# Patient Record
Sex: FEMALE | Race: WHITE | NOT HISPANIC OR LATINO | Employment: OTHER | URBAN - METROPOLITAN AREA
[De-identification: names, ages, dates, MRNs, and addresses within clinical notes are randomized per-mention and may not be internally consistent; named-entity substitution may affect disease eponyms.]

---

## 2017-01-03 ENCOUNTER — PATIENT MESSAGE (OUTPATIENT)
Dept: RHEUMATOLOGY | Facility: CLINIC | Age: 57
End: 2017-01-03

## 2017-01-17 ENCOUNTER — TELEPHONE (OUTPATIENT)
Dept: RHEUMATOLOGY | Facility: CLINIC | Age: 57
End: 2017-01-17

## 2017-01-17 NOTE — TELEPHONE ENCOUNTER
----- Message from Shagufta Clark sent at 1/17/2017  1:19 PM CST -----  Contact: Zeinab Al office called regarding a clearance needed for the patient. Stating having surgery. Please contact [phone] and email address is [email or web address]

## 2017-01-18 ENCOUNTER — TELEPHONE (OUTPATIENT)
Dept: FAMILY MEDICINE | Facility: CLINIC | Age: 57
End: 2017-01-18

## 2017-01-18 NOTE — TELEPHONE ENCOUNTER
Called Zeinab with Dr. Chinchilla office, she was cleared by Dr. Fry to have surgery, and had a few questions like who prescribed her the sildenafil and imuran. Advised Dr. Fry was the last one to prescribe her that and she stated she will pre op her today and that's all she needed, and she will just contact Dr. Fry office for any other clearance questions.

## 2017-01-18 NOTE — TELEPHONE ENCOUNTER
----- Message from Shagufta Clark sent at 1/17/2017  1:17 PM CST -----  Contact: Zeinab Al office called regarding a clearance needed for the patient. Stating having surgery. Please contact [phone] and email address is [email or web address]

## 2017-01-19 ENCOUNTER — TELEPHONE (OUTPATIENT)
Dept: RHEUMATOLOGY | Facility: CLINIC | Age: 57
End: 2017-01-19

## 2017-01-19 NOTE — TELEPHONE ENCOUNTER
Returned Dr. Beavers's office's call. Informed patient to hold Imuran one week prior and Revation day of surgery.

## 2017-01-19 NOTE — TELEPHONE ENCOUNTER
----- Message from Hawaleo Armstrong sent at 1/18/2017  8:47 AM CST -----  Contact: [phone]/Zeinab/Renée  Please call Dr. Chinchilla's office/Zeinab at [phone] in reference to patients medications.

## 2017-03-17 ENCOUNTER — PATIENT MESSAGE (OUTPATIENT)
Dept: RHEUMATOLOGY | Facility: CLINIC | Age: 57
End: 2017-03-17

## 2017-03-20 ENCOUNTER — TELEPHONE (OUTPATIENT)
Dept: RHEUMATOLOGY | Facility: CLINIC | Age: 57
End: 2017-03-20

## 2017-03-20 ENCOUNTER — LAB VISIT (OUTPATIENT)
Dept: LAB | Facility: HOSPITAL | Age: 57
End: 2017-03-20
Attending: INTERNAL MEDICINE
Payer: MEDICARE

## 2017-03-20 DIAGNOSIS — K22.4 ESOPHAGEAL DYSMOTILITY: ICD-10-CM

## 2017-03-20 DIAGNOSIS — I27.20 PULMONARY HYPERTENSION: ICD-10-CM

## 2017-03-20 DIAGNOSIS — E03.9 HYPOTHYROIDISM, UNSPECIFIED TYPE: ICD-10-CM

## 2017-03-20 DIAGNOSIS — M34.1 CREST (CALCINOSIS, RAYNAUD'S PHENOMENON, ESOPHAGEAL DYSFUNCTION, SCLERODACTYLY, TELANGIECTASIA): ICD-10-CM

## 2017-03-20 LAB
ALBUMIN SERPL BCP-MCNC: 3.5 G/DL
ALP SERPL-CCNC: 71 U/L
ALT SERPL W/O P-5'-P-CCNC: 25 U/L
ANION GAP SERPL CALC-SCNC: 8 MMOL/L
AST SERPL-CCNC: 22 U/L
BASOPHILS # BLD AUTO: 0.01 K/UL
BASOPHILS NFR BLD: 0.2 %
BILIRUB SERPL-MCNC: 0.2 MG/DL
BUN SERPL-MCNC: 15 MG/DL
CALCIUM SERPL-MCNC: 9.1 MG/DL
CHLORIDE SERPL-SCNC: 103 MMOL/L
CO2 SERPL-SCNC: 28 MMOL/L
CREAT SERPL-MCNC: 0.8 MG/DL
CRP SERPL-MCNC: 3.6 MG/L
DIFFERENTIAL METHOD: ABNORMAL
EOSINOPHIL # BLD AUTO: 0.1 K/UL
EOSINOPHIL NFR BLD: 1.7 %
ERYTHROCYTE [DISTWIDTH] IN BLOOD BY AUTOMATED COUNT: 13.4 %
ERYTHROCYTE [SEDIMENTATION RATE] IN BLOOD BY WESTERGREN METHOD: 20 MM/HR
EST. GFR  (AFRICAN AMERICAN): >60 ML/MIN/1.73 M^2
EST. GFR  (NON AFRICAN AMERICAN): >60 ML/MIN/1.73 M^2
GLUCOSE SERPL-MCNC: 87 MG/DL
HCT VFR BLD AUTO: 36.8 %
HGB BLD-MCNC: 12.2 G/DL
LYMPHOCYTES # BLD AUTO: 3.1 K/UL
LYMPHOCYTES NFR BLD: 46.4 %
MCH RBC QN AUTO: 28.8 PG
MCHC RBC AUTO-ENTMCNC: 33.2 %
MCV RBC AUTO: 87 FL
MONOCYTES # BLD AUTO: 0.5 K/UL
MONOCYTES NFR BLD: 7.4 %
NEUTROPHILS # BLD AUTO: 2.9 K/UL
NEUTROPHILS NFR BLD: 44.1 %
PLATELET # BLD AUTO: 257 K/UL
PMV BLD AUTO: 10.8 FL
POTASSIUM SERPL-SCNC: 3.7 MMOL/L
PROT SERPL-MCNC: 7.1 G/DL
RBC # BLD AUTO: 4.23 M/UL
SODIUM SERPL-SCNC: 139 MMOL/L
T3FREE SERPL-MCNC: 2.4 PG/ML
T4 FREE SERPL-MCNC: 0.92 NG/DL
TSH SERPL DL<=0.005 MIU/L-ACNC: 2.53 UIU/ML
WBC # BLD AUTO: 6.58 K/UL

## 2017-03-20 PROCEDURE — 80053 COMPREHEN METABOLIC PANEL: CPT

## 2017-03-20 PROCEDURE — 85651 RBC SED RATE NONAUTOMATED: CPT

## 2017-03-20 PROCEDURE — 84481 FREE ASSAY (FT-3): CPT

## 2017-03-20 PROCEDURE — 86140 C-REACTIVE PROTEIN: CPT

## 2017-03-20 PROCEDURE — 84443 ASSAY THYROID STIM HORMONE: CPT

## 2017-03-20 PROCEDURE — 36415 COLL VENOUS BLD VENIPUNCTURE: CPT | Mod: PO

## 2017-03-20 PROCEDURE — 85025 COMPLETE CBC W/AUTO DIFF WBC: CPT

## 2017-03-20 PROCEDURE — 84439 ASSAY OF FREE THYROXINE: CPT

## 2017-03-23 ENCOUNTER — OFFICE VISIT (OUTPATIENT)
Dept: RHEUMATOLOGY | Facility: CLINIC | Age: 57
End: 2017-03-23
Payer: MEDICARE

## 2017-03-23 VITALS
WEIGHT: 189.38 LBS | SYSTOLIC BLOOD PRESSURE: 128 MMHG | HEIGHT: 63 IN | BODY MASS INDEX: 33.55 KG/M2 | DIASTOLIC BLOOD PRESSURE: 82 MMHG | HEART RATE: 89 BPM

## 2017-03-23 DIAGNOSIS — M35.00 SJOGREN'S SYNDROME: ICD-10-CM

## 2017-03-23 DIAGNOSIS — I27.20 PULMONARY HYPERTENSION: Primary | ICD-10-CM

## 2017-03-23 DIAGNOSIS — E55.9 VITAMIN D DEFICIENCY DISEASE: ICD-10-CM

## 2017-03-23 DIAGNOSIS — M34.1 CREST (CALCINOSIS, RAYNAUD'S PHENOMENON, ESOPHAGEAL DYSFUNCTION, SCLERODACTYLY, TELANGIECTASIA): ICD-10-CM

## 2017-03-23 DIAGNOSIS — K22.4 ESOPHAGEAL DYSMOTILITY: ICD-10-CM

## 2017-03-23 PROCEDURE — 3079F DIAST BP 80-89 MM HG: CPT | Mod: S$GLB,,, | Performed by: INTERNAL MEDICINE

## 2017-03-23 PROCEDURE — 1160F RVW MEDS BY RX/DR IN RCRD: CPT | Mod: S$GLB,,, | Performed by: INTERNAL MEDICINE

## 2017-03-23 PROCEDURE — 99499 UNLISTED E&M SERVICE: CPT | Mod: S$GLB,,, | Performed by: INTERNAL MEDICINE

## 2017-03-23 PROCEDURE — 99214 OFFICE O/P EST MOD 30 MIN: CPT | Mod: S$GLB,,, | Performed by: INTERNAL MEDICINE

## 2017-03-23 PROCEDURE — 99999 PR PBB SHADOW E&M-EST. PATIENT-LVL III: CPT | Mod: PBBFAC,,, | Performed by: INTERNAL MEDICINE

## 2017-03-23 PROCEDURE — 3074F SYST BP LT 130 MM HG: CPT | Mod: S$GLB,,, | Performed by: INTERNAL MEDICINE

## 2017-03-23 RX ORDER — CLONAZEPAM 1 MG/1
1 TABLET ORAL 2 TIMES DAILY PRN
Qty: 45 TABLET | Refills: 2 | Status: SHIPPED | OUTPATIENT
Start: 2017-03-23 | End: 2017-04-25 | Stop reason: SDUPTHER

## 2017-03-23 RX ORDER — HYDROXYZINE PAMOATE 25 MG/1
50 CAPSULE ORAL NIGHTLY PRN
Qty: 60 CAPSULE | Refills: 4 | Status: SHIPPED | OUTPATIENT
Start: 2017-03-23 | End: 2017-04-25 | Stop reason: SDUPTHER

## 2017-03-23 NOTE — PROGRESS NOTES
Subjective:       Patient ID: Tina García is a 56 y.o. female.    Chief Complaint: Follow-up    HPI Comments: Follow up: has crest syndrome, pa pressure 33, pft mild pulmonary restriction,  raynauds disease,  She is on revatio and its helping. Patient complains of arthralgias and myalgias for which has been present for a few years. Pain is located in multiple joints, both shoulder(s), both elbow(s), both wrist(s), both MCP(s): 1st, 2nd, 3rd, 4th and 5th, both PIP(s): 1st, 2nd, 3rd, 4th and 5th, both DIP(s): 1st and 2nd, both hip(s), both knee(s) and both MTP(s): 1st, 2nd, 3rd, 4th and 5th, is described as aching, pulsating, shooting and throbbing, and is constant, moderate .  Associated symptoms include: crepitation, decreased range of motion, edema, effusion, tenderness and warmth.              Associated symptoms include fatigue. She complains of lasting between 30 and 60 minutes after awakening.    Treatments tried: plaquenil.     Review of Systems   Constitutional: Positive for activity change, chills and fatigue. Negative for appetite change, diaphoresis and unexpected weight change.   HENT: Negative for congestion, dental problem, ear discharge, ear pain, facial swelling, mouth sores, nosebleeds, postnasal drip, rhinorrhea, sinus pressure, sneezing, sore throat, tinnitus and voice change.    Eyes: Negative for photophobia, pain, discharge, redness and itching.   Respiratory: Positive for choking. Negative for apnea, cough, chest tightness, shortness of breath and wheezing.    Cardiovascular: Positive for leg swelling. Negative for chest pain and palpitations.   Gastrointestinal: Positive for abdominal pain. Negative for abdominal distention, constipation, diarrhea, nausea and vomiting.   Endocrine: Negative for cold intolerance, heat intolerance, polydipsia and polyuria.   Genitourinary: Negative for decreased urine volume, difficulty urinating, flank pain, frequency, hematuria and urgency.  "  Musculoskeletal: Positive for arthralgias, back pain, gait problem, neck pain and neck stiffness.   Skin: Negative for pallor, rash and wound.   Allergic/Immunologic: Negative for immunocompromised state.   Neurological: Positive for weakness. Negative for dizziness, tremors and numbness.   Hematological: Negative for adenopathy. Does not bruise/bleed easily.   Psychiatric/Behavioral: Negative for sleep disturbance. The patient is not nervous/anxious.          Objective:     /82  Pulse 89  Ht 5' 3" (1.6 m)  Wt 85.9 kg (189 lb 6 oz)  BMI 33.55 kg/m2     Physical Exam   Vitals reviewed.  Constitutional: She is oriented to person, place, and time. She appears distressed.   HENT:   Head: Normocephalic and atraumatic.   Eyes: EOM are normal. Pupils are equal, round, and reactive to light. Right eye exhibits no discharge. Left eye exhibits no discharge.   Neck: Neck supple. No thyromegaly present.   Cardiovascular: Normal rate, regular rhythm and normal heart sounds.  Exam reveals no gallop and no friction rub.    No murmur heard.  Pulmonary/Chest: She has wheezes. She has no rales. She exhibits no tenderness.   Abdominal: There is no tenderness. There is no rebound and no guarding.       Right Side Rheumatological Exam     Examination finds the elbow normal.    The patient is tender to palpation of the shoulder, wrist, knee, 1st PIP, 1st MCP, 2nd PIP, 2nd MCP, 3rd PIP, 3rd MCP, 4th PIP, 4th MCP, 5th PIP and 5th MCP    She has swelling of the 1st PIP, 1st MCP, 2nd PIP, 2nd MCP, 3rd PIP, 3rd MCP, 4th PIP, 4th MCP, 5th PIP and 5th MCP    Shoulder Exam   Tenderness Location: no tenderness    Range of Motion   Active Abduction: abnormal   Adduction: abnormal  Sensation: normal    Knee Exam   Patellofemoral Crepitus: positive  Effusion: positive  Sensation: normal    Hip Exam   Tenderness Location: posterior  Sensation: normal    Elbow/Wrist Exam   Tenderness Location: no tenderness  Sensation: normal    Left Side " Rheumatological Exam     The patient is tender to palpation of the shoulder, elbow, wrist, knee, 1st PIP, 1st MCP, 2nd PIP, 2nd MCP, 3rd PIP, 3rd MCP, 4th PIP, 4th MCP, 5th PIP and 5th MCP.    She has swelling of the 1st PIP, 1st MCP, 2nd PIP, 2nd MCP, 3rd PIP, 3rd MCP, 4th PIP, 4th MCP, 5th PIP and 5th MCP    Shoulder Exam   Tenderness Location: no tenderness    Range of Motion   Active Abduction: abnormal   Sensation: normal    Knee Exam     Patellofemoral Crepitus: positive  Effusion: positive  Sensation: normal    Hip Exam   Tenderness Location: posterior  Sensation: normal    Elbow/Wrist Exam   Sensation: normal      Back/Neck Exam   General Inspection   Gait: normal         Lymphadenopathy:     She has no cervical adenopathy.   Neurological: She is alert and oriented to person, place, and time. Gait normal.   Skin: Skin is dry. No rash noted. No erythema. There is pallor.     Psychiatric: Mood and affect normal.   Musculoskeletal: She exhibits tenderness and deformity. She exhibits no edema.   dactylitis         TSH 0.400 - 4.000 uIU/mL   6.735 (H)  1.878  3.749  Resulting Agency  [facility]   Positive 1:1280 [employer] [facility]  Resulting Agency [facility]    Results for orders placed or performed in visit on 03/20/17   CBC auto differential   Result Value Ref Range    WBC 6.58 3.90 - 12.70 K/uL    RBC 4.23 4.00 - 5.40 M/uL    Hemoglobin 12.2 12.0 - 16.0 g/dL    Hematocrit 36.8 (L) 37.0 - 48.5 %    MCV 87 82 - 98 fL    MCH 28.8 27.0 - 31.0 pg    MCHC 33.2 32.0 - 36.0 %    RDW 13.4 11.5 - 14.5 %    Platelets 257 150 - 350 K/uL    MPV 10.8 9.2 - 12.9 fL    Gran # 2.9 1.8 - 7.7 K/uL    Lymph # 3.1 1.0 - 4.8 K/uL    Mono # 0.5 0.3 - 1.0 K/uL    Eos # 0.1 0.0 - 0.5 K/uL    Baso # 0.01 0.00 - 0.20 K/uL    Gran% 44.1 38.0 - 73.0 %    Lymph% 46.4 18.0 - 48.0 %    Mono% 7.4 4.0 - 15.0 %    Eosinophil% 1.7 0.0 - 8.0 %    Basophil% 0.2 0.0 - 1.9 %    Differential Method Automated    Comprehensive metabolic panel    Result Value Ref Range    Sodium 139 136 - 145 mmol/L    Potassium 3.7 3.5 - 5.1 mmol/L    Chloride 103 95 - 110 mmol/L    CO2 28 23 - 29 mmol/L    Glucose 87 70 - 110 mg/dL    BUN, Bld 15 6 - 20 mg/dL    Creatinine 0.8 0.5 - 1.4 mg/dL    Calcium 9.1 8.7 - 10.5 mg/dL    Total Protein 7.1 6.0 - 8.4 g/dL    Albumin 3.5 3.5 - 5.2 g/dL    Total Bilirubin 0.2 0.1 - 1.0 mg/dL    Alkaline Phosphatase 71 55 - 135 U/L    AST 22 10 - 40 U/L    ALT 25 10 - 44 U/L    Anion Gap 8 8 - 16 mmol/L    eGFR if African American >60.0 >60 mL/min/1.73 m^2    eGFR if non African American >60.0 >60 mL/min/1.73 m^2   C-reactive protein   Result Value Ref Range    CRP 3.6 0.0 - 8.2 mg/L   TSH   Result Value Ref Range    TSH 2.531 0.400 - 4.000 uIU/mL   T4, free   Result Value Ref Range    Free T4 0.92 0.71 - 1.51 ng/dL   T3, free   Result Value Ref Range    T3, Free 2.4 2.3 - 4.2 pg/mL   Sedimentation rate, manual   Result Value Ref Range    Sed Rate 20 0 - 20 mm/Hr     Assessment:       1. Pulmonary hypertension    2. CREST (calcinosis, Raynaud's phenomenon, esophageal dysfunction, sclerodactyly, telangiectasia)    3. Esophageal dysmotility    4. Sjogren's syndrome            Plan:       Pulmonary hypertension  -     Comprehensive metabolic panel; Future; Expected date: 3/23/17  -     CBC auto differential; Future; Expected date: 3/23/17  -     C-reactive protein; Future; Expected date: 3/23/17  -     Sedimentation rate, manual; Future; Expected date: 3/23/17  -     TSH; Future; Expected date: 3/23/17  -     T4, free; Future; Expected date: 3/23/17  -     T3, free; Future; Expected date: 3/23/17  -     JAMILA; Future; Expected date: 3/23/17  -     Anti-scleroderma antibody; Future; Expected date: 3/23/17  -     PROGESTERONE; Future; Expected date: 3/23/17  -     ESTROGENS, TOTAL; Future; Expected date: 3/23/17  -     DHEA; Future; Expected date: 3/23/17  -     Cortisol, free, serum; Future; Expected date: 3/23/17  -     VITAMIN D; Future;  Expected date: 3/23/17    CREST (calcinosis, Raynaud's phenomenon, esophageal dysfunction, sclerodactyly, telangiectasia)  -     Comprehensive metabolic panel; Future; Expected date: 3/23/17  -     CBC auto differential; Future; Expected date: 3/23/17  -     C-reactive protein; Future; Expected date: 3/23/17  -     Sedimentation rate, manual; Future; Expected date: 3/23/17  -     TSH; Future; Expected date: 3/23/17  -     T4, free; Future; Expected date: 3/23/17  -     T3, free; Future; Expected date: 3/23/17  -     JAMILA; Future; Expected date: 3/23/17  -     Anti-scleroderma antibody; Future; Expected date: 3/23/17  -     PROGESTERONE; Future; Expected date: 3/23/17  -     ESTROGENS, TOTAL; Future; Expected date: 3/23/17  -     DHEA; Future; Expected date: 3/23/17  -     Cortisol, free, serum; Future; Expected date: 3/23/17  -     VITAMIN D; Future; Expected date: 3/23/17    Esophageal dysmotility  -     Comprehensive metabolic panel; Future; Expected date: 3/23/17  -     CBC auto differential; Future; Expected date: 3/23/17  -     C-reactive protein; Future; Expected date: 3/23/17  -     Sedimentation rate, manual; Future; Expected date: 3/23/17  -     TSH; Future; Expected date: 3/23/17  -     T4, free; Future; Expected date: 3/23/17  -     T3, free; Future; Expected date: 3/23/17  -     JAMILA; Future; Expected date: 3/23/17  -     Anti-scleroderma antibody; Future; Expected date: 3/23/17  -     PROGESTERONE; Future; Expected date: 3/23/17  -     ESTROGENS, TOTAL; Future; Expected date: 3/23/17  -     DHEA; Future; Expected date: 3/23/17  -     Cortisol, free, serum; Future; Expected date: 3/23/17  -     VITAMIN D; Future; Expected date: 3/23/17    Sjogren's syndrome  -     Comprehensive metabolic panel; Future; Expected date: 3/23/17  -     CBC auto differential; Future; Expected date: 3/23/17  -     C-reactive protein; Future; Expected date: 3/23/17  -     Sedimentation rate, manual; Future; Expected date:  3/23/17  -     TSH; Future; Expected date: 3/23/17  -     T4, free; Future; Expected date: 3/23/17  -     T3, free; Future; Expected date: 3/23/17  -     JAMILA; Future; Expected date: 3/23/17  -     Anti-scleroderma antibody; Future; Expected date: 3/23/17  -     PROGESTERONE; Future; Expected date: 3/23/17  -     ESTROGENS, TOTAL; Future; Expected date: 3/23/17  -     DHEA; Future; Expected date: 3/23/17  -     Cortisol, free, serum; Future; Expected date: 3/23/17  -     VITAMIN D; Future; Expected date: 3/23/17    Vitamin D deficiency disease  -     PROGESTERONE; Future; Expected date: 3/23/17  -     ESTROGENS, TOTAL; Future; Expected date: 3/23/17  -     DHEA; Future; Expected date: 3/23/17  -     Cortisol, free, serum; Future; Expected date: 3/23/17  -     VITAMIN D; Future; Expected date: 3/23/17    Other orders  -     clonazePAM (KLONOPIN) 1 MG tablet; Take 1 tablet (1 mg total) by mouth 2 (two) times daily as needed for Anxiety.  Dispense: 45 tablet; Refill: 2  -     hydrOXYzine pamoate (VISTARIL) 25 MG Cap; Take 2 capsules (50 mg total) by mouth nightly as needed.  Dispense: 60 capsule; Refill: 4

## 2017-03-23 NOTE — MR AVS SNAPSHOT
[facility]  [address]  [facility] [address]  Phone: [phone]  Fax: [phone]                  Tina García   3/23/2017 11:30 AM   Office Visit    Description:  Female : 1960   Provider:  Joao Fry MD   Department:  [place] - Rheumatology           Reason for Visit     Follow-up           Diagnoses this Visit        Comments    Pulmonary hypertension    -  Primary     CREST (calcinosis, Raynaud's phenomenon, esophageal dysfunction, sclerodactyly, telangiectasia)         Esophageal dysmotility         Sjogren's syndrome         Vitamin D deficiency disease                To Do List           Future Appointments        Provider Department Dept Phone    7/15/2017 9:00 AM LAB, [facility] - [facility] [phone]    2017 3:00 PM Joao Fry MD [facility] [phone]      Goals (5 Years of Data)     None       These Medications        Disp Refills Start End    clonazePAM (KLONOPIN) 1 MG tablet 45 tablet 2 3/23/2017 2017    Take 1 tablet (1 mg total) by mouth 2 (two) times daily as needed for Anxiety. - Oral    Pharmacy: [email or web address] [address]. Ph #: [phone]       hydrOXYzine pamoate (VISTARIL) 25 MG Cap 60 capsule 4 3/23/2017 2017    Take 2 capsules (50 mg total) by mouth nightly as needed. - Oral    Pharmacy: [email or web address] [address]. Ph #: [phone]         [facility]     [facility] -  Assistance  Registered nurses in the [facility] provide clinical advisement, health education, appointment booking, and other advisory services.  Call for this free service at [phone].             Medications           Message regarding Medications     Verify the changes and/or additions to your medication regime listed below are the same as discussed with your clinician today.  If any of these changes or  additions are incorrect, please notify your healthcare provider.        CHANGE how you are taking these medications     Start Taking Instead of    clonazePAM (KLONOPIN) 1 MG tablet clonazePAM (KLONOPIN) 1 MG tablet    Dosage:  Take 1 tablet (1 mg total) by mouth 2 (two) times daily as needed for Anxiety.     Reason for Change:  Reorder     hydrOXYzine pamoate (VISTARIL) 25 MG Cap hydrOXYzine (VISTARIL) 25 MG capsule    Dosage:  Take 2 capsules (50 mg total) by mouth nightly as needed. Dosage:  Take 25 mg by mouth nightly as needed.      Reason for Change:  Reorder            Verify that the below list of medications is an accurate representation of the medications you are currently taking.  If none reported, the list may be blank. If incorrect, please contact your healthcare provider. Carry this list with you in case of emergency.           Current Medications     albuterol (PROAIR HFA) 90 mcg/actuation inhaler Inhale 2 puffs into the lungs every 6 (six) hours as needed for Wheezing.    atropine-phenobarbital-scopolamine-hyoscyamine (DONNATAL) 16.2-0.1037 -0.0194 mg Tab Take 1 tablet by mouth 3 (three) times daily as needed.    azathioprine (IMURAN) 50 mg Tab Take 1 tablet (50 mg total) by mouth once daily.    calcium carbonate-vit D3-min (CALTRATE 600+D PLUS MINERALS) 600 mg (1,500 mg)-400 unit Chew Take 1 tablet by mouth once daily.      cetirizine (ZYRTEC) 10 MG tablet Take 1 tablet (10 mg total) by mouth once daily.    clonazePAM (KLONOPIN) 1 MG tablet Take 1 tablet (1 mg total) by mouth 2 (two) times daily as needed for Anxiety.    diclofenac sodium (PENNSAID) 1.5 % Drop 40 drops.    EMBEDA 20-0.8 mg CEPO Take 1 capsule by mouth 2 (two) times daily.    ESTROGENS,CONJ.,SYNTHETIC B (SYNTHETIC CONJ ESTROGENS B ORAL) Take by mouth.    fluticasone (FLONASE) 50 mcg/actuation nasal spray 2 drops.    hydrOXYzine pamoate (VISTARIL) 25 MG Cap Take 2 capsules (50 mg total) by mouth nightly as needed.    levothyroxine  "(SYNTHROID) 75 MCG tablet Take 1 tablet (75 mcg total) by mouth once daily.    losartan-hydrochlorothiazide 100-25 mg (HYZAAR) 100-25 mg per tablet TAKE ONE TABLET BY MOUTH EVERY DAY    misoprostol (CYTOTEC) 200 MCG Tab Take 1 tablet (200 mcg total) by mouth 4 (four) times daily with meals and nightly.    naftifine (NAFTIN) 1 % Gel Apply 1 puff topically once daily.    niacin 500 MG tablet Take 1 tablet by mouth.    omeprazole (PRILOSEC) 40 MG capsule Take 40 mg by mouth once daily.    ondansetron (ZOFRAN-ODT) 4 MG TbDL Take 2 tablets (8 mg total) by mouth 3 (three) times daily as needed.    ranitidine (ZANTAC) 300 MG tablet Take 1 tablet (300 mg total) by mouth every evening. , about 30-60 minutes before bedtime.    sildenafil (REVATIO) 20 mg Tab Take 1 tablet (20 mg total) by mouth 3 (three) times daily.    TESTOSTERONE 5 MG CAP Take 5 mg by mouth once daily.    tizanidine (ZANAFLEX) 4 MG tablet Take 0.5 tablets by mouth Twice daily as needed.    tramadol (ULTRAM) 50 mg tablet Take 50 mg by mouth 3 (three) times daily as needed.           Clinical Reference Information           Your Vitals Were     BP Pulse Height Weight BMI    128/82 89 5' 3" (1.6 m) 85.9 kg (189 lb 6 oz) 33.55 kg/m2      Blood Pressure          Most Recent Value    BP  128/82      Allergies as of 3/23/2017     Celebrex [Celecoxib]    Nsaids (Non-steroidal Anti-inflammatory Drug)      Immunizations Administered on Date of Encounter - 3/23/2017     None      Orders Placed During Today's Visit     Future Labs/Procedures Expected by Expires    JAMILA  3/23/2017 5/22/2018    Anti-scleroderma antibody  3/23/2017 5/22/2018    C-reactive protein  3/23/2017 5/22/2018    CBC auto differential  3/23/2017 5/22/2018    Comprehensive metabolic panel  3/23/2017 5/22/2018    Cortisol, free, serum  3/23/2017 5/22/2018    DHEA  3/23/2017 5/22/2018    ESTROGENS, TOTAL  3/23/2017 5/22/2018    PROGESTERONE  3/23/2017 5/22/2018    Sedimentation rate, manual  3/23/2017 " 5/22/2018    T3, free  3/23/2017 5/22/2018    T4, free  3/23/2017 5/22/2018    TSH  3/23/2017 5/22/2018    VITAMIN D  3/23/2017 5/22/2018      Language Assistance Services     ATTENTION: Language assistance services are available, free of charge. Please call [phone].      ATENCIÓN: Si habla español, tiene a jorgensen disposición servicios gratuitos de asistencia lingüística. Llame al [phone].     CHÚ Ý: N?u b?n nói Ti?ng Vi?t, có các d?ch v? h? tr? ngôn ng? mi?n phí dành cho b?n. G?i s? [phone].         [facility] complies with applicable Federal civil rights laws and does not discriminate on the basis of race, color, national origin, age, disability, or sex.

## 2017-03-24 ENCOUNTER — PATIENT MESSAGE (OUTPATIENT)
Dept: RHEUMATOLOGY | Facility: CLINIC | Age: 57
End: 2017-03-24

## 2017-03-27 RX ORDER — CLONAZEPAM 1 MG/1
1 TABLET ORAL 2 TIMES DAILY PRN
Qty: 45 TABLET | Refills: 3 | Status: CANCELLED | OUTPATIENT
Start: 2017-03-27 | End: 2017-04-26

## 2017-03-27 RX ORDER — HYDROXYZINE PAMOATE 25 MG/1
50 CAPSULE ORAL NIGHTLY PRN
Qty: 60 CAPSULE | Refills: 3 | Status: CANCELLED | OUTPATIENT
Start: 2017-03-27 | End: 2017-04-26

## 2017-03-27 NOTE — TELEPHONE ENCOUNTER
Needs refills until next office visit in August. She states she has already discussed with you on last visit, until she see another psych, you stated you would be wiling to fill these meds. Please advise**     Currently pt has:   clonazepam 1mg-  (2) refills left. Will need 3 more refills to last until next office visit.   hydroxyzine 25mg- (3) refills left. Will need 2 more refills to last her until next office visit.

## 2017-03-29 ENCOUNTER — PATIENT MESSAGE (OUTPATIENT)
Dept: RHEUMATOLOGY | Facility: CLINIC | Age: 57
End: 2017-03-29

## 2017-04-12 ENCOUNTER — OFFICE VISIT (OUTPATIENT)
Dept: FAMILY MEDICINE | Facility: CLINIC | Age: 57
End: 2017-04-12
Payer: MEDICARE

## 2017-04-12 VITALS
TEMPERATURE: 98 F | OXYGEN SATURATION: 97 % | BODY MASS INDEX: 33.2 KG/M2 | HEIGHT: 63 IN | DIASTOLIC BLOOD PRESSURE: 80 MMHG | HEART RATE: 106 BPM | WEIGHT: 187.38 LBS | SYSTOLIC BLOOD PRESSURE: 140 MMHG

## 2017-04-12 DIAGNOSIS — J45.20 REACTIVE AIRWAY DISEASE, MILD INTERMITTENT, UNCOMPLICATED: ICD-10-CM

## 2017-04-12 DIAGNOSIS — M34.1 CREST SYNDROME: ICD-10-CM

## 2017-04-12 DIAGNOSIS — J01.10 ACUTE NON-RECURRENT FRONTAL SINUSITIS: ICD-10-CM

## 2017-04-12 DIAGNOSIS — R05.9 COUGH: Primary | ICD-10-CM

## 2017-04-12 DIAGNOSIS — I10 ESSENTIAL HYPERTENSION: ICD-10-CM

## 2017-04-12 DIAGNOSIS — I27.20 PHT (PULMONARY HYPERTENSION): ICD-10-CM

## 2017-04-12 PROCEDURE — 3077F SYST BP >= 140 MM HG: CPT | Mod: S$GLB,,, | Performed by: NURSE PRACTITIONER

## 2017-04-12 PROCEDURE — 99214 OFFICE O/P EST MOD 30 MIN: CPT | Mod: 25,S$GLB,, | Performed by: NURSE PRACTITIONER

## 2017-04-12 PROCEDURE — 96372 THER/PROPH/DIAG INJ SC/IM: CPT | Mod: S$GLB,,, | Performed by: NURSE PRACTITIONER

## 2017-04-12 PROCEDURE — 1160F RVW MEDS BY RX/DR IN RCRD: CPT | Mod: S$GLB,,, | Performed by: NURSE PRACTITIONER

## 2017-04-12 PROCEDURE — 99999 PR PBB SHADOW E&M-EST. PATIENT-LVL V: CPT | Mod: PBBFAC,,, | Performed by: NURSE PRACTITIONER

## 2017-04-12 PROCEDURE — 99499 UNLISTED E&M SERVICE: CPT | Mod: S$GLB,,, | Performed by: NURSE PRACTITIONER

## 2017-04-12 PROCEDURE — 3079F DIAST BP 80-89 MM HG: CPT | Mod: S$GLB,,, | Performed by: NURSE PRACTITIONER

## 2017-04-12 RX ORDER — ALBUTEROL SULFATE 0.83 MG/ML
2.5 SOLUTION RESPIRATORY (INHALATION) EVERY 6 HOURS PRN
Qty: 3 BOX | Refills: 0 | Status: SHIPPED | OUTPATIENT
Start: 2017-04-12 | End: 2018-04-12

## 2017-04-12 RX ORDER — FLUTICASONE PROPIONATE 50 MCG
1 SPRAY, SUSPENSION (ML) NASAL DAILY
Qty: 16 G | Refills: 5 | Status: SHIPPED | OUTPATIENT
Start: 2017-04-12 | End: 2018-01-05 | Stop reason: SDUPTHER

## 2017-04-12 RX ORDER — CEFUROXIME AXETIL 500 MG/1
500 TABLET ORAL 2 TIMES DAILY
Qty: 14 TABLET | Refills: 0 | Status: SHIPPED | OUTPATIENT
Start: 2017-04-12 | End: 2017-05-23

## 2017-04-12 RX ORDER — BETAMETHASONE SODIUM PHOSPHATE AND BETAMETHASONE ACETATE 3; 3 MG/ML; MG/ML
6 INJECTION, SUSPENSION INTRA-ARTICULAR; INTRALESIONAL; INTRAMUSCULAR; SOFT TISSUE
Status: COMPLETED | OUTPATIENT
Start: 2017-04-12 | End: 2017-04-12

## 2017-04-12 RX ORDER — ALBUTEROL SULFATE 90 UG/1
2 AEROSOL, METERED RESPIRATORY (INHALATION) EVERY 6 HOURS PRN
Qty: 6.7 G | Refills: 3 | Status: SHIPPED | OUTPATIENT
Start: 2017-04-12 | End: 2020-06-03 | Stop reason: SDUPTHER

## 2017-04-12 RX ADMIN — BETAMETHASONE SODIUM PHOSPHATE AND BETAMETHASONE ACETATE 6 MG: 3; 3 INJECTION, SUSPENSION INTRA-ARTICULAR; INTRALESIONAL; INTRAMUSCULAR; SOFT TISSUE at 02:04

## 2017-04-12 NOTE — LETTER
[facility]  [address]  [facility] [address]  Phone: [phone]  Fax: [phone] April 12, 2017    Tina García  [address] [place] [id number]      To Whom It May Concern:    Tina García is unable to participate in jury duty due to chronic illness.  She is unable to participate in jury duty that she was summoned for 5-17-17     If you have any questions or concerns, please feel free to call my office.    Sincerely,        Linda Jane, NP

## 2017-04-12 NOTE — PROGRESS NOTES
Subjective:       Patient ID: Tina García is a 56 y.o. female.    Chief Complaint: Cough (cough/cold since saturday.Sore throat,chills,wheezing,congestion,fever,diarrhea,fatigue ,no appetite)    Cough   This is a new problem. The current episode started in the past 7 days (4 days ). The problem has been rapidly worsening. The cough is productive of sputum. Associated symptoms include a fever, nasal congestion, rhinorrhea, a sore throat, shortness of breath and wheezing. Pertinent negatives include no chest pain, chills, ear congestion, ear pain, headaches, heartburn, hemoptysis, myalgias, postnasal drip, rash, sweats or weight loss. Associated symptoms comments: 100. Nothing aggravates the symptoms. Treatments tried: mucinex, cloricidan hbp. There is no history of asthma, bronchiectasis, bronchitis, COPD, emphysema, environmental allergies or pneumonia.     Vitals:    04/12/17 1411   BP: (!) 140/80   Pulse: 106   Temp: 98.3 °F (36.8 °C)     Review of Systems   Constitutional: Positive for fever. Negative for chills, diaphoresis, fatigue and weight loss.   HENT: Positive for rhinorrhea and sore throat. Negative for ear pain and postnasal drip.    Eyes: Negative.    Respiratory: Positive for cough, shortness of breath and wheezing. Negative for hemoptysis.    Cardiovascular: Negative.  Negative for chest pain.   Gastrointestinal: Negative.  Negative for abdominal pain, diarrhea, heartburn and nausea.   Endocrine: Negative.    Genitourinary: Negative.  Negative for dysuria and hematuria.   Musculoskeletal: Negative.  Negative for myalgias.   Skin: Negative for color change and rash.   Allergic/Immunologic: Negative.  Negative for environmental allergies.   Neurological: Negative.  Negative for speech difficulty, numbness and headaches.   Hematological: Negative.    Psychiatric/Behavioral: Negative.        Past Medical History:   Diagnosis Date    Acid reflux     Anxiety     AR (allergic rhinitis)      Arthritis     rheumatoid?    Chronic constipation     Colon polyp     CREST syndrome     Depression     Dry eyes     Dry mouth     Esophageal dysmotility     Esophageal dysmotility     Fibromyalgia     Food intolerance     GERD (gastroesophageal reflux disease)     HTN (hypertension)     Irritable bowel syndrome     Raynaud disease     Right wrist fracture     nov 2013    SOB (shortness of breath) on exertion     Thyroid disease     hypothyroid, thyroid cyst on U/S    Ulcer     Vitamin D deficiency disease      Objective:      Physical Exam   Constitutional: She is oriented to person, place, and time. She appears well-developed and well-nourished.   HENT:   Head: Normocephalic and atraumatic.   Right Ear: Hearing normal. A middle ear effusion is present.   Left Ear: Hearing, tympanic membrane, external ear and ear canal normal.   Nose: Mucosal edema and rhinorrhea present. Right sinus exhibits maxillary sinus tenderness. Right sinus exhibits no frontal sinus tenderness. Left sinus exhibits maxillary sinus tenderness. Left sinus exhibits no frontal sinus tenderness.   Mouth/Throat: Uvula is midline and mucous membranes are normal. Posterior oropharyngeal erythema present.   Eyes: Conjunctivae and EOM are normal. Pupils are equal, round, and reactive to light.   Neck: Neck supple.   Cardiovascular: Normal rate, regular rhythm, normal heart sounds and intact distal pulses.  Exam reveals no friction rub.    No murmur heard.  Pulmonary/Chest: Effort normal and breath sounds normal. No respiratory distress. She has no wheezes.   Abdominal: Soft. Bowel sounds are normal.   Musculoskeletal: Normal range of motion.   Neurological: She is alert and oriented to person, place, and time.   Skin: Skin is warm and dry.   Psychiatric: She has a normal mood and affect. Her behavior is normal. Judgment and thought content normal.   Nursing note and vitals reviewed.      Assessment:       1. Cough    2. Reactive  airway disease, mild intermittent, uncomplicated    3. Acute non-recurrent frontal sinusitis    4. CREST syndrome    5. Essential hypertension    6. PHT (pulmonary hypertension)        Plan:       Cough  -     betamethasone acetate-betamethasone sodium phosphate injection 6 mg; Inject 1 mL (6 mg total) into the muscle one time.    Reactive airway disease, mild intermittent, uncomplicated  -     albuterol (PROAIR HFA) 90 mcg/actuation inhaler; Inhale 2 puffs into the lungs every 6 (six) hours as needed for Wheezing.  Dispense: 6.7 g; Refill: 3  -     albuterol (PROVENTIL) 2.5 mg /3 mL (0.083 %) nebulizer solution; Take 3 mLs (2.5 mg total) by nebulization every 6 (six) hours as needed for Wheezing. Rescue  Dispense: 3 Box; Refill: 0  -     betamethasone acetate-betamethasone sodium phosphate injection 6 mg; Inject 1 mL (6 mg total) into the muscle one time.    Acute non-recurrent frontal sinusitis   fluticasone (FLONASE) 50 mcg/actuation nasal spray; 1 spray by Each Nare route once daily.  Dispense: 16 g; Refill: 5  -     cefUROXime (CEFTIN) 500 MG tablet; Take 1 tablet (500 mg total) by mouth 2 (two) times daily.  Dispense: 14 tablet; Refill: 0  Discussed with her that based on her immunodeficient function - that will provide abx - she will try to see how she feels and wait to take it 7-10 days     CREST syndrome  Stable follows with rheum -     Essential hypertension  On meds     PHT (pulmonary hypertension)  Referred to pulmonology       FU if not better

## 2017-04-24 DIAGNOSIS — K21.9 GASTROESOPHAGEAL REFLUX DISEASE, ESOPHAGITIS PRESENCE NOT SPECIFIED: Primary | ICD-10-CM

## 2017-04-26 RX ORDER — CLONAZEPAM 1 MG/1
1 TABLET ORAL 2 TIMES DAILY PRN
Qty: 45 TABLET | Refills: 2 | Status: SHIPPED | OUTPATIENT
Start: 2017-04-26 | End: 2017-09-21 | Stop reason: SDUPTHER

## 2017-04-26 RX ORDER — HYDROXYZINE PAMOATE 25 MG/1
50 CAPSULE ORAL NIGHTLY PRN
Qty: 60 CAPSULE | Refills: 4 | Status: SHIPPED | OUTPATIENT
Start: 2017-04-26 | End: 2017-05-26

## 2017-05-01 ENCOUNTER — TELEPHONE (OUTPATIENT)
Dept: TRANSPLANT | Facility: CLINIC | Age: 57
End: 2017-05-01

## 2017-05-01 DIAGNOSIS — R06.82 TACHYPNEA: ICD-10-CM

## 2017-05-01 DIAGNOSIS — I27.9 CHRONIC PULMONARY HEART DISEASE: ICD-10-CM

## 2017-05-01 DIAGNOSIS — Z79.899 POLYPHARMACY: Primary | ICD-10-CM

## 2017-05-01 NOTE — TELEPHONE ENCOUNTER
----- Message from Na Ramos RN sent at 5/1/2017  2:42 PM CDT -----  Contact: patient      ----- Message -----     From: Anahi Johnson     Sent: 5/1/2017   2:10 PM       To: Yud[facility] SALMON Staff    Please call pt at [phone]. Referred by Dr Fry for pulmonary hypertension. Also referred by Anne Favret/Jeremy Zheng nurse. She asked only for Dr Zurita.     Thank you

## 2017-05-04 DIAGNOSIS — R06.02 SHORTNESS OF BREATH: Primary | ICD-10-CM

## 2017-05-04 DIAGNOSIS — R05.9 COUGH: ICD-10-CM

## 2017-05-15 ENCOUNTER — PATIENT MESSAGE (OUTPATIENT)
Dept: RHEUMATOLOGY | Facility: CLINIC | Age: 57
End: 2017-05-15

## 2017-05-15 RX ORDER — TRAMADOL HYDROCHLORIDE 50 MG/1
50 TABLET ORAL 3 TIMES DAILY PRN
Qty: 90 TABLET | Refills: 3 | Status: SHIPPED | OUTPATIENT
Start: 2017-05-15 | End: 2017-08-16 | Stop reason: SDUPTHER

## 2017-05-15 RX ORDER — TRAMADOL HYDROCHLORIDE 50 MG/1
50 TABLET ORAL 3 TIMES DAILY PRN
Qty: 90 TABLET | Refills: 2 | Status: SHIPPED | OUTPATIENT
Start: 2017-05-15 | End: 2017-05-15 | Stop reason: SDUPTHER

## 2017-05-23 ENCOUNTER — HOSPITAL ENCOUNTER (OUTPATIENT)
Dept: PULMONOLOGY | Facility: CLINIC | Age: 57
Discharge: HOME | End: 2017-05-23
Payer: MEDICARE

## 2017-05-23 ENCOUNTER — HOSPITAL ENCOUNTER (OUTPATIENT)
Dept: RADIOLOGY | Facility: HOSPITAL | Age: 57
Discharge: HOME | End: 2017-05-23
Attending: INTERNAL MEDICINE
Payer: MEDICARE

## 2017-05-23 ENCOUNTER — INITIAL CONSULT (OUTPATIENT)
Dept: TRANSPLANT | Facility: CLINIC | Age: 57
End: 2017-05-23
Attending: INTERNAL MEDICINE
Payer: MEDICARE

## 2017-05-23 ENCOUNTER — HOSPITAL ENCOUNTER (OUTPATIENT)
Dept: PULMONOLOGY | Facility: CLINIC | Age: 57
Discharge: HOME | End: 2017-05-23
Attending: INTERNAL MEDICINE
Payer: MEDICARE

## 2017-05-23 VITALS
SYSTOLIC BLOOD PRESSURE: 121 MMHG | BODY MASS INDEX: 31.21 KG/M2 | DIASTOLIC BLOOD PRESSURE: 75 MMHG | WEIGHT: 176.13 LBS | HEIGHT: 63 IN | HEART RATE: 85 BPM

## 2017-05-23 VITALS — HEIGHT: 64 IN | BODY MASS INDEX: 30.04 KG/M2 | WEIGHT: 175.94 LBS

## 2017-05-23 DIAGNOSIS — R06.02 SHORTNESS OF BREATH: ICD-10-CM

## 2017-05-23 DIAGNOSIS — R05.9 COUGH: ICD-10-CM

## 2017-05-23 DIAGNOSIS — I27.20 PHT (PULMONARY HYPERTENSION): ICD-10-CM

## 2017-05-23 DIAGNOSIS — I10 ESSENTIAL HYPERTENSION: ICD-10-CM

## 2017-05-23 DIAGNOSIS — M34.1 CREST SYNDROME: Primary | ICD-10-CM

## 2017-05-23 DIAGNOSIS — K21.9 GASTROESOPHAGEAL REFLUX DISEASE WITHOUT ESOPHAGITIS: ICD-10-CM

## 2017-05-23 DIAGNOSIS — I27.9 CHRONIC PULMONARY HEART DISEASE: ICD-10-CM

## 2017-05-23 DIAGNOSIS — E66.01 SEVERE OBESITY (BMI 35.0-39.9) WITH COMORBIDITY: ICD-10-CM

## 2017-05-23 LAB
POST FEV1 FVC: 0.84
POST FEV1: 2.49
POST FVC: 2.95
PRE FEV1 FVC: 82
PRE FEV1: 2.33
PRE FVC: 2.85
PREDICTED FEV1 FVC: 81
PREDICTED FEV1: 2.54
PREDICTED FVC: 3.12

## 2017-05-23 PROCEDURE — 94620 PR PULMONARY STRESS TESTING,SIMPLE: CPT | Mod: S$GLB,,, | Performed by: INTERNAL MEDICINE

## 2017-05-23 PROCEDURE — 99999 PR PBB SHADOW E&M-EST. PATIENT-LVL III: CPT | Mod: PBBFAC,,, | Performed by: INTERNAL MEDICINE

## 2017-05-23 PROCEDURE — 99499 UNLISTED E&M SERVICE: CPT | Mod: S$GLB,,, | Performed by: INTERNAL MEDICINE

## 2017-05-23 PROCEDURE — 94727 GAS DIL/WSHOT DETER LNG VOL: CPT | Mod: S$GLB,,, | Performed by: INTERNAL MEDICINE

## 2017-05-23 PROCEDURE — 99204 OFFICE O/P NEW MOD 45 MIN: CPT | Mod: S$GLB,,, | Performed by: INTERNAL MEDICINE

## 2017-05-23 PROCEDURE — 94060 EVALUATION OF WHEEZING: CPT | Mod: S$GLB,,, | Performed by: INTERNAL MEDICINE

## 2017-05-23 PROCEDURE — 94729 DIFFUSING CAPACITY: CPT | Mod: S$GLB,,, | Performed by: INTERNAL MEDICINE

## 2017-05-23 PROCEDURE — 71020 XR CHEST PA AND LATERAL: CPT | Mod: 26,,, | Performed by: RADIOLOGY

## 2017-05-23 NOTE — PROGRESS NOTES
Subjective:    Patient ID:  Tina García is a 56 y.o. female who presents for evaluation of Pulmonary Hypertension.    HPI  57 yo WF referred for PH evaluation by Dr. Fry (rheumatology) is related to Anne Favret a NP at [facility]. She has hx of CREST and Sjorgren's and has been on revatio 20 mg TID for PAH since Nov 2016.  I do not see a RHC in system.  The highest doppler PASP 33 mm Hg July 2016.  She is currently on slidenafil for Raynaud's.  She uses pedometer and generally walks 10,000 steps/day.  She has MATHUR if talking an walking or if climbing steps.  She is working on weight loss program with [employer] with personal goa of 130's-140's for target weight.  No presyncope or syncope but does note occ dizziness and reports BP as low as 90's systolic on sildenafil.  Sleeps on wedge and 2 pillows without PND but this is for prior reflux more than breathing.  No hx of left sided heart disease or CAD.  Was only of pulm inhalers briefly for URI in April 2017.    Past Medical History:   Diagnosis Date    Acid reflux     Anxiety     AR (allergic rhinitis)     Arthritis     rheumatoid?    Chronic constipation     Colon polyp     CREST syndrome     Depression     Dry eyes     Dry mouth     Esophageal dysmotility     Esophageal dysmotility     Fibromyalgia     Food intolerance     GERD (gastroesophageal reflux disease)     HTN (hypertension)     Irritable bowel syndrome     Raynaud disease     Right wrist fracture     nov 2013    SOB (shortness of breath) on exertion     Thyroid disease     hypothyroid, thyroid cyst on U/S    Ulcer     Vitamin D deficiency disease        Past Surgical History:   Procedure Laterality Date    COLONOSCOPY  7/2012    one polyp removed, 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    CYST REMOVAL      pilonidal    DILATION AND CURETTAGE  OF UTERUS      GANGLION CYST EXCISION      right thumb    HYSTERECTOMY      TOTAL ABDOMINAL HYSTERECTOMY W/ BILATERAL SALPINGOOPHORECTOMY      UPPER GASTROINTESTINAL ENDOSCOPY  12/23/2011  [facility]    Fluid in the lower third of the esophagus  (stasis).  The esophageal examination was compatible with scleroderma.  (Dilataion done, 51 Fr.)    LA Grade A reflux esophagitis.   Enlarged gastric folds.   Multiple gastric polyps.   Antritis (erythematous streaks).  JOELLEN Test  Negative.       Family History   Problem Relation Age of Onset    Cancer Mother      Leiomyosarcoma    Liver cancer Mother     Diabetes      Hypertension      Stomach cancer Paternal Grandfather     Cancer Paternal Uncle      colon    Cancer Maternal Grandfather      stomach    Stomach cancer Father 73     getting chemo    Cloud's esophagus Father     Colon polyps Sister      Social History    Marital status:      Social History Main Topics    Smoking status: Never Smoker    Smokeless tobacco: Never Used    Alcohol use Yes      Comment: holidays    Drug use: No     Current Outpatient Prescriptions   Medication Sig Dispense Refill    albuterol (PROAIR HFA) 90 mcg/actuation inhaler Inhale 2 puffs into the lungs every 6 (six) hours as needed for Wheezing. 6.7 g 3    albuterol (PROVENTIL) 2.5 mg /3 mL (0.083 %) nebulizer solution Take 3 mLs (2.5 mg total) by nebulization every 6 (six) hours as needed for Wheezing. Rescue 3 Box 0    atropine-phenobarbital-scopolamine-hyoscyamine (DONNATAL) 16.2-0.1037 -0.0194 mg Tab Take 1 tablet by mouth 3 (three) times daily as needed. 90 tablet 5    azathioprine (IMURAN) 50 mg Tab Take 1 tablet (50 mg total) by mouth once daily. 30 tablet 4    calcium carbonate-vit D3-min (CALTRATE 600+D PLUS MINERALS) 600 mg (1,500 mg)-400 unit Chew Take 1 tablet by mouth once daily.        cetirizine (ZYRTEC) 10 MG tablet Take 1 tablet (10 mg total) by mouth once daily. 30 tablet 11    clonazePAM  (KLONOPIN) 1 MG tablet Take 1 tablet (1 mg total) by mouth 2 (two) times daily as needed for Anxiety. 45 tablet 2    diclofenac sodium (PENNSAID) 1.5 % Drop 40 drops.      ESTROGENS,CONJ.,SYNTHETIC B (SYNTHETIC CONJ ESTROGENS B ORAL) Take by mouth.      fluticasone (FLONASE) 50 mcg/actuation nasal spray 1 spray by Each Nare route once daily. 16 g 5    levothyroxine (SYNTHROID) 75 MCG tablet Take 1 tablet (75 mcg total) by mouth once daily. 30 tablet 11    losartan-hydrochlorothiazide 100-25 mg (HYZAAR) 100-25 mg per tablet TAKE ONE TABLET BY MOUTH EVERY DAY 90 tablet 0    misoprostol (CYTOTEC) 200 MCG Tab Take 1 tablet (200 mcg total) by mouth 4 (four) times daily with meals and nightly. 120 tablet 11    naftifine (NAFTIN) 1 % Gel Apply 1 puff topically once daily. 20 g 1    niacin 500 MG tablet Take 1 tablet by mouth.      omeprazole (PRILOSEC) 40 MG capsule Take 40 mg by mouth once daily.  6    ondansetron (ZOFRAN-ODT) 4 MG TbDL Take 2 tablets (8 mg total) by mouth 3 (three) times daily as needed. 30 tablet 2    ranitidine (ZANTAC) 300 MG tablet Take 1 tablet (300 mg total) by mouth every evening. , about 30-60 minutes before bedtime. 30 tablet 11    sildenafil (REVATIO) 20 mg Tab Take 1 tablet (20 mg total) by mouth 3 (three) times daily. 90 tablet 11    tizanidine (ZANAFLEX) 4 MG tablet Take 0.5 tablets by mouth Twice daily as needed.      tramadol (ULTRAM) 50 mg tablet Take 1 tablet (50 mg total) by mouth 3 (three) times daily as needed. 90 tablet 3     Review of patient's allergies indicates:   Allergen Reactions    Celebrex [celecoxib] Other (See Comments)     Reflux & stomach symptoms    Nsaids (non-steroidal anti-inflammatory drug)      Irritates reflux     Review of Systems   Constitution: Positive for weight loss (on diet). Negative for chills, decreased appetite, diaphoresis, fever, weakness, malaise/fatigue, night sweats and weight gain.   HENT: Negative for ear discharge, ear pain,  "headaches, hearing loss and hoarse voice.    Eyes: Negative for photophobia and visual disturbance.   Cardiovascular: Negative for chest pain, dyspnea on exertion, irregular heartbeat, leg swelling, orthopnea, palpitations, paroxysmal nocturnal dyspnea and syncope.   Respiratory: Negative for cough, hemoptysis, shortness of breath, sputum production and wheezing.    Endocrine: Negative for cold intolerance, heat intolerance, polydipsia and polyuria.   Hematologic/Lymphatic: Negative for bleeding problem. Does not bruise/bleed easily.   Musculoskeletal: Positive for back pain (lower thorax with or without exertion relieved with heating pad and tramadol; uses motrin prn). Negative for arthritis and joint pain.   Gastrointestinal: Positive for heartburn (reflux controlled with PPI.  She reports prior esophageal dilation few years ago but no dysphagia or odonophagia at this time). Negative for abdominal pain, anorexia, change in bowel habit, dysphagia, hematochezia and melena.   Genitourinary: Negative for dysuria, frequency and hematuria.   Neurological: Negative for brief paralysis, focal weakness and seizures.     Objective:  /75   Pulse 85   Ht 5' 3" (1.6 m)   Wt 79.9 kg (176 lb 2.4 oz)   BMI 31.20 kg/m²       Physical Exam   Constitutional: She is oriented to person, place, and time. She appears well-developed and well-nourished. No distress.   /75   Pulse 85   Ht 5' 3" (1.6 m)   Wt 79.9 kg (176 lb 2.4 oz)   BMI 31.20 kg/m²   Overweight WF in NAD   HENT:   Head: Normocephalic and atraumatic.   Mouth/Throat: No oropharyngeal exudate.   Eyes: Conjunctivae are normal. Right eye exhibits no discharge. Left eye exhibits no discharge. No scleral icterus.   Neck: No JVD present. No tracheal deviation present. No thyromegaly present.   Cardiovascular: Normal rate, regular rhythm and normal heart sounds.  Exam reveals no gallop.    No murmur heard.  Pulmonary/Chest: Effort normal and breath sounds " normal.   Abdominal: Soft. Bowel sounds are normal. She exhibits no distension. There is no tenderness. There is no rebound and no guarding.   Musculoskeletal: She exhibits no edema or tenderness.   Neurological: She is alert and oriented to person, place, and time.   Skin: Skin is warm and dry. She is not diaphoretic.   Psychiatric: She has a normal mood and affect. Her behavior is normal. Judgment and thought content normal.     Lab Results   Component Value Date    BNP 31 05/23/2017     (L) 05/23/2017    K 4.3 05/23/2017    MG 1.8 05/23/2017    CL 96 05/23/2017    CO2 27 05/23/2017    BUN 19 05/23/2017    CREATININE 0.9 05/23/2017     05/23/2017    AST 20 05/23/2017    ALT 17 05/23/2017    ALBUMIN 4.0 05/23/2017    PROT 7.3 05/23/2017    BILITOT 0.2 05/23/2017    WBC 6.43 05/23/2017    HGB 12.8 05/23/2017    HCT 37.0 05/23/2017     05/23/2017    TSH 2.531 03/20/2017    CHOL 201 (H) 04/08/2015    HDL 45 04/08/2015    LDLCALC 137.8 04/08/2015    TRIG 91 04/08/2015     Today Six Minute Walk Test:   427 meters, Huy 0, sats 97-98%    Echo 7/7/2016    Results for orders placed or performed in visit on 07/07/16   2D Echo w/ Color Flow Doppler   Result Value Ref Range    EF 55 55 - 65    Mitral Valve Regurgitation TRIVIAL     Diastolic Dysfunction No     Est. PA Systolic Pressure 33     Tricuspid Valve Regurgitation TRIVIAL TO MILD    CONCLUSIONS     1 - Normal left ventricular systolic function (EF 55-60%).     2 - Normal left ventricular diastolic function.     3 - Normal right ventricular systolic function .     4 - The estimated PA systolic pressure is 33 mmHg.     5 - Trivial mitral regurgitation.     6 - Trivial to mild tricuspid regurgitation.     EKG none in system    RHC none in system    CXR   5/23/2017 unremarkable by report    CT Chest none in system      PFTs   5/23/2017:  Await official read but no obstruction, normal DLCO and VC 87% predicted  Assessment:       1. CREST syndrome    2.  PHT (pulmonary hypertension) WHO Group: 1  Functional Class 2 listed as dx with echo July 2016 PASP 33 mm Hg   3. Essential HBP   4. GERD without esophagitis; prior hx of esophageal dilatation   5. Obesity with BMI low 30's with co-morbid condition of HBP, PH     Plan:   ECHO doppler to see if RHC indicated  Keep up exercise program and diet efforts  Would consider evaluation for ischemia for completeness though upper back pain does not sound ischemic  Repeat lipids with wt loss goal attainment of by 2018   Sister is NP Anne Favret at [facility] and patient has given me permission to speak with her and will do so after doppler available. Will forward this note to her as well.    Thank you for allowing me to see this nice woman in consultation and do not hesitate to contact me if any questions arise.  Benji Zurita MD  [facility] [address] [phone]

## 2017-05-24 NOTE — PROCEDURES
Tina García is a 56 y.o.  female patient, who presents for a 6 minute walk test ordered by MD. Lb.  The diagnosis is Shortness of Breath.  The patient's BMI is 30.3 kg/m2.  Predicted distance (lower limit of normal) is 374.11 meters.      Test Results:    The test was completed without stopping.   The total time walked was 360 seconds.  During walking, the patient reported:  No complaints. The patient used no assistive devices  during testing.     5/23/2017---------Distance: 426.72 meters (1400 feet)     O2 Sat % Supplemental Oxygen Heart Rate Blood Pressure Huy Scale   Pre-exercise  (Resting) 97 % Room Air 84 bpm 116/61 mmHg 0   During Exercise 98 % Room Air 107 bpm 125/66 mmHg 0   Post-exercise  (Recovery) 98 %    96 bpm   mmHg       Recovery Time: 71 seconds    Performing nurse/tech: TATUM Hager.      PREVIOUS STUDY:   The patient has not had a previous study.      CLINICAL INTERPRETATION:  Six minute walk distance is 426.72 meters (1400 feet) with no dyspnea.  During exercise, there was no significant desaturation while breathing room air.  Both blood pressure and heart rate remained stable with walking.  The patient did not report non-pulmonary symptoms during exercise.  No previous study performed.  Based upon age and body mass index, exercise capacity is normal.

## 2017-05-30 ENCOUNTER — HOSPITAL ENCOUNTER (OUTPATIENT)
Dept: CARDIOLOGY | Facility: CLINIC | Age: 57
Discharge: HOME | End: 2017-05-30
Attending: INTERNAL MEDICINE
Payer: MEDICARE

## 2017-05-30 ENCOUNTER — OFFICE VISIT (OUTPATIENT)
Dept: PULMONOLOGY | Facility: CLINIC | Age: 57
End: 2017-05-30
Payer: MEDICARE

## 2017-05-30 ENCOUNTER — TELEPHONE (OUTPATIENT)
Dept: TRANSPLANT | Facility: CLINIC | Age: 57
End: 2017-05-30

## 2017-05-30 VITALS
BODY MASS INDEX: 30.51 KG/M2 | HEIGHT: 63 IN | DIASTOLIC BLOOD PRESSURE: 66 MMHG | WEIGHT: 172.19 LBS | HEART RATE: 79 BPM | OXYGEN SATURATION: 99 % | SYSTOLIC BLOOD PRESSURE: 118 MMHG

## 2017-05-30 DIAGNOSIS — R06.00 DYSPNEA AND RESPIRATORY ABNORMALITY: Primary | ICD-10-CM

## 2017-05-30 DIAGNOSIS — M34.1 CRST SYNDROME: ICD-10-CM

## 2017-05-30 DIAGNOSIS — M34.1 CREST SYNDROME: ICD-10-CM

## 2017-05-30 DIAGNOSIS — E87.1 HYPONATREMIA: ICD-10-CM

## 2017-05-30 DIAGNOSIS — I27.20 PHT (PULMONARY HYPERTENSION): ICD-10-CM

## 2017-05-30 DIAGNOSIS — R06.89 DYSPNEA AND RESPIRATORY ABNORMALITY: Primary | ICD-10-CM

## 2017-05-30 LAB
DIASTOLIC DYSFUNCTION: NO
ESTIMATED PA SYSTOLIC PRESSURE: 23.79
RETIRED EF AND QEF - SEE NOTES: 65 (ref 55–65)
TRICUSPID VALVE REGURGITATION: NORMAL

## 2017-05-30 PROCEDURE — 99204 OFFICE O/P NEW MOD 45 MIN: CPT | Mod: S$GLB,,, | Performed by: INTERNAL MEDICINE

## 2017-05-30 PROCEDURE — 93306 TTE W/DOPPLER COMPLETE: CPT | Mod: S$GLB,,, | Performed by: INTERNAL MEDICINE

## 2017-05-30 PROCEDURE — 99999 PR PBB SHADOW E&M-EST. PATIENT-LVL III: CPT | Mod: PBBFAC,,, | Performed by: INTERNAL MEDICINE

## 2017-05-30 NOTE — PROGRESS NOTES
Subjective:       Patient ID: Tina García is a 56 y.o. female.    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 Her symptoms began with Raynaud's Phenomenon and slowly progressed. I have access to her recent pulmonary studies and they are all normal. Six minute walk test; Normal Walked 427 meters SaO2: 97% and no change with exercise. Recent PFT's done here are normal including a DLCO of88%.   2-D Echo done today is Normal; EF:65% and PAP: 24.   Review of Systems   Constitutional: Negative.    HENT: Negative.    Eyes: Negative.    Respiratory: Negative.    Cardiovascular: Negative.    Genitourinary: Negative.    Musculoskeletal: Negative.         CRST and Fibromyalgia   Skin: Negative.    Gastrointestinal: Negative.         Long hx of GERD's   Neurological: Negative.    Psychiatric/Behavioral: Negative.         Hx of depression       Objective:      Physical Exam   Constitutional: She is oriented to person, place, and time. She appears well-developed and well-nourished. No distress.   HENT:   Head: Normocephalic and atraumatic.   Right Ear: External ear normal.   Left Ear: External ear normal.   Nose: Nose normal.   Mouth/Throat: Oropharynx is clear and moist.   Eyes: Conjunctivae and EOM are normal. Pupils are equal, round, and reactive to light.   Neck: Normal range of motion. Neck supple. No JVD present. No thyromegaly present.   Cardiovascular: Normal rate, regular rhythm, normal heart sounds and intact distal pulses.  Exam reveals no gallop.    No murmur heard.  Pulmonary/Chest: Breath sounds normal. No stridor. No respiratory distress. She has no wheezes. She has no rales. She exhibits no tenderness.     Clear without wheezes or rales.    Abdominal: Soft. Bowel sounds are normal. She exhibits no distension and no mass. There is no tenderness. There is no rebound and no guarding.    Musculoskeletal: Normal range of motion. She exhibits no edema.   Lymphadenopathy:     She has no cervical adenopathy.   Neurological: She is alert and oriented to person, place, and time. She has normal reflexes. She displays normal reflexes. No cranial nerve deficit.   Skin: Skin is warm and dry. No rash noted.   Psychiatric: She has a normal mood and affect. Her behavior is normal. Judgment and thought content normal.   Nursing note and vitals reviewed.        Assessment:       No diagnosis found.    Outpatient Encounter Prescriptions as of 5/30/2017   Medication Sig Dispense Refill    albuterol (PROAIR HFA) 90 mcg/actuation inhaler Inhale 2 puffs into the lungs every 6 (six) hours as needed for Wheezing. 6.7 g 3    albuterol (PROVENTIL) 2.5 mg /3 mL (0.083 %) nebulizer solution Take 3 mLs (2.5 mg total) by nebulization every 6 (six) hours as needed for Wheezing. Rescue 3 Box 0    atropine-phenobarbital-scopolamine-hyoscyamine (DONNATAL) 16.2-0.1037 -0.0194 mg Tab Take 1 tablet by mouth 3 (three) times daily as needed. 90 tablet 5    azathioprine (IMURAN) 50 mg Tab Take 1 tablet (50 mg total) by mouth once daily. 30 tablet 4    calcium carbonate-vit D3-min (CALTRATE 600+D PLUS MINERALS) 600 mg (1,500 mg)-400 unit Chew Take 1 tablet by mouth once daily.        cetirizine (ZYRTEC) 10 MG tablet Take 1 tablet (10 mg total) by mouth once daily. 30 tablet 11    clonazePAM (KLONOPIN) 1 MG tablet Take 1 tablet (1 mg total) by mouth 2 (two) times daily as needed for Anxiety. 45 tablet 2    diclofenac sodium (PENNSAID) 1.5 % Drop 40 drops.      ESTROGENS,CONJ.,SYNTHETIC B (SYNTHETIC CONJ ESTROGENS B ORAL) Take by mouth.      fluticasone (FLONASE) 50 mcg/actuation nasal spray 1 spray by Each Nare route once daily. 16 g 5    levothyroxine (SYNTHROID) 75 MCG tablet Take 1 tablet (75 mcg total) by mouth once daily. 30 tablet 11    losartan-hydrochlorothiazide 100-25 mg (HYZAAR) 100-25 mg per tablet TAKE ONE  TABLET BY MOUTH EVERY DAY 90 tablet 0    misoprostol (CYTOTEC) 200 MCG Tab Take 1 tablet (200 mcg total) by mouth 4 (four) times daily with meals and nightly. 120 tablet 11    naftifine (NAFTIN) 1 % Gel Apply 1 puff topically once daily. 20 g 1    niacin 500 MG tablet Take 1 tablet by mouth.      omeprazole (PRILOSEC) 40 MG capsule Take 40 mg by mouth once daily.  6    ondansetron (ZOFRAN-ODT) 4 MG TbDL Take 2 tablets (8 mg total) by mouth 3 (three) times daily as needed. 30 tablet 2    ranitidine (ZANTAC) 300 MG tablet Take 1 tablet (300 mg total) by mouth every evening. , about 30-60 minutes before bedtime. 30 tablet 11    sildenafil (REVATIO) 20 mg Tab Take 1 tablet (20 mg total) by mouth 3 (three) times daily. 90 tablet 11    tizanidine (ZANAFLEX) 4 MG tablet Take 0.5 tablets by mouth Twice daily as needed.      tramadol (ULTRAM) 50 mg tablet Take 1 tablet (50 mg total) by mouth 3 (three) times daily as needed. 90 tablet 3     No facility-administered encounter medications on file as of 5/30/2017.      No orders of the defined types were placed in this encounter.    Plan:        Patient comprehensive pulmonary assessment is normal in all respects. She is exercising and losing weight. I told her to shoot for a weight of 145 pounds, but she doesn't need a pulmonologist.

## 2017-05-31 RX ORDER — LOSARTAN POTASSIUM AND HYDROCHLOROTHIAZIDE 25; 100 MG/1; MG/1
TABLET ORAL
Qty: 90 TABLET | Refills: 0 | Status: SHIPPED | OUTPATIENT
Start: 2017-05-31 | End: 2017-08-16 | Stop reason: DRUGHIGH

## 2017-06-02 ENCOUNTER — PATIENT MESSAGE (OUTPATIENT)
Dept: TRANSPLANT | Facility: CLINIC | Age: 57
End: 2017-06-02

## 2017-07-15 ENCOUNTER — LAB VISIT (OUTPATIENT)
Dept: LAB | Facility: HOSPITAL | Age: 57
End: 2017-07-15
Attending: INTERNAL MEDICINE
Payer: MEDICARE

## 2017-07-15 DIAGNOSIS — M34.1 CREST (CALCINOSIS, RAYNAUD'S PHENOMENON, ESOPHAGEAL DYSFUNCTION, SCLERODACTYLY, TELANGIECTASIA): ICD-10-CM

## 2017-07-15 DIAGNOSIS — K22.4 ESOPHAGEAL DYSMOTILITY: ICD-10-CM

## 2017-07-15 DIAGNOSIS — I27.20 PULMONARY HYPERTENSION: ICD-10-CM

## 2017-07-15 DIAGNOSIS — M35.00 SJOGREN'S SYNDROME: ICD-10-CM

## 2017-07-15 DIAGNOSIS — E87.1 HYPONATREMIA: ICD-10-CM

## 2017-07-15 DIAGNOSIS — E55.9 VITAMIN D DEFICIENCY DISEASE: ICD-10-CM

## 2017-07-15 LAB
25(OH)D3+25(OH)D2 SERPL-MCNC: 55 NG/ML
ALBUMIN SERPL BCP-MCNC: 4 G/DL
ALP SERPL-CCNC: 63 U/L
ALT SERPL W/O P-5'-P-CCNC: 20 U/L
ANION GAP SERPL CALC-SCNC: 11 MMOL/L
ANION GAP SERPL CALC-SCNC: 11 MMOL/L
AST SERPL-CCNC: 19 U/L
BASOPHILS # BLD AUTO: 0.03 K/UL
BASOPHILS NFR BLD: 0.4 %
BILIRUB SERPL-MCNC: 0.2 MG/DL
BUN SERPL-MCNC: 16 MG/DL
BUN SERPL-MCNC: 16 MG/DL
CALCIUM SERPL-MCNC: 9.6 MG/DL
CALCIUM SERPL-MCNC: 9.6 MG/DL
CHLORIDE SERPL-SCNC: 99 MMOL/L
CHLORIDE SERPL-SCNC: 99 MMOL/L
CO2 SERPL-SCNC: 24 MMOL/L
CO2 SERPL-SCNC: 24 MMOL/L
CREAT SERPL-MCNC: 0.9 MG/DL
CREAT SERPL-MCNC: 0.9 MG/DL
CRP SERPL-MCNC: 1.8 MG/L
DIFFERENTIAL METHOD: NORMAL
EOSINOPHIL # BLD AUTO: 0.1 K/UL
EOSINOPHIL NFR BLD: 1.8 %
ERYTHROCYTE [DISTWIDTH] IN BLOOD BY AUTOMATED COUNT: 13.6 %
ERYTHROCYTE [SEDIMENTATION RATE] IN BLOOD BY WESTERGREN METHOD: 12 MM/HR
EST. GFR  (AFRICAN AMERICAN): >60 ML/MIN/1.73 M^2
EST. GFR  (AFRICAN AMERICAN): >60 ML/MIN/1.73 M^2
EST. GFR  (NON AFRICAN AMERICAN): >60 ML/MIN/1.73 M^2
EST. GFR  (NON AFRICAN AMERICAN): >60 ML/MIN/1.73 M^2
GLUCOSE SERPL-MCNC: 91 MG/DL
GLUCOSE SERPL-MCNC: 91 MG/DL
HCT VFR BLD AUTO: 37.7 %
HGB BLD-MCNC: 13 G/DL
LYMPHOCYTES # BLD AUTO: 2.8 K/UL
LYMPHOCYTES NFR BLD: 39 %
MCH RBC QN AUTO: 29.1 PG
MCHC RBC AUTO-ENTMCNC: 34.5 %
MCV RBC AUTO: 85 FL
MONOCYTES # BLD AUTO: 0.5 K/UL
MONOCYTES NFR BLD: 7.5 %
NEUTROPHILS # BLD AUTO: 3.6 K/UL
NEUTROPHILS NFR BLD: 51 %
PLATELET # BLD AUTO: 316 K/UL
PMV BLD AUTO: 10.4 FL
POTASSIUM SERPL-SCNC: 3.8 MMOL/L
POTASSIUM SERPL-SCNC: 3.8 MMOL/L
PROGEST SERPL-MCNC: 0.1 NG/ML
PROT SERPL-MCNC: 7.4 G/DL
RBC # BLD AUTO: 4.46 M/UL
SODIUM SERPL-SCNC: 134 MMOL/L
SODIUM SERPL-SCNC: 134 MMOL/L
T3FREE SERPL-MCNC: 2.4 PG/ML
T4 FREE SERPL-MCNC: 1.14 NG/DL
TSH SERPL DL<=0.005 MIU/L-ACNC: 2.73 UIU/ML
WBC # BLD AUTO: 7.05 K/UL

## 2017-07-15 PROCEDURE — 82626 DEHYDROEPIANDROSTERONE: CPT

## 2017-07-15 PROCEDURE — 80053 COMPREHEN METABOLIC PANEL: CPT

## 2017-07-15 PROCEDURE — 86038 ANTINUCLEAR ANTIBODIES: CPT

## 2017-07-15 PROCEDURE — 86039 ANTINUCLEAR ANTIBODIES (ANA): CPT

## 2017-07-15 PROCEDURE — 86235 NUCLEAR ANTIGEN ANTIBODY: CPT

## 2017-07-15 PROCEDURE — 84481 FREE ASSAY (FT-3): CPT

## 2017-07-15 PROCEDURE — 84443 ASSAY THYROID STIM HORMONE: CPT

## 2017-07-15 PROCEDURE — 82530 CORTISOL FREE: CPT

## 2017-07-15 PROCEDURE — 84144 ASSAY OF PROGESTERONE: CPT

## 2017-07-15 PROCEDURE — 85651 RBC SED RATE NONAUTOMATED: CPT

## 2017-07-15 PROCEDURE — 86235 NUCLEAR ANTIGEN ANTIBODY: CPT | Mod: 59

## 2017-07-15 PROCEDURE — 86140 C-REACTIVE PROTEIN: CPT

## 2017-07-15 PROCEDURE — 82306 VITAMIN D 25 HYDROXY: CPT

## 2017-07-15 PROCEDURE — 85025 COMPLETE CBC W/AUTO DIFF WBC: CPT

## 2017-07-15 PROCEDURE — 82672 ASSAY OF ESTROGEN: CPT

## 2017-07-15 PROCEDURE — 84439 ASSAY OF FREE THYROXINE: CPT

## 2017-07-17 LAB
ANA SER QL IF: POSITIVE
ANA TITR SER IF: NORMAL {TITER}

## 2017-07-18 ENCOUNTER — PATIENT MESSAGE (OUTPATIENT)
Dept: TRANSPLANT | Facility: CLINIC | Age: 57
End: 2017-07-18

## 2017-07-18 LAB
ANTI SM ANTIBODY: 1.05 EU
ANTI SM/RNP ANTIBODY: 3.52 EU
ANTI-SM INTERPRETATION: NEGATIVE
ANTI-SM/RNP INTERPRETATION: NEGATIVE
ANTI-SSA ANTIBODY: 1.96 EU
ANTI-SSA INTERPRETATION: NEGATIVE
ANTI-SSB ANTIBODY: 13.47 EU
ANTI-SSB INTERPRETATION: NEGATIVE
DSDNA AB SER-ACNC: NORMAL [IU]/ML

## 2017-07-19 LAB
DHEA SERPL-MCNC: 0.29 NG/ML (ref 0.63–4.7)
ENA SCL70 AB SER-ACNC: 4 UNITS
ESTROGEN SERPL-MCNC: 60 PG/ML

## 2017-07-23 LAB — CORTIS F SERPL-MCNC: 0.23 MCG/DL

## 2017-08-07 RX ORDER — LEVOTHYROXINE SODIUM 75 UG/1
75 TABLET ORAL DAILY
Qty: 30 TABLET | Refills: 11 | Status: SHIPPED | OUTPATIENT
Start: 2017-08-07 | End: 2018-02-08 | Stop reason: SDUPTHER

## 2017-08-07 NOTE — TELEPHONE ENCOUNTER
----- Message from Charles Diop sent at 8/7/2017  1:54 PM CDT -----  Contact: [phone]  Patient requesting a refill on levothyroxine, completely out, see fax.    Patient will be using   [facility]  [address].  [facility].  [place] [address]  Phone: [phone] Fax: [phone]    Please call patient at [phone]. Thanks!

## 2017-08-16 ENCOUNTER — OFFICE VISIT (OUTPATIENT)
Dept: FAMILY MEDICINE | Facility: CLINIC | Age: 57
End: 2017-08-16
Payer: MEDICARE

## 2017-08-16 ENCOUNTER — OFFICE VISIT (OUTPATIENT)
Dept: RHEUMATOLOGY | Facility: CLINIC | Age: 57
End: 2017-08-16
Payer: MEDICARE

## 2017-08-16 VITALS
WEIGHT: 158.31 LBS | DIASTOLIC BLOOD PRESSURE: 84 MMHG | BODY MASS INDEX: 28.05 KG/M2 | HEART RATE: 72 BPM | OXYGEN SATURATION: 98 % | HEIGHT: 63 IN | SYSTOLIC BLOOD PRESSURE: 110 MMHG

## 2017-08-16 VITALS
HEIGHT: 63 IN | SYSTOLIC BLOOD PRESSURE: 120 MMHG | HEART RATE: 77 BPM | BODY MASS INDEX: 27.64 KG/M2 | DIASTOLIC BLOOD PRESSURE: 73 MMHG | WEIGHT: 156 LBS

## 2017-08-16 DIAGNOSIS — M34.1 CREST (CALCINOSIS, RAYNAUD'S PHENOMENON, ESOPHAGEAL DYSFUNCTION, SCLERODACTYLY, TELANGIECTASIA): ICD-10-CM

## 2017-08-16 DIAGNOSIS — I10 ESSENTIAL HYPERTENSION: Primary | ICD-10-CM

## 2017-08-16 DIAGNOSIS — K22.4 ESOPHAGEAL DYSMOTILITY: ICD-10-CM

## 2017-08-16 DIAGNOSIS — I27.20 PULMONARY HYPERTENSION: Primary | ICD-10-CM

## 2017-08-16 PROCEDURE — 3008F BODY MASS INDEX DOCD: CPT | Mod: S$GLB,,, | Performed by: PHYSICIAN ASSISTANT

## 2017-08-16 PROCEDURE — 99999 PR PBB SHADOW E&M-EST. PATIENT-LVL IV: CPT | Mod: PBBFAC,,, | Performed by: INTERNAL MEDICINE

## 2017-08-16 PROCEDURE — 3078F DIAST BP <80 MM HG: CPT | Mod: S$GLB,,, | Performed by: INTERNAL MEDICINE

## 2017-08-16 PROCEDURE — 99999 PR PBB SHADOW E&M-EST. PATIENT-LVL III: CPT | Mod: PBBFAC,,, | Performed by: PHYSICIAN ASSISTANT

## 2017-08-16 PROCEDURE — 99499 UNLISTED E&M SERVICE: CPT | Mod: S$GLB,,, | Performed by: INTERNAL MEDICINE

## 2017-08-16 PROCEDURE — 99499 UNLISTED E&M SERVICE: CPT | Mod: S$GLB,,, | Performed by: PHYSICIAN ASSISTANT

## 2017-08-16 PROCEDURE — 3079F DIAST BP 80-89 MM HG: CPT | Mod: S$GLB,,, | Performed by: PHYSICIAN ASSISTANT

## 2017-08-16 PROCEDURE — 99214 OFFICE O/P EST MOD 30 MIN: CPT | Mod: S$GLB,,, | Performed by: INTERNAL MEDICINE

## 2017-08-16 PROCEDURE — 99214 OFFICE O/P EST MOD 30 MIN: CPT | Mod: S$GLB,,, | Performed by: PHYSICIAN ASSISTANT

## 2017-08-16 PROCEDURE — 3008F BODY MASS INDEX DOCD: CPT | Mod: S$GLB,,, | Performed by: INTERNAL MEDICINE

## 2017-08-16 PROCEDURE — 3074F SYST BP LT 130 MM HG: CPT | Mod: S$GLB,,, | Performed by: PHYSICIAN ASSISTANT

## 2017-08-16 PROCEDURE — 3074F SYST BP LT 130 MM HG: CPT | Mod: S$GLB,,, | Performed by: INTERNAL MEDICINE

## 2017-08-16 RX ORDER — TRAMADOL HYDROCHLORIDE 50 MG/1
50 TABLET ORAL EVERY 6 HOURS PRN
Qty: 120 TABLET | Refills: 3 | Status: SHIPPED | OUTPATIENT
Start: 2017-08-16 | End: 2017-10-24 | Stop reason: SDUPTHER

## 2017-08-16 RX ORDER — HYDROXYZINE PAMOATE 25 MG/1
CAPSULE ORAL
Refills: 4 | COMMUNITY
Start: 2017-08-01 | End: 2019-07-22

## 2017-08-16 RX ORDER — TRIAMCINOLONE ACETONIDE 1 MG/G
CREAM TOPICAL 2 TIMES DAILY
Refills: 1 | COMMUNITY
Start: 2017-06-20 | End: 2020-03-02

## 2017-08-16 RX ORDER — LOSARTAN POTASSIUM AND HYDROCHLOROTHIAZIDE 12.5; 5 MG/1; MG/1
1 TABLET ORAL DAILY
Qty: 90 TABLET | Refills: 3 | Status: SHIPPED | OUTPATIENT
Start: 2017-08-16 | End: 2018-05-01 | Stop reason: ALTCHOICE

## 2017-08-16 NOTE — PROGRESS NOTES
Subjective:       Patient ID: Tina García is a 57 y.o. female    Chief Complaint: chronic pulmonary heart disease    HPI   Her pulmonologist is Dr Asher.  She has recently done ECHO and pulmonary function testing done recently.  She reports that her blood pressure has been low over the past few weeks when she takes her sildenafil.   She feels dizzy when her blood pressure is 90/60.  She has recently lost 25 pounds because she is doing [employer] meal fueling with great results.      Review of Systems   Respiratory: Negative for shortness of breath.    Cardiovascular: Negative for chest pain and palpitations.   Musculoskeletal: Negative for neck pain.   Skin: Positive for rash (rash when exposed to sun, side effect of Imuran).   Neurological: Positive for headaches (she reports increased light sensitivity resulting in occasional headaches).            Objective:   Physical Exam   Constitutional: She is oriented to person, place, and time. She appears well-developed and well-nourished. No distress.   HENT:   Head: Normocephalic and atraumatic.   Right Ear: External ear normal.   Left Ear: External ear normal.   Nose: Nose normal.   Mouth/Throat: Oropharynx is clear and moist. No oropharyngeal exudate.   Eyes: Conjunctivae and EOM are normal. Pupils are equal, round, and reactive to light.   Neck: Normal range of motion. Neck supple. No thyromegaly present.   Cardiovascular: Normal rate, regular rhythm, normal heart sounds and intact distal pulses.  Exam reveals no gallop and no friction rub.    No murmur heard.  Pulmonary/Chest: Effort normal and breath sounds normal. No respiratory distress. She has no wheezes. She has no rales. She exhibits no tenderness.   Abdominal: Soft. Bowel sounds are normal. She exhibits no distension and no mass. There is no tenderness. There is no guarding.   Musculoskeletal: Normal range of motion. She exhibits no edema.   Lymphadenopathy:     She has no cervical adenopathy.    Neurological: She is alert and oriented to person, place, and time. She has normal reflexes.   Skin: Skin is warm and dry. No rash noted. She is not diaphoretic.   Psychiatric: She has a normal mood and affect. Her behavior is normal. Judgment and thought content normal.         Assessment:       1. Essential hypertension           Plan:       Essential hypertension    Other orders  -     losartan-hydrochlorothiazide 50-12.5 mg (HYZAAR) 50-12.5 mg per tablet; Take 1 tablet by mouth once daily.  Dispense: 90 tablet; Refill: 3

## 2017-08-16 NOTE — PROGRESS NOTES
Subjective:       Patient ID: Tina García is a 57 y.o. female.    Chief Complaint: CREST syndrome    Follow up: has crest syndrome, pa pressure 24, [employer] diet lost  30 lbs,  raynauds disease,  She is on revatio and its helping. Patient complains of arthralgias and myalgias for which has been present for a few years. Pain is located in multiple joints, both shoulder(s), both elbow(s), both wrist(s), both MCP(s): 1st, 2nd, 3rd, 4th and 5th, both PIP(s): 1st, 2nd, 3rd, 4th and 5th, both DIP(s): 1st and 2nd, both hip(s), both knee(s) and both MTP(s): 1st, 2nd, 3rd, 4th and 5th, is described as aching, pulsating, shooting and throbbing, and is constant, moderate .  Associated symptoms include: crepitation, decreased range of motion, edema, effusion, tenderness and warmth.                Associated symptoms include fatigue. She complains of lasting between 30 and 60 minutes after awakening.    Treatments tried: plaquenil.     Review of Systems   Constitutional: Positive for activity change, chills and fatigue. Negative for appetite change, diaphoresis and unexpected weight change.   HENT: Negative for congestion, dental problem, ear discharge, ear pain, facial swelling, mouth sores, nosebleeds, postnasal drip, rhinorrhea, sinus pressure, sneezing, sore throat, tinnitus and voice change.    Eyes: Negative for photophobia, pain, discharge, redness and itching.   Respiratory: Positive for choking. Negative for apnea, cough, chest tightness, shortness of breath and wheezing.    Cardiovascular: Positive for leg swelling. Negative for chest pain and palpitations.   Gastrointestinal: Positive for abdominal pain. Negative for abdominal distention, constipation, diarrhea, nausea and vomiting.   Endocrine: Negative for cold intolerance, heat intolerance, polydipsia and polyuria.   Genitourinary: Negative for decreased urine volume, difficulty urinating, flank pain, frequency, hematuria and urgency.  "  Musculoskeletal: Positive for arthralgias, back pain, gait problem, neck pain and neck stiffness.   Skin: Negative for pallor, rash and wound.   Allergic/Immunologic: Negative for immunocompromised state.   Neurological: Positive for weakness. Negative for dizziness, tremors and numbness.   Hematological: Negative for adenopathy. Does not bruise/bleed easily.   Psychiatric/Behavioral: Negative for sleep disturbance. The patient is not nervous/anxious.          Objective:     /73   Pulse 77   Ht 5' 3" (1.6 m)   Wt 70.8 kg (156 lb)   BMI 27.63 kg/m²      Physical Exam   Vitals reviewed.  Constitutional: She is oriented to person, place, and time. No distress.   HENT:   Head: Normocephalic and atraumatic.   Eyes: EOM are normal. Pupils are equal, round, and reactive to light. Right eye exhibits no discharge. Left eye exhibits no discharge.   Neck: Neck supple. No thyromegaly present.   Cardiovascular: Normal rate, regular rhythm and normal heart sounds.  Exam reveals no gallop and no friction rub.    No murmur heard.  Pulmonary/Chest: She has wheezes. She has no rales. She exhibits no tenderness.   Abdominal: There is no tenderness. There is no rebound and no guarding.       Right Side Rheumatological Exam     Examination finds the elbow normal.    The patient is tender to palpation of the shoulder, wrist, knee, 1st PIP, 1st MCP, 2nd PIP, 2nd MCP, 3rd PIP, 3rd MCP, 4th PIP, 4th MCP, 5th PIP and 5th MCP    She has swelling of the 1st PIP, 1st MCP, 2nd PIP, 2nd MCP, 3rd PIP, 3rd MCP, 4th PIP, 4th MCP, 5th PIP and 5th MCP    Shoulder Exam   Tenderness Location: no tenderness    Range of Motion   Active Abduction: abnormal   Adduction: abnormal  Sensation: normal    Knee Exam   Patellofemoral Crepitus: positive  Effusion: positive  Sensation: normal    Hip Exam   Tenderness Location: posterior  Sensation: normal    Elbow/Wrist Exam   Tenderness Location: no tenderness  Sensation: normal    Left Side " Rheumatological Exam     The patient is tender to palpation of the shoulder, elbow, wrist, knee, 1st PIP, 1st MCP, 2nd PIP, 2nd MCP, 3rd PIP, 3rd MCP, 4th PIP, 4th MCP, 5th PIP and 5th MCP.    She has swelling of the 1st PIP, 1st MCP, 2nd PIP, 2nd MCP, 3rd PIP, 3rd MCP, 4th PIP, 4th MCP, 5th PIP and 5th MCP    Shoulder Exam   Tenderness Location: no tenderness    Range of Motion   Active Abduction: abnormal   Sensation: normal    Knee Exam     Patellofemoral Crepitus: positive  Effusion: positive  Sensation: normal    Hip Exam   Tenderness Location: posterior  Sensation: normal    Elbow/Wrist Exam   Sensation: normal      Back/Neck Exam   General Inspection   Gait: normal         Lymphadenopathy:     She has no cervical adenopathy.   Neurological: She is alert and oriented to person, place, and time. Gait normal.   Skin: Skin is dry. No rash noted. No erythema. There is pallor.     Psychiatric: Mood and affect normal.   Musculoskeletal: She exhibits tenderness and deformity. She exhibits no edema.   dactylitis             Results for orders placed or performed in visit on 07/15/17   Comprehensive metabolic panel   Result Value Ref Range    Sodium 134 (L) 136 - 145 mmol/L    Potassium 3.8 3.5 - 5.1 mmol/L    Chloride 99 95 - 110 mmol/L    CO2 24 23 - 29 mmol/L    Glucose 91 70 - 110 mg/dL    BUN, Bld 16 6 - 20 mg/dL    Creatinine 0.9 0.5 - 1.4 mg/dL    Calcium 9.6 8.7 - 10.5 mg/dL    Total Protein 7.4 6.0 - 8.4 g/dL    Albumin 4.0 3.5 - 5.2 g/dL    Total Bilirubin 0.2 0.1 - 1.0 mg/dL    Alkaline Phosphatase 63 55 - 135 U/L    AST 19 10 - 40 U/L    ALT 20 10 - 44 U/L    Anion Gap 11 8 - 16 mmol/L    eGFR if African American >60.0 >60 mL/min/1.73 m^2    eGFR if non African American >60.0 >60 mL/min/1.73 m^2   CBC auto differential   Result Value Ref Range    WBC 7.05 3.90 - 12.70 K/uL    RBC 4.46 4.00 - 5.40 M/uL    Hemoglobin 13.0 12.0 - 16.0 g/dL    Hematocrit 37.7 37.0 - 48.5 %    MCV 85 82 - 98 fL    MCH 29.1 27.0  - 31.0 pg    MCHC 34.5 32.0 - 36.0 %    RDW 13.6 11.5 - 14.5 %    Platelets 316 150 - 350 K/uL    MPV 10.4 9.2 - 12.9 fL    Gran # 3.6 1.8 - 7.7 K/uL    Lymph # 2.8 1.0 - 4.8 K/uL    Mono # 0.5 0.3 - 1.0 K/uL    Eos # 0.1 0.0 - 0.5 K/uL    Baso # 0.03 0.00 - 0.20 K/uL    Gran% 51.0 38.0 - 73.0 %    Lymph% 39.0 18.0 - 48.0 %    Mono% 7.5 4.0 - 15.0 %    Eosinophil% 1.8 0.0 - 8.0 %    Basophil% 0.4 0.0 - 1.9 %    Differential Method Automated    C-reactive protein   Result Value Ref Range    CRP 1.8 0.0 - 8.2 mg/L   Sedimentation rate, manual   Result Value Ref Range    Sed Rate 12 0 - 20 mm/Hr   TSH   Result Value Ref Range    TSH 2.725 0.400 - 4.000 uIU/mL   T4, free   Result Value Ref Range    Free T4 1.14 0.71 - 1.51 ng/dL   T3, free   Result Value Ref Range    T3, Free 2.4 2.3 - 4.2 pg/mL   JAMILA   Result Value Ref Range    JAMILA Screen Positive (A) Negative <1:160   Anti-scleroderma antibody   Result Value Ref Range    Scleroderma SCL- 4 <20 UNITS   PROGESTERONE   Result Value Ref Range    Progesterone 0.1 See Text ng/mL   ESTROGENS, TOTAL   Result Value Ref Range    Estrogen 60 pg/mL   DHEA   Result Value Ref Range    DHEA 0.293 (L) 0.630 - 4.700 ng/mL   Cortisol, free, serum   Result Value Ref Range    Cortisol, Free, Serum 0.23 mcg/dL   VITAMIN D   Result Value Ref Range    Vit D, 25-Hydroxy 55 30 - 96 ng/mL   Basic metabolic panel   Result Value Ref Range    Sodium 134 (L) 136 - 145 mmol/L    Potassium 3.8 3.5 - 5.1 mmol/L    Chloride 99 95 - 110 mmol/L    CO2 24 23 - 29 mmol/L    Glucose 91 70 - 110 mg/dL    BUN, Bld 16 6 - 20 mg/dL    Creatinine 0.9 0.5 - 1.4 mg/dL    Calcium 9.6 8.7 - 10.5 mg/dL    Anion Gap 11 8 - 16 mmol/L    eGFR if African American >60.0 >60 mL/min/1.73 m^2    eGFR if non African American >60.0 >60 mL/min/1.73 m^2   JAMILA Titer   Result Value Ref Range    JAMILA HEP-2 Titer Positive 1:1280 [employer] Homogeneous    JAMILA Profile   Result Value Ref Range    Anti Sm Antibody 1.05 0.00 - 19.99 EU     Anti-Sm Interpretation Negative Negative    Anti-SSA Antibody 1.96 0.00 - 19.99 EU    Anti-SSA Interpretation Negative Negative    Anti-SSB Antibody 13.47 0.00 - 19.99 EU    Anti-SSB Interpretation Negative Negative    ds DNA Ab Negative 1:10 Negative 1:10    Anti Sm/RNP Antibody 3.52 0.00 - 19.99 EU    Anti-Sm/RNP Interpretation Negative Negative     Assessment:       1. Pulmonary hypertension    2. CREST (calcinosis, Raynaud's phenomenon, esophageal dysfunction, sclerodactyly, telangiectasia)    3. Esophageal dysmotility            Plan:       Pulmonary hypertension  -     Comprehensive metabolic panel; Future; Expected date: 08/16/2017  -     CBC auto differential; Future; Expected date: 08/16/2017  -     Sedimentation rate, manual; Future; Expected date: 08/16/2017  -     C-reactive protein; Future; Expected date: 08/16/2017  -     JAMILA; Future; Expected date: 08/16/2017  -     Urinalysis; Future; Expected date: 08/16/2017  -     TSH; Future; Expected date: 08/16/2017  -     T4, free; Future; Expected date: 08/16/2017  -     T3, free; Future; Expected date: 08/16/2017    CREST (calcinosis, Raynaud's phenomenon, esophageal dysfunction, sclerodactyly, telangiectasia)  -     Comprehensive metabolic panel; Future; Expected date: 08/16/2017  -     CBC auto differential; Future; Expected date: 08/16/2017  -     Sedimentation rate, manual; Future; Expected date: 08/16/2017  -     C-reactive protein; Future; Expected date: 08/16/2017  -     JAMILA; Future; Expected date: 08/16/2017  -     Urinalysis; Future; Expected date: 08/16/2017  -     TSH; Future; Expected date: 08/16/2017  -     T4, free; Future; Expected date: 08/16/2017  -     T3, free; Future; Expected date: 08/16/2017    Esophageal dysmotility  -     Comprehensive metabolic panel; Future; Expected date: 08/16/2017  -     CBC auto differential; Future; Expected date: 08/16/2017  -     Sedimentation rate, manual; Future; Expected date: 08/16/2017  -      C-reactive protein; Future; Expected date: 08/16/2017  -     JAMILA; Future; Expected date: 08/16/2017  -     Urinalysis; Future; Expected date: 08/16/2017  -     TSH; Future; Expected date: 08/16/2017  -     T4, free; Future; Expected date: 08/16/2017  -     T3, free; Future; Expected date: 08/16/2017    Other orders  -     tramadol (ULTRAM) 50 mg tablet; Take 1 tablet (50 mg total) by mouth every 6 (six) hours as needed.  Dispense: 120 tablet; Refill: 3

## 2017-08-24 DIAGNOSIS — K58.0 IRRITABLE BOWEL SYNDROME WITH DIARRHEA: ICD-10-CM

## 2017-08-25 RX ORDER — PHENOBARBITAL, HYOSCYAMINE SULFATE, ATROPINE SULFATE AND SCOPOLAMINE HYDROBROMIDE .0194; .1037; 16.2; .0065 MG/1; MG/1; MG/1; MG/1
1 TABLET ORAL 3 TIMES DAILY PRN
Qty: 90 TABLET | Refills: 5 | Status: SHIPPED | OUTPATIENT
Start: 2017-08-25 | End: 2018-02-08 | Stop reason: SDUPTHER

## 2017-09-07 ENCOUNTER — PATIENT MESSAGE (OUTPATIENT)
Dept: RHEUMATOLOGY | Facility: CLINIC | Age: 57
End: 2017-09-07

## 2017-09-21 DIAGNOSIS — K21.9 GASTROESOPHAGEAL REFLUX DISEASE, ESOPHAGITIS PRESENCE NOT SPECIFIED: ICD-10-CM

## 2017-09-21 RX ORDER — CLONAZEPAM 1 MG/1
1 TABLET ORAL 2 TIMES DAILY PRN
Qty: 45 TABLET | Refills: 2 | Status: SHIPPED | OUTPATIENT
Start: 2017-09-21 | End: 2018-01-05 | Stop reason: SDUPTHER

## 2017-10-12 ENCOUNTER — OFFICE VISIT (OUTPATIENT)
Dept: OPTOMETRY | Facility: CLINIC | Age: 57
End: 2017-10-12
Payer: MEDICARE

## 2017-10-12 DIAGNOSIS — H52.03 HYPEROPIA WITH ASTIGMATISM AND PRESBYOPIA, BILATERAL: ICD-10-CM

## 2017-10-12 DIAGNOSIS — Z13.5 GLAUCOMA SCREENING: ICD-10-CM

## 2017-10-12 DIAGNOSIS — H52.203 HYPEROPIA WITH ASTIGMATISM AND PRESBYOPIA, BILATERAL: ICD-10-CM

## 2017-10-12 DIAGNOSIS — H43.393 VITREOUS FLOATERS, BILATERAL: Primary | ICD-10-CM

## 2017-10-12 DIAGNOSIS — H52.4 HYPEROPIA WITH ASTIGMATISM AND PRESBYOPIA, BILATERAL: ICD-10-CM

## 2017-10-12 DIAGNOSIS — H10.10 ALLERGIC CONJUNCTIVITIS, UNSPECIFIED LATERALITY: ICD-10-CM

## 2017-10-12 PROCEDURE — 92015 DETERMINE REFRACTIVE STATE: CPT | Mod: S$GLB,,, | Performed by: OPTOMETRIST

## 2017-10-12 PROCEDURE — 92004 COMPRE OPH EXAM NEW PT 1/>: CPT | Mod: S$GLB,,, | Performed by: OPTOMETRIST

## 2017-10-12 PROCEDURE — 99999 PR PBB SHADOW E&M-EST. PATIENT-LVL III: CPT | Mod: PBBFAC,,, | Performed by: OPTOMETRIST

## 2017-10-12 NOTE — PROGRESS NOTES
HPI     Annual Exam    Additional comments: DLE 6-14 (omar)   ocular health exam // pt off of   plaquenil x 1 yr           Blurred Vision    Additional comments: near >> distance            Itchy Eye    Additional comments: allergies -- +OTC gtts prn           Comments   Agree above  Previous on plaquenil, not for > 1year  Notes reduced at distance / near, using otc and mag glass for reading         Last edited by JERI Jones, OD on 10/12/2017  4:02 PM. (History)        ROS     Positive for: Eyes    Negative for: Constitutional, Gastrointestinal, Neurological, Skin,   Genitourinary, Musculoskeletal, HENT, Endocrine, Cardiovascular,   Respiratory, Psychiatric, Allergic/Imm, Heme/Lymph    Last edited by JERI Jones, OD on 10/12/2017  3:14 PM. (History)        Assessment /Plan     For exam results, see Encounter Report.    Vitreous floaters, bilateral    Allergic conjunctivitis, unspecified laterality    Glaucoma screening    Hyperopia with astigmatism and presbyopia, bilateral      1. RD precautions given  2. Mild seasonal s/s---gave suggestions otc ATs and consider zaditor bid prn  Message if not effective  3. Not suspect  4. Updated specs rx, gave copy, fill prn    Discussed and educated patient on current findings /plan.  RTC 1 year, prn if any changes / issues

## 2017-10-25 RX ORDER — TRAMADOL HYDROCHLORIDE 50 MG/1
50 TABLET ORAL EVERY 6 HOURS PRN
Qty: 120 TABLET | Refills: 3 | Status: SHIPPED | OUTPATIENT
Start: 2017-10-25 | End: 2018-01-05 | Stop reason: SDUPTHER

## 2017-12-18 ENCOUNTER — LAB VISIT (OUTPATIENT)
Dept: LAB | Facility: HOSPITAL | Age: 57
End: 2017-12-18
Attending: INTERNAL MEDICINE
Payer: MEDICARE

## 2017-12-18 DIAGNOSIS — I27.20 PULMONARY HYPERTENSION: ICD-10-CM

## 2017-12-18 DIAGNOSIS — K22.4 ESOPHAGEAL DYSMOTILITY: ICD-10-CM

## 2017-12-18 DIAGNOSIS — M34.1 CREST (CALCINOSIS, RAYNAUD'S PHENOMENON, ESOPHAGEAL DYSFUNCTION, SCLERODACTYLY, TELANGIECTASIA): ICD-10-CM

## 2017-12-18 LAB
ALBUMIN SERPL BCP-MCNC: 3.8 G/DL
ALP SERPL-CCNC: 75 U/L
ALT SERPL W/O P-5'-P-CCNC: 36 U/L
ANION GAP SERPL CALC-SCNC: 8 MMOL/L
AST SERPL-CCNC: 20 U/L
BASOPHILS # BLD AUTO: 0.02 K/UL
BASOPHILS NFR BLD: 0.3 %
BILIRUB SERPL-MCNC: 0.3 MG/DL
BUN SERPL-MCNC: 15 MG/DL
CALCIUM SERPL-MCNC: 9.5 MG/DL
CHLORIDE SERPL-SCNC: 95 MMOL/L
CO2 SERPL-SCNC: 32 MMOL/L
CREAT SERPL-MCNC: 0.8 MG/DL
CRP SERPL-MCNC: 2.6 MG/L
DIFFERENTIAL METHOD: NORMAL
EOSINOPHIL # BLD AUTO: 0.1 K/UL
EOSINOPHIL NFR BLD: 1.4 %
ERYTHROCYTE [DISTWIDTH] IN BLOOD BY AUTOMATED COUNT: 12.3 %
ERYTHROCYTE [SEDIMENTATION RATE] IN BLOOD BY WESTERGREN METHOD: 22 MM/HR
EST. GFR  (AFRICAN AMERICAN): >60 ML/MIN/1.73 M^2
EST. GFR  (NON AFRICAN AMERICAN): >60 ML/MIN/1.73 M^2
GLUCOSE SERPL-MCNC: 87 MG/DL
HCT VFR BLD AUTO: 37 %
HGB BLD-MCNC: 12.4 G/DL
IMM GRANULOCYTES # BLD AUTO: 0.01 K/UL
IMM GRANULOCYTES NFR BLD AUTO: 0.2 %
LYMPHOCYTES # BLD AUTO: 2.4 K/UL
LYMPHOCYTES NFR BLD: 37.1 %
MCH RBC QN AUTO: 28.8 PG
MCHC RBC AUTO-ENTMCNC: 33.5 G/DL
MCV RBC AUTO: 86 FL
MONOCYTES # BLD AUTO: 0.6 K/UL
MONOCYTES NFR BLD: 9.4 %
NEUTROPHILS # BLD AUTO: 3.4 K/UL
NEUTROPHILS NFR BLD: 51.6 %
NRBC BLD-RTO: 0 /100 WBC
PLATELET # BLD AUTO: 297 K/UL
PMV BLD AUTO: 10.2 FL
POTASSIUM SERPL-SCNC: 3.7 MMOL/L
PROT SERPL-MCNC: 7.5 G/DL
RBC # BLD AUTO: 4.31 M/UL
SODIUM SERPL-SCNC: 135 MMOL/L
T3FREE SERPL-MCNC: 2.4 PG/ML
T4 FREE SERPL-MCNC: 1.36 NG/DL
TSH SERPL DL<=0.005 MIU/L-ACNC: 1.1 UIU/ML
WBC # BLD AUTO: 6.52 K/UL

## 2017-12-18 PROCEDURE — 86235 NUCLEAR ANTIGEN ANTIBODY: CPT | Mod: 59

## 2017-12-18 PROCEDURE — 85025 COMPLETE CBC W/AUTO DIFF WBC: CPT

## 2017-12-18 PROCEDURE — 84481 FREE ASSAY (FT-3): CPT

## 2017-12-18 PROCEDURE — 86039 ANTINUCLEAR ANTIBODIES (ANA): CPT

## 2017-12-18 PROCEDURE — 86038 ANTINUCLEAR ANTIBODIES: CPT

## 2017-12-18 PROCEDURE — 86140 C-REACTIVE PROTEIN: CPT

## 2017-12-18 PROCEDURE — 84439 ASSAY OF FREE THYROXINE: CPT

## 2017-12-18 PROCEDURE — 80053 COMPREHEN METABOLIC PANEL: CPT

## 2017-12-18 PROCEDURE — 85651 RBC SED RATE NONAUTOMATED: CPT

## 2017-12-18 PROCEDURE — 36415 COLL VENOUS BLD VENIPUNCTURE: CPT | Mod: PO

## 2017-12-18 PROCEDURE — 84443 ASSAY THYROID STIM HORMONE: CPT

## 2017-12-19 LAB
ANA SER QL IF: POSITIVE
ANA TITR SER IF: NORMAL {TITER}

## 2017-12-21 LAB
ANTI SM ANTIBODY: 0.7 EU
ANTI SM/RNP ANTIBODY: 3.33 EU
ANTI-SM INTERPRETATION: NEGATIVE
ANTI-SM/RNP INTERPRETATION: NEGATIVE
ANTI-SSA ANTIBODY: 1.83 EU
ANTI-SSA INTERPRETATION: NEGATIVE
ANTI-SSB ANTIBODY: 6.4 EU
ANTI-SSB INTERPRETATION: NEGATIVE
DSDNA AB SER-ACNC: NORMAL [IU]/ML

## 2017-12-31 ENCOUNTER — PATIENT MESSAGE (OUTPATIENT)
Dept: FAMILY MEDICINE | Facility: CLINIC | Age: 57
End: 2017-12-31

## 2018-01-02 ENCOUNTER — TELEPHONE (OUTPATIENT)
Dept: FAMILY MEDICINE | Facility: CLINIC | Age: 58
End: 2018-01-02

## 2018-01-02 DIAGNOSIS — R35.0 URINARY FREQUENCY: Primary | ICD-10-CM

## 2018-01-02 RX ORDER — LOSARTAN POTASSIUM AND HYDROCHLOROTHIAZIDE 25; 100 MG/1; MG/1
TABLET ORAL
Qty: 90 TABLET | Refills: 0 | Status: SHIPPED | OUTPATIENT
Start: 2018-01-02 | End: 2018-01-05 | Stop reason: DRUGHIGH

## 2018-01-02 NOTE — TELEPHONE ENCOUNTER
Spoke to patient and stated to her what was noted. Verbalized understanding and states she will go to the lab in [place] and have those done.

## 2018-01-02 NOTE — TELEPHONE ENCOUNTER
----- Message from Ryann Merchant sent at 1/2/2018 10:52 AM CST -----  Returning call for Pavithra / call [phone] (home)

## 2018-01-02 NOTE — TELEPHONE ENCOUNTER
I have put orders in for urinalysis and urine culture.  Please schedule patient.     Thank You,   Medina HIRSCH

## 2018-01-05 ENCOUNTER — OFFICE VISIT (OUTPATIENT)
Dept: FAMILY MEDICINE | Facility: CLINIC | Age: 58
End: 2018-01-05
Payer: MEDICARE

## 2018-01-05 ENCOUNTER — LAB VISIT (OUTPATIENT)
Dept: LAB | Facility: HOSPITAL | Age: 58
End: 2018-01-05
Attending: FAMILY MEDICINE
Payer: MEDICARE

## 2018-01-05 VITALS
OXYGEN SATURATION: 98 % | SYSTOLIC BLOOD PRESSURE: 124 MMHG | DIASTOLIC BLOOD PRESSURE: 76 MMHG | HEART RATE: 76 BPM | TEMPERATURE: 98 F | HEIGHT: 63 IN | BODY MASS INDEX: 27.23 KG/M2 | RESPIRATION RATE: 18 BRPM | WEIGHT: 153.69 LBS

## 2018-01-05 DIAGNOSIS — R35.0 URINARY FREQUENCY: ICD-10-CM

## 2018-01-05 DIAGNOSIS — N30.90 CYSTITIS: Primary | ICD-10-CM

## 2018-01-05 DIAGNOSIS — R11.0 NAUSEA: ICD-10-CM

## 2018-01-05 LAB
BACTERIA #/AREA URNS HPF: ABNORMAL /HPF
BILIRUB UR QL STRIP: NEGATIVE
CLARITY UR: CLEAR
COLOR UR: ABNORMAL
GLUCOSE UR QL STRIP: ABNORMAL
HGB UR QL STRIP: NEGATIVE
KETONES UR QL STRIP: NEGATIVE
LEUKOCYTE ESTERASE UR QL STRIP: ABNORMAL
MICROSCOPIC COMMENT: ABNORMAL
NITRITE UR QL STRIP: ABNORMAL
PH UR STRIP: 7 [PH] (ref 5–8)
PROT UR QL STRIP: ABNORMAL
RBC #/AREA URNS HPF: 1 /HPF (ref 0–4)
SP GR UR STRIP: <=1.005 (ref 1–1.03)
SQUAMOUS #/AREA URNS HPF: 3 /HPF
URN SPEC COLLECT METH UR: ABNORMAL
WBC #/AREA URNS HPF: 10 /HPF (ref 0–5)

## 2018-01-05 PROCEDURE — 87086 URINE CULTURE/COLONY COUNT: CPT

## 2018-01-05 PROCEDURE — 99999 PR PBB SHADOW E&M-EST. PATIENT-LVL III: CPT | Mod: PBBFAC,,, | Performed by: PHYSICIAN ASSISTANT

## 2018-01-05 PROCEDURE — 87077 CULTURE AEROBIC IDENTIFY: CPT

## 2018-01-05 PROCEDURE — 99214 OFFICE O/P EST MOD 30 MIN: CPT | Mod: S$GLB,,, | Performed by: PHYSICIAN ASSISTANT

## 2018-01-05 PROCEDURE — 81000 URINALYSIS NONAUTO W/SCOPE: CPT | Mod: PO

## 2018-01-05 PROCEDURE — 87088 URINE BACTERIA CULTURE: CPT

## 2018-01-05 PROCEDURE — 87186 SC STD MICRODIL/AGAR DIL: CPT

## 2018-01-05 PROCEDURE — 99499 UNLISTED E&M SERVICE: CPT | Mod: S$GLB,,, | Performed by: PHYSICIAN ASSISTANT

## 2018-01-05 RX ORDER — SULFAMETHOXAZOLE AND TRIMETHOPRIM 800; 160 MG/1; MG/1
1 TABLET ORAL 2 TIMES DAILY
Qty: 14 TABLET | Refills: 0 | Status: SHIPPED | OUTPATIENT
Start: 2018-01-05 | End: 2018-01-05 | Stop reason: SDUPTHER

## 2018-01-05 RX ORDER — FLUCONAZOLE 150 MG/1
150 TABLET ORAL DAILY
Qty: 1 TABLET | Refills: 0 | Status: SHIPPED | OUTPATIENT
Start: 2018-01-05 | End: 2018-01-06

## 2018-01-05 RX ORDER — FLUTICASONE PROPIONATE 50 MCG
1 SPRAY, SUSPENSION (ML) NASAL DAILY
Qty: 16 G | Refills: 5 | Status: SHIPPED | OUTPATIENT
Start: 2018-01-05 | End: 2019-09-26 | Stop reason: SDUPTHER

## 2018-01-05 RX ORDER — SULFAMETHOXAZOLE AND TRIMETHOPRIM 800; 160 MG/1; MG/1
1 TABLET ORAL 2 TIMES DAILY
Qty: 14 TABLET | Refills: 0 | Status: SHIPPED | OUTPATIENT
Start: 2018-01-05 | End: 2018-01-12

## 2018-01-05 NOTE — PROGRESS NOTES
Subjective:       Patient ID: Tina García is a 57 y.o. female    Chief Complaint: Hypertension; Dysuria; and Hematuria    HPI  Mrs García presents today CO of dysuria and urinary frequency that began on 12/23/2017.  She has been taking pyridium with temporary relief of her symtpoms.      Review of Systems   Constitutional: Negative for chills and fever.   Gastrointestinal: Negative for abdominal pain, nausea and vomiting.   Genitourinary: Positive for dysuria and frequency. Negative for flank pain.   Musculoskeletal: Negative for back pain.        Objective:   Physical Exam   Constitutional: She is oriented to person, place, and time. She appears well-developed and well-nourished. No distress.   HENT:   Head: Normocephalic and atraumatic.   Eyes: EOM are normal. Pupils are equal, round, and reactive to light.   Neck: Normal range of motion. Neck supple.   Cardiovascular: Normal rate, regular rhythm, normal heart sounds and intact distal pulses.  Exam reveals no gallop and no friction rub.    No murmur heard.  Pulmonary/Chest: Effort normal and breath sounds normal. No respiratory distress. She has no wheezes. She has no rales. She exhibits no tenderness.   Abdominal:   No CVA tenderness bilaterally   Musculoskeletal: Normal range of motion.   Neurological: She is alert and oriented to person, place, and time.   Skin: Skin is warm and dry. No rash noted. She is not diaphoretic.   Psychiatric: She has a normal mood and affect. Her behavior is normal. Judgment and thought content normal.        Assessment:       1. Cystitis  fluconazole (DIFLUCAN) 150 MG Tab    sulfamethoxazole-trimethoprim 800-160mg (BACTRIM DS) 800-160 mg Tab    fluticasone (FLONASE) 50 mcg/actuation nasal spray    DISCONTINUED: sulfamethoxazole-trimethoprim 800-160mg (BACTRIM DS) 800-160 mg Tab        Plan:       Cystitis  -     sulfamethoxazole-trimethoprim 800-160mg (BACTRIM DS) 800-160 mg Tab; Take 1 tablet by mouth 2 (two) times  daily.  Dispense: 14 tablet; Refill: 0  -     fluticasone (FLONASE) 50 mcg/actuation nasal spray; 1 spray (50 mcg total) by Each Nare route once daily.  Dispense: 16 g; Refill: 5

## 2018-01-08 LAB — BACTERIA UR CULT: NORMAL

## 2018-01-08 RX ORDER — PROMETHAZINE HYDROCHLORIDE 25 MG/1
25 TABLET ORAL EVERY 6 HOURS PRN
Qty: 90 TABLET | Refills: 2 | Status: SHIPPED | OUTPATIENT
Start: 2018-01-08 | End: 2018-02-08

## 2018-01-08 RX ORDER — CLONAZEPAM 1 MG/1
1 TABLET ORAL 2 TIMES DAILY PRN
Qty: 45 TABLET | Refills: 2 | Status: SHIPPED | OUTPATIENT
Start: 2018-01-08 | End: 2018-07-09 | Stop reason: SDUPTHER

## 2018-01-08 RX ORDER — TRAMADOL HYDROCHLORIDE 50 MG/1
50 TABLET ORAL EVERY 6 HOURS PRN
Qty: 120 TABLET | Refills: 3 | Status: SHIPPED | OUTPATIENT
Start: 2018-01-08 | End: 2018-07-09 | Stop reason: SDUPTHER

## 2018-01-08 RX ORDER — DICLOFENAC SODIUM 10 MG/G
2 GEL TOPICAL 4 TIMES DAILY PRN
Qty: 100 G | Refills: 3 | Status: SHIPPED | OUTPATIENT
Start: 2018-01-08 | End: 2018-01-18

## 2018-01-08 RX ORDER — ONDANSETRON 4 MG/1
8 TABLET, ORALLY DISINTEGRATING ORAL 3 TIMES DAILY PRN
Qty: 30 TABLET | Refills: 2 | Status: SHIPPED | OUTPATIENT
Start: 2018-01-08 | End: 2019-07-17 | Stop reason: SDUPTHER

## 2018-01-09 ENCOUNTER — PATIENT MESSAGE (OUTPATIENT)
Dept: FAMILY MEDICINE | Facility: CLINIC | Age: 58
End: 2018-01-09

## 2018-01-09 ENCOUNTER — TELEPHONE (OUTPATIENT)
Dept: FAMILY MEDICINE | Facility: CLINIC | Age: 58
End: 2018-01-09

## 2018-01-09 DIAGNOSIS — N30.90 CYSTITIS: Primary | ICD-10-CM

## 2018-01-09 RX ORDER — CIPROFLOXACIN 500 MG/1
500 TABLET ORAL 2 TIMES DAILY
Qty: 14 TABLET | Refills: 0 | Status: SHIPPED | OUTPATIENT
Start: 2018-01-09 | End: 2018-01-16

## 2018-01-09 NOTE — TELEPHONE ENCOUNTER
The urine culture shows some resistance to the antibiotic that was prescribed.  I have sent a different antibiotic to your pharmacy.      AEF

## 2018-02-08 ENCOUNTER — OFFICE VISIT (OUTPATIENT)
Dept: RHEUMATOLOGY | Facility: CLINIC | Age: 58
End: 2018-02-08
Payer: MEDICARE

## 2018-02-08 ENCOUNTER — LAB VISIT (OUTPATIENT)
Dept: LAB | Facility: HOSPITAL | Age: 58
End: 2018-02-08
Attending: INTERNAL MEDICINE
Payer: MEDICARE

## 2018-02-08 VITALS
DIASTOLIC BLOOD PRESSURE: 76 MMHG | BODY MASS INDEX: 26.89 KG/M2 | WEIGHT: 151.81 LBS | HEART RATE: 74 BPM | SYSTOLIC BLOOD PRESSURE: 132 MMHG

## 2018-02-08 DIAGNOSIS — I27.20 PULMONARY HYPERTENSION: ICD-10-CM

## 2018-02-08 DIAGNOSIS — K58.0 IRRITABLE BOWEL SYNDROME WITH DIARRHEA: ICD-10-CM

## 2018-02-08 DIAGNOSIS — K22.4 ESOPHAGEAL DYSMOTILITY: ICD-10-CM

## 2018-02-08 DIAGNOSIS — M34.1 CREST (CALCINOSIS, RAYNAUD'S PHENOMENON, ESOPHAGEAL DYSFUNCTION, SCLERODACTYLY, TELANGIECTASIA): Primary | ICD-10-CM

## 2018-02-08 DIAGNOSIS — N39.0 URINARY TRACT INFECTION WITHOUT HEMATURIA, SITE UNSPECIFIED: ICD-10-CM

## 2018-02-08 DIAGNOSIS — E88.89 FATTY CHANGE: ICD-10-CM

## 2018-02-08 DIAGNOSIS — M34.1 CREST (CALCINOSIS, RAYNAUD'S PHENOMENON, ESOPHAGEAL DYSFUNCTION, SCLERODACTYLY, TELANGIECTASIA): ICD-10-CM

## 2018-02-08 LAB
BILIRUB UR QL STRIP: NEGATIVE
CLARITY UR: CLEAR
COLOR UR: YELLOW
GLUCOSE UR QL STRIP: NEGATIVE
HGB UR QL STRIP: NEGATIVE
KETONES UR QL STRIP: NEGATIVE
LEUKOCYTE ESTERASE UR QL STRIP: NEGATIVE
NITRITE UR QL STRIP: NEGATIVE
PH UR STRIP: 7 [PH] (ref 5–8)
PROT UR QL STRIP: NEGATIVE
SP GR UR STRIP: 1.01 (ref 1–1.03)
URN SPEC COLLECT METH UR: NORMAL

## 2018-02-08 PROCEDURE — 3008F BODY MASS INDEX DOCD: CPT | Mod: S$GLB,,, | Performed by: INTERNAL MEDICINE

## 2018-02-08 PROCEDURE — 99499 UNLISTED E&M SERVICE: CPT | Mod: S$GLB,,, | Performed by: INTERNAL MEDICINE

## 2018-02-08 PROCEDURE — 99999 PR PBB SHADOW E&M-EST. PATIENT-LVL III: CPT | Mod: PBBFAC,,, | Performed by: INTERNAL MEDICINE

## 2018-02-08 PROCEDURE — 81003 URINALYSIS AUTO W/O SCOPE: CPT | Mod: PO

## 2018-02-08 PROCEDURE — 87086 URINE CULTURE/COLONY COUNT: CPT

## 2018-02-08 PROCEDURE — 99214 OFFICE O/P EST MOD 30 MIN: CPT | Mod: S$GLB,,, | Performed by: INTERNAL MEDICINE

## 2018-02-08 RX ORDER — LEVOTHYROXINE SODIUM 75 UG/1
75 TABLET ORAL DAILY
Qty: 30 TABLET | Refills: 11 | Status: SHIPPED | OUTPATIENT
Start: 2018-02-08 | End: 2018-07-09 | Stop reason: SDUPTHER

## 2018-02-08 RX ORDER — FLUCONAZOLE 150 MG/1
150 TABLET ORAL DAILY
Qty: 10 TABLET | Refills: 5 | Status: SHIPPED | OUTPATIENT
Start: 2018-02-08 | End: 2018-02-09

## 2018-02-08 RX ORDER — PHENOBARBITAL, HYOSCYAMINE SULFATE, ATROPINE SULFATE AND SCOPOLAMINE HYDROBROMIDE .0194; .1037; 16.2; .0065 MG/1; MG/1; MG/1; MG/1
1 TABLET ORAL 3 TIMES DAILY PRN
Qty: 90 TABLET | Refills: 5 | Status: SHIPPED | OUTPATIENT
Start: 2018-02-08 | End: 2020-03-02

## 2018-02-08 RX ORDER — SILDENAFIL CITRATE 20 MG/1
20 TABLET ORAL 3 TIMES DAILY
Qty: 90 TABLET | Refills: 11 | Status: SHIPPED | OUTPATIENT
Start: 2018-02-08 | End: 2019-10-28 | Stop reason: SDUPTHER

## 2018-02-08 RX ORDER — NITROFURANTOIN 25; 75 MG/1; MG/1
100 CAPSULE ORAL 2 TIMES DAILY
Qty: 20 CAPSULE | Refills: 5 | Status: SHIPPED | OUTPATIENT
Start: 2018-02-08 | End: 2018-02-18

## 2018-02-08 RX ORDER — AZATHIOPRINE 50 MG/1
50 TABLET ORAL DAILY
Qty: 30 TABLET | Refills: 4 | Status: SHIPPED | OUTPATIENT
Start: 2018-02-08 | End: 2018-07-09 | Stop reason: SDUPTHER

## 2018-02-08 NOTE — PROGRESS NOTES
Subjective:       Patient ID: Tina García is a 57 y.o. female.    Chief Complaint: Follow-up    Follow up:she is on Imuran, has crest syndrome, pa pressure 24, Optiva diet  She is on revatio and its helping. Patient complains of arthralgias and myalgias for which has been present for a few years. Pain is located in multiple joints, both shoulder(s), both elbow(s), both wrist(s), both MCP(s): 1st, 2nd, 3rd, 4th and 5th, both PIP(s): 1st, 2nd, 3rd, 4th and 5th, both DIP(s): 1st and 2nd, both hip(s), both knee(s) and both MTP(s): 1st, 2nd, 3rd, 4th and 5th, is described as aching, pulsating, shooting and throbbing, and is constant, moderate .  Associated symptoms include: crepitation, decreased range of motion, edema, effusion, tenderness and warmth.        Review of Systems   Constitutional: Positive for activity change and chills. Negative for appetite change, diaphoresis and unexpected weight change.   HENT: Negative for congestion, dental problem, ear discharge, ear pain, facial swelling, mouth sores, nosebleeds, postnasal drip, rhinorrhea, sinus pressure, sneezing, sore throat, tinnitus and voice change.    Eyes: Negative for photophobia, pain, discharge, redness and itching.   Respiratory: Positive for choking. Negative for apnea, cough, chest tightness, shortness of breath and wheezing.    Cardiovascular: Positive for leg swelling. Negative for chest pain and palpitations.   Gastrointestinal: Positive for abdominal pain. Negative for abdominal distention, constipation, diarrhea, nausea and vomiting.   Endocrine: Negative for cold intolerance, heat intolerance, polydipsia and polyuria.   Genitourinary: Negative for decreased urine volume, difficulty urinating, flank pain, frequency, hematuria and urgency.   Musculoskeletal: Positive for arthralgias, back pain, gait problem, neck pain and neck stiffness.   Skin: Negative for pallor, rash and wound.   Allergic/Immunologic: Negative for  immunocompromised state.   Neurological: Positive for weakness. Negative for dizziness, tremors and numbness.   Hematological: Negative for adenopathy. Does not bruise/bleed easily.   Psychiatric/Behavioral: Negative for sleep disturbance. The patient is not nervous/anxious.          Objective:     /76 (BP Location: Left arm, Patient Position: Sitting, BP Method: Medium (Automatic))   Pulse 74   Wt 68.9 kg (151 lb 12.6 oz)   BMI 26.89 kg/m²      Physical Exam   Vitals reviewed.  Constitutional: She is oriented to person, place, and time. No distress.   HENT:   Head: Normocephalic and atraumatic.   Eyes: EOM are normal. Pupils are equal, round, and reactive to light. Right eye exhibits no discharge. Left eye exhibits no discharge.   Neck: Neck supple. No thyromegaly present.   Cardiovascular: Normal rate, regular rhythm and normal heart sounds.  Exam reveals no gallop and no friction rub.    No murmur heard.  Pulmonary/Chest: She has wheezes. She has no rales. She exhibits no tenderness.   Abdominal: There is no tenderness. There is no rebound and no guarding.       Right Side Rheumatological Exam     Examination finds the elbow normal.    The patient is tender to palpation of the shoulder, wrist, knee, 1st PIP, 1st MCP, 2nd PIP, 2nd MCP, 3rd PIP, 3rd MCP, 4th PIP, 4th MCP, 5th PIP and 5th MCP    She has swelling of the 1st PIP, 1st MCP, 2nd PIP, 2nd MCP, 3rd PIP, 3rd MCP, 4th PIP, 4th MCP, 5th PIP and 5th MCP    Shoulder Exam   Tenderness Location: no tenderness    Range of Motion   Active Abduction: abnormal   Adduction: abnormal  Sensation: normal    Knee Exam   Patellofemoral Crepitus: positive  Effusion: positive  Sensation: normal    Hip Exam   Tenderness Location: posterior  Sensation: normal    Elbow/Wrist Exam   Tenderness Location: no tenderness  Sensation: normal    Left Side Rheumatological Exam     The patient is tender to palpation of the shoulder, elbow, wrist, knee, 1st PIP, 1st MCP, 2nd PIP,  2nd MCP, 3rd PIP, 3rd MCP, 4th PIP, 4th MCP, 5th PIP and 5th MCP.    She has swelling of the 1st PIP, 1st MCP, 2nd PIP, 2nd MCP, 3rd PIP, 3rd MCP, 4th PIP, 4th MCP, 5th PIP and 5th MCP    Shoulder Exam   Tenderness Location: no tenderness    Range of Motion   Active Abduction: abnormal   Sensation: normal    Knee Exam     Patellofemoral Crepitus: positive  Effusion: positive  Sensation: normal    Hip Exam   Tenderness Location: posterior  Sensation: normal    Elbow/Wrist Exam   Sensation: normal      Back/Neck Exam   General Inspection   Gait: normal         Lymphadenopathy:     She has no cervical adenopathy.   Neurological: She is alert and oriented to person, place, and time. Gait normal.   Skin: Skin is dry. No rash noted. No erythema. There is pallor.     Psychiatric: Mood and affect normal.   Musculoskeletal: She exhibits tenderness and deformity. She exhibits no edema.   dactylitis        Sed Rate 0 - 20 mm/Hr   22    TSH 0.400 - 4.000 uIU/mL   1.104   JAMILA HEP-2 Titer Positive 1:640 [employer] Homogeneous    Resulting Agency [facility]      Specimen Collected: 12/18/17 08:03 Last Resulted:             Results for orders placed or performed in visit on 01/05/18   Urine culture   Result Value Ref Range    Urine Culture, Routine ESCHERICHIA COLI  10,000 - 49,999 cfu/ml          Susceptibility    Escherichia coli - CULTURE, URINE     Amp/Sulbactam <=8/4 Sensitive mcg/mL     Ampicillin <=8 Sensitive mcg/mL     Amox/K Clav'ate <=8/4 Sensitive mcg/mL     Ceftriaxone <=8 Sensitive mcg/mL     Cefazolin <=8 Sensitive mcg/mL     Ciprofloxacin <=1 Sensitive mcg/mL     Cefepime <=8 Sensitive mcg/mL     Ertapenem <=2 Sensitive mcg/mL     Nitrofurantoin <=32 Sensitive mcg/mL     Gentamicin <=4 Sensitive mcg/mL     Meropenem <=4 Sensitive mcg/mL     Piperacillin/Tazo <=16 Sensitive mcg/mL     Trimeth/Sulfa >2/38 Resistant mcg/mL     Tetracycline <=4 Sensitive mcg/mL     Tobramycin <=4 Sensitive mcg/mL   Urinalysis   Result Value  Ref Range    Specimen UA Urine, Clean Catch     Color, UA Orange (A) Yellow, Straw, Natalee    Appearance, UA Clear Clear    pH, UA 7.0 5.0 - 8.0    Specific Gravity, UA <=1.005 (A) 1.005 - 1.030    Protein, UA Trace (A) Negative    Glucose, UA SEE COMMENT Negative    Ketones, UA Negative Negative    Bilirubin (UA) Negative Negative    Occult Blood UA Negative Negative    Nitrite, UA SEE COMMENT Negative    Leukocytes, UA 1+ (A) Negative   Urinalysis Microscopic   Result Value Ref Range    RBC, UA 1 0 - 4 /hpf    WBC, UA 10 (H) 0 - 5 /hpf    Bacteria, UA Few (A) None-Occ /hpf    Squam Epithel, UA 3 /hpf    Microscopic Comment SEE COMMENT      Assessment:       1. Irritable bowel syndrome with diarrhea    2. Pulmonary hypertension    3. CREST (calcinosis, Raynaud's phenomenon, esophageal dysfunction, sclerodactyly, telangiectasia)    4. Esophageal dysmotility            Plan:       CREST (calcinosis, Raynaud's phenomenon, esophageal dysfunction, sclerodactyly, telangiectasia)  -     CULTURE, URINE; Future; Expected date: 02/08/2018  -     Urinalysis; Future; Expected date: 02/08/2018  -     fluconazole (DIFLUCAN) 150 MG Tab; Take 1 tablet (150 mg total) by mouth once daily.  Dispense: 10 tablet; Refill: 5  -     atropine-phenobarbital-scopolamine-hyoscyamine (DONNATAL) 16.2-0.1037 -0.0194 mg Tab; Take 1 tablet by mouth 3 (three) times daily as needed.  Dispense: 90 tablet; Refill: 5  -     azaTHIOprine (IMURAN) 50 mg Tab; Take 1 tablet (50 mg total) by mouth once daily.  Dispense: 30 tablet; Refill: 4  -     levothyroxine (SYNTHROID) 75 MCG tablet; Take 1 tablet (75 mcg total) by mouth once daily.  Dispense: 30 tablet; Refill: 11  -     sildenafil, antihypertensive, (REVATIO) 20 mg Tab; Take 1 tablet (20 mg total) by mouth 3 (three) times daily.  Dispense: 90 tablet; Refill: 11  -     nitrofurantoin, macrocrystal-monohydrate, (MACROBID) 100 MG capsule; Take 1 capsule (100 mg total) by mouth 2 (two) times daily.   Dispense: 20 capsule; Refill: 5  -     CBC auto differential; Future; Expected date: 02/08/2018  -     Comprehensive metabolic panel; Future; Expected date: 02/08/2018  -     TSH; Future; Expected date: 02/08/2018  -     C-reactive protein; Future; Expected date: 02/08/2018  -     Sedimentation rate, manual; Future; Expected date: 02/08/2018    Irritable bowel syndrome with diarrhea  -     CULTURE, URINE; Future; Expected date: 02/08/2018  -     Urinalysis; Future; Expected date: 02/08/2018  -     fluconazole (DIFLUCAN) 150 MG Tab; Take 1 tablet (150 mg total) by mouth once daily.  Dispense: 10 tablet; Refill: 5  -     atropine-phenobarbital-scopolamine-hyoscyamine (DONNATAL) 16.2-0.1037 -0.0194 mg Tab; Take 1 tablet by mouth 3 (three) times daily as needed.  Dispense: 90 tablet; Refill: 5  -     azaTHIOprine (IMURAN) 50 mg Tab; Take 1 tablet (50 mg total) by mouth once daily.  Dispense: 30 tablet; Refill: 4  -     levothyroxine (SYNTHROID) 75 MCG tablet; Take 1 tablet (75 mcg total) by mouth once daily.  Dispense: 30 tablet; Refill: 11  -     sildenafil, antihypertensive, (REVATIO) 20 mg Tab; Take 1 tablet (20 mg total) by mouth 3 (three) times daily.  Dispense: 90 tablet; Refill: 11  -     nitrofurantoin, macrocrystal-monohydrate, (MACROBID) 100 MG capsule; Take 1 capsule (100 mg total) by mouth 2 (two) times daily.  Dispense: 20 capsule; Refill: 5  -     CBC auto differential; Future; Expected date: 02/08/2018  -     Comprehensive metabolic panel; Future; Expected date: 02/08/2018  -     TSH; Future; Expected date: 02/08/2018  -     C-reactive protein; Future; Expected date: 02/08/2018  -     Sedimentation rate, manual; Future; Expected date: 02/08/2018    Pulmonary hypertension  -     CULTURE, URINE; Future; Expected date: 02/08/2018  -     Urinalysis; Future; Expected date: 02/08/2018  -     fluconazole (DIFLUCAN) 150 MG Tab; Take 1 tablet (150 mg total) by mouth once daily.  Dispense: 10 tablet; Refill: 5  -      atropine-phenobarbital-scopolamine-hyoscyamine (DONNATAL) 16.2-0.1037 -0.0194 mg Tab; Take 1 tablet by mouth 3 (three) times daily as needed.  Dispense: 90 tablet; Refill: 5  -     azaTHIOprine (IMURAN) 50 mg Tab; Take 1 tablet (50 mg total) by mouth once daily.  Dispense: 30 tablet; Refill: 4  -     levothyroxine (SYNTHROID) 75 MCG tablet; Take 1 tablet (75 mcg total) by mouth once daily.  Dispense: 30 tablet; Refill: 11  -     sildenafil, antihypertensive, (REVATIO) 20 mg Tab; Take 1 tablet (20 mg total) by mouth 3 (three) times daily.  Dispense: 90 tablet; Refill: 11  -     nitrofurantoin, macrocrystal-monohydrate, (MACROBID) 100 MG capsule; Take 1 capsule (100 mg total) by mouth 2 (two) times daily.  Dispense: 20 capsule; Refill: 5  -     CBC auto differential; Future; Expected date: 02/08/2018  -     Comprehensive metabolic panel; Future; Expected date: 02/08/2018  -     TSH; Future; Expected date: 02/08/2018  -     C-reactive protein; Future; Expected date: 02/08/2018  -     Sedimentation rate, manual; Future; Expected date: 02/08/2018    Esophageal dysmotility  -     CULTURE, URINE; Future; Expected date: 02/08/2018  -     Urinalysis; Future; Expected date: 02/08/2018  -     fluconazole (DIFLUCAN) 150 MG Tab; Take 1 tablet (150 mg total) by mouth once daily.  Dispense: 10 tablet; Refill: 5  -     atropine-phenobarbital-scopolamine-hyoscyamine (DONNATAL) 16.2-0.1037 -0.0194 mg Tab; Take 1 tablet by mouth 3 (three) times daily as needed.  Dispense: 90 tablet; Refill: 5  -     azaTHIOprine (IMURAN) 50 mg Tab; Take 1 tablet (50 mg total) by mouth once daily.  Dispense: 30 tablet; Refill: 4  -     levothyroxine (SYNTHROID) 75 MCG tablet; Take 1 tablet (75 mcg total) by mouth once daily.  Dispense: 30 tablet; Refill: 11  -     sildenafil, antihypertensive, (REVATIO) 20 mg Tab; Take 1 tablet (20 mg total) by mouth 3 (three) times daily.  Dispense: 90 tablet; Refill: 11  -     nitrofurantoin,  macrocrystal-monohydrate, (MACROBID) 100 MG capsule; Take 1 capsule (100 mg total) by mouth 2 (two) times daily.  Dispense: 20 capsule; Refill: 5  -     CBC auto differential; Future; Expected date: 02/08/2018  -     Comprehensive metabolic panel; Future; Expected date: 02/08/2018  -     TSH; Future; Expected date: 02/08/2018  -     C-reactive protein; Future; Expected date: 02/08/2018  -     Sedimentation rate, manual; Future; Expected date: 02/08/2018    Urinary tract infection without hematuria, site unspecified  -     CULTURE, URINE; Future; Expected date: 02/08/2018  -     Urinalysis; Future; Expected date: 02/08/2018  -     fluconazole (DIFLUCAN) 150 MG Tab; Take 1 tablet (150 mg total) by mouth once daily.  Dispense: 10 tablet; Refill: 5  -     atropine-phenobarbital-scopolamine-hyoscyamine (DONNATAL) 16.2-0.1037 -0.0194 mg Tab; Take 1 tablet by mouth 3 (three) times daily as needed.  Dispense: 90 tablet; Refill: 5  -     azaTHIOprine (IMURAN) 50 mg Tab; Take 1 tablet (50 mg total) by mouth once daily.  Dispense: 30 tablet; Refill: 4  -     levothyroxine (SYNTHROID) 75 MCG tablet; Take 1 tablet (75 mcg total) by mouth once daily.  Dispense: 30 tablet; Refill: 11  -     sildenafil, antihypertensive, (REVATIO) 20 mg Tab; Take 1 tablet (20 mg total) by mouth 3 (three) times daily.  Dispense: 90 tablet; Refill: 11  -     nitrofurantoin, macrocrystal-monohydrate, (MACROBID) 100 MG capsule; Take 1 capsule (100 mg total) by mouth 2 (two) times daily.  Dispense: 20 capsule; Refill: 5  -     CBC auto differential; Future; Expected date: 02/08/2018  -     Comprehensive metabolic panel; Future; Expected date: 02/08/2018  -     TSH; Future; Expected date: 02/08/2018  -     C-reactive protein; Future; Expected date: 02/08/2018  -     Sedimentation rate, manual; Future; Expected date: 02/08/2018    Fatty change  -     US Abdomen Limited; Future; Expected date: 02/08/2018

## 2018-02-09 LAB — BACTERIA UR CULT: NO GROWTH

## 2018-02-12 ENCOUNTER — HOSPITAL ENCOUNTER (OUTPATIENT)
Dept: RADIOLOGY | Facility: HOSPITAL | Age: 58
Discharge: HOME | End: 2018-02-12
Attending: INTERNAL MEDICINE
Payer: MEDICARE

## 2018-02-12 DIAGNOSIS — E88.89 FATTY CHANGE: ICD-10-CM

## 2018-02-12 PROCEDURE — 76705 ECHO EXAM OF ABDOMEN: CPT | Mod: 26,,, | Performed by: INTERNAL MEDICINE

## 2018-02-12 PROCEDURE — 76705 ECHO EXAM OF ABDOMEN: CPT | Mod: TC

## 2018-04-26 ENCOUNTER — PES CALL (OUTPATIENT)
Dept: ADMINISTRATIVE | Facility: CLINIC | Age: 58
End: 2018-04-26

## 2018-05-01 RX ORDER — LOSARTAN POTASSIUM AND HYDROCHLOROTHIAZIDE 25; 100 MG/1; MG/1
TABLET ORAL
Qty: 90 TABLET | Refills: 0 | Status: SHIPPED | OUTPATIENT
Start: 2018-05-01 | End: 2018-08-10 | Stop reason: SDUPTHER

## 2018-07-09 ENCOUNTER — OFFICE VISIT (OUTPATIENT)
Dept: RHEUMATOLOGY | Facility: CLINIC | Age: 58
End: 2018-07-09
Payer: MEDICARE

## 2018-07-09 VITALS
BODY MASS INDEX: 30.02 KG/M2 | WEIGHT: 169.44 LBS | SYSTOLIC BLOOD PRESSURE: 129 MMHG | HEART RATE: 75 BPM | DIASTOLIC BLOOD PRESSURE: 77 MMHG | HEIGHT: 63 IN

## 2018-07-09 DIAGNOSIS — K22.4 ESOPHAGEAL DYSMOTILITY: ICD-10-CM

## 2018-07-09 DIAGNOSIS — E03.9 HYPOTHYROIDISM, UNSPECIFIED TYPE: ICD-10-CM

## 2018-07-09 DIAGNOSIS — K58.0 IRRITABLE BOWEL SYNDROME WITH DIARRHEA: ICD-10-CM

## 2018-07-09 DIAGNOSIS — I27.20 PULMONARY HYPERTENSION: ICD-10-CM

## 2018-07-09 DIAGNOSIS — E55.9 VITAMIN D DEFICIENCY DISEASE: ICD-10-CM

## 2018-07-09 DIAGNOSIS — N39.0 URINARY TRACT INFECTION WITHOUT HEMATURIA, SITE UNSPECIFIED: ICD-10-CM

## 2018-07-09 DIAGNOSIS — M34.1 CREST (CALCINOSIS, RAYNAUD'S PHENOMENON, ESOPHAGEAL DYSFUNCTION, SCLERODACTYLY, TELANGIECTASIA): Primary | ICD-10-CM

## 2018-07-09 PROCEDURE — 3078F DIAST BP <80 MM HG: CPT | Mod: CPTII,S$GLB,, | Performed by: INTERNAL MEDICINE

## 2018-07-09 PROCEDURE — 3074F SYST BP LT 130 MM HG: CPT | Mod: CPTII,S$GLB,, | Performed by: INTERNAL MEDICINE

## 2018-07-09 PROCEDURE — 99999 PR PBB SHADOW E&M-EST. PATIENT-LVL IV: CPT | Mod: PBBFAC,,, | Performed by: INTERNAL MEDICINE

## 2018-07-09 PROCEDURE — 99213 OFFICE O/P EST LOW 20 MIN: CPT | Mod: S$GLB,,, | Performed by: INTERNAL MEDICINE

## 2018-07-09 PROCEDURE — 3008F BODY MASS INDEX DOCD: CPT | Mod: CPTII,S$GLB,, | Performed by: INTERNAL MEDICINE

## 2018-07-09 RX ORDER — AZATHIOPRINE 50 MG/1
50 TABLET ORAL DAILY
Qty: 30 TABLET | Refills: 5 | Status: SHIPPED | OUTPATIENT
Start: 2018-07-09 | End: 2019-05-14 | Stop reason: SDUPTHER

## 2018-07-09 RX ORDER — CLONAZEPAM 1 MG/1
1 TABLET ORAL 2 TIMES DAILY PRN
Qty: 45 TABLET | Refills: 4 | Status: SHIPPED | OUTPATIENT
Start: 2018-07-09 | End: 2019-05-14

## 2018-07-09 RX ORDER — LEVOTHYROXINE SODIUM 75 UG/1
75 TABLET ORAL DAILY
Qty: 30 TABLET | Refills: 11 | Status: SHIPPED | OUTPATIENT
Start: 2018-07-09 | End: 2019-07-22 | Stop reason: SDUPTHER

## 2018-07-09 RX ORDER — TRAMADOL HYDROCHLORIDE 50 MG/1
50 TABLET ORAL EVERY 6 HOURS PRN
Qty: 120 TABLET | Refills: 3 | Status: SHIPPED | OUTPATIENT
Start: 2018-07-09 | End: 2018-12-11 | Stop reason: SDUPTHER

## 2018-07-09 NOTE — PROGRESS NOTES
Subjective:       Patient ID: Tina García is a 58 y.o. female.    Chief Complaint: Follow-up    Follow up:she is on Imuran, has crest syndrome, pa pressure 24, Optiva diet  She is on revatio and its helping. Patient complains of arthralgias and myalgias for which has been present for a few years. Pain is located in multiple joints, both shoulder(s), both elbow(s), both wrist(s), both MCP(s): 1st, 2nd, 3rd, 4th and 5th, both PIP(s): 1st, 2nd, 3rd, 4th and 5th, both DIP(s): 1st and 2nd, both hip(s), both knee(s) and both MTP(s): 1st, 2nd, 3rd, 4th and 5th, is described as aching, pulsating, shooting and throbbing, and is constant, moderate .  Associated symptoms include: crepitation, decreased range of motion, edema, effusion, tenderness and warmth.        Review of Systems   Constitutional: Positive for activity change and chills. Negative for appetite change, diaphoresis and unexpected weight change.   HENT: Negative for congestion, dental problem, ear discharge, ear pain, facial swelling, mouth sores, nosebleeds, postnasal drip, rhinorrhea, sinus pressure, sneezing, sore throat, tinnitus and voice change.    Eyes: Negative for photophobia, pain, discharge, redness and itching.   Respiratory: Positive for choking. Negative for apnea, cough, chest tightness, shortness of breath and wheezing.    Cardiovascular: Positive for leg swelling. Negative for chest pain and palpitations.   Gastrointestinal: Positive for abdominal pain. Negative for abdominal distention, constipation, diarrhea, nausea and vomiting.   Endocrine: Negative for cold intolerance, heat intolerance, polydipsia and polyuria.   Genitourinary: Negative for decreased urine volume, difficulty urinating, flank pain, frequency, hematuria and urgency.   Musculoskeletal: Positive for arthralgias, back pain, gait problem, joint swelling, neck pain and neck stiffness.   Skin: Negative for pallor, rash and wound.   Allergic/Immunologic: Negative  "for immunocompromised state.   Neurological: Positive for weakness. Negative for dizziness, tremors and numbness.   Hematological: Negative for adenopathy. Does not bruise/bleed easily.   Psychiatric/Behavioral: Negative for sleep disturbance. The patient is not nervous/anxious.          Objective:     /77   Pulse 75   Ht 5' 3" (1.6 m)   Wt 76.9 kg (169 lb 6.8 oz)   BMI 30.01 kg/m²      Physical Exam   Vitals reviewed.  Constitutional: She is oriented to person, place, and time. No distress.   HENT:   Head: Normocephalic and atraumatic.   Eyes: EOM are normal. Pupils are equal, round, and reactive to light. Right eye exhibits no discharge. Left eye exhibits no discharge.   Neck: Neck supple. No thyromegaly present.   Cardiovascular: Normal rate, regular rhythm and normal heart sounds.  Exam reveals no gallop and no friction rub.    No murmur heard.  Pulmonary/Chest: She has no wheezes. She has no rales. She exhibits no tenderness.   Abdominal: There is no tenderness. There is no rebound and no guarding.       Right Side Rheumatological Exam     Examination finds the elbow normal.    The patient is tender to palpation of the shoulder, wrist, knee, 1st PIP, 1st MCP, 2nd PIP, 2nd MCP, 3rd PIP, 3rd MCP, 4th PIP, 4th MCP, 5th PIP and 5th MCP    She has swelling of the 1st PIP, 1st MCP, 2nd PIP, 2nd MCP, 3rd PIP, 3rd MCP, 4th PIP, 4th MCP, 5th PIP and 5th MCP    Shoulder Exam   Tenderness Location: no tenderness    Range of Motion   Active Abduction: abnormal   Adduction: abnormal  Sensation: normal    Knee Exam   Patellofemoral Crepitus: positive  Effusion: positive  Sensation: normal    Hip Exam   Tenderness Location: posterior  Sensation: normal    Elbow/Wrist Exam   Tenderness Location: no tenderness  Sensation: normal    Left Side Rheumatological Exam     The patient is tender to palpation of the shoulder, elbow, wrist, knee, 1st PIP, 1st MCP, 2nd PIP, 2nd MCP, 3rd PIP, 3rd MCP, 4th PIP, 4th MCP, 5th PIP " and 5th MCP.    She has swelling of the 1st PIP, 1st MCP, 2nd PIP, 2nd MCP, 3rd PIP, 3rd MCP, 4th PIP, 4th MCP, 5th PIP and 5th MCP    Shoulder Exam   Tenderness Location: no tenderness    Range of Motion   Active Abduction: abnormal   Sensation: normal    Knee Exam     Patellofemoral Crepitus: positive  Effusion: positive  Sensation: normal    Hip Exam   Tenderness Location: posterior  Sensation: normal    Elbow/Wrist Exam   Sensation: normal      Back/Neck Exam   General Inspection   Gait: normal         Lymphadenopathy:     She has no cervical adenopathy.   Neurological: She is alert and oriented to person, place, and time. Gait normal.   Skin: Skin is dry. No rash noted. No erythema. There is pallor.     Psychiatric: Mood and affect normal.   Musculoskeletal: She exhibits tenderness and deformity. She exhibits no edema.   dactylitis                Results for orders placed or performed in visit on 02/08/18   CULTURE, URINE   Result Value Ref Range    Urine Culture, Routine No growth    Urinalysis   Result Value Ref Range    Specimen UA Urine, Clean Catch     Color, UA Yellow Yellow, Straw, Natalee    Appearance, UA Clear Clear    pH, UA 7.0 5.0 - 8.0    Specific Gravity, UA 1.010 1.005 - 1.030    Protein, UA Negative Negative    Glucose, UA Negative Negative    Ketones, UA Negative Negative    Bilirubin (UA) Negative Negative    Occult Blood UA Negative Negative    Nitrite, UA Negative Negative    Leukocytes, UA Negative Negative     Assessment:       1. CREST (calcinosis, Raynaud's phenomenon, esophageal dysfunction, sclerodactyly, telangiectasia)    2. Pulmonary hypertension    3. Vitamin D deficiency disease    4. Esophageal dysmotility    5. Hypothyroidism, unspecified type    6. Irritable bowel syndrome with diarrhea    7. Urinary tract infection without hematuria, site unspecified            Plan:       CREST (calcinosis, Raynaud's phenomenon, esophageal dysfunction, sclerodactyly, telangiectasia)  -      JAMILA; Future; Expected date: 07/09/2018  -     Anti Sm/RNP Antibody; Future; Expected date: 07/09/2018  -     Anti-scleroderma antibody; Future; Expected date: 07/09/2018  -     Sjogrens syndrome-A extractable nuclear antibody; Future; Expected date: 07/09/2018  -     Sjogrens syndrome-B extractable nuclear antibody; Future; Expected date: 07/09/2018  -     TSH; Future; Expected date: 07/09/2018  -     T4, free; Future; Expected date: 07/09/2018  -     T3, free; Future; Expected date: 07/09/2018  -     CBC auto differential; Future; Expected date: 07/09/2018  -     Comprehensive metabolic panel; Future; Expected date: 07/09/2018  -     C-reactive protein; Future; Expected date: 07/09/2018  -     Sedimentation rate; Future; Expected date: 07/09/2018  -     Anti-Smith antibody; Future; Expected date: 07/09/2018  -     Anti-DNA antibody, double-stranded; Future; Expected date: 07/09/2018  -     Anti-Histone Antibody; Future; Expected date: 07/09/2018  -     azaTHIOprine (IMURAN) 50 mg Tab; Take 1 tablet (50 mg total) by mouth once daily.  Dispense: 30 tablet; Refill: 5  -     X-Ray Chest PA And Lateral; Future; Expected date: 07/09/2018  -     Complete PFT with bronchodilator; Future  -     PULSE OXIMETRY WITH REST - PULM; Future  -     2D Echo w/ Color Flow Doppler; Future  -     levothyroxine (SYNTHROID) 75 MCG tablet; Take 1 tablet (75 mcg total) by mouth once daily.  Dispense: 30 tablet; Refill: 11    Pulmonary hypertension  -     JAMILA; Future; Expected date: 07/09/2018  -     Anti Sm/RNP Antibody; Future; Expected date: 07/09/2018  -     Anti-scleroderma antibody; Future; Expected date: 07/09/2018  -     Sjogrens syndrome-A extractable nuclear antibody; Future; Expected date: 07/09/2018  -     Sjogrens syndrome-B extractable nuclear antibody; Future; Expected date: 07/09/2018  -     TSH; Future; Expected date: 07/09/2018  -     T4, free; Future; Expected date: 07/09/2018  -     T3, free; Future; Expected date:  07/09/2018  -     CBC auto differential; Future; Expected date: 07/09/2018  -     Comprehensive metabolic panel; Future; Expected date: 07/09/2018  -     C-reactive protein; Future; Expected date: 07/09/2018  -     Sedimentation rate; Future; Expected date: 07/09/2018  -     Anti-Smith antibody; Future; Expected date: 07/09/2018  -     Anti-DNA antibody, double-stranded; Future; Expected date: 07/09/2018  -     Anti-Histone Antibody; Future; Expected date: 07/09/2018  -     azaTHIOprine (IMURAN) 50 mg Tab; Take 1 tablet (50 mg total) by mouth once daily.  Dispense: 30 tablet; Refill: 5  -     X-Ray Chest PA And Lateral; Future; Expected date: 07/09/2018  -     Complete PFT with bronchodilator; Future  -     PULSE OXIMETRY WITH REST - PULM; Future  -     2D Echo w/ Color Flow Doppler; Future  -     levothyroxine (SYNTHROID) 75 MCG tablet; Take 1 tablet (75 mcg total) by mouth once daily.  Dispense: 30 tablet; Refill: 11    Vitamin D deficiency disease  -     JAMILA; Future; Expected date: 07/09/2018  -     Anti Sm/RNP Antibody; Future; Expected date: 07/09/2018  -     Anti-scleroderma antibody; Future; Expected date: 07/09/2018  -     Sjogrens syndrome-A extractable nuclear antibody; Future; Expected date: 07/09/2018  -     Sjogrens syndrome-B extractable nuclear antibody; Future; Expected date: 07/09/2018  -     TSH; Future; Expected date: 07/09/2018  -     T4, free; Future; Expected date: 07/09/2018  -     T3, free; Future; Expected date: 07/09/2018  -     CBC auto differential; Future; Expected date: 07/09/2018  -     Comprehensive metabolic panel; Future; Expected date: 07/09/2018  -     C-reactive protein; Future; Expected date: 07/09/2018  -     Sedimentation rate; Future; Expected date: 07/09/2018  -     Anti-Smith antibody; Future; Expected date: 07/09/2018  -     Anti-DNA antibody, double-stranded; Future; Expected date: 07/09/2018  -     Anti-Histone Antibody; Future; Expected date: 07/09/2018    Esophageal  dysmotility  -     JAMILA; Future; Expected date: 07/09/2018  -     Anti Sm/RNP Antibody; Future; Expected date: 07/09/2018  -     Anti-scleroderma antibody; Future; Expected date: 07/09/2018  -     Sjogrens syndrome-A extractable nuclear antibody; Future; Expected date: 07/09/2018  -     Sjogrens syndrome-B extractable nuclear antibody; Future; Expected date: 07/09/2018  -     TSH; Future; Expected date: 07/09/2018  -     T4, free; Future; Expected date: 07/09/2018  -     T3, free; Future; Expected date: 07/09/2018  -     CBC auto differential; Future; Expected date: 07/09/2018  -     Comprehensive metabolic panel; Future; Expected date: 07/09/2018  -     C-reactive protein; Future; Expected date: 07/09/2018  -     Sedimentation rate; Future; Expected date: 07/09/2018  -     Anti-Smith antibody; Future; Expected date: 07/09/2018  -     Anti-DNA antibody, double-stranded; Future; Expected date: 07/09/2018  -     Anti-Histone Antibody; Future; Expected date: 07/09/2018  -     azaTHIOprine (IMURAN) 50 mg Tab; Take 1 tablet (50 mg total) by mouth once daily.  Dispense: 30 tablet; Refill: 5  -     X-Ray Chest PA And Lateral; Future; Expected date: 07/09/2018  -     Complete PFT with bronchodilator; Future  -     PULSE OXIMETRY WITH REST - PULM; Future  -     2D Echo w/ Color Flow Doppler; Future  -     levothyroxine (SYNTHROID) 75 MCG tablet; Take 1 tablet (75 mcg total) by mouth once daily.  Dispense: 30 tablet; Refill: 11    Hypothyroidism, unspecified type  -     JAMILA; Future; Expected date: 07/09/2018  -     Anti Sm/RNP Antibody; Future; Expected date: 07/09/2018  -     Anti-scleroderma antibody; Future; Expected date: 07/09/2018  -     Sjogrens syndrome-A extractable nuclear antibody; Future; Expected date: 07/09/2018  -     Sjogrens syndrome-B extractable nuclear antibody; Future; Expected date: 07/09/2018  -     TSH; Future; Expected date: 07/09/2018  -     T4, free; Future; Expected date: 07/09/2018  -     T3, free;  Future; Expected date: 07/09/2018  -     CBC auto differential; Future; Expected date: 07/09/2018  -     Comprehensive metabolic panel; Future; Expected date: 07/09/2018  -     C-reactive protein; Future; Expected date: 07/09/2018  -     Sedimentation rate; Future; Expected date: 07/09/2018  -     Anti-Smith antibody; Future; Expected date: 07/09/2018  -     Anti-DNA antibody, double-stranded; Future; Expected date: 07/09/2018  -     Anti-Histone Antibody; Future; Expected date: 07/09/2018    Irritable bowel syndrome with diarrhea  -     azaTHIOprine (IMURAN) 50 mg Tab; Take 1 tablet (50 mg total) by mouth once daily.  Dispense: 30 tablet; Refill: 5  -     levothyroxine (SYNTHROID) 75 MCG tablet; Take 1 tablet (75 mcg total) by mouth once daily.  Dispense: 30 tablet; Refill: 11    Urinary tract infection without hematuria, site unspecified  -     levothyroxine (SYNTHROID) 75 MCG tablet; Take 1 tablet (75 mcg total) by mouth once daily.  Dispense: 30 tablet; Refill: 11    Other orders  -     traMADol (ULTRAM) 50 mg tablet; Take 1 tablet (50 mg total) by mouth every 6 (six) hours as needed.  Dispense: 120 tablet; Refill: 3  -     clonazePAM (KLONOPIN) 1 MG tablet; Take 1 tablet (1 mg total) by mouth 2 (two) times daily as needed for Anxiety.  Dispense: 45 tablet; Refill: 4       doing fair on Imuran.

## 2018-07-10 ENCOUNTER — TELEPHONE (OUTPATIENT)
Dept: FAMILY MEDICINE | Facility: CLINIC | Age: 58
End: 2018-07-10

## 2018-07-13 ENCOUNTER — TELEPHONE (OUTPATIENT)
Dept: RHEUMATOLOGY | Facility: CLINIC | Age: 58
End: 2018-07-13

## 2018-07-13 NOTE — TELEPHONE ENCOUNTER
----- Message from Jorge Eduardo sent at 7/13/2018  1:55 PM CDT -----  Contact: Pt  Name of Who is Calling: Tina      What is the request in detail: Pt is calling requesting to be scheduled in [place] for her lab for Tuesday morning. System won't let me.      Can the clinic reply by [facility]: no      What Number to Call Back if not in [facility]: [phone]

## 2018-08-02 ENCOUNTER — HOSPITAL ENCOUNTER (OUTPATIENT)
Dept: RADIOLOGY | Facility: CLINIC | Age: 58
Discharge: HOME | End: 2018-08-02
Attending: INTERNAL MEDICINE
Payer: MEDICARE

## 2018-08-02 DIAGNOSIS — I27.20 PULMONARY HYPERTENSION: ICD-10-CM

## 2018-08-02 DIAGNOSIS — K22.4 ESOPHAGEAL DYSMOTILITY: ICD-10-CM

## 2018-08-02 DIAGNOSIS — M34.1 CREST (CALCINOSIS, RAYNAUD'S PHENOMENON, ESOPHAGEAL DYSFUNCTION, SCLERODACTYLY, TELANGIECTASIA): ICD-10-CM

## 2018-08-02 PROCEDURE — 71046 X-RAY EXAM CHEST 2 VIEWS: CPT | Mod: TC,FY,PO

## 2018-08-02 PROCEDURE — 71046 X-RAY EXAM CHEST 2 VIEWS: CPT | Mod: 26,,, | Performed by: RADIOLOGY

## 2018-08-12 RX ORDER — LOSARTAN POTASSIUM AND HYDROCHLOROTHIAZIDE 25; 100 MG/1; MG/1
TABLET ORAL
Qty: 90 TABLET | Refills: 0 | Status: SHIPPED | OUTPATIENT
Start: 2018-08-12 | End: 2018-08-14 | Stop reason: SDUPTHER

## 2018-08-13 ENCOUNTER — PATIENT MESSAGE (OUTPATIENT)
Dept: FAMILY MEDICINE | Facility: CLINIC | Age: 58
End: 2018-08-13

## 2018-08-13 ENCOUNTER — TELEPHONE (OUTPATIENT)
Dept: RHEUMATOLOGY | Facility: CLINIC | Age: 58
End: 2018-08-13

## 2018-08-13 NOTE — TELEPHONE ENCOUNTER
----- Message from Valeriano Rivera sent at 8/13/2018  9:34 AM CDT -----  Type: Needs Medical Advice    Who Called:  Patient    Best Call Back Number: [phone]  Additional Information: Patient calling.  States that she was told that she would be scheduled an ECHO but has not seen an appointment.    Please call to advise

## 2018-08-13 NOTE — TELEPHONE ENCOUNTER
Spoke to pt, she advises she has never heard back about scheduling PFTs and TTE at [facility]. Advised nurse would look into how to schedule at that campus. Pt verbalized understanding. Sent message to [facility] and [facility].

## 2018-08-14 RX ORDER — LOSARTAN POTASSIUM AND HYDROCHLOROTHIAZIDE 25; 100 MG/1; MG/1
1 TABLET ORAL DAILY
Qty: 90 TABLET | Refills: 0 | Status: SHIPPED | OUTPATIENT
Start: 2018-08-14 | End: 2018-12-10 | Stop reason: SDUPTHER

## 2018-08-14 NOTE — TELEPHONE ENCOUNTER
Pharmacy states they did not received e-script.    I've already notified patient she needs to come for an annual. Waiting for patient to call and schedule

## 2018-08-17 ENCOUNTER — PATIENT MESSAGE (OUTPATIENT)
Dept: RHEUMATOLOGY | Facility: CLINIC | Age: 58
End: 2018-08-17

## 2018-08-17 ENCOUNTER — TELEPHONE (OUTPATIENT)
Dept: RHEUMATOLOGY | Facility: CLINIC | Age: 58
End: 2018-08-17

## 2018-08-17 NOTE — TELEPHONE ENCOUNTER
----- Message from Brianna Grayson sent at 8/15/2018  9:46 AM CDT -----  Contact: self  Patient is calling back regarding appointment for the echo and PFT. Please call patient at [phone]. Thanks!

## 2018-08-17 NOTE — TELEPHONE ENCOUNTER
----- Message from Valeriano Rivera sent at 8/17/2018  2:49 PM CDT -----  Type: Needs Medical Advice    Who Called:  patient    Best Call Back Number: [phone]  Additional Information: Patient calling.  States that she needs information on scheduling her tests, (ECHO and Pulmonary Function Test)   Please call to advise

## 2018-08-22 ENCOUNTER — PATIENT MESSAGE (OUTPATIENT)
Dept: FAMILY MEDICINE | Facility: CLINIC | Age: 58
End: 2018-08-22

## 2018-08-22 DIAGNOSIS — Z00.00 ROUTINE HEALTH MAINTENANCE: Primary | ICD-10-CM

## 2018-08-22 DIAGNOSIS — E03.9 HYPOTHYROIDISM, UNSPECIFIED TYPE: ICD-10-CM

## 2018-08-22 DIAGNOSIS — Z13.6 ENCOUNTER FOR SCREENING FOR CARDIOVASCULAR DISORDERS: ICD-10-CM

## 2018-12-11 ENCOUNTER — TELEPHONE (OUTPATIENT)
Dept: FAMILY MEDICINE | Facility: CLINIC | Age: 58
End: 2018-12-11

## 2018-12-11 ENCOUNTER — OFFICE VISIT (OUTPATIENT)
Dept: RHEUMATOLOGY | Facility: CLINIC | Age: 58
End: 2018-12-11
Payer: MEDICARE

## 2018-12-11 ENCOUNTER — OFFICE VISIT (OUTPATIENT)
Dept: FAMILY MEDICINE | Facility: CLINIC | Age: 58
End: 2018-12-11
Payer: MEDICARE

## 2018-12-11 VITALS
BODY MASS INDEX: 30.48 KG/M2 | SYSTOLIC BLOOD PRESSURE: 116 MMHG | HEIGHT: 63 IN | RESPIRATION RATE: 13 BRPM | WEIGHT: 172 LBS | HEART RATE: 91 BPM | DIASTOLIC BLOOD PRESSURE: 69 MMHG

## 2018-12-11 VITALS
SYSTOLIC BLOOD PRESSURE: 116 MMHG | TEMPERATURE: 98 F | OXYGEN SATURATION: 98 % | BODY MASS INDEX: 30.62 KG/M2 | DIASTOLIC BLOOD PRESSURE: 69 MMHG | HEART RATE: 91 BPM | WEIGHT: 172.81 LBS | HEIGHT: 63 IN

## 2018-12-11 DIAGNOSIS — I27.20 PHT (PULMONARY HYPERTENSION): ICD-10-CM

## 2018-12-11 DIAGNOSIS — Z00.00 ENCOUNTER FOR PREVENTIVE HEALTH EXAMINATION: Primary | ICD-10-CM

## 2018-12-11 DIAGNOSIS — R05.9 COUGH: ICD-10-CM

## 2018-12-11 DIAGNOSIS — I27.9 CHRONIC PULMONARY HEART DISEASE: ICD-10-CM

## 2018-12-11 DIAGNOSIS — M79.7 FIBROMYALGIA: ICD-10-CM

## 2018-12-11 DIAGNOSIS — I27.20 PULMONARY HYPERTENSION: ICD-10-CM

## 2018-12-11 DIAGNOSIS — G89.29 CHRONIC BILATERAL LOW BACK PAIN WITH BILATERAL SCIATICA: ICD-10-CM

## 2018-12-11 DIAGNOSIS — I10 ESSENTIAL HYPERTENSION: ICD-10-CM

## 2018-12-11 DIAGNOSIS — F32.2 MAJOR DEPRESSIVE DISORDER, SINGLE EPISODE, SEVERE WITHOUT PSYCHOTIC FEATURES: ICD-10-CM

## 2018-12-11 DIAGNOSIS — M54.41 CHRONIC BILATERAL LOW BACK PAIN WITH BILATERAL SCIATICA: ICD-10-CM

## 2018-12-11 DIAGNOSIS — M54.42 CHRONIC BILATERAL LOW BACK PAIN WITH BILATERAL SCIATICA: ICD-10-CM

## 2018-12-11 DIAGNOSIS — K21.9 GASTROESOPHAGEAL REFLUX DISEASE WITHOUT ESOPHAGITIS: ICD-10-CM

## 2018-12-11 DIAGNOSIS — E55.9 VITAMIN D DEFICIENCY DISEASE: ICD-10-CM

## 2018-12-11 DIAGNOSIS — M15.9 PRIMARY OSTEOARTHRITIS INVOLVING MULTIPLE JOINTS: ICD-10-CM

## 2018-12-11 DIAGNOSIS — M34.1 CREST (CALCINOSIS, RAYNAUD'S PHENOMENON, ESOPHAGEAL DYSFUNCTION, SCLERODACTYLY, TELANGIECTASIA): Primary | ICD-10-CM

## 2018-12-11 DIAGNOSIS — M34.1 CREST (CALCINOSIS, RAYNAUD'S PHENOMENON, ESOPHAGEAL DYSFUNCTION, SCLERODACTYLY, TELANGIECTASIA): ICD-10-CM

## 2018-12-11 DIAGNOSIS — M35.00 SJOGREN'S SYNDROME, WITH UNSPECIFIED ORGAN INVOLVEMENT: ICD-10-CM

## 2018-12-11 PROCEDURE — 3078F DIAST BP <80 MM HG: CPT | Mod: CPTII,HCWC,S$GLB, | Performed by: NURSE PRACTITIONER

## 2018-12-11 PROCEDURE — G0439 PPPS, SUBSEQ VISIT: HCPCS | Mod: HCWC,S$GLB,, | Performed by: NURSE PRACTITIONER

## 2018-12-11 PROCEDURE — 3074F SYST BP LT 130 MM HG: CPT | Mod: CPTII,HCWC,S$GLB, | Performed by: NURSE PRACTITIONER

## 2018-12-11 PROCEDURE — 96372 THER/PROPH/DIAG INJ SC/IM: CPT | Mod: 59,S$GLB,, | Performed by: INTERNAL MEDICINE

## 2018-12-11 PROCEDURE — 3074F SYST BP LT 130 MM HG: CPT | Mod: CPTII,S$GLB,, | Performed by: INTERNAL MEDICINE

## 2018-12-11 PROCEDURE — 99499 UNLISTED E&M SERVICE: CPT | Mod: HCNC,S$GLB,, | Performed by: NURSE PRACTITIONER

## 2018-12-11 PROCEDURE — 3078F DIAST BP <80 MM HG: CPT | Mod: CPTII,S$GLB,, | Performed by: INTERNAL MEDICINE

## 2018-12-11 PROCEDURE — 99999 PR PBB SHADOW E&M-EST. PATIENT-LVL V: CPT | Mod: PBBFAC,,, | Performed by: INTERNAL MEDICINE

## 2018-12-11 PROCEDURE — 99999 PR PBB SHADOW E&M-EST. PATIENT-LVL V: CPT | Mod: PBBFAC,HCWC,, | Performed by: NURSE PRACTITIONER

## 2018-12-11 PROCEDURE — 3008F BODY MASS INDEX DOCD: CPT | Mod: CPTII,S$GLB,, | Performed by: INTERNAL MEDICINE

## 2018-12-11 PROCEDURE — 99214 OFFICE O/P EST MOD 30 MIN: CPT | Mod: 25,S$GLB,, | Performed by: INTERNAL MEDICINE

## 2018-12-11 RX ORDER — METHYLPREDNISOLONE ACETATE 80 MG/ML
80 INJECTION, SUSPENSION INTRA-ARTICULAR; INTRALESIONAL; INTRAMUSCULAR; SOFT TISSUE
Status: COMPLETED | OUTPATIENT
Start: 2018-12-11 | End: 2018-12-11

## 2018-12-11 RX ORDER — LOSARTAN POTASSIUM AND HYDROCHLOROTHIAZIDE 25; 100 MG/1; MG/1
TABLET ORAL
Qty: 90 TABLET | Refills: 0 | Status: SHIPPED | OUTPATIENT
Start: 2018-12-11 | End: 2019-03-29 | Stop reason: SDUPTHER

## 2018-12-11 RX ORDER — CYANOCOBALAMIN 1000 UG/ML
1000 INJECTION, SOLUTION INTRAMUSCULAR; SUBCUTANEOUS
Status: COMPLETED | OUTPATIENT
Start: 2018-12-11 | End: 2018-12-11

## 2018-12-11 RX ORDER — BENZONATATE 100 MG/1
100 CAPSULE ORAL 3 TIMES DAILY PRN
Qty: 30 CAPSULE | Refills: 0 | Status: SHIPPED | OUTPATIENT
Start: 2018-12-11 | End: 2018-12-21

## 2018-12-11 RX ORDER — TRAMADOL HYDROCHLORIDE 50 MG/1
50 TABLET ORAL EVERY 6 HOURS PRN
Qty: 120 TABLET | Refills: 4 | Status: SHIPPED | OUTPATIENT
Start: 2018-12-11 | End: 2019-05-14 | Stop reason: SDUPTHER

## 2018-12-11 RX ORDER — AZITHROMYCIN 250 MG/1
TABLET, FILM COATED ORAL
Qty: 6 TABLET | Refills: 0 | Status: SHIPPED | OUTPATIENT
Start: 2018-12-11 | End: 2019-04-16

## 2018-12-11 RX ADMIN — METHYLPREDNISOLONE ACETATE 80 MG: 80 INJECTION, SUSPENSION INTRA-ARTICULAR; INTRALESIONAL; INTRAMUSCULAR; SOFT TISSUE at 11:12

## 2018-12-11 RX ADMIN — CYANOCOBALAMIN 1000 MCG: 1000 INJECTION, SOLUTION INTRAMUSCULAR; SUBCUTANEOUS at 11:12

## 2018-12-11 ASSESSMENT — ROUTINE ASSESSMENT OF PATIENT INDEX DATA (RAPID3)
PAIN SCORE: 5
TOTAL RAPID3 SCORE: 4
PSYCHOLOGICAL DISTRESS SCORE: 5.5
PATIENT GLOBAL ASSESSMENT SCORE: 5
MDHAQ FUNCTION SCORE: .6

## 2018-12-11 NOTE — PATIENT INSTRUCTIONS
Counseling and Referral of Other Preventative  (Italic type indicates deductible and co-insurance are waived)    Patient Name: Tina García  Today's Date: 12/11/2018    Health Maintenance       Date Due Completion Date    Pneumococcal Vaccine (Medium Risk) (1 of 1 - PPSV23) 06/26/1981 ---    Influenza Vaccine 08/01/2018 2/22/2016    Mammogram 09/21/2019 9/21/2018    Override on 12/9/2013: Done (Needs recheck in 6 months due to abnormal MMG)    Lipid Panel 04/08/2020 4/8/2015    Colonoscopy 07/24/2022 7/24/2012    TETANUS VACCINE 04/09/2024 4/9/2014        No orders of the defined types were placed in this encounter.    The following information is provided to all patients.  This information is to help you find resources for any of the problems found today that may be affecting your health:                Living healthy guide: www.[facility].[place].[facility]      Understanding Diabetes: www.diabetes.org      Eating healthy: www.cdc.gov/healthyweight      [facility] home safety checklist: www.cdc.gov/steadi/patient.html      Agency on Aging: www.goea.[place].[facility]      Alcoholics anonymous (AA): www.aa.org      Physical Activity: www.[place].nih.gov/av6qcqw      Tobacco use: www.quitwithusla.org

## 2018-12-11 NOTE — TELEPHONE ENCOUNTER
Requesting refill on BP meds (losartan) and also to change it since it has been recalled. Please advise and send to [facility]. Thank you

## 2018-12-11 NOTE — PROGRESS NOTES
Subjective:       Patient ID: Tina García is a 58 y.o. female.    Chief Complaint: Disease Management (CREST )    Follow up:she is on Imuran, has crest syndrome, pa pressure 24,doing poorly, pt is ill and has a cough and sob.She is on revatio and its helping. Patient complains of arthralgias and myalgias for which has been present for a few years. Pain is located in multiple joints, both shoulder(s), both elbow(s), both wrist(s), both MCP(s): 1st, 2nd, 3rd, 4th and 5th, both PIP(s): 1st, 2nd, 3rd, 4th and 5th, both DIP(s): 1st and 2nd, both hip(s), both knee(s) and both MTP(s): 1st, 2nd, 3rd, 4th and 5th, is described as aching, pulsating, shooting and throbbing, and is constant, moderate .  Associated symptoms include: crepitation, decreased range of motion, edema, effusion, tenderness and warmth.   Pt attempted to work part time, she only has to sit however it is making her fatigued and has a cough.      Review of Systems   Constitutional: Positive for activity change, chills and fatigue. Negative for appetite change, diaphoresis and unexpected weight change.   HENT: Negative for congestion, dental problem, ear discharge, ear pain, facial swelling, mouth sores, nosebleeds, postnasal drip, rhinorrhea, sinus pressure, sneezing, sore throat, tinnitus and voice change.    Eyes: Negative for photophobia, pain, discharge, redness and itching.   Respiratory: Positive for choking. Negative for apnea, cough, chest tightness, shortness of breath and wheezing.    Cardiovascular: Positive for leg swelling. Negative for chest pain and palpitations.   Gastrointestinal: Positive for abdominal pain. Negative for abdominal distention, constipation, diarrhea, nausea and vomiting.   Endocrine: Negative for cold intolerance, heat intolerance, polydipsia and polyuria.   Genitourinary: Negative for decreased urine volume, difficulty urinating, flank pain, frequency, hematuria and urgency.   Musculoskeletal: Positive for  "arthralgias, back pain, gait problem, joint swelling, myalgias, neck pain and neck stiffness.   Skin: Negative for pallor, rash and wound.   Allergic/Immunologic: Negative for immunocompromised state.   Neurological: Positive for weakness. Negative for dizziness, tremors and numbness.   Hematological: Negative for adenopathy. Does not bruise/bleed easily.   Psychiatric/Behavioral: Negative for sleep disturbance. The patient is not nervous/anxious.          Objective:     /69   Pulse 91   Resp 13   Ht 5' 3" (1.6 m)   Wt 78 kg (172 lb)   BMI 30.47 kg/m²      Physical Exam   Vitals reviewed.  Constitutional: She is oriented to person, place, and time. She appears distressed.   HENT:   Head: Normocephalic and atraumatic.   Eyes: EOM are normal. Pupils are equal, round, and reactive to light. Right eye exhibits no discharge. Left eye exhibits no discharge.   Neck: Neck supple. No thyromegaly present.   Cardiovascular: Normal rate, regular rhythm and normal heart sounds.  Exam reveals no gallop and no friction rub.    No murmur heard.  Pulmonary/Chest: She has wheezes. She has no rales. She exhibits no tenderness.   Abdominal: There is no tenderness. There is no rebound and no guarding.       Right Side Rheumatological Exam     Examination finds the elbow normal.    The patient is tender to palpation of the shoulder, wrist, knee, 1st PIP, 1st MCP, 2nd PIP, 2nd MCP, 3rd PIP, 3rd MCP, 4th PIP, 4th MCP, 5th PIP and 5th MCP    She has swelling of the 1st PIP, 1st MCP, 2nd PIP, 2nd MCP, 3rd PIP, 3rd MCP, 4th PIP, 4th MCP, 5th PIP and 5th MCP    Shoulder Exam   Tenderness Location: no tenderness    Range of Motion   Active abduction: abnormal   Adduction: abnormal  Sensation: normal    Knee Exam   Patellofemoral Crepitus: positive  Effusion: positive  Sensation: normal    Hip Exam   Tenderness Location: posterior  Sensation: normal    Elbow/Wrist Exam   Tenderness Location: no tenderness  Sensation: normal    Left " Side Rheumatological Exam     The patient is tender to palpation of the shoulder, elbow, wrist, knee, 1st PIP, 1st MCP, 2nd PIP, 2nd MCP, 3rd PIP, 3rd MCP, 4th PIP, 4th MCP, 5th PIP and 5th MCP.    She has swelling of the 1st PIP, 1st MCP, 2nd PIP, 2nd MCP, 3rd PIP, 3rd MCP, 4th PIP, 4th MCP, 5th PIP and 5th MCP    Shoulder Exam   Tenderness Location: no tenderness    Range of Motion   Active abduction: abnormal   Sensation: normal    Knee Exam     Patellofemoral Crepitus: positive  Effusion: positive  Sensation: normal    Hip Exam   Tenderness Location: posterior  Sensation: normal    Elbow/Wrist Exam   Sensation: normal      Back/Neck Exam   General Inspection   Gait: normal         Lymphadenopathy:     She has no cervical adenopathy.   Neurological: She is alert and oriented to person, place, and time. Gait normal.   Skin: Skin is dry. No rash noted. No erythema. There is pallor.     Psychiatric: Mood and affect normal.   Musculoskeletal: She exhibits edema, tenderness and deformity.   Dactylitis, raynauds                Results for orders placed or performed during the hospital encounter of 08/31/18   Transthoracic echo (TTE) complete   Result Value Ref Range    LVIDS 3.10 2.1 - 4.0 cm    STJ 2.90 cm    AV mean gradient 4 mmHg    Ao peak joselito 1.32 m/s    Ao VTI 30.4 cm    IVS 1.04 0.6 - 1.1 cm    LA size 3.3 cm    LVIDD 4.33 3.5 - 6.0 cm    LVOT diameter 2.0 cm    LVOT peak VTI 21.6 cm    PW 0.915 0.6 - 1.1 cm    MV Peak A Joselito 0.766 m/s    E wave decelartion time 145 msec    RA Major Axis 4.91 cm    RA Width 3.49 cm    RVDD 2.76 cm    LA WIDTH 3.69 cm    PV PEAK VELOCITY 78.7 cm/s    PV peak gradient 2 mmHg    BSA 1.85 m2    FS 28 28 - 44 %    LV mass 139.61 g    Left Ventricle Relative Wall Thickness 0.45 cm    AV valve area 2.23 cm2    LVOT area 3.14 cm2    LVOT stroke volume 67.82 cm3    LV Mass Index 75.5 g/m2    TDI SEPTAL 0.07     LV LATERAL E/E' RATIO 5.00     LV SEPTAL E/E' RATIO 10.71     TDI LATERAL  0.15     E/A ratio 0.98     Mean e' 0.11     E/E' ratio 6.82     MV Peak E Joselito 0.75 m/s    LA volume 51.37 cm3    LA Volume Index 27.8 mL/m2    Left Atrium Minor Axis 4.86 cm    Left Atrium Major Axis 5.07 cm    MV valve area p 1/2 method 2.53 cm2    MV stenosis pressure 1/2 time 87 ms   JAMILA HEP-2 Titer Positive 1:1280 [employer] [facility]  Resulting [facility]   T3, Free 2.3 - 4.2 pg/mL 2.0 Abnormally low    Sed Rate 0 - 20 mm/Hr 23 Abnormally high  22 Abnormally high      Assessment:       1. CREST (calcinosis, Raynaud's phenomenon, esophageal dysfunction, sclerodactyly, telangiectasia)    2. Pulmonary hypertension    3. Vitamin D deficiency disease    4. Sjogren's syndrome, with unspecified organ involvement    5. Primary osteoarthritis involving multiple joints            Plan:       CREST (calcinosis, Raynaud's phenomenon, esophageal dysfunction, sclerodactyly, telangiectasia)  Comments:  JAMILA 1:1280 center[employer] pattern  on Imuran  Orders:  -     JAMILA; Future; Expected date: 12/11/2018  -     Anti Sm/RNP Antibody; Future; Expected date: 12/11/2018  -     Anti-DNA antibody, double-stranded; Future; Expected date: 12/11/2018  -     Anti-Histone Antibody; Future; Expected date: 12/11/2018  -     Anti-scleroderma antibody; Future; Expected date: 12/11/2018  -     Anti-Smith antibody; Future; Expected date: 12/11/2018  -     Sjogrens syndrome-A extractable nuclear antibody; Future; Expected date: 12/11/2018  -     Sjogrens syndrome-B extractable nuclear antibody; Future; Expected date: 12/11/2018  -     Anti-thyroglobulin antibody; Future; Expected date: 12/11/2018  -     Thyroid peroxidase antibody; Future; Expected date: 12/11/2018  -     TSH; Future; Expected date: 12/11/2018  -     T3, free; Future; Expected date: 12/11/2018  -     CBC auto differential; Future; Expected date: 12/11/2018  -     Comprehensive metabolic panel; Future; Expected date: 12/11/2018  -     C-reactive protein; Future; Expected date:  12/11/2018  -     Sedimentation rate; Future; Expected date: 12/11/2018    Pulmonary hypertension  -     JAMILA; Future; Expected date: 12/11/2018  -     Anti Sm/RNP Antibody; Future; Expected date: 12/11/2018  -     Anti-DNA antibody, double-stranded; Future; Expected date: 12/11/2018  -     Anti-Histone Antibody; Future; Expected date: 12/11/2018  -     Anti-scleroderma antibody; Future; Expected date: 12/11/2018  -     Anti-Smith antibody; Future; Expected date: 12/11/2018  -     Sjogrens syndrome-A extractable nuclear antibody; Future; Expected date: 12/11/2018  -     Sjogrens syndrome-B extractable nuclear antibody; Future; Expected date: 12/11/2018  -     Anti-thyroglobulin antibody; Future; Expected date: 12/11/2018  -     Thyroid peroxidase antibody; Future; Expected date: 12/11/2018  -     TSH; Future; Expected date: 12/11/2018  -     T3, free; Future; Expected date: 12/11/2018  -     CBC auto differential; Future; Expected date: 12/11/2018  -     Comprehensive metabolic panel; Future; Expected date: 12/11/2018  -     C-reactive protein; Future; Expected date: 12/11/2018  -     Sedimentation rate; Future; Expected date: 12/11/2018    Vitamin D deficiency disease  -     JAMILA; Future; Expected date: 12/11/2018  -     Anti Sm/RNP Antibody; Future; Expected date: 12/11/2018  -     Anti-DNA antibody, double-stranded; Future; Expected date: 12/11/2018  -     Anti-Histone Antibody; Future; Expected date: 12/11/2018  -     Anti-scleroderma antibody; Future; Expected date: 12/11/2018  -     Anti-Smith antibody; Future; Expected date: 12/11/2018  -     Sjogrens syndrome-A extractable nuclear antibody; Future; Expected date: 12/11/2018  -     Sjogrens syndrome-B extractable nuclear antibody; Future; Expected date: 12/11/2018  -     Anti-thyroglobulin antibody; Future; Expected date: 12/11/2018  -     Thyroid peroxidase antibody; Future; Expected date: 12/11/2018  -     TSH; Future; Expected date: 12/11/2018  -     T3,  free; Future; Expected date: 12/11/2018  -     CBC auto differential; Future; Expected date: 12/11/2018  -     Comprehensive metabolic panel; Future; Expected date: 12/11/2018  -     C-reactive protein; Future; Expected date: 12/11/2018  -     Sedimentation rate; Future; Expected date: 12/11/2018    Sjogren's syndrome, with unspecified organ involvement  -     JAMILA; Future; Expected date: 12/11/2018  -     Anti Sm/RNP Antibody; Future; Expected date: 12/11/2018  -     Anti-DNA antibody, double-stranded; Future; Expected date: 12/11/2018  -     Anti-Histone Antibody; Future; Expected date: 12/11/2018  -     Anti-scleroderma antibody; Future; Expected date: 12/11/2018  -     Anti-Smith antibody; Future; Expected date: 12/11/2018  -     Sjogrens syndrome-A extractable nuclear antibody; Future; Expected date: 12/11/2018  -     Sjogrens syndrome-B extractable nuclear antibody; Future; Expected date: 12/11/2018  -     Anti-thyroglobulin antibody; Future; Expected date: 12/11/2018  -     Thyroid peroxidase antibody; Future; Expected date: 12/11/2018  -     TSH; Future; Expected date: 12/11/2018  -     T3, free; Future; Expected date: 12/11/2018  -     CBC auto differential; Future; Expected date: 12/11/2018  -     Comprehensive metabolic panel; Future; Expected date: 12/11/2018  -     C-reactive protein; Future; Expected date: 12/11/2018  -     Sedimentation rate; Future; Expected date: 12/11/2018    Primary osteoarthritis involving multiple joints  -     JAMILA; Future; Expected date: 12/11/2018  -     Anti Sm/RNP Antibody; Future; Expected date: 12/11/2018  -     Anti-DNA antibody, double-stranded; Future; Expected date: 12/11/2018  -     Anti-Histone Antibody; Future; Expected date: 12/11/2018  -     Anti-scleroderma antibody; Future; Expected date: 12/11/2018  -     Anti-Smith antibody; Future; Expected date: 12/11/2018  -     Sjogrens syndrome-A extractable nuclear antibody; Future; Expected date: 12/11/2018  -      Sjogrens syndrome-B extractable nuclear antibody; Future; Expected date: 12/11/2018  -     Anti-thyroglobulin antibody; Future; Expected date: 12/11/2018  -     Thyroid peroxidase antibody; Future; Expected date: 12/11/2018  -     TSH; Future; Expected date: 12/11/2018  -     T3, free; Future; Expected date: 12/11/2018  -     CBC auto differential; Future; Expected date: 12/11/2018  -     Comprehensive metabolic panel; Future; Expected date: 12/11/2018  -     C-reactive protein; Future; Expected date: 12/11/2018  -     Sedimentation rate; Future; Expected date: 12/11/2018    Other orders  -     azithromycin (Z-NATALIE) 250 MG tablet; Take 2 tablets by mouth on day 1; Take 1 tablet by mouth on days 2-5  Dispense: 6 tablet; Refill: 0       On Imuran, she is on losartan/HCTZ and there is a recall due to [employer] contaminants in the meds

## 2018-12-11 NOTE — PROGRESS NOTES
"Tina García presented for a  Medicare AWV and comprehensive Health Risk Assessment today. The following components were reviewed and updated:    · Medical history  · Family History  · Social history  · Allergies and Current Medications  · Health Risk Assessment  · Health Maintenance  · Care Team     ** See Completed Assessments for Annual Wellness Visit within the encounter summary.**     The following assessments were completed:  · Living Situation  · CAGE  · Depression Screening  · Timed Get Up and Go  · Whisper Test  · Cognitive Function Screening      · Nutrition Screening  · ADL Screening  · PAQ Screening    Vitals:    12/11/18 0849   BP: 116/69   BP Location: Left arm   Patient Position: Sitting   BP Method: Medium (Automatic)   Pulse: 91   Temp: 97.7 °F (36.5 °C)   TempSrc: Oral   SpO2: 98%   Weight: 78.4 kg (172 lb 13.5 oz)   Height: 5' 3" (1.6 m)     Body mass index is 30.62 kg/m².  Physical Exam   Constitutional: She is oriented to person, place, and time. She appears well-nourished.   Cardiovascular: Normal rate, regular rhythm, normal heart sounds and intact distal pulses.   Pulmonary/Chest: Effort normal and breath sounds normal.   Neurological: She is alert and oriented to person, place, and time.   Skin: Skin is warm and dry.   Vitals reviewed.        Diagnoses and health risks identified today and associated recommendations/orders:    1. Encounter for preventive health examination  Reviewed and discussed health maintenance.      2. Major depressive disorder, single episode, severe without psychotic features  Stable- continue current treatment and follow up routinely with PCP     3. Chronic pulmonary heart disease  Stable- continue current treatment and follow up routinely with PCP     4. PHT (pulmonary hypertension)  Stable- continue current treatment and follow up routinely with PCP and rheumatolgy ()    5. Essential hypertension  Stable- continue current treatment and follow up " routinely with PCP     6. CREST (calcinosis, Raynaud's phenomenon, esophageal dysfunction, sclerodactyly, telangiectasia)  Stable- continue current treatment and follow up routinely with PCP and rheumatolgy (    7. Gastroesophageal reflux disease without esophagitis  Stable- continue current treatment and follow up routinely with PCP     8. Fibromyalgia  Stable- continue current treatment and follow up routinely with PCP and rheumatolgy (    9. Cough  - benzonatate (TESSALON) 100 MG capsule; Take 1 capsule (100 mg total) by mouth 3 (three) times daily as needed for Cough.  Dispense: 30 capsule; Refill: 0    Provided Tina with a 5-10 year written screening schedule and personal prevention plan. Recommendations were developed using the USPSTF age appropriate recommendations. Education, counseling, and referrals were provided as needed. After Visit Summary printed and given to patient which includes a list of additional screenings\tests needed.    Rand Jones NP

## 2018-12-11 NOTE — PROGRESS NOTES
Administered 1 cc DepoMedrol 80mg/cc  to right upper outer gluteal. Pt tolerated well. No acute reaction noted to site. Pt instructed on S/S to report. Advised patient to wait in lobby 15 minutes after receiving injection to monitor for any reactions..  Pt verbalized understanding.     Lot: [id number]  Exp: 01/20  Administered 1 cc B12 1000mcg to right upper outer gluteal .  Pt tolerated well.No acute reaction noted to site. Pt instructed on S/S to report. Advised pt to waitn in lobby 15 minutes after receiving injection to monitor for any reactions. Pt verbalized understanding.     Lot:[id number]  Exp:Mar20

## 2019-01-04 ENCOUNTER — HOSPITAL ENCOUNTER (OUTPATIENT)
Dept: RADIOLOGY | Facility: HOSPITAL | Age: 59
Discharge: HOME | End: 2019-01-04
Attending: INTERNAL MEDICINE
Payer: MEDICARE

## 2019-01-04 DIAGNOSIS — M34.1 CREST (CALCINOSIS, RAYNAUD'S PHENOMENON, ESOPHAGEAL DYSFUNCTION, SCLERODACTYLY, TELANGIECTASIA): ICD-10-CM

## 2019-01-04 DIAGNOSIS — M54.42 CHRONIC BILATERAL LOW BACK PAIN WITH BILATERAL SCIATICA: ICD-10-CM

## 2019-01-04 DIAGNOSIS — I27.20 PULMONARY HYPERTENSION: ICD-10-CM

## 2019-01-04 DIAGNOSIS — M35.00 SJOGREN'S SYNDROME, WITH UNSPECIFIED ORGAN INVOLVEMENT: ICD-10-CM

## 2019-01-04 DIAGNOSIS — M54.41 CHRONIC BILATERAL LOW BACK PAIN WITH BILATERAL SCIATICA: ICD-10-CM

## 2019-01-04 DIAGNOSIS — M15.9 PRIMARY OSTEOARTHRITIS INVOLVING MULTIPLE JOINTS: ICD-10-CM

## 2019-01-04 DIAGNOSIS — G89.29 CHRONIC BILATERAL LOW BACK PAIN WITH BILATERAL SCIATICA: ICD-10-CM

## 2019-01-04 DIAGNOSIS — E55.9 VITAMIN D DEFICIENCY DISEASE: ICD-10-CM

## 2019-01-04 PROCEDURE — 72040 X-RAY EXAM NECK SPINE 2-3 VW: CPT | Mod: TC,PO

## 2019-01-04 PROCEDURE — 72040 XR CERVICAL SPINE AP LATERAL: ICD-10-PCS | Mod: 26,,, | Performed by: RADIOLOGY

## 2019-01-04 PROCEDURE — 72040 X-RAY EXAM NECK SPINE 2-3 VW: CPT | Mod: 26,,, | Performed by: RADIOLOGY

## 2019-01-08 ENCOUNTER — PATIENT MESSAGE (OUTPATIENT)
Dept: FAMILY MEDICINE | Facility: CLINIC | Age: 59
End: 2019-01-08

## 2019-01-09 ENCOUNTER — PATIENT MESSAGE (OUTPATIENT)
Dept: RHEUMATOLOGY | Facility: CLINIC | Age: 59
End: 2019-01-09

## 2019-01-09 NOTE — TELEPHONE ENCOUNTER
I have not seen this patient since 4-2017   I will not write this for her   - she can ask her PCP or Medina HIRSCH for this.

## 2019-01-09 NOTE — TELEPHONE ENCOUNTER
Rand Jones NP skyped me their is a jury duty letter by Rand Jane NP under letters.       Message forward to Rand Jane NP

## 2019-02-26 ENCOUNTER — PATIENT MESSAGE (OUTPATIENT)
Dept: RHEUMATOLOGY | Facility: CLINIC | Age: 59
End: 2019-02-26

## 2019-02-26 DIAGNOSIS — M34.1 CREST SYNDROME: ICD-10-CM

## 2019-02-26 DIAGNOSIS — L65.9 HAIR LOSS: Primary | ICD-10-CM

## 2019-03-11 ENCOUNTER — PATIENT MESSAGE (OUTPATIENT)
Dept: GASTROENTEROLOGY | Facility: CLINIC | Age: 59
End: 2019-03-11

## 2019-03-15 RX ORDER — OMEPRAZOLE 40 MG/1
40 CAPSULE, DELAYED RELEASE ORAL
Qty: 90 CAPSULE | Refills: 6 | Status: SHIPPED | OUTPATIENT
Start: 2019-03-15 | End: 2020-06-02 | Stop reason: SDUPTHER

## 2019-03-21 ENCOUNTER — INITIAL CONSULT (OUTPATIENT)
Dept: DERMATOLOGY | Facility: CLINIC | Age: 59
End: 2019-03-21
Payer: MEDICARE

## 2019-03-21 DIAGNOSIS — E55.9 HYPOVITAMINOSIS D: ICD-10-CM

## 2019-03-21 DIAGNOSIS — L65.9 ALOPECIA: Primary | ICD-10-CM

## 2019-03-21 PROCEDURE — 99999 PR PBB SHADOW E&M-EST. PATIENT-LVL II: CPT | Mod: PBBFAC,HCNC,, | Performed by: DERMATOLOGY

## 2019-03-21 PROCEDURE — 99203 OFFICE O/P NEW LOW 30 MIN: CPT | Mod: HCNC,S$GLB,, | Performed by: DERMATOLOGY

## 2019-03-21 PROCEDURE — 99499 RISK ADDL DX/OHS AUDIT: ICD-10-PCS | Mod: HCNC,S$GLB,, | Performed by: DERMATOLOGY

## 2019-03-21 PROCEDURE — 99499 UNLISTED E&M SERVICE: CPT | Mod: HCNC,S$GLB,, | Performed by: DERMATOLOGY

## 2019-03-21 PROCEDURE — 99999 PR PBB SHADOW E&M-EST. PATIENT-LVL II: ICD-10-PCS | Mod: PBBFAC,HCNC,, | Performed by: DERMATOLOGY

## 2019-03-21 PROCEDURE — 99203 PR OFFICE/OUTPT VISIT, NEW, LEVL III, 30-44 MIN: ICD-10-PCS | Mod: HCNC,S$GLB,, | Performed by: DERMATOLOGY

## 2019-03-21 NOTE — LETTER
March 21, 2019      Joanna Paulson PA-C  [address] [facility] [address] [place] [address]           [facility]  [address] [facility] [address] [place] [address]  Phone: [phone]  Fax: [phone]          Patient: Tina García   MR Number: [id number]   YOB: 1960   Date of Visit: 3/21/2019       Dear Joanna Paulson:    Thank you for referring Tina García to me for evaluation. Attached you will find relevant portions of my assessment and plan of care.    If you have questions, please do not hesitate to call me. I look forward to following Tina García along with you.    Sincerely,    Nikole Cortez MD    Enclosure  CC:  No Recipients    If you would like to receive this communication electronically, please contact [email or web address] or [phone] to request more information on [employer] Link access.    For providers and/or their staff who would like to refer a patient to [facility], please contact us through our one-stop-shop provider referral line, [facility], at [phone].    If you feel you have received this communication in error or would no longer like to receive these types of communications, please e-mail [email or web address]

## 2019-03-21 NOTE — PROGRESS NOTES
Subjective:       Patient ID:  Tina García is a 58 y.o. female who presents for   Chief Complaint   Patient presents with    Hair Loss     Tina García a 57 yo female with history of hypothyroidism (on synthroid), CREST (on imuran x about 2 years and sildenafil) and hypovitamin D (taking MV) presents today with concerns of hairloss for a duration of 10 yrs with itchy scalp at sites of extensions, denies dandruff or scaling, treating with rogaine women foam x 1 month and hair,skin and nail vitamins, pt states she noticed hairloss after menapause, states baldness does not run in her family. + increase stress due to an ended relationship       Labs:   1/4/19 CBC wnl, TSH wnl    Denies hx of skin cancer  Denies fx hx of melanoma    Past Medical History:  No date: Acid reflux  No date: Anxiety  No date: AR (allergic rhinitis)  No date: Arthritis      Comment:  rheumatoid?  No date: Chronic constipation  No date: Colon polyp  No date: CREST syndrome  No date: Depression  No date: Dry eyes  No date: Dry mouth  No date: Esophageal dysmotility  No date: Esophageal dysmotility  No date: Fibrocystic breast  No date: Fibromyalgia  No date: Food intolerance  No date: GERD (gastroesophageal reflux disease)  No date: HTN (hypertension)  No date: Irritable bowel syndrome  No date: Raynaud disease  No date: Right wrist fracture      Comment:  nov 2013  No date: SOB (shortness of breath) on exertion  No date: Thyroid disease      Comment:  hypothyroid, thyroid cyst on U/S  No date: Ulcer  No date: Vitamin D deficiency disease          Review of Systems   Skin: Positive for itching and daily sunscreen use. Negative for rash and dry skin.        Objective:    Physical Exam   Constitutional: She appears well-developed and well-nourished. No distress.   Neurological: She is alert and oriented to person, place, and time. She is not disoriented.   Psychiatric: She has a normal mood and affect.   Skin:   Areas  Examined (abnormalities noted in diagram):   Head / Face Inspection Performed  Neck Inspection Performed  Chest / Axilla Inspection Performed  Back Inspection Performed  RUE Inspected  LUE Inspection Performed              Diagram Legend     Erythematous scaling macule/papule c/w actinic keratosis       Vascular papule c/w angioma      Pigmented verrucoid papule/plaque c/w seborrheic keratosis      Yellow umbilicated papule c/w sebaceous hyperplasia      Irregularly shaped tan macule c/w lentigo     1-2 mm smooth white papules consistent with Milia      Movable subcutaneous cyst with punctum c/w epidermal inclusion cyst      Subcutaneous movable cyst c/w pilar cyst      Firm pink to brown papule c/w dermatofibroma      Pedunculated fleshy papule(s) c/w skin tag(s)      Evenly pigmented macule c/w junctional nevus     Mildly variegated pigmented, slightly irregular-bordered macule c/w mildly atypical nevus      Flesh colored to evenly pigmented papule c/w intradermal nevus       Pink pearly papule/plaque c/w basal cell carcinoma      Erythematous hyperkeratotic cursted plaque c/w SCC      Surgical scar with no sign of skin cancer recurrence      Open and closed comedones      Inflammatory papules and pustules      Verrucoid papule consistent consistent with wart     Erythematous eczematous patches and plaques     Dystrophic onycholytic nail with subungual debris c/w onychomycosis     Umbilicated papule    Erythematous-base heme-crusted tan verrucoid plaque consistent with inflamed seborrheic keratosis     Erythematous Silvery Scaling Plaque c/w Psoriasis     See annotation      Assessment / Plan:        Alopecia  -     VITAMIN D; Future; Expected date: 03/21/2019  -     IRON AND TIBC; Future; Expected date: 03/21/2019  -     FERRITIN; Future; Expected date: 03/21/2019    Multifactorial including female pattern and possible telogen effluvium 2/2 emotional stress and possible low vit D (history of hypovitamin D and not  on supplements)  -I will measure laboratory studies including Iron studies, and vitamin D.   - Recommend she continue good control of hypothyroidism as this can also contribute to hair loss  -Recommend that the patient consider a trial of at least 6 months of minoxidil 5% topical solution/foam BID. I discussed that she may initially notice hair loss with initiation of this medication. We also recommended that she taper the medication should she choose to discontinue it.  -I recommended dietary supplementation with Biotin 5000 mcg per day and Vitamin D 800-1200 international units per day.  - Future considerations include spironolactone but would administer cautiously given she is on an ACEI and over the age of 40.     History of hypovitaminosis D  -     VITAMIN D; Future; Expected date: 03/21/2019             Follow-up if symptoms worsen or fail to improve.

## 2019-03-23 ENCOUNTER — LAB VISIT (OUTPATIENT)
Dept: LAB | Facility: HOSPITAL | Age: 59
End: 2019-03-23
Attending: DERMATOLOGY
Payer: MEDICARE

## 2019-03-23 DIAGNOSIS — E55.9 HYPOVITAMINOSIS D: ICD-10-CM

## 2019-03-23 DIAGNOSIS — L65.9 ALOPECIA: ICD-10-CM

## 2019-03-23 LAB
25(OH)D3+25(OH)D2 SERPL-MCNC: 32 NG/ML
FERRITIN SERPL-MCNC: 44 NG/ML
IRON SERPL-MCNC: 67 UG/DL
SATURATED IRON: 19 %
TOTAL IRON BINDING CAPACITY: 349 UG/DL
TRANSFERRIN SERPL-MCNC: 236 MG/DL

## 2019-03-23 PROCEDURE — 82728 ASSAY OF FERRITIN: CPT | Mod: HCNC

## 2019-03-23 PROCEDURE — 36415 COLL VENOUS BLD VENIPUNCTURE: CPT | Mod: HCNC,PO

## 2019-03-23 PROCEDURE — 82306 VITAMIN D 25 HYDROXY: CPT | Mod: HCNC

## 2019-03-23 PROCEDURE — 83540 ASSAY OF IRON: CPT | Mod: HCNC

## 2019-03-29 RX ORDER — LOSARTAN POTASSIUM AND HYDROCHLOROTHIAZIDE 25; 100 MG/1; MG/1
1 TABLET ORAL DAILY
Qty: 90 TABLET | Refills: 0 | Status: SHIPPED | OUTPATIENT
Start: 2019-03-29 | End: 2019-08-05 | Stop reason: SDUPTHER

## 2019-04-17 ENCOUNTER — HOSPITAL ENCOUNTER (OUTPATIENT)
Facility: HOSPITAL | Age: 59
Discharge: HOME | End: 2019-04-17
Attending: INTERNAL MEDICINE | Admitting: INTERNAL MEDICINE
Payer: MEDICARE

## 2019-04-17 ENCOUNTER — ANESTHESIA (OUTPATIENT)
Dept: ENDOSCOPY | Facility: HOSPITAL | Age: 59
End: 2019-04-17
Payer: MEDICARE

## 2019-04-17 ENCOUNTER — ANESTHESIA EVENT (OUTPATIENT)
Dept: ENDOSCOPY | Facility: HOSPITAL | Age: 59
End: 2019-04-17
Payer: MEDICARE

## 2019-04-17 VITALS
HEART RATE: 64 BPM | SYSTOLIC BLOOD PRESSURE: 124 MMHG | OXYGEN SATURATION: 100 % | WEIGHT: 158 LBS | RESPIRATION RATE: 11 BRPM | BODY MASS INDEX: 28 KG/M2 | HEIGHT: 63 IN | TEMPERATURE: 98 F | DIASTOLIC BLOOD PRESSURE: 62 MMHG

## 2019-04-17 DIAGNOSIS — K21.9 GERD (GASTROESOPHAGEAL REFLUX DISEASE): ICD-10-CM

## 2019-04-17 LAB — H PYLORI INDEX VALUE: NEGATIVE

## 2019-04-17 PROCEDURE — 27201089 HC SNARE, DISP (ANY): Mod: PO | Performed by: INTERNAL MEDICINE

## 2019-04-17 PROCEDURE — 88305 TISSUE SPECIMEN TO PATHOLOGY - SURGERY: ICD-10-PCS | Mod: 26,,, | Performed by: PATHOLOGY

## 2019-04-17 PROCEDURE — 43239 EGD BIOPSY SINGLE/MULTIPLE: CPT | Mod: PO | Performed by: INTERNAL MEDICINE

## 2019-04-17 PROCEDURE — 37000009 HC ANESTHESIA EA ADD 15 MINS: Mod: PO | Performed by: INTERNAL MEDICINE

## 2019-04-17 PROCEDURE — 63600175 PHARM REV CODE 636 W HCPCS: Mod: HCNC,PO | Performed by: NURSE ANESTHETIST, CERTIFIED REGISTERED

## 2019-04-17 PROCEDURE — D9220A PRA ANESTHESIA: ICD-10-PCS | Mod: PT,HCNC,ANES, | Performed by: ANESTHESIOLOGY

## 2019-04-17 PROCEDURE — 45385 PR COLONOSCOPY,REMV LESN,SNARE: ICD-10-PCS | Mod: PT,,, | Performed by: INTERNAL MEDICINE

## 2019-04-17 PROCEDURE — 88342 IMHCHEM/IMCYTCHM 1ST ANTB: CPT | Mod: 26,,, | Performed by: PATHOLOGY

## 2019-04-17 PROCEDURE — D9220A PRA ANESTHESIA: Mod: PT,HCNC,ANES, | Performed by: ANESTHESIOLOGY

## 2019-04-17 PROCEDURE — 43239 PR EGD, FLEX, W/BIOPSY, SGL/MULTI: ICD-10-PCS | Mod: 51,,, | Performed by: INTERNAL MEDICINE

## 2019-04-17 PROCEDURE — 27201012 HC FORCEPS, HOT/COLD, DISP: Mod: PO | Performed by: INTERNAL MEDICINE

## 2019-04-17 PROCEDURE — 45385 COLONOSCOPY W/LESION REMOVAL: CPT | Mod: PO | Performed by: INTERNAL MEDICINE

## 2019-04-17 PROCEDURE — 43239 EGD BIOPSY SINGLE/MULTIPLE: CPT | Mod: 51,,, | Performed by: INTERNAL MEDICINE

## 2019-04-17 PROCEDURE — 88305 TISSUE EXAM BY PATHOLOGIST: CPT | Performed by: PATHOLOGY

## 2019-04-17 PROCEDURE — 25000003 PHARM REV CODE 250: Mod: HCNC,PO | Performed by: INTERNAL MEDICINE

## 2019-04-17 PROCEDURE — 87449 NOS EACH ORGANISM AG IA: CPT | Mod: PO | Performed by: INTERNAL MEDICINE

## 2019-04-17 PROCEDURE — 88342 TISSUE SPECIMEN TO PATHOLOGY - SURGERY: ICD-10-PCS | Mod: 26,,, | Performed by: PATHOLOGY

## 2019-04-17 PROCEDURE — 88305 TISSUE EXAM BY PATHOLOGIST: CPT | Mod: 26,,, | Performed by: PATHOLOGY

## 2019-04-17 PROCEDURE — 37000008 HC ANESTHESIA 1ST 15 MINUTES: Mod: PO | Performed by: INTERNAL MEDICINE

## 2019-04-17 PROCEDURE — 45385 COLONOSCOPY W/LESION REMOVAL: CPT | Mod: PT,,, | Performed by: INTERNAL MEDICINE

## 2019-04-17 RX ORDER — PROPOFOL 10 MG/ML
VIAL (ML) INTRAVENOUS
Status: DISCONTINUED | OUTPATIENT
Start: 2019-04-17 | End: 2019-04-17

## 2019-04-17 RX ORDER — LIDOCAINE HCL/PF 100 MG/5ML
SYRINGE (ML) INTRAVENOUS
Status: DISCONTINUED | OUTPATIENT
Start: 2019-04-17 | End: 2019-04-17

## 2019-04-17 RX ORDER — SODIUM CHLORIDE 0.9 % (FLUSH) 0.9 %
10 SYRINGE (ML) INJECTION
Status: DISCONTINUED | OUTPATIENT
Start: 2019-04-17 | End: 2019-04-17 | Stop reason: HOSPADM

## 2019-04-17 RX ORDER — SODIUM CHLORIDE, SODIUM LACTATE, POTASSIUM CHLORIDE, CALCIUM CHLORIDE 600; 310; 30; 20 MG/100ML; MG/100ML; MG/100ML; MG/100ML
INJECTION, SOLUTION INTRAVENOUS CONTINUOUS
Status: DISCONTINUED | OUTPATIENT
Start: 2019-04-17 | End: 2019-04-17 | Stop reason: HOSPADM

## 2019-04-17 RX ADMIN — LIDOCAINE HYDROCHLORIDE 100 MG: 20 INJECTION, SOLUTION INTRAVENOUS at 12:04

## 2019-04-17 RX ADMIN — PROPOFOL 30 MG: 10 INJECTION, EMULSION INTRAVENOUS at 01:04

## 2019-04-17 RX ADMIN — PROPOFOL 30 MG: 10 INJECTION, EMULSION INTRAVENOUS at 12:04

## 2019-04-17 RX ADMIN — PROPOFOL 100 MG: 10 INJECTION, EMULSION INTRAVENOUS at 12:04

## 2019-04-17 RX ADMIN — SODIUM CHLORIDE, SODIUM LACTATE, POTASSIUM CHLORIDE, AND CALCIUM CHLORIDE: .6; .31; .03; .02 INJECTION, SOLUTION INTRAVENOUS at 11:04

## 2019-04-17 NOTE — PROVATION PATIENT INSTRUCTIONS
Discharge Summary/Instructions after an Endoscopic Procedure  Patient Name: Tina García  Patient MRN: [id number]  Patient YOB: 1960 Wednesday, April 17, 2019  Rich Kingsley MD  RESTRICTIONS:  During your procedure today, you received medications for sedation.  These   medications may affect your judgment, balance and coordination.  Therefore,   for 24 hours, you have the following restrictions:   - DO NOT drive a car, operate machinery, make legal/financial decisions,   sign important papers or drink alcohol.    ACTIVITY:  Today: no heavy lifting, straining or running due to procedural   sedation/anesthesia.  The following day: return to full activity including work.  DIET:  Eat and drink normally unless instructed otherwise.     TREATMENT FOR COMMON SIDE EFFECTS:  - Mild abdominal pain, nausea, belching, bloating or excessive gas:  rest,   eat lightly and use a heating pad.  - Sore Throat: treat with throat lozenges and/or gargle with warm salt   water.  - Because air was used during the procedure, expelling large amounts of air   from your rectum or belching is normal.  - If a bowel prep was taken, you may not have a bowel movement for 1-3 days.    This is normal.  SYMPTOMS TO WATCH FOR AND REPORT TO YOUR PHYSICIAN:  1. Abdominal pain or bloating, other than gas cramps.  2. Chest pain.  3. Back pain.  4. Signs of infection such as: chills or fever occurring within 24 hours   after the procedure.  5. Rectal bleeding, which would show as bright red, maroon, or black stools.   (A tablespoon of blood from the rectum is not serious, especially if   hemorrhoids are present.)  6. Vomiting.  7. Weakness or dizziness.  GO DIRECTLY TO THE NEAREST EMERGENCY ROOM IF YOU HAVE ANY OF THE FOLLOWING:      Difficulty breathing              Chills and/or fever over 101 F   Persistent vomiting and/or vomiting blood   Severe abdominal pain   Severe chest pain   Black, tarry stools   Bleeding- more than one  tablespoon   Any other symptom or condition that you feel may need urgent attention  Your doctor recommends these additional instructions:  If any biopsies were taken, your doctors clinic will contact you in 1 to 2   weeks with any results.  Follow an antireflux regimen.  This includes:       - Do not lie down for at least 3 to 4 hours after meals.        - Raise the head of the bed 4 to 6 inches.        - Decrease excess weight.        - Avoid citrus juices and other acidic foods, alcohol, chocolate, mints,   coffee and other caffeinated beverages, carbonated beverages, fatty and   fried foods.        - Avoid tight-fitting clothing.        - Avoid cigarettes and other tobacco products.   Continue your present medications.   Take Prilosec (omeprazole) 40 mg by mouth once a day.   Take Zantac (ranitidine) 300 mg by mouth once a day.   Take Cytotec (misoprostol) 200 micrograms by mouth four times a day.   Your physician has recommended a repeat upper endoscopy in 2-3 years for   surveillance.  For questions, problems or results please call your physician - Rich Kingsley MD at Work:  [phone].  EMERGENCY PHONE NUMBER: [phone], LAB RESULTS: [phone]  IF A COMPLICATION OR EMERGENCY SITUATION ARISES AND YOU ARE UNABLE TO REACH   YOUR PHYSICIAN - GO DIRECTLY TO THE EMERGENCY ROOM.  ___________________________________________  Nurse Signature  ___________________________________________  Patient/Designated Responsible Party Signature  Rich Kingsley MD  4/17/2019 1:40:38 PM  This report has been verified and signed electronically.  [employer]

## 2019-04-17 NOTE — ANESTHESIA POSTPROCEDURE EVALUATION
Anesthesia Post Evaluation    Patient: Tina García    Procedure(s) Performed: Procedure(s) (LRB):  EGD (ESOPHAGOGASTRODUODENOSCOPY) (N/A)  COLONOSCOPY (N/A)    Final Anesthesia Type: general  Patient location during evaluation: PACU  Patient participation: Yes- Able to Participate  Level of consciousness: awake and alert  Post-procedure vital signs: reviewed and stable  Pain management: adequate  Airway patency: patent  PONV status at discharge: No PONV  Anesthetic complications: no      Cardiovascular status: hemodynamically stable  Respiratory status: unassisted and room air  Hydration status: euvolemic  Follow-up not needed.          Vitals Value Taken Time   /62 4/17/2019  2:09 PM   Temp 36.7 °C (98 °F) 4/17/2019  1:29 PM   Pulse 64 4/17/2019  2:09 PM   Resp 11 4/17/2019  2:09 PM   SpO2 100 % 4/17/2019  2:09 PM         Event Time     Out of Recovery 14:05:58          Pain/Laquita Score: Laquita Score: 10 (4/17/2019  2:04 PM)

## 2019-04-17 NOTE — BRIEF OP NOTE
Discharge Note  Short Stay      SUMMARY     Admit Date: 4/17/2019    Attending Physician: Rich Kingsley Jr., MD     Discharge Physician: Rich Kingsley Jr., MD    Discharge Date: 4/17/2019 1:58 PM    Final Diagnosis: Reflux esophagitis [K21.0]  Screen for colon cancer [Z12.11]  Polyp of colon, unspecified part of colon, unspecified type [K63.5]    Impression:          - Normal oropharynx.                       - Normal upper third of esophagus and middle third                        of esophagus.                       - Non-severe (reflux? stasis?) esophagitis. Biopsied.                       - Z-line regular, 35 cm from the incisors.                       - Reflux esophagitis. Biopsied.                       - Gaping lower esophageal sphincter.                       - Small hiatal hernia.                       - Fundal Gastritis. Biopsied.                       - Normal gastric body and antrum. Biopsied.                       - Normal examined duodenum.  Recommendation:      - Perform a colonoscopy as previously scheduled.                       - Discharge patient to home after that.                       - Await pathology and CLOtest results.                       - Follow an antireflux regimen.                       - Continue present medications.                       - Use Prilosec (omeprazole) 40 mg PO daily.                       - Use Zantac (ranitidine) 300 mg PO nightly.                       - Use misoprostol 200 micrograms PO QID.                       - Call the [facility] clinic in 2 weeks for reports (if                        you haven't heard from us sooner) [phone].                       - Repeat upper endoscopy in 2 years for surveillance.    Impression:          - One 3 mm polyp in the proximal sigmoid colon,                        removed with a cold snare. Resected and retrieved.                       - Non-bleeding internal hemorrhoids.                       - The examination was otherwise  normal.                       - The examined portion of the ileum was normal.  Recommendation:      - Discharge patient to home.                       - Await pathology results.                       - Repeat colonoscopy in 5 years for surveillance.                       - High fiber diet.                       - Use fiber, for example Citrucel, Fibercon, Konsyl                        or Metamucil.                       - Take a PROBIOTIC, such as a carton of GREEK YOGURT                        (Chobani or Oikos, or Activia or Dannon); or tablets                        of ALIGN or CULTURELLE or RADHA-Q (all                        non-prescription), every day for a month.                       - Call the [facility] clinic in 2 weeks for reports (if                        you haven't heard from us sooner) [phone].                       - Continue present medications.    Rich Kingsley MD  4/17/2019       Disposition: HOME OR SELF CARE    Patient Instructions:   Current Discharge Medication List      CONTINUE these medications which have NOT CHANGED    Details   azaTHIOprine (IMURAN) 50 mg Tab Take 1 tablet (50 mg total) by mouth once daily.  Qty: 30 tablet, Refills: 5    Associated Diagnoses: Pulmonary hypertension; CREST (calcinosis, Raynaud's phenomenon, esophageal dysfunction, sclerodactyly, telangiectasia); Esophageal dysmotility; Irritable bowel syndrome with diarrhea      calcium carbonate-vit D3-min (CALTRATE 600+D PLUS MINERALS) 600 mg (1,500 mg)-400 unit Chew Take 1 tablet by mouth once daily.        cetirizine (ZYRTEC) 10 MG tablet Take 1 tablet (10 mg total) by mouth once daily.  Qty: 30 tablet, Refills: 11      estradiol (ESTRACE) 1 MG tablet Take by mouth once a week. Insert tablet vaginally once a week  Refills: 0      fluticasone (FLONASE) 50 mcg/actuation nasal spray 1 spray (50 mcg total) by Each Nare route once daily.  Qty: 16 g, Refills: 5    Associated Diagnoses: Cystitis      levothyroxine  (SYNTHROID) 75 MCG tablet Take 1 tablet (75 mcg total) by mouth once daily.  Qty: 30 tablet, Refills: 11    Associated Diagnoses: Irritable bowel syndrome with diarrhea; Pulmonary hypertension; CREST (calcinosis, Raynaud's phenomenon, esophageal dysfunction, sclerodactyly, telangiectasia); Esophageal dysmotility; Urinary tract infection without hematuria, site unspecified      losartan-hydrochlorothiazide 100-25 mg (HYZAAR) 100-25 mg per tablet Take 1 tablet by mouth once daily.  Qty: 90 tablet, Refills: 0      niacin 500 MG tablet Take 1 tablet by mouth.      omeprazole (PRILOSEC) 40 MG capsule Take 1 capsule (40 mg total) by mouth before breakfast.  Qty: 90 capsule, Refills: 6      ondansetron (ZOFRAN-ODT) 4 MG TbDL Take 2 tablets (8 mg total) by mouth 3 (three) times daily as needed.  Qty: 30 tablet, Refills: 2    Associated Diagnoses: Nausea      ranitidine (ZANTAC) 300 MG tablet Take 1 tablet (300 mg total) by mouth every evening. , about 30-60 minutes before bedtime.  Qty: 30 tablet, Refills: 11    Associated Diagnoses: Gastroesophageal reflux disease, esophagitis presence not specified      sildenafil, antihypertensive, (REVATIO) 20 mg Tab Take 1 tablet (20 mg total) by mouth 3 (three) times daily.  Qty: 90 tablet, Refills: 11    Associated Diagnoses: Pulmonary hypertension; CREST (calcinosis, Raynaud's phenomenon, esophageal dysfunction, sclerodactyly, telangiectasia); Esophageal dysmotility; Irritable bowel syndrome with diarrhea; Urinary tract infection without hematuria, site unspecified      traMADol (ULTRAM) 50 mg tablet Take 1 tablet (50 mg total) by mouth every 6 (six) hours as needed.  Qty: 120 tablet, Refills: 4      albuterol (PROAIR HFA) 90 mcg/actuation inhaler Inhale 2 puffs into the lungs every 6 (six) hours as needed for Wheezing.  Qty: 6.7 g, Refills: 3    Associated Diagnoses: Reactive airway disease, mild intermittent, uncomplicated      atropine-phenobarbital-scopolamine-hyoscyamine  (DONNATAL) 16.2-0.1037 -0.0194 mg Tab Take 1 tablet by mouth 3 (three) times daily as needed.  Qty: 90 tablet, Refills: 5    Associated Diagnoses: Irritable bowel syndrome with diarrhea; Pulmonary hypertension; CREST (calcinosis, Raynaud's phenomenon, esophageal dysfunction, sclerodactyly, telangiectasia); Esophageal dysmotility; Urinary tract infection without hematuria, site unspecified      clonazePAM (KLONOPIN) 1 MG tablet Take 1 tablet (1 mg total) by mouth 2 (two) times daily as needed for Anxiety.  Qty: 45 tablet, Refills: 4      hydrOXYzine pamoate (VISTARIL) 25 MG Cap TAKE TWO CAPSULES BY MOUTH NIGHTLY IF NEEDED  Refills: 4      misoprostol (CYTOTEC) 200 MCG Tab Take 1 tablet (200 mcg total) by mouth 4 (four) times daily with meals and nightly.  Qty: 120 tablet, Refills: 11      naftifine (NAFTIN) 1 % Gel Apply 1 puff topically once daily.  Qty: 20 g, Refills: 1      triamcinolone acetonide 0.1% (KENALOG) 0.1 % cream 2 (two) times daily. Apply to affected area  Refills: 1             Discharge Procedure Orders (must include Diet, Follow-up, Activity)    Follow Up:  Follow up with PCP as per your routine.  Please follow an anti reflux diet and a high fiber diet.  Activity as tolerated.    No driving day of procedure.    PROBIOTICS:  Now that your colon is so cleaned out, now is a good time for a round of PROBIOTICS.  Eat a container of Greek Yogurt, such as OIKOS or CHOBANI,  Or Activia or Dannon    Greek Yogurt.    Or Take a similar Probiotic product such as Align or Culturelle or Jess-Q, every day for a month.                  (The products listed are non-prescription, but you may need to ask the pharmacist for their location.)  Repeat this 5-6 times a year.

## 2019-04-17 NOTE — DISCHARGE INSTRUCTIONS
Recovery After Procedural Sedation (Adult)  You have been given medicine by vein to make you sleep during your surgery. This may have included both a pain medicine and sleeping medicine. Most of the effects have worn off. But you may still have some drowsiness for the next 6 to 8 hours.  Home care  Follow these guidelines when you get home:  · For the next 8 hours, you should be watched by a responsible adult. This person should make sure your condition is not getting worse.  · Don't drink any alcohol for the next 24 hours.  · Don't drive, operate dangerous machinery, or make important business or personal decisions during the next 24 hours.  Note: Your healthcare provider may tell you not to take any medicine by mouth for pain or sleep in the next 4 hours. These medicines may react with the medicines you were given in the hospital. This could cause a much stronger response than usual.  Follow-up care  Follow up with your healthcare provider if you are not alert and back to your usual level of activity within 12 hours.  When to seek medical advice  Call your healthcare provider right away if any of these occur:  · Drowsiness gets worse  · Weakness or dizziness gets worse  · Repeated vomiting  · You can't be awakened   Date Last Reviewed: 10/18/2016  © 0004-2967 [employer]. [address] [place] [address], [place] [address]. All rights reserved. This information is not intended as a substitute for professional medical care. Always follow your healthcare professional's instructions.      PROBIOTICS:  Now that your colon is so cleaned out, now is a good time for a round of PROBIOTICS.  Eat a container of Greek Yogurt, such as OIKOS or CHOBANI,  Or Activia or Dannon    Greek Yogurt.    Or Take a similar Probiotic product such as Align or Culturelle or Jess-Q, every day for a month.                  (The products listed are non-prescription, but you may need to ask the pharmacist for their location.)  Repeat  this 5-6 times a year.        Tips to Control Acid Reflux    To control acid reflux, youll need to make some basic diet and lifestyle changes. The simple steps outlined below may be all youll need to ease discomfort.  Watch what you eat  · Avoid fatty foods and spicy foods.  · Eat fewer acidic foods, such as citrus and tomato-based foods. These can increase symptoms.  · Limit drinking alcohol, caffeine, and fizzy beverages. All increase acid reflux.  · Try limiting chocolate, peppermint, and spearmint. These can worsen acid reflux in some people.  Watch when you eat  · Avoid lying down for 3 hours after eating.  · Do not snack before going to bed.  Raise your head  Raising your head and upper body by 4 to 6 inches helps limit reflux when youre lying down. Put blocks under the head of your bed frame to raise it.  Other changes  · Lose weight, if you need to  · Dont exercise near bedtime  · Avoid tight-fitting clothes  · Limit aspirin and ibuprofen  · Stop smoking   Date Last Reviewed: 7/1/2016  © 8219-4280 [employer]. [address] [place] [address]. All rights reserved. This information is not intended as a substitute for professional medical care. Always follow your healthcare professional's instructions.        High-Fiber Diet  Fiber is in fruits, vegetables, cereals, and grains. Fiber passes through your body undigested. A high-fiber diet helps food move through your intestinal tract. The added bulk is helpful in preventing constipation. In people with diverticulosis, fiber helps clean out the pouches along the colon wall. It also prevents new pouches from forming. A high-fiber diet reduces the risk of colon cancer. It also lowers blood cholesterol and prevents high blood sugar in people with diabetes.    The fiber-rich foods listed below should be part of your diet. If you are not used to high-fiber foods, start with 1 or 2 foods from this list. Every 3 to 4 days add a new one to  your diet. Do this until you are eating 4 high-fiber foods per day. This should give you 20 to 35 grams of fiber a day. It is also important to drink a lot of water when you are on this diet. You should have 6 to 8 glasses of water a day. Water makes the fiber swell and increases the benefit.  Foods high in dietary fiber  The following foods are high in dietary fiber:  · Breads. Breads made with 100% whole-wheat flour; judith, wheat, or rye crackers; whole-grain tortillas, bran muffins.  · Cereals. Whole-grain and bran cereals with bran (shredded wheat, wheat flakes, raisin bran, corn bran); oatmeal, rolled oats, granola, and brown rice.  · Fruits. Fresh fruits and their edible skins (pears, prunes, raisins, berries, apples, and apricots); bananas, citrus fruit, mangoes, pineapple; and prune juice.  · Nuts. Any nuts and seeds.  · Vegetables. Best served raw or lightly cooked. All types, especially: green peas, celery, eggplant, potatoes, spinach, broccoli, [place] sprouts, winter squash, carrots, cauliflower, soybeans, lentils, and fresh and dried beans of all kinds.  · Other. Popcorn, any spices.  Date Last Reviewed: 8/1/2016  © 0936-0731 [employer]. [address] [place] [address], [place] [address]. All rights reserved. This information is not intended as a substitute for professional medical care. Always follow your healthcare professional's instructions.

## 2019-04-17 NOTE — PROVATION PATIENT INSTRUCTIONS
Discharge Summary/Instructions for after Colonoscopy with   Biopsy/Polypectomy  Tina García    Wednesday, April 17, 2019  Rich Kingsley MD  RESTRICTIONS ON ACTIVITY:  - Do not drive a car or operate machinery until the day after the procedure.      - The following day: return to full activity including work.  - For  3 days: No heavy lifting, straining or running.  - Diet: You can have solid foods, but no gassy foods (i.e. beans, broccoli,   cabbage, etc).  TREATMENT FOR COMMON SIDE EFFECTS:  - Mild abdominal pain and bloating or excessive gas: rest, eat lightly and   use a heating pad.  SYMPTOMS TO WATCH FOR AND REPORT TO YOUR PHYSICIAN:  1. Severe abdominal pain.  2. Fever within 24 hours after a procedure.  3. A large amount of rectal bleeding. (A small amount of blood from the   rectum is not serious, especially if hemorrhoids are present.  3.  Because air was put into your colon during the procedure, expelling   large amounts of air from your rectum is normal.  4.  You may not have a bowel movement for 1-3 days because of the   colonoscopy prep.  This is normal.  5.  Call immediately if you notice any of the following:   Chills and/or fever over 101   Persistent vomiting   Severe abdominal pain, other than gas cramps   Severe chest pain   Black, tarry stools   Any bleeding - exceeding one tablespoon  Your doctor recommends these additional instructions:  We are waiting for your pathology results.   Your physician has recommended a repeat colonoscopy in 5 years for   surveillance.   Eat a high fiber diet.   Take a fiber supplement, for example Citrucel, Fibercon, Konsyl or   Metamucil.   Take a PROBIOTIC, such as a carton of GREEK YOGURT (Chobani or Oikos,  or   Activia or Dannon);  or tablets of ALIGN or CULTURELLE or RADHA-Q (all   non-prescription), every day for a month.   And repeat this 3-4 times a   year.  Continue your present medications.  None  If you have any questions or problems, please  call your physician.  EMERGENCY PHONE NUMBER: [phone]  LAB RESULTS: Call in two (2) weeks for lab results, [phone]  ___________________________________________  Nurse Signature  ___________________________________________  Patient/Designated Responsible Party Signature  Rich Kingsley MD  4/17/2019 1:57:10 PM  This report has been verified and signed electronically.  [employer]

## 2019-04-17 NOTE — ANESTHESIA PREPROCEDURE EVALUATION
04/17/2019  Tina García is a 58 y.o., female.    Pre-op Assessment    I have reviewed the Patient Summary Reports.     I have reviewed the Nursing Notes.   I have reviewed the Medications.     Review of Systems  Anesthesia Hx:  No problems with previous Anesthesia    Social:  Non-Smoker, No Alcohol Use    Cardiovascular:   Hypertension    Pulmonary:   Shortness of breath    Renal/:  Renal/ Normal     Hepatic/GI:   GERD    Neurological:   Neuromuscular Disease,    Endocrine:  Endocrine Normal    Psych:   Psychiatric History anxiety depression          Physical Exam  General:  Well nourished    Airway/Jaw/Neck:  Airway Findings: Mouth Opening: Normal Tongue: Normal  General Airway Assessment: Adult, Average  Mallampati: II  Jaw/Neck Findings:  Neck ROM: Normal ROM       Chest/Lungs:  Chest/Lungs Findings: Clear to auscultation, Normal Respiratory Rate     Heart/Vascular:  Heart Findings: Rate: Normal  Rhythm: Regular Rhythm  Sounds: Normal  Heart Murmur        Mental Status:  Mental Status Findings:  Cooperative, Alert and Oriented         Anesthesia Plan  Type of Anesthesia, risks & benefits discussed:  Anesthesia Type:  general  Patient's Preference:   Intra-op Monitoring Plan: standard ASA monitors  Intra-op Monitoring Plan Comments:   Post Op Pain Control Plan:   Post Op Pain Control Plan Comments:   Induction:   IV  Beta Blocker:  Patient is not currently on a Beta-Blocker (No further documentation required).       Informed Consent: Patient understands risks and agrees with Anesthesia plan.  Questions answered. Anesthesia consent signed with patient.  ASA Score: 3     Day of Surgery Review of History & Physical: I have interviewed and examined the patient. I have reviewed the patient's H&P dated:  There are no significant changes.  H&P update referred to the provider.         Ready For  Surgery From Anesthesia Perspective.

## 2019-04-23 ENCOUNTER — PATIENT MESSAGE (OUTPATIENT)
Dept: RHEUMATOLOGY | Facility: CLINIC | Age: 59
End: 2019-04-23

## 2019-04-23 DIAGNOSIS — R11.0 NAUSEA: Primary | ICD-10-CM

## 2019-04-23 RX ORDER — PROMETHAZINE HYDROCHLORIDE 25 MG/1
25 TABLET ORAL EVERY 6 HOURS PRN
Qty: 30 TABLET | Refills: 0 | Status: SHIPPED | OUTPATIENT
Start: 2019-04-23 | End: 2019-07-17 | Stop reason: SDUPTHER

## 2019-05-01 ENCOUNTER — PATIENT MESSAGE (OUTPATIENT)
Dept: GASTROENTEROLOGY | Facility: CLINIC | Age: 59
End: 2019-05-01

## 2019-05-01 ENCOUNTER — TELEPHONE (OUTPATIENT)
Dept: GASTROENTEROLOGY | Facility: CLINIC | Age: 59
End: 2019-05-01

## 2019-05-01 NOTE — TELEPHONE ENCOUNTER
Pt notified Dr Kingsley recommends her to see allergist for possible food allergies Dr Erin Gray number provided

## 2019-05-07 ENCOUNTER — PATIENT MESSAGE (OUTPATIENT)
Dept: FAMILY MEDICINE | Facility: CLINIC | Age: 59
End: 2019-05-07

## 2019-05-09 ENCOUNTER — OFFICE VISIT (OUTPATIENT)
Dept: URGENT CARE | Facility: CLINIC | Age: 59
End: 2019-05-09
Payer: MEDICARE

## 2019-05-09 VITALS
WEIGHT: 158 LBS | OXYGEN SATURATION: 96 % | DIASTOLIC BLOOD PRESSURE: 81 MMHG | HEART RATE: 97 BPM | RESPIRATION RATE: 19 BRPM | BODY MASS INDEX: 28 KG/M2 | TEMPERATURE: 99 F | SYSTOLIC BLOOD PRESSURE: 129 MMHG | HEIGHT: 63 IN

## 2019-05-09 DIAGNOSIS — J98.01 ACUTE BRONCHOSPASM: ICD-10-CM

## 2019-05-09 DIAGNOSIS — R50.9 FEVER, UNSPECIFIED FEVER CAUSE: Primary | ICD-10-CM

## 2019-05-09 DIAGNOSIS — J10.1 INFLUENZA A: ICD-10-CM

## 2019-05-09 DIAGNOSIS — T78.3XXA ANGIOEDEMA OF LIPS, INITIAL ENCOUNTER: ICD-10-CM

## 2019-05-09 LAB
CTP QC/QA: YES
FLUAV AG NPH QL: POSITIVE
FLUBV AG NPH QL: NEGATIVE

## 2019-05-09 PROCEDURE — 3074F SYST BP LT 130 MM HG: CPT | Mod: CPTII,S$GLB,, | Performed by: INTERNAL MEDICINE

## 2019-05-09 PROCEDURE — 3079F PR MOST RECENT DIASTOLIC BLOOD PRESSURE 80-89 MM HG: ICD-10-PCS | Mod: CPTII,S$GLB,, | Performed by: INTERNAL MEDICINE

## 2019-05-09 PROCEDURE — 3079F DIAST BP 80-89 MM HG: CPT | Mod: CPTII,S$GLB,, | Performed by: INTERNAL MEDICINE

## 2019-05-09 PROCEDURE — 87804 POCT INFLUENZA A/B: ICD-10-PCS | Mod: 59,QW,S$GLB, | Performed by: INTERNAL MEDICINE

## 2019-05-09 PROCEDURE — 3074F PR MOST RECENT SYSTOLIC BLOOD PRESSURE < 130 MM HG: ICD-10-PCS | Mod: CPTII,S$GLB,, | Performed by: INTERNAL MEDICINE

## 2019-05-09 PROCEDURE — 87804 INFLUENZA ASSAY W/OPTIC: CPT | Mod: QW,S$GLB,, | Performed by: INTERNAL MEDICINE

## 2019-05-09 PROCEDURE — 99214 PR OFFICE/OUTPT VISIT, EST, LEVL IV, 30-39 MIN: ICD-10-PCS | Mod: 25,S$GLB,, | Performed by: INTERNAL MEDICINE

## 2019-05-09 PROCEDURE — 3008F BODY MASS INDEX DOCD: CPT | Mod: CPTII,S$GLB,, | Performed by: INTERNAL MEDICINE

## 2019-05-09 PROCEDURE — 99214 OFFICE O/P EST MOD 30 MIN: CPT | Mod: 25,S$GLB,, | Performed by: INTERNAL MEDICINE

## 2019-05-09 PROCEDURE — 96372 THER/PROPH/DIAG INJ SC/IM: CPT | Mod: S$GLB,,, | Performed by: INTERNAL MEDICINE

## 2019-05-09 PROCEDURE — 3008F PR BODY MASS INDEX (BMI) DOCUMENTED: ICD-10-PCS | Mod: CPTII,S$GLB,, | Performed by: INTERNAL MEDICINE

## 2019-05-09 PROCEDURE — 96372 PR INJECTION,THERAP/PROPH/DIAG2ST, IM OR SUBCUT: ICD-10-PCS | Mod: S$GLB,,, | Performed by: INTERNAL MEDICINE

## 2019-05-09 RX ORDER — CODEINE PHOSPHATE AND GUAIFENESIN 10; 100 MG/5ML; MG/5ML
5 SOLUTION ORAL EVERY 4 HOURS PRN
Qty: 118 ML | Refills: 0 | Status: SHIPPED | OUTPATIENT
Start: 2019-05-09 | End: 2019-05-19

## 2019-05-09 RX ORDER — BETAMETHASONE SODIUM PHOSPHATE AND BETAMETHASONE ACETATE 3; 3 MG/ML; MG/ML
9 INJECTION, SUSPENSION INTRA-ARTICULAR; INTRALESIONAL; INTRAMUSCULAR; SOFT TISSUE ONCE
Status: COMPLETED | OUTPATIENT
Start: 2019-05-09 | End: 2019-05-09

## 2019-05-09 RX ORDER — OSELTAMIVIR PHOSPHATE 75 MG/1
75 CAPSULE ORAL 2 TIMES DAILY
Qty: 10 CAPSULE | Refills: 0 | Status: SHIPPED | OUTPATIENT
Start: 2019-05-09 | End: 2019-05-14

## 2019-05-09 RX ORDER — ALBUTEROL SULFATE 90 UG/1
2 AEROSOL, METERED RESPIRATORY (INHALATION) EVERY 4 HOURS PRN
Qty: 1 INHALER | Refills: 1 | Status: SHIPPED | OUTPATIENT
Start: 2019-05-09 | End: 2019-05-14 | Stop reason: SDUPTHER

## 2019-05-09 RX ADMIN — BETAMETHASONE SODIUM PHOSPHATE AND BETAMETHASONE ACETATE 9 MG: 3; 3 INJECTION, SUSPENSION INTRA-ARTICULAR; INTRALESIONAL; INTRAMUSCULAR; SOFT TISSUE at 09:05

## 2019-05-09 NOTE — PATIENT INSTRUCTIONS
Do not take losartan; discuss future treatment with PCP; for flu and cough take mucinex and avoid antihistamines due to wheezing

## 2019-05-14 ENCOUNTER — OFFICE VISIT (OUTPATIENT)
Dept: RHEUMATOLOGY | Facility: CLINIC | Age: 59
End: 2019-05-14
Payer: MEDICARE

## 2019-05-14 VITALS
BODY MASS INDEX: 29.95 KG/M2 | HEIGHT: 63 IN | SYSTOLIC BLOOD PRESSURE: 122 MMHG | HEART RATE: 81 BPM | TEMPERATURE: 99 F | DIASTOLIC BLOOD PRESSURE: 81 MMHG | WEIGHT: 169 LBS

## 2019-05-14 DIAGNOSIS — K22.4 ESOPHAGEAL DYSMOTILITY: ICD-10-CM

## 2019-05-14 DIAGNOSIS — M35.00 SJOGREN'S SYNDROME, WITH UNSPECIFIED ORGAN INVOLVEMENT: ICD-10-CM

## 2019-05-14 DIAGNOSIS — M34.1 CREST (CALCINOSIS, RAYNAUD'S PHENOMENON, ESOPHAGEAL DYSFUNCTION, SCLERODACTYLY, TELANGIECTASIA): ICD-10-CM

## 2019-05-14 DIAGNOSIS — I27.20 PULMONARY HYPERTENSION: ICD-10-CM

## 2019-05-14 DIAGNOSIS — J20.9 ACUTE BRONCHITIS, UNSPECIFIED ORGANISM: Primary | ICD-10-CM

## 2019-05-14 DIAGNOSIS — K44.9 HIATAL HERNIA: ICD-10-CM

## 2019-05-14 DIAGNOSIS — M34.1 CREST SYNDROME: ICD-10-CM

## 2019-05-14 DIAGNOSIS — K58.0 IRRITABLE BOWEL SYNDROME WITH DIARRHEA: ICD-10-CM

## 2019-05-14 PROCEDURE — 99499 UNLISTED E&M SERVICE: CPT | Mod: HCNC,S$GLB,, | Performed by: INTERNAL MEDICINE

## 2019-05-14 PROCEDURE — 3079F DIAST BP 80-89 MM HG: CPT | Mod: HCNC,CPTII,S$GLB, | Performed by: INTERNAL MEDICINE

## 2019-05-14 PROCEDURE — 99499 RISK ADDL DX/OHS AUDIT: ICD-10-PCS | Mod: HCNC,S$GLB,, | Performed by: INTERNAL MEDICINE

## 2019-05-14 PROCEDURE — 3074F SYST BP LT 130 MM HG: CPT | Mod: HCNC,CPTII,S$GLB, | Performed by: INTERNAL MEDICINE

## 2019-05-14 PROCEDURE — 3008F PR BODY MASS INDEX (BMI) DOCUMENTED: ICD-10-PCS | Mod: HCNC,CPTII,S$GLB, | Performed by: INTERNAL MEDICINE

## 2019-05-14 PROCEDURE — 99215 PR OFFICE/OUTPT VISIT, EST, LEVL V, 40-54 MIN: ICD-10-PCS | Mod: 25,HCNC,S$GLB, | Performed by: INTERNAL MEDICINE

## 2019-05-14 PROCEDURE — 99215 OFFICE O/P EST HI 40 MIN: CPT | Mod: 25,HCNC,S$GLB, | Performed by: INTERNAL MEDICINE

## 2019-05-14 PROCEDURE — 99999 PR PBB SHADOW E&M-EST. PATIENT-LVL III: ICD-10-PCS | Mod: PBBFAC,HCNC,, | Performed by: INTERNAL MEDICINE

## 2019-05-14 PROCEDURE — 96372 THER/PROPH/DIAG INJ SC/IM: CPT | Mod: HCNC,59,S$GLB, | Performed by: INTERNAL MEDICINE

## 2019-05-14 PROCEDURE — 96372 PR INJECTION,THERAP/PROPH/DIAG2ST, IM OR SUBCUT: ICD-10-PCS | Mod: HCNC,59,S$GLB, | Performed by: INTERNAL MEDICINE

## 2019-05-14 PROCEDURE — 3074F PR MOST RECENT SYSTOLIC BLOOD PRESSURE < 130 MM HG: ICD-10-PCS | Mod: HCNC,CPTII,S$GLB, | Performed by: INTERNAL MEDICINE

## 2019-05-14 PROCEDURE — 3079F PR MOST RECENT DIASTOLIC BLOOD PRESSURE 80-89 MM HG: ICD-10-PCS | Mod: HCNC,CPTII,S$GLB, | Performed by: INTERNAL MEDICINE

## 2019-05-14 PROCEDURE — 3008F BODY MASS INDEX DOCD: CPT | Mod: HCNC,CPTII,S$GLB, | Performed by: INTERNAL MEDICINE

## 2019-05-14 PROCEDURE — 99999 PR PBB SHADOW E&M-EST. PATIENT-LVL III: CPT | Mod: PBBFAC,HCNC,, | Performed by: INTERNAL MEDICINE

## 2019-05-14 RX ORDER — AZATHIOPRINE 50 MG/1
50 TABLET ORAL DAILY
Qty: 30 TABLET | Refills: 5 | Status: SHIPPED | OUTPATIENT
Start: 2019-05-14 | End: 2019-10-15 | Stop reason: SDUPTHER

## 2019-05-14 RX ORDER — CLARITHROMYCIN 500 MG/1
500 TABLET, FILM COATED ORAL EVERY 12 HOURS
Qty: 20 TABLET | Refills: 0 | Status: SHIPPED | OUTPATIENT
Start: 2019-05-14 | End: 2019-05-24

## 2019-05-14 RX ORDER — AZATHIOPRINE 50 MG/1
50 TABLET ORAL DAILY
Qty: 30 TABLET | Refills: 5 | Status: SHIPPED | OUTPATIENT
Start: 2019-05-14 | End: 2019-05-14 | Stop reason: SDUPTHER

## 2019-05-14 RX ORDER — PROMETHAZINE HYDROCHLORIDE AND CODEINE PHOSPHATE 6.25; 1 MG/5ML; MG/5ML
5 SOLUTION ORAL DAILY
Qty: 118 ML | Refills: 0 | Status: SHIPPED | OUTPATIENT
Start: 2019-05-14 | End: 2020-03-02

## 2019-05-14 RX ORDER — DEXAMETHASONE SODIUM PHOSPHATE 4 MG/ML
4 INJECTION, SOLUTION INTRA-ARTICULAR; INTRALESIONAL; INTRAMUSCULAR; INTRAVENOUS; SOFT TISSUE
Status: COMPLETED | OUTPATIENT
Start: 2019-05-14 | End: 2019-05-14

## 2019-05-14 RX ORDER — FLUCONAZOLE 150 MG/1
150 TABLET ORAL DAILY
Qty: 3 TABLET | Refills: 3 | Status: SHIPPED | OUTPATIENT
Start: 2019-05-14 | End: 2019-05-17

## 2019-05-14 RX ORDER — TRAMADOL HYDROCHLORIDE 50 MG/1
50 TABLET ORAL EVERY 6 HOURS PRN
Qty: 120 TABLET | Refills: 4 | Status: SHIPPED | OUTPATIENT
Start: 2019-05-14 | End: 2019-05-14 | Stop reason: SDUPTHER

## 2019-05-14 RX ORDER — TRAMADOL HYDROCHLORIDE 50 MG/1
50 TABLET ORAL EVERY 6 HOURS PRN
Qty: 120 TABLET | Refills: 4 | Status: SHIPPED | OUTPATIENT
Start: 2019-05-14 | End: 2019-10-15 | Stop reason: SDUPTHER

## 2019-05-14 RX ORDER — METHYLPREDNISOLONE ACETATE 80 MG/ML
80 INJECTION, SUSPENSION INTRA-ARTICULAR; INTRALESIONAL; INTRAMUSCULAR; SOFT TISSUE
Status: COMPLETED | OUTPATIENT
Start: 2019-05-14 | End: 2019-05-14

## 2019-05-14 RX ORDER — CEFTRIAXONE 1 G/1
1 INJECTION, POWDER, FOR SOLUTION INTRAMUSCULAR; INTRAVENOUS
Status: COMPLETED | OUTPATIENT
Start: 2019-05-14 | End: 2019-05-14

## 2019-05-14 RX ADMIN — METHYLPREDNISOLONE ACETATE 80 MG: 80 INJECTION, SUSPENSION INTRA-ARTICULAR; INTRALESIONAL; INTRAMUSCULAR; SOFT TISSUE at 02:05

## 2019-05-14 RX ADMIN — DEXAMETHASONE SODIUM PHOSPHATE 4 MG: 4 INJECTION, SOLUTION INTRA-ARTICULAR; INTRALESIONAL; INTRAMUSCULAR; INTRAVENOUS; SOFT TISSUE at 02:05

## 2019-05-14 RX ADMIN — CEFTRIAXONE 1 G: 1 INJECTION, POWDER, FOR SOLUTION INTRAMUSCULAR; INTRAVENOUS at 02:05

## 2019-05-14 ASSESSMENT — ROUTINE ASSESSMENT OF PATIENT INDEX DATA (RAPID3)
PAIN SCORE: 7
PATIENT GLOBAL ASSESSMENT SCORE: 3.5
PSYCHOLOGICAL DISTRESS SCORE: 7.7
MDHAQ FUNCTION SCORE: 1
TOTAL RAPID3 SCORE: 4.61

## 2019-05-14 NOTE — PROGRESS NOTES
Administered 1 gram of rocephin mixed with 2.1 ml of lidocaine. Given in the left upper outer gluteal. Informed of s/s to report verbalized understanding. No adverse reactions noted.    Lot # [id number]  Expiration aug 2021    Administered 1 cc dexamethasone 4mg/cc  to right upper outer gluteal. Pt tolerated well. No acute reaction noted to site. Pt instructed on S/S to report. Pt verbalized understanding.     Lot: [id number]  Exp: 07/20    Administered 1 cc ( 80 mg/ml ) of depomedrol to the right upper outer gluteal. Informed of s/s to report verbalized understanding. No adverse reactions noted.    Lot # [id number]  Expiration 04/20

## 2019-05-14 NOTE — PROGRESS NOTES
Subjective:       Patient ID: Tina García is a 58 y.o. female.    Chief Complaint: Pain; Disease Management; and Swelling    Follow up:she is on Imuran, has crest syndrome, pa pressure 24,doing poorly, pt is ill and has a cough and sob has the flu on tamiflu day 5. She is on revatio and its helping. Patient complains of arthralgias and myalgias for which has been present for a few years. Pain is located in multiple joints, both shoulder(s), both elbow(s), both wrist(s), both MCP(s): 1st, 2nd, 3rd, 4th and 5th, both PIP(s): 1st, 2nd, 3rd, 4th and 5th, both DIP(s): 1st and 2nd, both hip(s), both knee(s) and both MTP(s): 1st, 2nd, 3rd, 4th and 5th, is described as aching, pulsating, shooting and throbbing, and is constant, moderate .  Associated symptoms include: crepitation, decreased range of motion, edema, effusion, tenderness and warmth.    Review of Systems   Constitutional: Positive for activity change, chills and fatigue. Negative for appetite change, diaphoresis and unexpected weight change.   HENT: Positive for sinus pressure and sinus pain. Negative for congestion, dental problem, ear discharge, ear pain, facial swelling, mouth sores, nosebleeds, postnasal drip, rhinorrhea, sneezing, sore throat, tinnitus and voice change.    Eyes: Negative for photophobia, pain, discharge, redness and itching.   Respiratory: Positive for cough, choking and shortness of breath. Negative for apnea, chest tightness and wheezing.    Cardiovascular: Positive for leg swelling. Negative for chest pain and palpitations.   Gastrointestinal: Positive for abdominal pain. Negative for abdominal distention, constipation, diarrhea, nausea and vomiting.   Endocrine: Negative for cold intolerance, heat intolerance, polydipsia and polyuria.   Genitourinary: Negative for decreased urine volume, difficulty urinating, flank pain, frequency, hematuria and urgency.   Musculoskeletal: Positive for arthralgias, back pain, joint  "swelling, myalgias, neck pain and neck stiffness.   Skin: Negative for pallor, rash and wound.   Allergic/Immunologic: Negative for immunocompromised state.   Neurological: Positive for weakness. Negative for dizziness, tremors and numbness.   Hematological: Negative for adenopathy. Does not bruise/bleed easily.   Psychiatric/Behavioral: Negative for sleep disturbance. The patient is not nervous/anxious.          Objective:     /81 (BP Location: Left arm, Patient Position: Sitting, BP Method: Medium (Automatic))   Pulse 81   Temp 98.9 °F (37.2 °C) (Axillary)   Ht 5' 3" (1.6 m)   Wt 76.7 kg (169 lb)   BMI 29.94 kg/m²      Physical Exam   Vitals reviewed.  Constitutional: She is oriented to person, place, and time. She appears distressed.   HENT:   Head: Normocephalic and atraumatic.   Eyes: EOM are normal. Pupils are equal, round, and reactive to light. Right eye exhibits no discharge. Left eye exhibits no discharge.   Neck: Neck supple. No thyromegaly present.   Cardiovascular: Normal rate, regular rhythm and normal heart sounds.  Exam reveals no gallop and no friction rub.    No murmur heard.  Pulmonary/Chest: She has wheezes. She has no rales. She exhibits no tenderness.   Abdominal: There is no tenderness. There is no rebound and no guarding.       Right Side Rheumatological Exam     Examination finds the elbow normal.    The patient is tender to palpation of the shoulder, wrist, knee, 1st PIP, 1st MCP, 2nd PIP, 2nd MCP, 3rd PIP, 3rd MCP, 4th PIP, 4th MCP, 5th PIP and 5th MCP    She has swelling of the 1st PIP, 1st MCP, 2nd PIP, 2nd MCP, 3rd PIP, 3rd MCP, 4th PIP, 4th MCP, 5th PIP and 5th MCP    Shoulder Exam   Tenderness Location: no tenderness    Range of Motion   Active abduction: abnormal   Adduction: abnormal  Sensation: normal    Knee Exam   Patellofemoral Crepitus: positive  Effusion: positive  Sensation: normal    Hip Exam   Tenderness Location: posterior  Sensation: normal    Elbow/Wrist Exam "   Tenderness Location: no tenderness  Sensation: normal    Left Side Rheumatological Exam     The patient is tender to palpation of the shoulder, elbow, wrist, knee, 1st PIP, 1st MCP, 2nd PIP, 2nd MCP, 3rd PIP, 3rd MCP, 4th PIP, 4th MCP, 5th PIP and 5th MCP.    She has swelling of the 1st PIP, 1st MCP, 2nd PIP, 2nd MCP, 3rd PIP, 3rd MCP, 4th PIP, 4th MCP, 5th PIP and 5th MCP    Shoulder Exam   Tenderness Location: no tenderness    Range of Motion   Active abduction: abnormal   Sensation: normal    Knee Exam     Patellofemoral Crepitus: positive  Effusion: positive  Sensation: normal    Hip Exam   Tenderness Location: posterior  Sensation: normal    Elbow/Wrist Exam   Sensation: normal      Back/Neck Exam   General Inspection   Gait: normal         Lymphadenopathy:     She has no cervical adenopathy.   Neurological: She is alert and oriented to person, place, and time. Gait normal.   Skin: Skin is dry. No rash noted. No erythema. There is pallor.     Psychiatric: Mood and affect normal.   Musculoskeletal: She exhibits edema, tenderness and deformity.   Dactylitis, raynauds          JAMILA HEP-2 TiterPositive >=1:2560 Centrome[employer]       Results for orders placed or performed in visit on 05/09/19   POCT Influenza A/B   Result Value Ref Range    Rapid Influenza A Ag Positive (A) Negative    Rapid Influenza B Ag Negative Negative     Acceptable Yes      Supplemental Diagnosis  Immunohistochemical stain for Helicobacter species is completed on the stomach #4 biopsy and is negative for  organisms with satisfactory positive and negative controls.  (Electronically Signed: 2019-04-26 12:34:37 )  Diagnosed by: Radha Palencia M.D.  FINAL PATHOLOGIC DIAGNOSIS  1. Mid esophagus, esophagitis at 29 to 33 cm, biopsy:  Esophageal squamous mucosa with marked intraepithelial eosinophils (up to over 50 eosinophils identified in a  single high power field). These findings could be consistent with eosinophilic esophagitis the  correct clinical setting.  Correlate clinically.  No evidence of intestinal metaplasia, dysplasia or malignancy.  Report  Assessment:       1. Acute bronchitis, unspecified organism    2. Sjogren's syndrome, with unspecified organ involvement    3. CREST syndrome    4. Pulmonary hypertension    5. Esophageal dysmotility    6. CREST (calcinosis, Raynaud's phenomenon, esophageal dysfunction, sclerodactyly, telangiectasia)    7. Irritable bowel syndrome with diarrhea    8. Hiatal hernia            Plan:       Acute bronchitis, unspecified organism  -     cefTRIAXone injection 1 g  -     methylPREDNISolone acetate injection 80 mg  -     dexamethasone injection 4 mg  -     clarithromycin (BIAXIN) 500 MG tablet; Take 1 tablet (500 mg total) by mouth every 12 (twelve) hours. for 10 days  Dispense: 20 tablet; Refill: 0  -     fluconazole (DIFLUCAN) 150 MG Tab; Take 1 tablet (150 mg total) by mouth once daily. for 3 days  Dispense: 3 tablet; Refill: 3  -     promethazine-codeine 6.25-10 mg/5 ml (PHENERGAN WITH CODEINE) 6.25-10 mg/5 mL syrup; Take 5 mLs by mouth once daily. for 10 days  Dispense: 118 mL; Refill: 0  -     Ambulatory consult to Allergy  -     Celiac Disease HLA Genotyping; Future; Expected date: 05/14/2019  -     Celiac Disease Panel; Future; Expected date: 05/14/2019    Sjogren's syndrome, with unspecified organ involvement  -     JAMILA Screen w/Reflex; Future; Expected date: 05/14/2019  -     Anti-DNA antibody, double-stranded; Future; Expected date: 05/14/2019  -     Sedimentation rate; Future; Expected date: 05/14/2019  -     CBC auto differential; Future; Expected date: 05/14/2019  -     Comprehensive metabolic panel; Future; Expected date: 05/14/2019  -     C-reactive protein; Future; Expected date: 05/14/2019  -     TSH; Future; Expected date: 05/14/2019  -     T4, free; Future; Expected date: 05/14/2019  -     Ambulatory consult to Allergy  -     Celiac Disease HLA Genotyping; Future; Expected date:  05/14/2019  -     Celiac Disease Panel; Future; Expected date: 05/14/2019    CREST syndrome  -     JAMILA Screen w/Reflex; Future; Expected date: 05/14/2019  -     Anti-DNA antibody, double-stranded; Future; Expected date: 05/14/2019  -     Sedimentation rate; Future; Expected date: 05/14/2019  -     CBC auto differential; Future; Expected date: 05/14/2019  -     Comprehensive metabolic panel; Future; Expected date: 05/14/2019  -     C-reactive protein; Future; Expected date: 05/14/2019  -     TSH; Future; Expected date: 05/14/2019  -     T4, free; Future; Expected date: 05/14/2019  -     Ambulatory consult to Allergy  -     Celiac Disease HLA Genotyping; Future; Expected date: 05/14/2019  -     Celiac Disease Panel; Future; Expected date: 05/14/2019    Pulmonary hypertension  -     Discontinue: azaTHIOprine (IMURAN) 50 mg Tab; Take 1 tablet (50 mg total) by mouth once daily.  Dispense: 30 tablet; Refill: 5  -     azaTHIOprine (IMURAN) 50 mg Tab; Take 1 tablet (50 mg total) by mouth once daily.  Dispense: 30 tablet; Refill: 5  -     JAMILA Screen w/Reflex; Future; Expected date: 05/14/2019  -     Anti-DNA antibody, double-stranded; Future; Expected date: 05/14/2019  -     Sedimentation rate; Future; Expected date: 05/14/2019  -     CBC auto differential; Future; Expected date: 05/14/2019  -     Comprehensive metabolic panel; Future; Expected date: 05/14/2019  -     C-reactive protein; Future; Expected date: 05/14/2019  -     TSH; Future; Expected date: 05/14/2019  -     T4, free; Future; Expected date: 05/14/2019  -     Ambulatory consult to Allergy  -     Celiac Disease HLA Genotyping; Future; Expected date: 05/14/2019  -     Celiac Disease Panel; Future; Expected date: 05/14/2019    Esophageal dysmotility  -     Discontinue: azaTHIOprine (IMURAN) 50 mg Tab; Take 1 tablet (50 mg total) by mouth once daily.  Dispense: 30 tablet; Refill: 5  -     azaTHIOprine (IMURAN) 50 mg Tab; Take 1 tablet (50 mg total) by mouth once  daily.  Dispense: 30 tablet; Refill: 5  -     JAMILA Screen w/Reflex; Future; Expected date: 05/14/2019  -     Anti-DNA antibody, double-stranded; Future; Expected date: 05/14/2019  -     Sedimentation rate; Future; Expected date: 05/14/2019  -     CBC auto differential; Future; Expected date: 05/14/2019  -     Comprehensive metabolic panel; Future; Expected date: 05/14/2019  -     C-reactive protein; Future; Expected date: 05/14/2019  -     TSH; Future; Expected date: 05/14/2019  -     T4, free; Future; Expected date: 05/14/2019  -     Ambulatory consult to Allergy  -     Celiac Disease HLA Genotyping; Future; Expected date: 05/14/2019  -     Celiac Disease Panel; Future; Expected date: 05/14/2019    CREST (calcinosis, Raynaud's phenomenon, esophageal dysfunction, sclerodactyly, telangiectasia)  -     Discontinue: azaTHIOprine (IMURAN) 50 mg Tab; Take 1 tablet (50 mg total) by mouth once daily.  Dispense: 30 tablet; Refill: 5  -     azaTHIOprine (IMURAN) 50 mg Tab; Take 1 tablet (50 mg total) by mouth once daily.  Dispense: 30 tablet; Refill: 5  -     JAMILA Screen w/Reflex; Future; Expected date: 05/14/2019  -     Anti-DNA antibody, double-stranded; Future; Expected date: 05/14/2019  -     Sedimentation rate; Future; Expected date: 05/14/2019  -     CBC auto differential; Future; Expected date: 05/14/2019  -     Comprehensive metabolic panel; Future; Expected date: 05/14/2019  -     C-reactive protein; Future; Expected date: 05/14/2019  -     TSH; Future; Expected date: 05/14/2019  -     T4, free; Future; Expected date: 05/14/2019  -     Ambulatory consult to Allergy    Irritable bowel syndrome with diarrhea  -     Discontinue: azaTHIOprine (IMURAN) 50 mg Tab; Take 1 tablet (50 mg total) by mouth once daily.  Dispense: 30 tablet; Refill: 5  -     azaTHIOprine (IMURAN) 50 mg Tab; Take 1 tablet (50 mg total) by mouth once daily.  Dispense: 30 tablet; Refill: 5  -     JAMILA Screen w/Reflex; Future; Expected date:  05/14/2019  -     Anti-DNA antibody, double-stranded; Future; Expected date: 05/14/2019  -     Sedimentation rate; Future; Expected date: 05/14/2019  -     CBC auto differential; Future; Expected date: 05/14/2019  -     Comprehensive metabolic panel; Future; Expected date: 05/14/2019  -     C-reactive protein; Future; Expected date: 05/14/2019  -     TSH; Future; Expected date: 05/14/2019  -     T4, free; Future; Expected date: 05/14/2019  -     Ambulatory consult to Allergy    Hiatal hernia  -     Ambulatory consult to Allergy    Other orders  -     Discontinue: traMADol (ULTRAM) 50 mg tablet; Take 1 tablet (50 mg total) by mouth every 6 (six) hours as needed.  Dispense: 120 tablet; Refill: 4  -     traMADol (ULTRAM) 50 mg tablet; Take 1 tablet (50 mg total) by mouth every 6 (six) hours as needed.  Dispense: 120 tablet; Refill: 4

## 2019-05-15 ENCOUNTER — PATIENT MESSAGE (OUTPATIENT)
Dept: RHEUMATOLOGY | Facility: CLINIC | Age: 59
End: 2019-05-15

## 2019-05-16 ENCOUNTER — DOCUMENTATION ONLY (OUTPATIENT)
Dept: RHEUMATOLOGY | Facility: CLINIC | Age: 59
End: 2019-05-16

## 2019-05-17 ENCOUNTER — PATIENT MESSAGE (OUTPATIENT)
Dept: RHEUMATOLOGY | Facility: CLINIC | Age: 59
End: 2019-05-17

## 2019-06-07 ENCOUNTER — PES CALL (OUTPATIENT)
Dept: ADMINISTRATIVE | Facility: CLINIC | Age: 59
End: 2019-06-07

## 2019-06-28 ENCOUNTER — PATIENT MESSAGE (OUTPATIENT)
Dept: RHEUMATOLOGY | Facility: CLINIC | Age: 59
End: 2019-06-28

## 2019-07-17 DIAGNOSIS — K21.9 GASTROESOPHAGEAL REFLUX DISEASE, ESOPHAGITIS PRESENCE NOT SPECIFIED: ICD-10-CM

## 2019-07-17 DIAGNOSIS — R11.0 NAUSEA: ICD-10-CM

## 2019-07-18 ENCOUNTER — TELEPHONE (OUTPATIENT)
Dept: FAMILY MEDICINE | Facility: CLINIC | Age: 59
End: 2019-07-18

## 2019-07-19 RX ORDER — PROMETHAZINE HYDROCHLORIDE 25 MG/1
25 TABLET ORAL EVERY 6 HOURS PRN
Qty: 30 TABLET | Refills: 3 | Status: SHIPPED | OUTPATIENT
Start: 2019-07-19 | End: 2020-06-18 | Stop reason: SDUPTHER

## 2019-07-19 RX ORDER — ONDANSETRON 4 MG/1
8 TABLET, ORALLY DISINTEGRATING ORAL 3 TIMES DAILY PRN
Qty: 30 TABLET | Refills: 3 | Status: SHIPPED | OUTPATIENT
Start: 2019-07-19 | End: 2019-10-15 | Stop reason: SDUPTHER

## 2019-07-22 ENCOUNTER — HOSPITAL ENCOUNTER (OUTPATIENT)
Dept: RADIOLOGY | Facility: HOSPITAL | Age: 59
Discharge: HOME | End: 2019-07-22
Attending: PHYSICIAN ASSISTANT
Payer: MEDICARE

## 2019-07-22 ENCOUNTER — OFFICE VISIT (OUTPATIENT)
Dept: FAMILY MEDICINE | Facility: CLINIC | Age: 59
End: 2019-07-22
Payer: MEDICARE

## 2019-07-22 VITALS
WEIGHT: 175.94 LBS | BODY MASS INDEX: 31.17 KG/M2 | SYSTOLIC BLOOD PRESSURE: 126 MMHG | OXYGEN SATURATION: 98 % | HEART RATE: 96 BPM | DIASTOLIC BLOOD PRESSURE: 72 MMHG | HEIGHT: 63 IN | TEMPERATURE: 98 F

## 2019-07-22 DIAGNOSIS — I10 ESSENTIAL HYPERTENSION: ICD-10-CM

## 2019-07-22 DIAGNOSIS — R05.9 COUGH: ICD-10-CM

## 2019-07-22 DIAGNOSIS — N39.3 STRESS INCONTINENCE: ICD-10-CM

## 2019-07-22 DIAGNOSIS — M34.1 CREST (CALCINOSIS, RAYNAUD'S PHENOMENON, ESOPHAGEAL DYSFUNCTION, SCLERODACTYLY, TELANGIECTASIA): ICD-10-CM

## 2019-07-22 DIAGNOSIS — J45.909 ASTHMA DUE TO SEASONAL ALLERGIES: ICD-10-CM

## 2019-07-22 DIAGNOSIS — I27.20 PULMONARY HYPERTENSION: ICD-10-CM

## 2019-07-22 DIAGNOSIS — N39.0 URINARY TRACT INFECTION WITHOUT HEMATURIA, SITE UNSPECIFIED: ICD-10-CM

## 2019-07-22 DIAGNOSIS — K22.4 ESOPHAGEAL DYSMOTILITY: ICD-10-CM

## 2019-07-22 DIAGNOSIS — R05.9 COUGH: Primary | ICD-10-CM

## 2019-07-22 DIAGNOSIS — R09.82 POST-NASAL DRIP: ICD-10-CM

## 2019-07-22 DIAGNOSIS — K58.0 IRRITABLE BOWEL SYNDROME WITH DIARRHEA: ICD-10-CM

## 2019-07-22 PROCEDURE — 3074F SYST BP LT 130 MM HG: CPT | Mod: HCNC,CPTII,S$GLB, | Performed by: PHYSICIAN ASSISTANT

## 2019-07-22 PROCEDURE — 99214 PR OFFICE/OUTPT VISIT, EST, LEVL IV, 30-39 MIN: ICD-10-PCS | Mod: HCNC,S$GLB,, | Performed by: PHYSICIAN ASSISTANT

## 2019-07-22 PROCEDURE — 3074F PR MOST RECENT SYSTOLIC BLOOD PRESSURE < 130 MM HG: ICD-10-PCS | Mod: HCNC,CPTII,S$GLB, | Performed by: PHYSICIAN ASSISTANT

## 2019-07-22 PROCEDURE — 99214 OFFICE O/P EST MOD 30 MIN: CPT | Mod: HCNC,S$GLB,, | Performed by: PHYSICIAN ASSISTANT

## 2019-07-22 PROCEDURE — 71046 X-RAY EXAM CHEST 2 VIEWS: CPT | Mod: 26,HCNC,, | Performed by: RADIOLOGY

## 2019-07-22 PROCEDURE — 99999 PR PBB SHADOW E&M-EST. PATIENT-LVL III: CPT | Mod: PBBFAC,HCNC,, | Performed by: PHYSICIAN ASSISTANT

## 2019-07-22 PROCEDURE — 99999 PR PBB SHADOW E&M-EST. PATIENT-LVL III: ICD-10-PCS | Mod: PBBFAC,HCNC,, | Performed by: PHYSICIAN ASSISTANT

## 2019-07-22 PROCEDURE — 71046 X-RAY EXAM CHEST 2 VIEWS: CPT | Mod: TC,HCNC,FY,PO

## 2019-07-22 PROCEDURE — 3078F PR MOST RECENT DIASTOLIC BLOOD PRESSURE < 80 MM HG: ICD-10-PCS | Mod: HCNC,CPTII,S$GLB, | Performed by: PHYSICIAN ASSISTANT

## 2019-07-22 PROCEDURE — 3078F DIAST BP <80 MM HG: CPT | Mod: HCNC,CPTII,S$GLB, | Performed by: PHYSICIAN ASSISTANT

## 2019-07-22 PROCEDURE — 71046 XR CHEST PA AND LATERAL: ICD-10-PCS | Mod: 26,HCNC,, | Performed by: RADIOLOGY

## 2019-07-22 PROCEDURE — 3008F BODY MASS INDEX DOCD: CPT | Mod: HCNC,CPTII,S$GLB, | Performed by: PHYSICIAN ASSISTANT

## 2019-07-22 PROCEDURE — 3008F PR BODY MASS INDEX (BMI) DOCUMENTED: ICD-10-PCS | Mod: HCNC,CPTII,S$GLB, | Performed by: PHYSICIAN ASSISTANT

## 2019-07-22 RX ORDER — MONTELUKAST SODIUM 10 MG/1
10 TABLET ORAL NIGHTLY
Qty: 30 TABLET | Refills: 0 | Status: SHIPPED | OUTPATIENT
Start: 2019-07-22 | End: 2019-08-22

## 2019-07-22 RX ORDER — LEVOTHYROXINE SODIUM 75 UG/1
75 TABLET ORAL DAILY
Qty: 30 TABLET | Refills: 11 | Status: SHIPPED | OUTPATIENT
Start: 2019-07-22 | End: 2019-10-15

## 2019-07-22 NOTE — PROGRESS NOTES
"Subjective:      Patient ID: Tina García is a 59 y.o. female.    Chief Complaint: Follow-up (nasal drip and cough; no fever) and Urinary Incontinence (with coughing attacks)    Patient is new to me, here today for PND and cough and urinary incontinence with coughing attacks  Diagnosed with flu in May and given steroid injection and antibiotics  Followed up with Rheum, was given oral antibiotics and additional steroids  Finds cough is lingering   Cough   This is a new problem. The current episode started more than 1 month ago. The problem has been unchanged. The cough is non-productive. Associated symptoms include headaches (occasionally, on and off), postnasal drip, rhinorrhea and wheezing. Pertinent negatives include no chills, fever, rash or sore throat. Treatments tried: otc meds, steroids, antibiotics, mucinex. The treatment provided no relief. Her past medical history is significant for pneumonia (outpatient). There is no history of asthma or COPD.     Review of Systems   Constitutional: Negative for chills, diaphoresis and fever.   HENT: Positive for congestion, postnasal drip, rhinorrhea and trouble swallowing (hoarse). Negative for sore throat.         H/o allergies, Claritin and Zyrtec, Nasonex and Flonase   Respiratory: Positive for cough (dry) and wheezing.         Non-smoker   Gastrointestinal: Negative for abdominal pain, constipation, diarrhea, nausea and vomiting.        H/o GERD   Skin: Negative for color change, rash and wound.   Neurological: Positive for headaches (occasionally, on and off). Negative for dizziness and light-headedness.       Objective:   /72   Pulse 96   Temp 97.8 °F (36.6 °C) (Oral)   Ht 5' 3" (1.6 m)   Wt 79.8 kg (175 lb 14.8 oz)   SpO2 98%   BMI 31.16 kg/m²   Physical Exam   Constitutional: She appears well-developed and well-nourished. She does not appear ill. No distress.   HENT:   Head: Normocephalic and atraumatic.   Right Ear: Tympanic membrane " and ear canal normal. Tympanic membrane is not erythematous. No middle ear effusion.   Left Ear: Tympanic membrane and ear canal normal. Tympanic membrane is not erythematous.  No middle ear effusion.   Nose: No mucosal edema. Right sinus exhibits no maxillary sinus tenderness and no frontal sinus tenderness. Left sinus exhibits no maxillary sinus tenderness and no frontal sinus tenderness.   Mouth/Throat: Uvula is midline and oropharynx is clear and moist. No posterior oropharyngeal erythema. Tonsils are 2+ on the right. Tonsils are 2+ on the left. No tonsillar exudate.   Signs of PND   Cardiovascular: Normal rate, regular rhythm and normal heart sounds.   No murmur heard.  Pulmonary/Chest: Effort normal and breath sounds normal. No respiratory distress. She has no decreased breath sounds. She has no wheezes. She has no rhonchi. She has no rales.   Occasional dry cough   Lymphadenopathy:     She has no cervical adenopathy.   Skin: Skin is warm and dry. No rash noted. She is not diaphoretic.   Psychiatric: She has a normal mood and affect. Her speech is normal and behavior is normal. Thought content normal. Cognition and memory are normal.     Assessment:      1. Cough    2. Post-nasal drip    3. Asthma due to seasonal allergies    4. Essential hypertension    5. Stress incontinence       Plan:   Cough  -     X-Ray Chest PA And Lateral; Future; Expected date: 07/22/2019    Post-nasal drip  -     montelukast (SINGULAIR) 10 mg tablet; Take 1 tablet (10 mg total) by mouth every evening.  Dispense: 30 tablet; Refill: 0    Asthma due to seasonal allergies    Essential hypertension    Stress incontinence    Suspect cough is more related to PND/allergies, will trial singulair   If cough persists, would recommend follow up w specialist    Discussed worsening signs/symptoms and when to return to clinic or go to ED.   Patient expresses understanding and agrees with treatment plan.

## 2019-08-05 RX ORDER — LOSARTAN POTASSIUM AND HYDROCHLOROTHIAZIDE 25; 100 MG/1; MG/1
TABLET ORAL
Qty: 30 TABLET | Refills: 0 | Status: SHIPPED | OUTPATIENT
Start: 2019-08-05 | End: 2019-08-29 | Stop reason: SDUPTHER

## 2019-08-05 NOTE — TELEPHONE ENCOUNTER
Patient needs appt for wellness visit and labs before any additional BP medication will be given.  30 day supply sent, she needs to be seen before she runs out of current prescription.

## 2019-08-22 DIAGNOSIS — R09.82 POST-NASAL DRIP: ICD-10-CM

## 2019-08-23 RX ORDER — MONTELUKAST SODIUM 10 MG/1
10 TABLET ORAL NIGHTLY
Qty: 30 TABLET | Refills: 0 | Status: SHIPPED | OUTPATIENT
Start: 2019-08-23 | End: 2019-09-23 | Stop reason: SDUPTHER

## 2019-08-28 ENCOUNTER — PATIENT MESSAGE (OUTPATIENT)
Dept: FAMILY MEDICINE | Facility: CLINIC | Age: 59
End: 2019-08-28

## 2019-08-29 RX ORDER — LOSARTAN POTASSIUM AND HYDROCHLOROTHIAZIDE 25; 100 MG/1; MG/1
1 TABLET ORAL DAILY
Qty: 90 TABLET | Refills: 0 | Status: SHIPPED | OUTPATIENT
Start: 2019-08-29 | End: 2019-10-28 | Stop reason: SDUPTHER

## 2019-09-05 ENCOUNTER — PATIENT MESSAGE (OUTPATIENT)
Dept: FAMILY MEDICINE | Facility: CLINIC | Age: 59
End: 2019-09-05

## 2019-09-13 ENCOUNTER — PATIENT MESSAGE (OUTPATIENT)
Dept: RHEUMATOLOGY | Facility: CLINIC | Age: 59
End: 2019-09-13

## 2019-09-13 DIAGNOSIS — Z12.39 SCREENING BREAST EXAMINATION: Primary | ICD-10-CM

## 2019-09-23 DIAGNOSIS — R09.82 POST-NASAL DRIP: ICD-10-CM

## 2019-09-26 DIAGNOSIS — J30.2 SEASONAL ALLERGIES: Primary | ICD-10-CM

## 2019-09-26 DIAGNOSIS — N30.90 CYSTITIS: ICD-10-CM

## 2019-09-26 RX ORDER — FLUTICASONE PROPIONATE 50 MCG
1 SPRAY, SUSPENSION (ML) NASAL DAILY
Qty: 16 G | Refills: 3 | Status: SHIPPED | OUTPATIENT
Start: 2019-09-26 | End: 2021-11-15 | Stop reason: SDUPTHER

## 2019-09-26 RX ORDER — MONTELUKAST SODIUM 10 MG/1
10 TABLET ORAL NIGHTLY
Qty: 30 TABLET | Refills: 0 | Status: SHIPPED | OUTPATIENT
Start: 2019-09-26 | End: 2019-10-26

## 2019-10-07 ENCOUNTER — PATIENT MESSAGE (OUTPATIENT)
Dept: RHEUMATOLOGY | Facility: CLINIC | Age: 59
End: 2019-10-07

## 2019-10-11 ENCOUNTER — LAB VISIT (OUTPATIENT)
Dept: LAB | Facility: HOSPITAL | Age: 59
End: 2019-10-11
Attending: INTERNAL MEDICINE
Payer: MEDICARE

## 2019-10-11 DIAGNOSIS — M34.1 CREST SYNDROME: ICD-10-CM

## 2019-10-11 DIAGNOSIS — K58.0 IRRITABLE BOWEL SYNDROME WITH DIARRHEA: ICD-10-CM

## 2019-10-11 DIAGNOSIS — M35.00 SJOGREN'S SYNDROME, WITH UNSPECIFIED ORGAN INVOLVEMENT: ICD-10-CM

## 2019-10-11 DIAGNOSIS — I27.20 PULMONARY HYPERTENSION: ICD-10-CM

## 2019-10-11 DIAGNOSIS — K22.4 ESOPHAGEAL DYSMOTILITY: ICD-10-CM

## 2019-10-11 DIAGNOSIS — M34.1 CREST (CALCINOSIS, RAYNAUD'S PHENOMENON, ESOPHAGEAL DYSFUNCTION, SCLERODACTYLY, TELANGIECTASIA): ICD-10-CM

## 2019-10-11 LAB
ALBUMIN SERPL BCP-MCNC: 3.6 G/DL (ref 3.5–5.2)
ALP SERPL-CCNC: 55 U/L (ref 55–135)
ALT SERPL W/O P-5'-P-CCNC: 28 U/L (ref 10–44)
ANION GAP SERPL CALC-SCNC: 6 MMOL/L (ref 8–16)
AST SERPL-CCNC: 23 U/L (ref 10–40)
BASOPHILS # BLD AUTO: 0.07 K/UL (ref 0–0.2)
BASOPHILS NFR BLD: 1.2 % (ref 0–1.9)
BILIRUB SERPL-MCNC: 0.2 MG/DL (ref 0.1–1)
BUN SERPL-MCNC: 20 MG/DL (ref 6–20)
CALCIUM SERPL-MCNC: 9 MG/DL (ref 8.7–10.5)
CHLORIDE SERPL-SCNC: 105 MMOL/L (ref 95–110)
CO2 SERPL-SCNC: 30 MMOL/L (ref 23–29)
CREAT SERPL-MCNC: 0.8 MG/DL (ref 0.5–1.4)
CRP SERPL-MCNC: 2 MG/L (ref 0–8.2)
DIFFERENTIAL METHOD: ABNORMAL
EOSINOPHIL # BLD AUTO: 0.1 K/UL (ref 0–0.5)
EOSINOPHIL NFR BLD: 2.3 % (ref 0–8)
ERYTHROCYTE [DISTWIDTH] IN BLOOD BY AUTOMATED COUNT: 13.2 % (ref 11.5–14.5)
ERYTHROCYTE [SEDIMENTATION RATE] IN BLOOD BY WESTERGREN METHOD: 13 MM/HR (ref 0–36)
EST. GFR  (AFRICAN AMERICAN): >60 ML/MIN/1.73 M^2
EST. GFR  (NON AFRICAN AMERICAN): >60 ML/MIN/1.73 M^2
GLUCOSE SERPL-MCNC: 97 MG/DL (ref 70–110)
HCT VFR BLD AUTO: 38.6 % (ref 37–48.5)
HGB BLD-MCNC: 12.1 G/DL (ref 12–16)
IMM GRANULOCYTES # BLD AUTO: 0.02 K/UL (ref 0–0.04)
IMM GRANULOCYTES NFR BLD AUTO: 0.3 % (ref 0–0.5)
LYMPHOCYTES # BLD AUTO: 2.5 K/UL (ref 1–4.8)
LYMPHOCYTES NFR BLD: 40.7 % (ref 18–48)
MCH RBC QN AUTO: 29 PG (ref 27–31)
MCHC RBC AUTO-ENTMCNC: 31.3 G/DL (ref 32–36)
MCV RBC AUTO: 93 FL (ref 82–98)
MONOCYTES # BLD AUTO: 0.7 K/UL (ref 0.3–1)
MONOCYTES NFR BLD: 11.3 % (ref 4–15)
NEUTROPHILS # BLD AUTO: 2.7 K/UL (ref 1.8–7.7)
NEUTROPHILS NFR BLD: 44.2 % (ref 38–73)
NRBC BLD-RTO: 0 /100 WBC
PLATELET # BLD AUTO: 268 K/UL (ref 150–350)
PMV BLD AUTO: 10.6 FL (ref 9.2–12.9)
POTASSIUM SERPL-SCNC: 4.1 MMOL/L (ref 3.5–5.1)
PROT SERPL-MCNC: 6.7 G/DL (ref 6–8.4)
RBC # BLD AUTO: 4.17 M/UL (ref 4–5.4)
SODIUM SERPL-SCNC: 141 MMOL/L (ref 136–145)
T4 FREE SERPL-MCNC: 1.04 NG/DL (ref 0.71–1.51)
TSH SERPL DL<=0.005 MIU/L-ACNC: 1.7 UIU/ML (ref 0.4–4)
WBC # BLD AUTO: 6.02 K/UL (ref 3.9–12.7)

## 2019-10-11 PROCEDURE — 36415 COLL VENOUS BLD VENIPUNCTURE: CPT | Mod: HCNC,PO

## 2019-10-11 PROCEDURE — 86140 C-REACTIVE PROTEIN: CPT | Mod: HCNC

## 2019-10-11 PROCEDURE — 85025 COMPLETE CBC W/AUTO DIFF WBC: CPT | Mod: HCNC

## 2019-10-11 PROCEDURE — 86225 DNA ANTIBODY NATIVE: CPT | Mod: HCNC

## 2019-10-11 PROCEDURE — 84443 ASSAY THYROID STIM HORMONE: CPT | Mod: HCNC

## 2019-10-11 PROCEDURE — 80053 COMPREHEN METABOLIC PANEL: CPT | Mod: HCNC

## 2019-10-11 PROCEDURE — 86039 ANTINUCLEAR ANTIBODIES (ANA): CPT | Mod: HCNC

## 2019-10-11 PROCEDURE — 84439 ASSAY OF FREE THYROXINE: CPT | Mod: HCNC

## 2019-10-11 PROCEDURE — 85652 RBC SED RATE AUTOMATED: CPT | Mod: HCNC

## 2019-10-11 PROCEDURE — 86235 NUCLEAR ANTIGEN ANTIBODY: CPT | Mod: 59,HCNC

## 2019-10-11 PROCEDURE — 86038 ANTINUCLEAR ANTIBODIES: CPT | Mod: HCNC

## 2019-10-14 LAB
ANA SER QL IF: POSITIVE
ANA TITR SER IF: NORMAL {TITER}
DSDNA AB SER-ACNC: NORMAL [IU]/ML

## 2019-10-15 ENCOUNTER — OFFICE VISIT (OUTPATIENT)
Dept: RHEUMATOLOGY | Facility: CLINIC | Age: 59
End: 2019-10-15
Payer: MEDICARE

## 2019-10-15 VITALS
DIASTOLIC BLOOD PRESSURE: 75 MMHG | WEIGHT: 178 LBS | HEART RATE: 76 BPM | HEIGHT: 63 IN | SYSTOLIC BLOOD PRESSURE: 127 MMHG | BODY MASS INDEX: 31.54 KG/M2

## 2019-10-15 DIAGNOSIS — M34.1 CREST (CALCINOSIS, RAYNAUD'S PHENOMENON, ESOPHAGEAL DYSFUNCTION, SCLERODACTYLY, TELANGIECTASIA): Primary | ICD-10-CM

## 2019-10-15 DIAGNOSIS — K22.4 ESOPHAGEAL DYSMOTILITY: ICD-10-CM

## 2019-10-15 DIAGNOSIS — M89.9 DISORDER OF BONE, UNSPECIFIED: ICD-10-CM

## 2019-10-15 DIAGNOSIS — I27.20 PULMONARY HYPERTENSION: ICD-10-CM

## 2019-10-15 DIAGNOSIS — J20.9 ACUTE BRONCHITIS, UNSPECIFIED ORGANISM: ICD-10-CM

## 2019-10-15 DIAGNOSIS — G89.4 CHRONIC PAIN SYNDROME: ICD-10-CM

## 2019-10-15 DIAGNOSIS — M34.1 CREST SYNDROME: ICD-10-CM

## 2019-10-15 DIAGNOSIS — E06.3 HASHIMOTO'S THYROIDITIS: ICD-10-CM

## 2019-10-15 DIAGNOSIS — R11.0 NAUSEA: ICD-10-CM

## 2019-10-15 DIAGNOSIS — M35.00 SJOGREN'S SYNDROME, WITH UNSPECIFIED ORGAN INVOLVEMENT: ICD-10-CM

## 2019-10-15 DIAGNOSIS — K58.0 IRRITABLE BOWEL SYNDROME WITH DIARRHEA: ICD-10-CM

## 2019-10-15 LAB
ANTI SM ANTIBODY: 1.74 EU (ref 0–19.99)
ANTI SM/RNP ANTIBODY: 5.17 EU (ref 0–19.99)
ANTI-SM INTERPRETATION: NEGATIVE
ANTI-SM/RNP INTERPRETATION: NEGATIVE
ANTI-SSA ANTIBODY: 0.47 EU (ref 0–19.99)
ANTI-SSA INTERPRETATION: NEGATIVE
ANTI-SSB ANTIBODY: 10.53 EU (ref 0–19.99)
ANTI-SSB INTERPRETATION: NEGATIVE
DSDNA AB SER-ACNC: NORMAL [IU]/ML

## 2019-10-15 PROCEDURE — 99499 UNLISTED E&M SERVICE: CPT | Mod: HCNC,S$GLB,, | Performed by: INTERNAL MEDICINE

## 2019-10-15 PROCEDURE — 96372 PR INJECTION,THERAP/PROPH/DIAG2ST, IM OR SUBCUT: ICD-10-PCS | Mod: HCNC,59,S$GLB, | Performed by: INTERNAL MEDICINE

## 2019-10-15 PROCEDURE — 3008F BODY MASS INDEX DOCD: CPT | Mod: HCNC,CPTII,S$GLB, | Performed by: INTERNAL MEDICINE

## 2019-10-15 PROCEDURE — 99499 RISK ADDL DX/OHS AUDIT: ICD-10-PCS | Mod: HCNC,S$GLB,, | Performed by: INTERNAL MEDICINE

## 2019-10-15 PROCEDURE — 99214 PR OFFICE/OUTPT VISIT, EST, LEVL IV, 30-39 MIN: ICD-10-PCS | Mod: 25,HCNC,S$GLB, | Performed by: INTERNAL MEDICINE

## 2019-10-15 PROCEDURE — 3074F SYST BP LT 130 MM HG: CPT | Mod: HCNC,CPTII,S$GLB, | Performed by: INTERNAL MEDICINE

## 2019-10-15 PROCEDURE — 3008F PR BODY MASS INDEX (BMI) DOCUMENTED: ICD-10-PCS | Mod: HCNC,CPTII,S$GLB, | Performed by: INTERNAL MEDICINE

## 2019-10-15 PROCEDURE — 3078F DIAST BP <80 MM HG: CPT | Mod: HCNC,CPTII,S$GLB, | Performed by: INTERNAL MEDICINE

## 2019-10-15 PROCEDURE — 96372 THER/PROPH/DIAG INJ SC/IM: CPT | Mod: HCNC,59,S$GLB, | Performed by: INTERNAL MEDICINE

## 2019-10-15 PROCEDURE — 99999 PR PBB SHADOW E&M-EST. PATIENT-LVL III: ICD-10-PCS | Mod: PBBFAC,HCNC,, | Performed by: INTERNAL MEDICINE

## 2019-10-15 PROCEDURE — 99999 PR PBB SHADOW E&M-EST. PATIENT-LVL III: CPT | Mod: PBBFAC,HCNC,, | Performed by: INTERNAL MEDICINE

## 2019-10-15 PROCEDURE — 3074F PR MOST RECENT SYSTOLIC BLOOD PRESSURE < 130 MM HG: ICD-10-PCS | Mod: HCNC,CPTII,S$GLB, | Performed by: INTERNAL MEDICINE

## 2019-10-15 PROCEDURE — 99214 OFFICE O/P EST MOD 30 MIN: CPT | Mod: 25,HCNC,S$GLB, | Performed by: INTERNAL MEDICINE

## 2019-10-15 PROCEDURE — 3078F PR MOST RECENT DIASTOLIC BLOOD PRESSURE < 80 MM HG: ICD-10-PCS | Mod: HCNC,CPTII,S$GLB, | Performed by: INTERNAL MEDICINE

## 2019-10-15 RX ORDER — METHYLPREDNISOLONE ACETATE 80 MG/ML
80 INJECTION, SUSPENSION INTRA-ARTICULAR; INTRALESIONAL; INTRAMUSCULAR; SOFT TISSUE
Status: COMPLETED | OUTPATIENT
Start: 2019-10-15 | End: 2019-10-15

## 2019-10-15 RX ORDER — FLUCONAZOLE 150 MG/1
150 TABLET ORAL DAILY
Qty: 7 TABLET | Refills: 3 | Status: SHIPPED | OUTPATIENT
Start: 2019-10-15 | End: 2019-10-22

## 2019-10-15 RX ORDER — ONDANSETRON 4 MG/1
8 TABLET, ORALLY DISINTEGRATING ORAL 3 TIMES DAILY PRN
Qty: 30 TABLET | Refills: 3 | Status: SHIPPED | OUTPATIENT
Start: 2019-10-15 | End: 2021-09-24 | Stop reason: SDUPTHER

## 2019-10-15 RX ORDER — TRAMADOL HYDROCHLORIDE 50 MG/1
50 TABLET ORAL EVERY 6 HOURS PRN
Qty: 120 TABLET | Refills: 4 | Status: SHIPPED | OUTPATIENT
Start: 2019-10-15 | End: 2020-04-08 | Stop reason: SDUPTHER

## 2019-10-15 RX ORDER — CYANOCOBALAMIN 1000 UG/ML
1000 INJECTION, SOLUTION INTRAMUSCULAR; SUBCUTANEOUS
Status: COMPLETED | OUTPATIENT
Start: 2019-10-15 | End: 2019-10-15

## 2019-10-15 RX ORDER — THYROID 90 MG/1
1 TABLET ORAL
Qty: 30 TABLET | Refills: 11 | Status: SHIPPED | OUTPATIENT
Start: 2019-10-15 | End: 2020-09-20

## 2019-10-15 RX ORDER — AZATHIOPRINE 50 MG/1
50 TABLET ORAL DAILY
Qty: 30 TABLET | Refills: 5 | Status: SHIPPED | OUTPATIENT
Start: 2019-10-15 | End: 2019-12-31 | Stop reason: SDUPTHER

## 2019-10-15 RX ADMIN — CYANOCOBALAMIN 1000 MCG: 1000 INJECTION, SOLUTION INTRAMUSCULAR; SUBCUTANEOUS at 02:10

## 2019-10-15 RX ADMIN — METHYLPREDNISOLONE ACETATE 80 MG: 80 INJECTION, SUSPENSION INTRA-ARTICULAR; INTRALESIONAL; INTRAMUSCULAR; SOFT TISSUE at 02:10

## 2019-10-15 ASSESSMENT — ROUTINE ASSESSMENT OF PATIENT INDEX DATA (RAPID3)
TOTAL RAPID3 SCORE: 3.33
FATIGUE SCORE: 2.2
PATIENT GLOBAL ASSESSMENT SCORE: 5
MDHAQ FUNCTION SCORE: .6
PAIN SCORE: 3
PSYCHOLOGICAL DISTRESS SCORE: 3.3

## 2019-10-15 NOTE — PROGRESS NOTES
Administered 1 cc ( 1000 mcg/ml ) of b12 to the right upper outer gluteal. Informed of s/s to report verbalized understanding. No adverse reactions noted.    Lot # [id number]  Expiration feb 21    Administered 1 cc ( 80 mg/ml ) of depomedrol to the right upper outer gluteal. Informed of s/s to report verbalized understanding. No adverse reactions noted.    Lot # [id number]  Expiration 12/2020

## 2019-10-15 NOTE — PROGRESS NOTES
Subjective:       Patient ID: Tina García is a 59 y.o. female.    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 She has  myalgias . Pain is located in multiple joints, both shoulder(s), both elbow(s), both wrist(s), both MCP(s): 1st, 2nd, 3rd, 4th and 5th, both PIP(s): 1st, 2nd, 3rd, 4th and 5th, both DIP(s): 1st and 2nd, both hip(s), both knee(s) and both MTP(s): 1st, 2nd, 3rd, 4th and 5th, is described as aching, pulsating, shooting and throbbing, and is constant, moderate .  Associated symptoms include: crepitation, decreased range of motion, edema, effusion, tenderness and warmth. Mild dactylitis.            She complains of joint swelling. Associated symptoms include fatigue and myalgias.         Review of Systems   Constitutional: Positive for activity change, chills and fatigue. Negative for appetite change, diaphoresis and unexpected weight change.   HENT: Positive for sinus pressure and sinus pain. Negative for congestion, dental problem, ear discharge, ear pain, facial swelling, mouth sores, nosebleeds, postnasal drip, rhinorrhea, sneezing, sore throat, tinnitus and voice change.    Eyes: Negative for photophobia, pain, discharge, redness and itching.   Respiratory: Positive for cough, choking and shortness of breath. Negative for apnea, chest tightness and wheezing.    Cardiovascular: Positive for leg swelling. Negative for chest pain and palpitations.   Gastrointestinal: Positive for abdominal pain. Negative for abdominal distention, constipation, diarrhea, nausea and vomiting.   Endocrine: Negative for cold intolerance, heat intolerance, polydipsia and polyuria.   Genitourinary: Negative for decreased urine volume, difficulty urinating, flank pain, frequency, hematuria and urgency.   Musculoskeletal: Positive for arthralgias, back pain, joint swelling, myalgias, neck pain and neck stiffness.  "  Skin: Negative for pallor, rash and wound.   Allergic/Immunologic: Negative for immunocompromised state.   Neurological: Positive for weakness. Negative for dizziness, tremors and numbness.   Hematological: Negative for adenopathy. Does not bruise/bleed easily.   Psychiatric/Behavioral: Negative for sleep disturbance. The patient is not nervous/anxious.          Objective:     /75 (BP Location: Left arm, Patient Position: Sitting, BP Method: Medium (Manual))   Pulse 76   Ht 5' 3" (1.6 m)   Wt 80.7 kg (178 lb)   BMI 31.53 kg/m²      Physical Exam   Vitals reviewed.  Constitutional: She is oriented to person, place, and time. She appears distressed.   HENT:   Head: Normocephalic and atraumatic.   Eyes: EOM are normal. Pupils are equal, round, and reactive to light. Right eye exhibits no discharge. Left eye exhibits no discharge.   Neck: Neck supple. No thyromegaly present.   Cardiovascular: Normal rate, regular rhythm and normal heart sounds.  Exam reveals no gallop and no friction rub.    No murmur heard.  Pulmonary/Chest: She has wheezes. She has no rales. She exhibits no tenderness.   Abdominal: There is no tenderness. There is no rebound and no guarding.       Right Side Rheumatological Exam     Examination finds the elbow normal.    The patient is tender to palpation of the shoulder, wrist, knee, 1st PIP, 1st MCP, 2nd PIP, 2nd MCP, 3rd PIP, 3rd MCP, 4th PIP, 4th MCP, 5th PIP and 5th MCP    She has swelling of the 1st PIP, 1st MCP, 2nd PIP, 2nd MCP, 3rd PIP, 3rd MCP, 4th PIP, 4th MCP, 5th PIP and 5th MCP    Shoulder Exam   Tenderness Location: no tenderness    Range of Motion   Active abduction: abnormal   Adduction: abnormal  Sensation: normal    Knee Exam   Patellofemoral Crepitus: positive  Effusion: positive  Sensation: normal    Hip Exam   Tenderness Location: posterior  Sensation: normal    Elbow/Wrist Exam   Tenderness Location: no tenderness  Sensation: normal    Left Side Rheumatological Exam "     The patient is tender to palpation of the shoulder, elbow, wrist, knee, 1st PIP, 1st MCP, 2nd PIP, 2nd MCP, 3rd PIP, 3rd MCP, 4th PIP, 4th MCP, 5th PIP and 5th MCP.    She has swelling of the 1st PIP, 1st MCP, 2nd PIP, 2nd MCP, 3rd PIP, 3rd MCP, 4th PIP, 4th MCP, 5th PIP and 5th MCP    Shoulder Exam   Tenderness Location: no tenderness    Range of Motion   Active abduction: abnormal   Sensation: normal    Knee Exam     Patellofemoral Crepitus: positive  Effusion: positive  Sensation: normal    Hip Exam   Tenderness Location: posterior  Sensation: normal    Elbow/Wrist Exam   Sensation: normal      Back/Neck Exam   General Inspection   Gait: normal         Lymphadenopathy:     She has no cervical adenopathy.   Neurological: She is alert and oriented to person, place, and time. Gait normal.   Skin: Skin is dry. No rash noted. No erythema. There is pallor.     Psychiatric: Mood and affect normal.   Musculoskeletal: She exhibits edema, tenderness and deformity.   Dactylitis, raynauds          JAMILA HEP-2 TiterPositive >=1:2560 [facility]       Results for orders placed or performed in visit on 10/11/19   JAMILA Screen w/Reflex   Result Value Ref Range    JAMILA Screen Positive (A) Negative <1:160   Anti-DNA antibody, double-stranded   Result Value Ref Range    ds DNA Ab Negative 1:10 Negative 1:10   Sedimentation rate   Result Value Ref Range    Sed Rate 13 0 - 36 mm/Hr   CBC auto differential   Result Value Ref Range    WBC 6.02 3.90 - 12.70 K/uL    RBC 4.17 4.00 - 5.40 M/uL    Hemoglobin 12.1 12.0 - 16.0 g/dL    Hematocrit 38.6 37.0 - 48.5 %    Mean Corpuscular Volume 93 82 - 98 fL    Mean Corpuscular Hemoglobin 29.0 27.0 - 31.0 pg    Mean Corpuscular Hemoglobin Conc 31.3 (L) 32.0 - 36.0 g/dL    RDW 13.2 11.5 - 14.5 %    Platelets 268 150 - 350 K/uL    MPV 10.6 9.2 - 12.9 fL    Immature Granulocytes 0.3 0.0 - 0.5 %    Gran # (ANC) 2.7 1.8 - 7.7 K/uL    Immature Grans (Abs) 0.02 0.00 - 0.04 K/uL    Lymph # 2.5 1.0 - 4.8  K/uL    Mono # 0.7 0.3 - 1.0 K/uL    Eos # 0.1 0.0 - 0.5 K/uL    Baso # 0.07 0.00 - 0.20 K/uL    nRBC 0 0 /100 WBC    Gran% 44.2 38.0 - 73.0 %    Lymph% 40.7 18.0 - 48.0 %    Mono% 11.3 4.0 - 15.0 %    Eosinophil% 2.3 0.0 - 8.0 %    Basophil% 1.2 0.0 - 1.9 %    Differential Method Automated    Comprehensive metabolic panel   Result Value Ref Range    Sodium 141 136 - 145 mmol/L    Potassium 4.1 3.5 - 5.1 mmol/L    Chloride 105 95 - 110 mmol/L    CO2 30 (H) 23 - 29 mmol/L    Glucose 97 70 - 110 mg/dL    BUN, Bld 20 6 - 20 mg/dL    Creatinine 0.8 0.5 - 1.4 mg/dL    Calcium 9.0 8.7 - 10.5 mg/dL    Total Protein 6.7 6.0 - 8.4 g/dL    Albumin 3.6 3.5 - 5.2 g/dL    Total Bilirubin 0.2 0.1 - 1.0 mg/dL    Alkaline Phosphatase 55 55 - 135 U/L    AST 23 10 - 40 U/L    ALT 28 10 - 44 U/L    Anion Gap 6 (L) 8 - 16 mmol/L    eGFR if African American >60.0 >60 mL/min/1.73 m^2    eGFR if non African American >60.0 >60 mL/min/1.73 m^2   C-reactive protein   Result Value Ref Range    CRP 2.0 0.0 - 8.2 mg/L   TSH   Result Value Ref Range    TSH 1.703 0.400 - 4.000 uIU/mL   T4, free   Result Value Ref Range    Free T4 1.04 0.71 - 1.51 ng/dL   JAMILA Titer   Result Value Ref Range    JAMILA HEP-2 Titer Positive 1:1280 [facility] Homogeneous     JAMILA Profile   Result Value Ref Range    Anti Sm Antibody 1.74 0.00 - 19.99 EU    Anti-Sm Interpretation Negative Negative    Anti-SSA Antibody 0.47 0.00 - 19.99 EU    Anti-SSA Interpretation Negative Negative    Anti-SSB Antibody 10.53 0.00 - 19.99 EU    Anti-SSB Interpretation Negative Negative    ds DNA Ab Negative 1:10 Negative 1:10    Anti Sm/RNP Antibody 5.17 0.00 - 19.99 EU    Anti-Sm/RNP Interpretation Negative Negative       Assessment:       1. CREST (calcinosis, Raynaud's phenomenon, esophageal dysfunction, sclerodactyly, telangiectasia)    2. Sjogren's syndrome, with unspecified organ involvement    3. CREST syndrome    4. Pulmonary hypertension    5. Acute bronchitis, unspecified organism     6. Esophageal dysmotility    7. Irritable bowel syndrome with diarrhea    8. Chronic pain syndrome    9. Nausea    10. Disorder of bone, unspecified     11. Hashimoto's thyroiditis            Plan:       CREST (calcinosis, Raynaud's phenomenon, esophageal dysfunction, sclerodactyly, telangiectasia)  -     Echo Color Flow Doppler? Yes; Future  -     azaTHIOprine (IMURAN) 50 mg Tab; Take 1 tablet (50 mg total) by mouth once daily.  Dispense: 30 tablet; Refill: 5  -     Cancel: CBC auto differential; Future; Expected date: 10/15/2019  -     Cancel: Comprehensive metabolic panel; Future; Expected date: 10/15/2019  -     Cancel: C-reactive protein; Future; Expected date: 10/15/2019  -     Cancel: Sedimentation rate; Future; Expected date: 10/15/2019  -     JAMILA Screen w/Reflex; Future; Expected date: 01/15/2020  -     Anti Sm/RNP Antibody; Future; Expected date: 01/15/2020  -     Sedimentation rate; Future; Expected date: 01/15/2020  -     Comprehensive metabolic panel; Future; Expected date: 01/15/2020  -     CBC auto differential; Future; Expected date: 01/15/2020  -     C-reactive protein; Future; Expected date: 01/15/2020  -     TSH; Future; Expected date: 01/15/2020  -     T3, free; Future; Expected date: 01/15/2020  -     Vitamin D; Future; Expected date: 01/15/2020  -     methylPREDNISolone acetate injection 80 mg  -     cyanocobalamin injection 1,000 mcg  -     Cancel: TSH; Future; Expected date: 10/15/2019  -     Cancel: T3, free; Future; Expected date: 10/15/2019  -     T4, free; Future; Expected date: 10/15/2019  -     TSH; Future; Expected date: 10/15/2019  -     T3, free; Future; Expected date: 10/15/2019    Sjogren's syndrome, with unspecified organ involvement  -     Echo Color Flow Doppler? Yes; Future  -     azaTHIOprine (IMURAN) 50 mg Tab; Take 1 tablet (50 mg total) by mouth once daily.  Dispense: 30 tablet; Refill: 5  -     Cancel: CBC auto differential; Future; Expected date: 10/15/2019  -      Cancel: Comprehensive metabolic panel; Future; Expected date: 10/15/2019  -     Cancel: C-reactive protein; Future; Expected date: 10/15/2019  -     Cancel: Sedimentation rate; Future; Expected date: 10/15/2019  -     JAMILA Screen w/Reflex; Future; Expected date: 01/15/2020  -     Anti Sm/RNP Antibody; Future; Expected date: 01/15/2020  -     Sedimentation rate; Future; Expected date: 01/15/2020  -     Comprehensive metabolic panel; Future; Expected date: 01/15/2020  -     CBC auto differential; Future; Expected date: 01/15/2020  -     C-reactive protein; Future; Expected date: 01/15/2020  -     TSH; Future; Expected date: 01/15/2020  -     T3, free; Future; Expected date: 01/15/2020  -     Vitamin D; Future; Expected date: 01/15/2020  -     methylPREDNISolone acetate injection 80 mg  -     cyanocobalamin injection 1,000 mcg    CREST syndrome    Pulmonary hypertension  -     Echo Color Flow Doppler? Yes; Future  -     azaTHIOprine (IMURAN) 50 mg Tab; Take 1 tablet (50 mg total) by mouth once daily.  Dispense: 30 tablet; Refill: 5  -     ondansetron (ZOFRAN-ODT) 4 MG TbDL; Take 2 tablets (8 mg total) by mouth 3 (three) times daily as needed.  Dispense: 30 tablet; Refill: 3  -     fluconazole (DIFLUCAN) 150 MG Tab; Take 1 tablet (150 mg total) by mouth once daily. for 7 days  Dispense: 7 tablet; Refill: 3  -     Cancel: CBC auto differential; Future; Expected date: 10/15/2019  -     Cancel: Comprehensive metabolic panel; Future; Expected date: 10/15/2019  -     Cancel: C-reactive protein; Future; Expected date: 10/15/2019  -     Cancel: Sedimentation rate; Future; Expected date: 10/15/2019  -     JAMILA Screen w/Reflex; Future; Expected date: 01/15/2020  -     Anti Sm/RNP Antibody; Future; Expected date: 01/15/2020  -     Sedimentation rate; Future; Expected date: 01/15/2020  -     Comprehensive metabolic panel; Future; Expected date: 01/15/2020  -     CBC auto differential; Future; Expected date: 01/15/2020  -      C-reactive protein; Future; Expected date: 01/15/2020  -     TSH; Future; Expected date: 01/15/2020  -     T3, free; Future; Expected date: 01/15/2020  -     Vitamin D; Future; Expected date: 01/15/2020  -     methylPREDNISolone acetate injection 80 mg  -     cyanocobalamin injection 1,000 mcg    Acute bronchitis, unspecified organism  -     ondansetron (ZOFRAN-ODT) 4 MG TbDL; Take 2 tablets (8 mg total) by mouth 3 (three) times daily as needed.  Dispense: 30 tablet; Refill: 3  -     fluconazole (DIFLUCAN) 150 MG Tab; Take 1 tablet (150 mg total) by mouth once daily. for 7 days  Dispense: 7 tablet; Refill: 3  -     Cancel: CBC auto differential; Future; Expected date: 10/15/2019  -     Cancel: Comprehensive metabolic panel; Future; Expected date: 10/15/2019  -     Cancel: C-reactive protein; Future; Expected date: 10/15/2019  -     Cancel: Sedimentation rate; Future; Expected date: 10/15/2019  -     JAMILA Screen w/Reflex; Future; Expected date: 01/15/2020  -     Anti Sm/RNP Antibody; Future; Expected date: 01/15/2020  -     Sedimentation rate; Future; Expected date: 01/15/2020  -     Comprehensive metabolic panel; Future; Expected date: 01/15/2020  -     CBC auto differential; Future; Expected date: 01/15/2020  -     C-reactive protein; Future; Expected date: 01/15/2020  -     TSH; Future; Expected date: 01/15/2020  -     T3, free; Future; Expected date: 01/15/2020  -     Vitamin D; Future; Expected date: 01/15/2020  -     methylPREDNISolone acetate injection 80 mg  -     cyanocobalamin injection 1,000 mcg    Esophageal dysmotility  -     azaTHIOprine (IMURAN) 50 mg Tab; Take 1 tablet (50 mg total) by mouth once daily.  Dispense: 30 tablet; Refill: 5  -     ondansetron (ZOFRAN-ODT) 4 MG TbDL; Take 2 tablets (8 mg total) by mouth 3 (three) times daily as needed.  Dispense: 30 tablet; Refill: 3  -     fluconazole (DIFLUCAN) 150 MG Tab; Take 1 tablet (150 mg total) by mouth once daily. for 7 days  Dispense: 7 tablet;  Refill: 3  -     Cancel: CBC auto differential; Future; Expected date: 10/15/2019  -     Cancel: Comprehensive metabolic panel; Future; Expected date: 10/15/2019  -     Cancel: C-reactive protein; Future; Expected date: 10/15/2019  -     Cancel: Sedimentation rate; Future; Expected date: 10/15/2019  -     JAMILA Screen w/Reflex; Future; Expected date: 01/15/2020  -     Anti Sm/RNP Antibody; Future; Expected date: 01/15/2020  -     Sedimentation rate; Future; Expected date: 01/15/2020  -     Comprehensive metabolic panel; Future; Expected date: 01/15/2020  -     CBC auto differential; Future; Expected date: 01/15/2020  -     C-reactive protein; Future; Expected date: 01/15/2020  -     TSH; Future; Expected date: 01/15/2020  -     T3, free; Future; Expected date: 01/15/2020  -     Vitamin D; Future; Expected date: 01/15/2020    Irritable bowel syndrome with diarrhea  -     azaTHIOprine (IMURAN) 50 mg Tab; Take 1 tablet (50 mg total) by mouth once daily.  Dispense: 30 tablet; Refill: 5  -     ondansetron (ZOFRAN-ODT) 4 MG TbDL; Take 2 tablets (8 mg total) by mouth 3 (three) times daily as needed.  Dispense: 30 tablet; Refill: 3  -     fluconazole (DIFLUCAN) 150 MG Tab; Take 1 tablet (150 mg total) by mouth once daily. for 7 days  Dispense: 7 tablet; Refill: 3  -     Cancel: CBC auto differential; Future; Expected date: 10/15/2019  -     Cancel: Comprehensive metabolic panel; Future; Expected date: 10/15/2019  -     Cancel: C-reactive protein; Future; Expected date: 10/15/2019  -     Cancel: Sedimentation rate; Future; Expected date: 10/15/2019    Chronic pain syndrome  -     traMADol (ULTRAM) 50 mg tablet; Take 1 tablet (50 mg total) by mouth every 6 (six) hours as needed.  Dispense: 120 tablet; Refill: 4  -     Cancel: CBC auto differential; Future; Expected date: 10/15/2019  -     Cancel: Comprehensive metabolic panel; Future; Expected date: 10/15/2019  -     Cancel: C-reactive protein; Future; Expected date:  10/15/2019  -     Cancel: Sedimentation rate; Future; Expected date: 10/15/2019  -     JAMILA Screen w/Reflex; Future; Expected date: 01/15/2020  -     Anti Sm/RNP Antibody; Future; Expected date: 01/15/2020  -     Sedimentation rate; Future; Expected date: 01/15/2020  -     Comprehensive metabolic panel; Future; Expected date: 01/15/2020  -     CBC auto differential; Future; Expected date: 01/15/2020  -     C-reactive protein; Future; Expected date: 01/15/2020  -     TSH; Future; Expected date: 01/15/2020  -     T3, free; Future; Expected date: 01/15/2020  -     Vitamin D; Future; Expected date: 01/15/2020    Nausea  -     ondansetron (ZOFRAN-ODT) 4 MG TbDL; Take 2 tablets (8 mg total) by mouth 3 (three) times daily as needed.  Dispense: 30 tablet; Refill: 3  -     fluconazole (DIFLUCAN) 150 MG Tab; Take 1 tablet (150 mg total) by mouth once daily. for 7 days  Dispense: 7 tablet; Refill: 3  -     Cancel: CBC auto differential; Future; Expected date: 10/15/2019  -     Cancel: Comprehensive metabolic panel; Future; Expected date: 10/15/2019  -     Cancel: C-reactive protein; Future; Expected date: 10/15/2019  -     Cancel: Sedimentation rate; Future; Expected date: 10/15/2019  -     JAMILA Screen w/Reflex; Future; Expected date: 01/15/2020  -     Anti Sm/RNP Antibody; Future; Expected date: 01/15/2020  -     Sedimentation rate; Future; Expected date: 01/15/2020  -     Comprehensive metabolic panel; Future; Expected date: 01/15/2020  -     CBC auto differential; Future; Expected date: 01/15/2020  -     C-reactive protein; Future; Expected date: 01/15/2020  -     TSH; Future; Expected date: 01/15/2020  -     T3, free; Future; Expected date: 01/15/2020  -     Vitamin D; Future; Expected date: 01/15/2020    Disorder of bone, unspecified   -     Vitamin D; Future; Expected date: 01/15/2020    Hashimoto's thyroiditis  -     thyroid, pork, (NP THYROID) 90 mg Tab; Take 1 tablet (90 mg total) by mouth before breakfast.  Dispense:  30 tablet; Refill: 11  -     Cancel: TSH; Future; Expected date: 10/15/2019  -     Cancel: T3, free; Future; Expected date: 10/15/2019  -     T4, free; Future; Expected date: 10/15/2019  -     TSH; Future; Expected date: 10/15/2019  -     T3, free; Future; Expected date: 10/15/2019

## 2019-10-28 DIAGNOSIS — N39.0 URINARY TRACT INFECTION WITHOUT HEMATURIA, SITE UNSPECIFIED: ICD-10-CM

## 2019-10-28 DIAGNOSIS — K58.0 IRRITABLE BOWEL SYNDROME WITH DIARRHEA: ICD-10-CM

## 2019-10-28 DIAGNOSIS — K21.9 GASTROESOPHAGEAL REFLUX DISEASE, ESOPHAGITIS PRESENCE NOT SPECIFIED: ICD-10-CM

## 2019-10-28 DIAGNOSIS — I27.20 PULMONARY HYPERTENSION: Primary | ICD-10-CM

## 2019-10-28 DIAGNOSIS — M34.1 CREST (CALCINOSIS, RAYNAUD'S PHENOMENON, ESOPHAGEAL DYSFUNCTION, SCLERODACTYLY, TELANGIECTASIA): ICD-10-CM

## 2019-10-28 DIAGNOSIS — K22.4 ESOPHAGEAL DYSMOTILITY: ICD-10-CM

## 2019-10-28 RX ORDER — LOSARTAN POTASSIUM AND HYDROCHLOROTHIAZIDE 25; 100 MG/1; MG/1
1 TABLET ORAL DAILY
Qty: 90 TABLET | Refills: 0 | Status: SHIPPED | OUTPATIENT
Start: 2019-10-28 | End: 2019-12-31 | Stop reason: SDUPTHER

## 2019-10-28 NOTE — TELEPHONE ENCOUNTER
Will send refill.  However, patient due for yearly wellness. Please schedule w Dr. Whitten or his PA for check up.  Thank you!

## 2019-10-29 RX ORDER — SILDENAFIL CITRATE 20 MG/1
20 TABLET ORAL 3 TIMES DAILY
Qty: 90 TABLET | Refills: 5 | Status: SHIPPED | OUTPATIENT
Start: 2019-10-29 | End: 2023-06-28

## 2019-11-17 ENCOUNTER — PATIENT MESSAGE (OUTPATIENT)
Dept: RHEUMATOLOGY | Facility: CLINIC | Age: 59
End: 2019-11-17

## 2019-11-19 RX ORDER — CYCLOBENZAPRINE HCL 10 MG
10 TABLET ORAL 3 TIMES DAILY PRN
Qty: 90 TABLET | Refills: 3 | Status: SHIPPED | OUTPATIENT
Start: 2019-11-19 | End: 2019-12-19

## 2019-12-04 ENCOUNTER — PATIENT MESSAGE (OUTPATIENT)
Dept: RHEUMATOLOGY | Facility: CLINIC | Age: 59
End: 2019-12-04

## 2019-12-18 ENCOUNTER — PATIENT MESSAGE (OUTPATIENT)
Dept: RHEUMATOLOGY | Facility: CLINIC | Age: 59
End: 2019-12-18

## 2019-12-31 ENCOUNTER — TELEPHONE (OUTPATIENT)
Dept: RHEUMATOLOGY | Facility: CLINIC | Age: 59
End: 2019-12-31

## 2019-12-31 DIAGNOSIS — M34.1 CREST (CALCINOSIS, RAYNAUD'S PHENOMENON, ESOPHAGEAL DYSFUNCTION, SCLERODACTYLY, TELANGIECTASIA): ICD-10-CM

## 2019-12-31 DIAGNOSIS — K58.0 IRRITABLE BOWEL SYNDROME WITH DIARRHEA: ICD-10-CM

## 2019-12-31 DIAGNOSIS — K22.4 ESOPHAGEAL DYSMOTILITY: ICD-10-CM

## 2019-12-31 DIAGNOSIS — I27.20 PULMONARY HYPERTENSION: ICD-10-CM

## 2019-12-31 DIAGNOSIS — M35.00 SJOGREN'S SYNDROME, WITH UNSPECIFIED ORGAN INVOLVEMENT: ICD-10-CM

## 2019-12-31 DIAGNOSIS — I10 ESSENTIAL HYPERTENSION: Primary | ICD-10-CM

## 2019-12-31 RX ORDER — LOSARTAN POTASSIUM AND HYDROCHLOROTHIAZIDE 25; 100 MG/1; MG/1
1 TABLET ORAL DAILY
Qty: 90 TABLET | Refills: 0 | Status: SHIPPED | OUTPATIENT
Start: 2019-12-31 | End: 2020-06-03 | Stop reason: SDUPTHER

## 2019-12-31 RX ORDER — AZATHIOPRINE 50 MG/1
50 TABLET ORAL DAILY
Qty: 30 TABLET | Refills: 5 | Status: SHIPPED | OUTPATIENT
Start: 2019-12-31 | End: 2021-01-25 | Stop reason: SDUPTHER

## 2019-12-31 NOTE — TELEPHONE ENCOUNTER
Refill Authorization Note     is requesting a refill authorization.    Brief assessment and rationale for refill: APPROVE: needs appt      Medication-related problems identified: Requires appointment    Medication Therapy Plan: needs appt(annual)                              Comments:   Requested Prescriptions   Pending Prescriptions Disp Refills    losartan-hydrochlorothiazide 100-25 mg (HYZAAR) 100-25 mg per tablet 90 tablet 0     Sig: Take 1 tablet by mouth once daily.       Cardiovascular: ARB + Diuretic Combos Passed - 12/31/2019  6:37 AM        Passed - Patient is at least 18 years old        Passed - Last BP in normal range within 360 days     BP Readings from Last 3 Encounters:   10/15/19 127/75   07/22/19 126/72   05/14/19 122/81              Passed - Office visit in past 12 months or future 90 days     Recent Outpatient Visits            2 months ago CREST (calcinosis, Raynaud's phenomenon, esophageal dysfunction, sclerodactyly, telangiectasia)    [facility] Joao Fry MD    5 months ago Cough    [facility] Ernestina Sewell PA-C    7 months ago Acute bronchitis, unspecified organism    [facility] Joao Fry MD    7 months ago Fever, unspecified fever cause    [facility] [place] Donavan Stevens MD    1 year ago Encounter for preventive health examination    [facility] Rand Jones NP          Future Appointments              In 1 week [facility] [place]    In 2 weeks [facility], [place] [facility] [place]    In 3 months [facility], [place] [facility] [place]    In 3 months Joao Fry MD [facility], [place]                Passed - K in normal range and within 180 days     Potassium   Date Value Ref Range Status   10/11/2019 4.1 3.5 - 5.1 mmol/L Final   01/04/2019 3.5 3.5 - 5.1 mmol/L Final   08/02/2018  3.4 (L) 3.5 - 5.1 mmol/L Final              Passed - Na in normal range and within 180 days     Sodium   Date Value Ref Range Status   10/11/2019 141 136 - 145 mmol/L Final   01/04/2019 135 (L) 136 - 145 mmol/L Final   08/02/2018 138 136 - 145 mmol/L Final              Passed - Cr is 1.3 or below and within 180 days     Creatinine   Date Value Ref Range Status   10/11/2019 0.8 0.5 - 1.4 mg/dL Final   01/04/2019 0.8 0.5 - 1.4 mg/dL Final   08/02/2018 0.8 0.5 - 1.4 mg/dL Final              Passed - Ca in normal range and within 360 days     Calcium   Date Value Ref Range Status   10/11/2019 9.0 8.7 - 10.5 mg/dL Final   01/04/2019 9.5 8.7 - 10.5 mg/dL Final   08/02/2018 9.8 8.7 - 10.5 mg/dL Final              Passed - eGFR within 180 days     eGFR if non    Date Value Ref Range Status   10/11/2019 >60.0 >60 mL/min/1.73 m^2 Final     Comment:     Calculation used to obtain the estimated glomerular filtration  rate (eGFR) is the CKD-EPI equation.      01/04/2019 >60.0 >60 mL/min/1.73 m^2 Final     Comment:     Calculation used to obtain the estimated glomerular filtration  rate (eGFR) is the CKD-EPI equation.      08/02/2018 >60.0 >60 mL/min/1.73 m^2 Final     Comment:     Calculation used to obtain the estimated glomerular filtration  rate (eGFR) is the CKD-EPI equation.        eGFR if    Date Value Ref Range Status   10/11/2019 >60.0 >60 mL/min/1.73 m^2 Final   01/04/2019 >60.0 >60 mL/min/1.73 m^2 Final   08/02/2018 >60.0 >60 mL/min/1.73 m^2 Final

## 2020-01-24 ENCOUNTER — OFFICE VISIT (OUTPATIENT)
Dept: URGENT CARE | Facility: CLINIC | Age: 60
End: 2020-01-24
Payer: MEDICARE

## 2020-01-24 VITALS
HEIGHT: 64 IN | HEART RATE: 106 BPM | OXYGEN SATURATION: 98 % | WEIGHT: 169 LBS | SYSTOLIC BLOOD PRESSURE: 138 MMHG | TEMPERATURE: 97 F | DIASTOLIC BLOOD PRESSURE: 83 MMHG | BODY MASS INDEX: 28.85 KG/M2

## 2020-01-24 DIAGNOSIS — S02.2XXA CLOSED FRACTURE OF NASAL BONE, INITIAL ENCOUNTER: Primary | ICD-10-CM

## 2020-01-24 PROCEDURE — 99214 PR OFFICE/OUTPT VISIT, EST, LEVL IV, 30-39 MIN: ICD-10-PCS | Mod: S$GLB,,, | Performed by: NURSE PRACTITIONER

## 2020-01-24 PROCEDURE — 70160 XR NASAL BONES: ICD-10-PCS | Mod: FY,S$GLB,, | Performed by: RADIOLOGY

## 2020-01-24 PROCEDURE — 70160 X-RAY EXAM OF NASAL BONES: CPT | Mod: FY,S$GLB,, | Performed by: RADIOLOGY

## 2020-01-24 PROCEDURE — 99214 OFFICE O/P EST MOD 30 MIN: CPT | Mod: S$GLB,,, | Performed by: NURSE PRACTITIONER

## 2020-01-24 NOTE — PATIENT INSTRUCTIONS
Nose Fracture, with X-Ray  A broken bone, or fracture, of the nose may be a minor crack. Or it may be a major break, with the parts of your nose pushed out of place. A fractured nose causes pain, swelling, and nasal stuffiness. You may have bleeding from your nose. By tomorrow, you may have bruising around your eyes.  A minor fracture will heal in 3 to 4 weeks, with no more treatment needed. A major break that changes the shape of your nose must be treated by a nose specialist, called an ENT (ear, nose, and throat) doctor.The ENT doctor will straighten the bones in your nose. This is called a reduction. Some fractures may need a reduction as soon as possible, such as when bleeding from the nose won't stop. Otherwise, it is best to wait a few days until the swelling has gone down. The doctor will then be able to easily see when your nose is back in the right position.    Home care  · Use an ice pack on your nose for no more than 15 to 20 minutes at a time. Do this every 1 to 2 hours for the first 24 to 48 hours. Then use the ice as needed to ease pain and swelling. To make an ice pack, put ice cubes in a plastic bag that seals at the top. Wrap the bag in a clean, thin towel or cloth. Never put ice or an ice pack directly on the skin.  · Tell your provider if you are taking aspirin or blood-thinning medicine. These medicines make it more likely that your nose will bleed. Your provider may need to change your dose.  · You may use over-the-counter pain medicine to control pain, unless another medicine was prescribed. If you have chronic liver or kidney disease, talk with your provider before using this medicine.  · Dont drink alcohol or hot liquids for the next 2 days. Alcohol and hot liquids can dilate blood vessels in your nose. This can cause bleeding.  · Dont blow your nose for the first 2 days. Then, do so gently so you don't cause bleeding.  · Dont play contact sports in the next 6 weeks unless you can  protect your nose from getting injured again. You can wear a special custom-fitted plastic face mask to protect your nose.  Special note on concussions  If you had any symptoms of a concussion today, dont return to sports or any activity that could result in another head injury.  These are symptoms of a concussion:  · Nausea  · Vomiting  · Dizziness  · Confusion  · Headache  · Memory loss  · Loss of consciousness  Wait until all of your symptoms are gone and your provider says its OK to resume your activity. Having a second head injury before you fully recover from the first one can lead to serious brain injury.  Follow-up care  Follow up with your healthcare provider, or as advised. If your nose looks crooked after the swelling goes down, call the ENT doctor for an appointment within the next 10 days. Also make an appointment if its still hard to breathe through 1 or both sides of your nose. If you have trouble getting an ENT appointment, call your regular provider.  If the bones are out of place, a reduction should be done 6 to 10 days after the injury. In children, the reduction should be done 3 to 7 days after the injury. After that time, the bones are more difficult to move back into place.  If you had X-rays taken, you will be told of any new findings that may affect your care.  When to seek medical advice  Call your healthcare provider right away if any of these occur:  · Bleeding from your nose even after you have pinched your nostrils together for 15 minutes without stopping  · Swelling, pain, or redness on your face that gets worse  · Fever of 100.4°F (38°C) or above lasting for 24 to 48 hours  · Can't breathe from both sides of your nose after swelling goes down  · Sinus pain  Call 911  Call 911 if you have:  · Repeated vomiting  · Severe headache or dizziness  · Headache or dizziness that gets worse  · Abnormal drowsiness, or unable to wake up as usual  · Confusion or change in behavior or  speech  · Convulsion, or seizure  Date Last Reviewed: 12/3/2015  © 4934-6282 [employer]. [address] [place] [address], [place] [address]. All rights reserved. This information is not intended as a substitute for professional medical care. Always follow your healthcare professional's instructions.

## 2020-01-24 NOTE — PROGRESS NOTES
"Subjective:       Patient ID: Tina García is a 59 y.o. female.    Vitals:  height is 5' 4" (1.626 m) and weight is 76.7 kg (169 lb). Her oral temperature is 97.2 °F (36.2 °C). Her blood pressure is 138/83 and her pulse is 106. Her oxygen saturation is 98%.     Chief Complaint: Facial Injury    Patient to Urgent Care with episode of falling out of bed injury to the nose    Facial Injury    Incident onset: yesterday morning 7:30am. The injury mechanism was a fall. There was no loss of consciousness. The volume of blood lost was minimal. The quality of the pain is described as dull. The pain is at a severity of 4/10. The pain is mild. The pain has been fluctuating since the injury. Associated symptoms include headaches. Pertinent negatives include no blurred vision, disorientation, memory loss, numbness, tinnitus, vomiting or weakness. She has tried ice (tramadol) for the symptoms. The treatment provided mild relief.       Constitution: Negative for fatigue.   HENT: Negative for tinnitus, facial swelling and facial trauma.    Neck: Negative for neck stiffness.   Cardiovascular: Negative for chest trauma.   Eyes: Negative for eye trauma, double vision and blurred vision.   Gastrointestinal: Negative for abdominal trauma, abdominal pain, vomiting and rectal bleeding.   Genitourinary: Negative for hematuria, missed menses, genital trauma and pelvic pain.   Musculoskeletal: Negative for pain, trauma, joint swelling and abnormal ROM of joint.   Skin: Negative for color change, wound, abrasion, laceration and bruising.   Neurological: Positive for headaches. Negative for dizziness, history of vertigo, light-headedness, coordination disturbances, disorientation, altered mental status, loss of consciousness and numbness.   Hematologic/Lymphatic: Negative for history of bleeding disorder.   Psychiatric/Behavioral: Negative for altered mental status and disorientation.       Objective:      Physical Exam "   Constitutional: She is oriented to person, place, and time. She appears well-developed and well-nourished. No distress.   HENT:   Head: Normocephalic.   Right Ear: External ear normal.   Left Ear: External ear normal.   Mouth/Throat: Oropharynx is clear and moist.   Bridge of nose trauma with ecchmotic tissue of lower eyelids and minor abrasion to nose;   Cardiovascular: Normal rate, regular rhythm, normal heart sounds and intact distal pulses.   Pulmonary/Chest: Effort normal.   Neurological: She is alert and oriented to person, place, and time.   Skin: Skin is warm, dry and not diaphoretic. Capillary refill takes less than 2 seconds.   Psychiatric: She has a normal mood and affect. Her behavior is normal. Judgment and thought content normal.         Assessment:       1. Closed fracture of nasal bone, initial encounter        Plan:         Closed fracture of nasal bone, initial encounter  -     X-Ray Nasal Bones; Future; Expected date: 01/24/2020    X-ray Nasal Bones    Result Date: 1/24/2020  EXAMINATION: XR NASAL BONES CLINICAL HISTORY: Fracture of nasal bones, initial encounter for closed fracture TECHNIQUE: Three views nasal bone COMPARISON: None FINDINGS: There is a fracture identified involving the distal aspect of the nasal bone without significant dislocation.  There is a rounded soft tissue density noted in the right maxillary sinus consistent with a retention cyst or mucosal polyp.  The other paranasal sinuses are clear and well aerated.  The frontal sinus is hypoplastic..  No bone destruction.     See above Electronically signed by: Yeyo Levin MD Date:    01/24/2020 Time:    15:01    Mammo Digital Screening Bilat W/ Pacheco    Result Date: 1/9/2020  Result: Mammo Digital Screening Bilat w/ Pacheco History: Patient is 59 y.o. and is seen for a screening mammogram. Films Compared: Prior images (if available) were compared. Findings: Computer-aided detection was utilized in the interpretation of this  examination. This procedure was performed using tomosynthesis.   The breasts have scattered areas of fibroglandular density. There is no evidence of suspicious masses, microcalcifications or architectural distortion.     No mammographic evidence of malignancy. BI-RADS Category 1: Negative Recommendation: Routine screening mammogram in 1 year is recommended.  Your estimated lifetime risk of breast cancer (to age 85) based on Tyrer-Cuzick risk assessment model is Tyrer-Cuzick: 8.8 %. According to the [employer] Society, patients with a lifetime breast cancer risk of 20% or higher might benefit from supplemental screening tests. ??          Patient Instructions     Nose Fracture, with X-Ray  A broken bone, or fracture, of the nose may be a minor crack. Or it may be a major break, with the parts of your nose pushed out of place. A fractured nose causes pain, swelling, and nasal stuffiness. You may have bleeding from your nose. By tomorrow, you may have bruising around your eyes.  A minor fracture will heal in 3 to 4 weeks, with no more treatment needed. A major break that changes the shape of your nose must be treated by a nose specialist, called an ENT (ear, nose, and throat) doctor.The ENT doctor will straighten the bones in your nose. This is called a reduction. Some fractures may need a reduction as soon as possible, such as when bleeding from the nose won't stop. Otherwise, it is best to wait a few days until the swelling has gone down. The doctor will then be able to easily see when your nose is back in the right position.    Home care  · Use an ice pack on your nose for no more than 15 to 20 minutes at a time. Do this every 1 to 2 hours for the first 24 to 48 hours. Then use the ice as needed to ease pain and swelling. To make an ice pack, put ice cubes in a plastic bag that seals at the top. Wrap the bag in a clean, thin towel or cloth. Never put ice or an ice pack directly on the skin.  · Tell your  provider if you are taking aspirin or blood-thinning medicine. These medicines make it more likely that your nose will bleed. Your provider may need to change your dose.  · You may use over-the-counter pain medicine to control pain, unless another medicine was prescribed. If you have chronic liver or kidney disease, talk with your provider before using this medicine.  · Dont drink alcohol or hot liquids for the next 2 days. Alcohol and hot liquids can dilate blood vessels in your nose. This can cause bleeding.  · Dont blow your nose for the first 2 days. Then, do so gently so you don't cause bleeding.  · Dont play contact sports in the next 6 weeks unless you can protect your nose from getting injured again. You can wear a special custom-fitted plastic face mask to protect your nose.  Special note on concussions  If you had any symptoms of a concussion today, dont return to sports or any activity that could result in another head injury.  These are symptoms of a concussion:  · Nausea  · Vomiting  · Dizziness  · Confusion  · Headache  · Memory loss  · Loss of consciousness  Wait until all of your symptoms are gone and your provider says its OK to resume your activity. Having a second head injury before you fully recover from the first one can lead to serious brain injury.  Follow-up care  Follow up with your healthcare provider, or as advised. If your nose looks crooked after the swelling goes down, call the ENT doctor for an appointment within the next 10 days. Also make an appointment if its still hard to breathe through 1 or both sides of your nose. If you have trouble getting an ENT appointment, call your regular provider.  If the bones are out of place, a reduction should be done 6 to 10 days after the injury. In children, the reduction should be done 3 to 7 days after the injury. After that time, the bones are more difficult to move back into place.  If you had X-rays taken, you will be told of any new  findings that may affect your care.  When to seek medical advice  Call your healthcare provider right away if any of these occur:  · Bleeding from your nose even after you have pinched your nostrils together for 15 minutes without stopping  · Swelling, pain, or redness on your face that gets worse  · Fever of 100.4°F (38°C) or above lasting for 24 to 48 hours  · Can't breathe from both sides of your nose after swelling goes down  · Sinus pain  Call 911  Call 911 if you have:  · Repeated vomiting  · Severe headache or dizziness  · Headache or dizziness that gets worse  · Abnormal drowsiness, or unable to wake up as usual  · Confusion or change in behavior or speech  · Convulsion, or seizure  Date Last Reviewed: 12/3/2015  © 7691-6509 [employer]. [address] [place] [address], [place] [address]. All rights reserved. This information is not intended as a substitute for professional medical care. Always follow your healthcare professional's instructions.

## 2020-01-30 ENCOUNTER — TELEPHONE (OUTPATIENT)
Dept: RHEUMATOLOGY | Facility: CLINIC | Age: 60
End: 2020-01-30

## 2020-01-30 ENCOUNTER — DOCUMENTATION ONLY (OUTPATIENT)
Dept: RHEUMATOLOGY | Facility: CLINIC | Age: 60
End: 2020-01-30

## 2020-01-30 NOTE — TELEPHONE ENCOUNTER
----- Message from Alverto Chirinos sent at 1/30/2020  3:57 PM CST -----  Type:  Pharmacy Calling to Clarify an RX    Name of Caller:  Bee  Pharmacy Name:  [facility]   Prescription Name:  sildenafil (REVATIO) 20 mg Tab  What do they need to clarify?: [place]   Best Call Back Number:  [phone]  Additional Information:  Rx need PA

## 2020-01-30 NOTE — TELEPHONE ENCOUNTER
Spoke with Aayush, they needed to know if pt has had Right cardiac cath to confirm Pulomnary arterial Hypertension. Advised per our records and checked for external records we did not have anything on file to say pt has had a cardiac cath. He advised they will review authorization and let us know determination. No further questions.

## 2020-02-12 ENCOUNTER — DOCUMENTATION ONLY (OUTPATIENT)
Dept: RHEUMATOLOGY | Facility: CLINIC | Age: 60
End: 2020-02-12

## 2020-02-23 ENCOUNTER — OFFICE VISIT (OUTPATIENT)
Dept: URGENT CARE | Facility: CLINIC | Age: 60
End: 2020-02-23
Payer: MEDICARE

## 2020-02-23 VITALS
TEMPERATURE: 98 F | HEART RATE: 94 BPM | WEIGHT: 169 LBS | BODY MASS INDEX: 28.85 KG/M2 | HEIGHT: 64 IN | OXYGEN SATURATION: 97 % | SYSTOLIC BLOOD PRESSURE: 138 MMHG | DIASTOLIC BLOOD PRESSURE: 92 MMHG

## 2020-02-23 DIAGNOSIS — M79.674 GREAT TOE PAIN, RIGHT: Primary | ICD-10-CM

## 2020-02-23 PROCEDURE — 99214 PR OFFICE/OUTPT VISIT, EST, LEVL IV, 30-39 MIN: ICD-10-PCS | Mod: S$GLB,,, | Performed by: PHYSICIAN ASSISTANT

## 2020-02-23 PROCEDURE — 99214 OFFICE O/P EST MOD 30 MIN: CPT | Mod: S$GLB,,, | Performed by: PHYSICIAN ASSISTANT

## 2020-02-23 NOTE — PATIENT INSTRUCTIONS
If you were prescribed a narcotic or controlled medication, do not drive or operate heavy equipment or machinery while taking these medications.  You must understand that you've received an Urgent Care treatment only and that you may be released before all your medical problems are known or treated. You, the patient, will arrange for follow up care as instructed.  Follow up with your PCP or specialty clinic as directed if not improved or as needed. You can call [phone] to schedule an appointment with the appropriate provider.  If your condition worsens we recommend that you receive another evaluation at the Emergency Department for any concerns or worsening of condition.  Patient aware and verbalized understanding.    Rest, Ice, Compression and Elevation as discussed.  Walking Boot/Post-Op Shoe during the day for better support/comfort.  OTC Ibuprofen or Tylenol every 4-6 hours as needed for pain.  You may do gentle stretching as tolerable.  Wear supportive shoes such as tennis shoes for better support/comfort.  Follow-up with PCP for further evaluation as needed.  Follow-up with Ortho and Rheumatology for further evaluation if still experiencing pain as discussed.  Strict ER precautions given to patient.  Patient aware and verbalized understanding.    R.I.C.E.    R.I.C.E. stands for Rest, Ice, Compression, and Elevation. Doing these things helps limit pain and swelling after an injury. R.I.C.E. also helps injuries heal faster. Use R.I.C.E. for sprains, strains, and severe bruises or bumps. Follow the tips on this handout and begin R.I.C.E. as soon as possible after an injury.  ? Rest  Pain is your bodys way of telling you to rest an injured area. Whether you have hurt an elbow, hand, foot, or knee, limiting its use will prevent further injury and help you heal.  ? Ice  Applying ice right after an injury helps prevent swelling and reduce pain. Dont place ice directly on your skin.  · Wrap a cold pack or bag  of ice in a thin cloth. Place it over the injured area.  · Ice for 10 minutes every 3 hours. Dont ice for more than 20 minutes at a time.  ? Compression  Putting pressure (compression) on an injury helps prevent swelling and provides support.  · Wrap the injured area firmly with an elastic bandage. If your hand or foot tingles, becomes discolored, or feels cold to the touch, the bandage may be too tight. Rewrap it more loosely.  · If your bandage becomes too loose, rewrap it.  · Do not wear an elastic bandage overnight.  ? Elevation  Keeping an injury elevated helps reduce swelling, pain, and throbbing. Elevation is most effective when the injury is kept elevated higher than the heart.     Call your healthcare provider if you notice any of the following:  · Fingers or toes feel numb, are cold to the touch, or change color  · Skin looks shiny or tight  · Pain, swelling, or bruising worsens and is not improved with elevation   Date Last Reviewed: 9/3/2015  © 7192-3839 [employer]. [address] [place] [address]. All rights reserved. This information is not intended as a substitute for professional medical care. Always follow your healthcare professional's instructions.    Myalgias  Myalgias are another word for muscle aches and soreness. This is a symptom, not a disease. Myalgias can have many causes. A cold, the flu, or an acute infection can cause them. So can any illness with a high fever. They may happen after exertion (such as heavy exercise) or injury (such as an accident or fall). Some medicines (such as statins and certain antidepressants) can cause myalgias. They can also be a symptom of chronic or ongoing medical problems (such as lupus, chronic fatigue, or hypothyroidism). With these illnesses, other serious symptoms often occur in addition to muscle pain and soreness.    Myalgias most often go away on their own. If they don't go away, come back, or are severe, testing may be  needed to help find the cause.  Home care  · Rest until you feel better.  · Follow instructions that you were given for how to care for yourself. This may depend on the cause of your myalgias.   · If myalgia is thought to be due to a medicine, be sure to talk to the doctor that prescribed the medicine about the best course of action.  · To control pain, take prescription or over-the-counter medicines as directed. Unless told not to, you can try acetaminophen or ibuprofen.  Follow-up care  Follow up with your healthcare provider or as advised. If your symptoms do not go away in a few days or if they come back, follow up with your healthcare provider for an exam and testing.  When to see medical advice  Call your healthcare provider for any of the following:  · Fever of 100.4°F (38ºC) or higher, or as directed by your healthcare provider  · Pain that gets worse and not better, or that goes away and comes back  · New joint pains  · New rash  · Severe headache, neck pain, drowsiness, or confusion  Date Last Reviewed: 3/1/2017  © 1928-4144 [employer]. [address] [place] [address]. All rights reserved. This information is not intended as a substitute for professional medical care. Always follow your healthcare professional's instructions.

## 2020-02-23 NOTE — PROGRESS NOTES
"Subjective:       Patient ID: Tina García is a 59 y.o. female.    Vitals:  height is 5' 4" (1.626 m) and weight is 76.7 kg (169 lb). Her temperature is 97.7 °F (36.5 °C). Her blood pressure is 138/92 (abnormal) and her pulse is 94. Her oxygen saturation is 97%.     Chief Complaint: Toe Pain    Patient with PMHx of CREST syndrome presents to urgent care with right great toe pain/cramps x 1 week. Patient reports that she does not know if she pulled something because she has been walking a lot recently or if this is related to her sclerodactyly. Patient reports that she has Rheumatology appointment scheduled for April (in 2 months). Patient denies numbness/tingling or weakness.    Toe Pain    The incident occurred more than 1 week ago. The incident occurred at home. There was no injury mechanism. The pain is present in the right toes. The quality of the pain is described as aching and shooting. The pain is at a severity of 5/10 (5/10 when walking). The pain is mild. The pain has been intermittent since onset. Pertinent negatives include no inability to bear weight, loss of motion, loss of sensation, muscle weakness, numbness or tingling. She reports no foreign bodies present. The symptoms are aggravated by weight bearing. Treatments tried: Tramadol. The treatment provided mild relief.       Constitution: Negative for chills, sweating, fatigue and fever.   HENT: Negative for ear pain, drooling, congestion, sore throat, trouble swallowing and voice change.    Neck: Negative for neck pain, neck stiffness, painful lymph nodes and neck swelling.   Cardiovascular: Negative for chest pain, leg swelling, palpitations, sob on exertion and passing out.   Eyes: Negative for eye pain, eye redness, photophobia, double vision, blurred vision and eyelid swelling.   Respiratory: Negative for chest tightness, cough, sputum production, bloody sputum, shortness of breath, stridor and wheezing.    Gastrointestinal: " Negative for abdominal pain, abdominal bloating, nausea, vomiting, constipation, diarrhea and heartburn.   Musculoskeletal: Positive for pain, joint pain and muscle ache. Negative for trauma, joint swelling, abnormal ROM of joint, back pain and muscle cramps.   Skin: Negative for rash and hives.   Allergic/Immunologic: Negative for seasonal allergies, food allergies, hives, itching and sneezing.   Neurological: Negative for dizziness, light-headedness, passing out, loss of balance, headaches, altered mental status, loss of consciousness, numbness, tingling and seizures.   Hematologic/Lymphatic: Negative for swollen lymph nodes.   Psychiatric/Behavioral: Negative for altered mental status and nervous/anxious. The patient is not nervous/anxious.        Objective:      Physical Exam   Constitutional: She is oriented to person, place, and time. She appears well-developed and well-nourished. She is cooperative.  Non-toxic appearance. She does not have a sickly appearance. She does not appear ill. No distress.   HENT:   Head: Normocephalic and atraumatic.   Right Ear: Hearing, tympanic membrane, external ear and ear canal normal.   Left Ear: Hearing, tympanic membrane, external ear and ear canal normal.   Nose: Nose normal. No mucosal edema, rhinorrhea or nasal deformity. No epistaxis. Right sinus exhibits no maxillary sinus tenderness and no frontal sinus tenderness. Left sinus exhibits no maxillary sinus tenderness and no frontal sinus tenderness.   Mouth/Throat: Uvula is midline, oropharynx is clear and moist and mucous membranes are normal. No trismus in the jaw. Normal dentition. No uvula swelling. No oropharyngeal exudate, posterior oropharyngeal edema or posterior oropharyngeal erythema.   Eyes: Conjunctivae and lids are normal. No scleral icterus.   Neck: Trachea normal, normal range of motion, full passive range of motion without pain and phonation normal. Neck supple. No neck rigidity. No edema and no erythema  present.   Cardiovascular: Normal rate, regular rhythm, normal heart sounds, intact distal pulses and normal pulses.   Pulmonary/Chest: Effort normal and breath sounds normal. No respiratory distress. She has no decreased breath sounds. She has no rhonchi.   Abdominal: Normal appearance.   Musculoskeletal: Normal range of motion. She exhibits no edema or deformity.        Right foot: There is tenderness. There is normal range of motion, no bony tenderness, no swelling, normal capillary refill, no crepitus, no deformity and no laceration.        Left foot: Normal.   FROM to upper and lower extremities bilateral. 5/5 strength and full sensation bilateral. TTP over dorsal aspect of R big toe. Pain only with R plantarflexion and weightbearing. 2+ DP pulses bilateral. No numbness or tingling. Able to ambulate without difficulty.   Lymphadenopathy:     She has no cervical adenopathy.   Neurological: She is alert and oriented to person, place, and time. She exhibits normal muscle tone. Coordination and gait normal.   Skin: Skin is warm, dry, intact, not diaphoretic, not pale and no rash. Capillary refill takes less than 2 seconds. not right foot  Psychiatric: She has a normal mood and affect. Her speech is normal and behavior is normal. Judgment and thought content normal. Cognition and memory are normal.   Nursing note and vitals reviewed.        Assessment:       1. Great toe pain, right        Plan:         Great toe pain, right  -     HME - OTHER      Patient Instructions     If you were prescribed a narcotic or controlled medication, do not drive or operate heavy equipment or machinery while taking these medications.  You must understand that you've received an Urgent Care treatment only and that you may be released before all your medical problems are known or treated. You, the patient, will arrange for follow up care as instructed.  Follow up with your PCP or specialty clinic as directed if not improved or as  needed. You can call [phone] to schedule an appointment with the appropriate provider.  If your condition worsens we recommend that you receive another evaluation at the Emergency Department for any concerns or worsening of condition.  Patient aware and verbalized understanding.    Rest, Ice, Compression and Elevation as discussed.  Walking Boot/Post-Op Shoe during the day for better support/comfort.  OTC Ibuprofen or Tylenol every 4-6 hours as needed for pain.  You may do gentle stretching as tolerable.  Wear supportive shoes such as tennis shoes for better support/comfort.  Follow-up with PCP for further evaluation as needed.  Follow-up with Ortho and Rheumatology for further evaluation if still experiencing pain as discussed.  Strict ER precautions given to patient.  Patient aware and verbalized understanding.    R.I.C.E.    R.I.C.E. stands for Rest, Ice, Compression, and Elevation. Doing these things helps limit pain and swelling after an injury. R.I.C.E. also helps injuries heal faster. Use R.I.C.E. for sprains, strains, and severe bruises or bumps. Follow the tips on this handout and begin R.I.C.E. as soon as possible after an injury.  ? Rest  Pain is your bodys way of telling you to rest an injured area. Whether you have hurt an elbow, hand, foot, or knee, limiting its use will prevent further injury and help you heal.  ? Ice  Applying ice right after an injury helps prevent swelling and reduce pain. Dont place ice directly on your skin.  · Wrap a cold pack or bag of ice in a thin cloth. Place it over the injured area.  · Ice for 10 minutes every 3 hours. Dont ice for more than 20 minutes at a time.  ? Compression  Putting pressure (compression) on an injury helps prevent swelling and provides support.  · Wrap the injured area firmly with an elastic bandage. If your hand or foot tingles, becomes discolored, or feels cold to the touch, the bandage may be too tight. Rewrap it more loosely.  · If your  bandage becomes too loose, rewrap it.  · Do not wear an elastic bandage overnight.  ? Elevation  Keeping an injury elevated helps reduce swelling, pain, and throbbing. Elevation is most effective when the injury is kept elevated higher than the heart.     Call your healthcare provider if you notice any of the following:  · Fingers or toes feel numb, are cold to the touch, or change color  · Skin looks shiny or tight  · Pain, swelling, or bruising worsens and is not improved with elevation   Date Last Reviewed: 9/3/2015  © 5561-5462 [employer]. [address] [place] [address]. All rights reserved. This information is not intended as a substitute for professional medical care. Always follow your healthcare professional's instructions.    Myalgias  Myalgias are another word for muscle aches and soreness. This is a symptom, not a disease. Myalgias can have many causes. A cold, the flu, or an acute infection can cause them. So can any illness with a high fever. They may happen after exertion (such as heavy exercise) or injury (such as an accident or fall). Some medicines (such as statins and certain antidepressants) can cause myalgias. They can also be a symptom of chronic or ongoing medical problems (such as lupus, chronic fatigue, or hypothyroidism). With these illnesses, other serious symptoms often occur in addition to muscle pain and soreness.    Myalgias most often go away on their own. If they don't go away, come back, or are severe, testing may be needed to help find the cause.  Home care  · Rest until you feel better.  · Follow instructions that you were given for how to care for yourself. This may depend on the cause of your myalgias.   · If myalgia is thought to be due to a medicine, be sure to talk to the doctor that prescribed the medicine about the best course of action.  · To control pain, take prescription or over-the-counter medicines as directed. Unless told not to, you can try  acetaminophen or ibuprofen.  Follow-up care  Follow up with your healthcare provider or as advised. If your symptoms do not go away in a few days or if they come back, follow up with your healthcare provider for an exam and testing.  When to see medical advice  Call your healthcare provider for any of the following:  · Fever of 100.4°F (38ºC) or higher, or as directed by your healthcare provider  · Pain that gets worse and not better, or that goes away and comes back  · New joint pains  · New rash  · Severe headache, neck pain, drowsiness, or confusion  Date Last Reviewed: 3/1/2017  © 3854-5933 "[employer]. [address] [place] [address], [place] [address]. All rights reserved. This information is not intended as a substitute for professional medical care. Always follow your healthcare professional's instructions.

## 2020-02-24 ENCOUNTER — PATIENT MESSAGE (OUTPATIENT)
Dept: RHEUMATOLOGY | Facility: CLINIC | Age: 60
End: 2020-02-24

## 2020-03-01 ENCOUNTER — PATIENT OUTREACH (OUTPATIENT)
Dept: ADMINISTRATIVE | Facility: OTHER | Age: 60
End: 2020-03-01

## 2020-03-02 ENCOUNTER — HOSPITAL ENCOUNTER (OUTPATIENT)
Dept: RADIOLOGY | Facility: HOSPITAL | Age: 60
Discharge: HOME | End: 2020-03-02
Attending: PHYSICIAN ASSISTANT
Payer: MEDICARE

## 2020-03-02 ENCOUNTER — OFFICE VISIT (OUTPATIENT)
Dept: RHEUMATOLOGY | Facility: CLINIC | Age: 60
End: 2020-03-02
Payer: MEDICARE

## 2020-03-02 VITALS
HEART RATE: 103 BPM | SYSTOLIC BLOOD PRESSURE: 147 MMHG | WEIGHT: 179 LBS | DIASTOLIC BLOOD PRESSURE: 85 MMHG | HEIGHT: 64 IN | BODY MASS INDEX: 30.56 KG/M2

## 2020-03-02 DIAGNOSIS — M34.1 CREST (CALCINOSIS, RAYNAUD'S PHENOMENON, ESOPHAGEAL DYSFUNCTION, SCLERODACTYLY, TELANGIECTASIA): Primary | ICD-10-CM

## 2020-03-02 DIAGNOSIS — M34.1 CREST (CALCINOSIS, RAYNAUD'S PHENOMENON, ESOPHAGEAL DYSFUNCTION, SCLERODACTYLY, TELANGIECTASIA): ICD-10-CM

## 2020-03-02 DIAGNOSIS — D84.821 IMMUNOCOMPROMISED STATE DUE TO DRUG THERAPY: ICD-10-CM

## 2020-03-02 DIAGNOSIS — M79.671 FOOT PAIN, RIGHT: ICD-10-CM

## 2020-03-02 DIAGNOSIS — G89.4 CHRONIC PAIN SYNDROME: ICD-10-CM

## 2020-03-02 DIAGNOSIS — Z79.899 IMMUNOCOMPROMISED STATE DUE TO DRUG THERAPY: ICD-10-CM

## 2020-03-02 DIAGNOSIS — M35.00 SJOGREN'S SYNDROME, WITH UNSPECIFIED ORGAN INVOLVEMENT: ICD-10-CM

## 2020-03-02 DIAGNOSIS — I27.20 PULMONARY HYPERTENSION: ICD-10-CM

## 2020-03-02 PROCEDURE — 3079F PR MOST RECENT DIASTOLIC BLOOD PRESSURE 80-89 MM HG: ICD-10-PCS | Mod: HCNC,CPTII,S$GLB, | Performed by: PHYSICIAN ASSISTANT

## 2020-03-02 PROCEDURE — 99214 OFFICE O/P EST MOD 30 MIN: CPT | Mod: HCNC,S$GLB,, | Performed by: PHYSICIAN ASSISTANT

## 2020-03-02 PROCEDURE — 73630 X-RAY EXAM OF FOOT: CPT | Mod: TC,HCNC,FY,PO,RT

## 2020-03-02 PROCEDURE — 3077F SYST BP >= 140 MM HG: CPT | Mod: HCNC,CPTII,S$GLB, | Performed by: PHYSICIAN ASSISTANT

## 2020-03-02 PROCEDURE — 3008F PR BODY MASS INDEX (BMI) DOCUMENTED: ICD-10-PCS | Mod: HCNC,CPTII,S$GLB, | Performed by: PHYSICIAN ASSISTANT

## 2020-03-02 PROCEDURE — 99999 PR PBB SHADOW E&M-EST. PATIENT-LVL IV: CPT | Mod: PBBFAC,HCNC,, | Performed by: PHYSICIAN ASSISTANT

## 2020-03-02 PROCEDURE — 99214 PR OFFICE/OUTPT VISIT, EST, LEVL IV, 30-39 MIN: ICD-10-PCS | Mod: HCNC,S$GLB,, | Performed by: PHYSICIAN ASSISTANT

## 2020-03-02 PROCEDURE — 3008F BODY MASS INDEX DOCD: CPT | Mod: HCNC,CPTII,S$GLB, | Performed by: PHYSICIAN ASSISTANT

## 2020-03-02 PROCEDURE — 3077F PR MOST RECENT SYSTOLIC BLOOD PRESSURE >= 140 MM HG: ICD-10-PCS | Mod: HCNC,CPTII,S$GLB, | Performed by: PHYSICIAN ASSISTANT

## 2020-03-02 PROCEDURE — 73630 X-RAY EXAM OF FOOT: CPT | Mod: 26,HCNC,RT, | Performed by: RADIOLOGY

## 2020-03-02 PROCEDURE — 73630 XR FOOT COMPLETE 3 VIEW RIGHT: ICD-10-PCS | Mod: 26,HCNC,RT, | Performed by: RADIOLOGY

## 2020-03-02 PROCEDURE — 99999 PR PBB SHADOW E&M-EST. PATIENT-LVL IV: ICD-10-PCS | Mod: PBBFAC,HCNC,, | Performed by: PHYSICIAN ASSISTANT

## 2020-03-02 PROCEDURE — 99499 UNLISTED E&M SERVICE: CPT | Mod: HCNC,S$GLB,, | Performed by: PHYSICIAN ASSISTANT

## 2020-03-02 PROCEDURE — 99499 RISK ADDL DX/OHS AUDIT: ICD-10-PCS | Mod: HCNC,S$GLB,, | Performed by: PHYSICIAN ASSISTANT

## 2020-03-02 PROCEDURE — 3079F DIAST BP 80-89 MM HG: CPT | Mod: HCNC,CPTII,S$GLB, | Performed by: PHYSICIAN ASSISTANT

## 2020-03-02 RX ORDER — VALACYCLOVIR HYDROCHLORIDE 500 MG/1
TABLET, FILM COATED ORAL
COMMUNITY
Start: 2020-01-17 | End: 2020-06-19

## 2020-03-02 RX ORDER — FLUCONAZOLE 150 MG/1
150 TABLET ORAL DAILY
Refills: 3 | COMMUNITY
Start: 2019-12-02 | End: 2021-12-01 | Stop reason: ALTCHOICE

## 2020-03-02 RX ORDER — CYCLOBENZAPRINE HCL 10 MG
TABLET ORAL
COMMUNITY
Start: 2020-02-26 | End: 2020-12-22 | Stop reason: SDUPTHER

## 2020-03-02 ASSESSMENT — ROUTINE ASSESSMENT OF PATIENT INDEX DATA (RAPID3)
PATIENT GLOBAL ASSESSMENT SCORE: 3
PSYCHOLOGICAL DISTRESS SCORE: 2.2
FATIGUE SCORE: 3.3
MDHAQ FUNCTION SCORE: .4
TOTAL RAPID3 SCORE: 2.94
PAIN SCORE: 4.5

## 2020-03-02 NOTE — PROGRESS NOTES
Subjective:       Patient ID: Tina García is a 59 y.o. female.    Chief Complaint: Follow-up    Mrs. García is a 59 year old female who presents to clinic for follow up on CREST syndrome. She is a new patient to me. She is here today for acute evaluation of right great toe pain. She completed a 5K [place] race approximately 3 weeks ago and 1 week after the event she went walking for exercise and developed severe R great toe pain. She was evaluated in urgent care and a boot was recommended x 4 weeks. No imaging completed. She has hx of foot fracture. She denies trauma to the foot. She has noticed increased cramping in her feet and was concerned sx may be related to scleroderma. She denies redness, warmth, or swelling of the joint. No other small joint pain. She has intermittent pains involving her hands, but overall this is stable with minimal AM stiffness. No heel pain. She wore the boot for 1 week then went 1 day without it and her pain returned.  She is taking tramadol for pain and typically avoids NSAIDs secondary to GERD. She has been otherwise stable on imuran. Raynauds is controlled with revatio PRN. Labs and Echo due.     Also of note, fell out of her bed in her sleep and hit the nightstand and fractured her nose approximately 1 month ago. Not taking any sleep medications.    Current tx:  1. Imuran 50 mg daily  2. revatio PRN  3. Flexeril PRN    Review of Systems   Constitutional: Negative for activity change, appetite change, chills, fatigue and fever.   Eyes: Negative for visual disturbance.   Respiratory: Negative for cough and shortness of breath.    Cardiovascular: Negative for chest pain, palpitations and leg swelling.   Gastrointestinal: Negative for abdominal pain, constipation, diarrhea, nausea and vomiting.   Musculoskeletal: Positive for arthralgias and gait problem. Negative for joint swelling.   Neurological: Negative for dizziness, weakness, light-headedness and headaches.          Objective:     Vitals:    03/02/20 0928   BP: (!) 147/85   Pulse: 103       Past Medical History:   Diagnosis Date    Anxiety     AR (allergic rhinitis)     Arthritis     rheumatoid?    Chronic constipation     Colon polyp     CREST syndrome     Depression     Dry eyes     Dry mouth     Esophageal dysmotility     Fibrocystic breast     Fibromyalgia     Food intolerance     GERD (gastroesophageal reflux disease)     HTN (hypertension)     Irritable bowel syndrome     Raynaud disease     Right wrist fracture     nov 2013    SOB (shortness of breath) on exertion     Thyroid disease     hypothyroid, thyroid cyst on U/S    Ulcer     Vitamin D deficiency disease      Past Surgical History:   Procedure Laterality Date    COLONOSCOPY  7/2012    one polyp removed, biospy=TUBULOVILLOUS ADENOMA, otherwise normal findings, repeat in 4-5 years for surveillance    COLONOSCOPY N/A 4/17/2019    Procedure: COLONOSCOPY;  Surgeon: Rich Kingsley Jr., MD;  Location: [facility];  Service: Endoscopy;  Laterality: N/A;    COLONOSCOPY W/ POLYPECTOMY  7/24/2012  [facility]    4-5 mm sigmoid polyp.    CYST REMOVAL      pilonidal    DILATION AND CURETTAGE OF UTERUS      ESOPHAGOGASTRODUODENOSCOPY N/A 4/17/2019    Procedure: EGD (ESOPHAGOGASTRODUODENOSCOPY);  Surgeon: Rich Kingsley Jr., MD;  Location: [facility];  Service: Endoscopy;  Laterality: N/A;    GANGLION CYST EXCISION      right thumb    HYSTERECTOMY  2008    complete    TOTAL ABDOMINAL HYSTERECTOMY W/ BILATERAL SALPINGOOPHORECTOMY  2008    w/Hysterectomy    UPPER GASTROINTESTINAL ENDOSCOPY  12/23/2011  [facility]    Fluid in the lower third of the esophagus  (stasis).  The esophageal examination was compatible with scleroderma.  (Dilataion done, 51 Fr.)    LA Grade A reflux esophagitis.   Enlarged gastric folds.   Multiple gastric polyps.   Antritis (erythematous streaks).  JOELLEN Test  Negative.          Physical Exam   Constitutional: She is  well-developed, well-nourished, and in no distress.   Eyes: Right conjunctiva is not injected. Left conjunctiva is not injected. Right eye exhibits normal extraocular motion. Left eye exhibits normal extraocular motion.   Neck: No JVD present. No thyromegaly present.   Cardiovascular: Normal rate and regular rhythm.  Exam reveals no decreased pulses.    Pulmonary/Chest: She has no wheezes. She has no rhonchi. She has no rales.       Right Side Rheumatological Exam     Examination finds the shoulder, elbow, wrist, knee, 1st PIP, 1st MCP, 2nd PIP, 2nd MCP, 3rd PIP, 3rd MCP, 4th PIP, 4th MCP, 5th PIP and 5th MCP normal.    Foot Exam     Range of Motion   Ankle Joint   Dorsiflexion: normal   Plantar flexion: normal   Subtalar Joint   Inversion: normal   Eversion: normal   First MTP Joint: normal    Foot Warmth: negative  Foot Swelling: negative  Foot Crepitus: negative    Left Side Rheumatological Exam     Examination finds the shoulder, elbow, wrist, knee, 1st PIP, 1st MCP, 2nd PIP, 2nd MCP, 3rd PIP, 3rd MCP, 4th PIP, 4th MCP, 5th PIP and 5th MCP normal.      Lymphadenopathy:     She has no cervical adenopathy.   Neurological: Gait normal.   Skin:     Skin on the feet is soft. No swelling. No erythema.    Psychiatric: Mood and affect normal.   Musculoskeletal:   Diffusely puffy fingers bilaterally         Recent labs:  Component      Latest Ref Rng & Units 10/11/2019   WBC      3.90 - 12.70 K/uL 6.02   RBC      4.00 - 5.40 M/uL 4.17   Hemoglobin      12.0 - 16.0 g/dL 12.1   Hematocrit      37.0 - 48.5 % 38.6   MCV      82 - 98 fL 93   MCH      27.0 - 31.0 pg 29.0   MCHC      32.0 - 36.0 g/dL 31.3 (L)   RDW      11.5 - 14.5 % 13.2   Platelets      150 - 350 K/uL 268   MPV      9.2 - 12.9 fL 10.6   Immature Granulocytes      0.0 - 0.5 % 0.3   Gran # (ANC)      1.8 - 7.7 K/uL 2.7   Immature Grans (Abs)      0.00 - 0.04 K/uL 0.02   Lymph #      1.0 - 4.8 K/uL 2.5   Mono #      0.3 - 1.0 K/uL 0.7   Eos #      0.0 - 0.5 K/uL  0.1   Baso #      0.00 - 0.20 K/uL 0.07   nRBC      0 /100 WBC 0   Gran%      38.0 - 73.0 % 44.2   Lymph%      18.0 - 48.0 % 40.7   Mono%      4.0 - 15.0 % 11.3   Eosinophil%      0.0 - 8.0 % 2.3   Basophil%      0.0 - 1.9 % 1.2   Differential Method       Automated   Sodium      136 - 145 mmol/L 141   Potassium      3.5 - 5.1 mmol/L 4.1   Chloride      95 - 110 mmol/L 105   CO2      23 - 29 mmol/L 30 (H)   Glucose      70 - 110 mg/dL 97   BUN, Bld      6 - 20 mg/dL 20   Creatinine      0.5 - 1.4 mg/dL 0.8   Calcium      8.7 - 10.5 mg/dL 9.0   PROTEIN TOTAL      6.0 - 8.4 g/dL 6.7   Albumin      3.5 - 5.2 g/dL 3.6   BILIRUBIN TOTAL      0.1 - 1.0 mg/dL 0.2   Alkaline Phosphatase      55 - 135 U/L 55   AST      10 - 40 U/L 23   ALT      10 - 44 U/L 28   Anion Gap      8 - 16 mmol/L 6 (L)   eGFR if African American      >60 mL/min/1.73 m:2 >60.0   eGFR if non African American      >60 mL/min/1.73 m:2 >60.0   JAMILA Screen      Negative <1:160 Positive (A)   Sed Rate      0 - 36 mm/Hr 13   CRP      0.0 - 8.2 mg/L 2.0   JAMILA HEP-2 Titer       Positive 1:1280 Centromere Homogeneous     Assessment:       1. CREST (calcinosis, Raynaud's phenomenon, esophageal dysfunction, sclerodactyly, telangiectasia)    2. Sjogren's syndrome, with unspecified organ involvement    3. Pulmonary hypertension    4. Immunocompromised state due to drug therapy    5. Foot pain, right    6. Chronic pain syndrome            Plan:       CREST (calcinosis, Raynaud's phenomenon, esophageal dysfunction, sclerodactyly, telangiectasia)  -     X-Ray Foot Complete Right; Future; Expected date: 03/02/2020  -     CBC auto differential; Future; Expected date: 03/02/2020  -     Comprehensive metabolic panel; Future; Expected date: 03/02/2020  -     C-reactive protein; Future; Expected date: 03/02/2020  -     Sedimentation rate; Future; Expected date: 03/02/2020  -     JAMILA Screen w/Reflex; Future; Expected date: 03/02/2020  -     Anti Sm/RNP Antibody; Future;  Expected date: 03/02/2020    Sjogren's syndrome, with unspecified organ involvement  -     CBC auto differential; Future; Expected date: 03/02/2020  -     Comprehensive metabolic panel; Future; Expected date: 03/02/2020  -     C-reactive protein; Future; Expected date: 03/02/2020  -     Sedimentation rate; Future; Expected date: 03/02/2020  -     JAMILA Screen w/Reflex; Future; Expected date: 03/02/2020  -     Anti Sm/RNP Antibody; Future; Expected date: 03/02/2020    Pulmonary hypertension    Immunocompromised state due to drug therapy    Foot pain, right  -     X-Ray Foot Complete Right; Future; Expected date: 03/02/2020    Chronic pain syndrome        Assessment:  59 year old female with  CREST syndrome (raynauds on revatio PRN, esophageal dysfunction/GERD on ppi/zantac, sclerodactyly hands), Sjogren's syndrome on imuran  --chronic pain syndrome on tramadol  --hx of Hashimoto's thyroiditis on NP thyroid  --hx of pulmonary HTN    Plan:  1. Check labs today  2. X-ray R foot complete. Continue to wear boot for now. Consider NSAID and referral to Ortho Dr. Singh.  3. Cont. Imuran 50 mg daily, revatio PRN  4. Cont. Tramadol PRN severe pain per Dr. Fry. I have  check [place] prescription monitoring program site and no unusual or abnormal behavior has occurred pt understand the risk and benefits of taking opioid medications and has decided to continue the  medication   6. Echocardiogram/PFTs due now   7. Monitor BP at home and keep a log. Notify clinic if BP is persistently >140/90.         Follow up:  6 weeks as scheduled Dr. Fry

## 2020-03-08 ENCOUNTER — PATIENT MESSAGE (OUTPATIENT)
Dept: INTERNAL MEDICINE | Facility: CLINIC | Age: 60
End: 2020-03-08

## 2020-03-08 ENCOUNTER — PATIENT MESSAGE (OUTPATIENT)
Dept: RHEUMATOLOGY | Facility: CLINIC | Age: 60
End: 2020-03-08

## 2020-03-08 DIAGNOSIS — M79.671 FOOT PAIN, RIGHT: Primary | ICD-10-CM

## 2020-03-12 ENCOUNTER — PATIENT OUTREACH (OUTPATIENT)
Dept: ADMINISTRATIVE | Facility: OTHER | Age: 60
End: 2020-03-12

## 2020-03-23 ENCOUNTER — PATIENT MESSAGE (OUTPATIENT)
Dept: RHEUMATOLOGY | Facility: CLINIC | Age: 60
End: 2020-03-23

## 2020-04-07 ENCOUNTER — PATIENT OUTREACH (OUTPATIENT)
Dept: ADMINISTRATIVE | Facility: OTHER | Age: 60
End: 2020-04-07

## 2020-04-08 ENCOUNTER — OFFICE VISIT (OUTPATIENT)
Dept: RHEUMATOLOGY | Facility: CLINIC | Age: 60
End: 2020-04-08
Payer: MEDICARE

## 2020-04-08 DIAGNOSIS — I27.20 PULMONARY HYPERTENSION: ICD-10-CM

## 2020-04-08 DIAGNOSIS — M35.00 SJOGREN'S SYNDROME, WITH UNSPECIFIED ORGAN INVOLVEMENT: Primary | ICD-10-CM

## 2020-04-08 DIAGNOSIS — D84.821 IMMUNOCOMPROMISED STATE DUE TO DRUG THERAPY: ICD-10-CM

## 2020-04-08 DIAGNOSIS — Z79.899 IMMUNOCOMPROMISED STATE DUE TO DRUG THERAPY: ICD-10-CM

## 2020-04-08 DIAGNOSIS — G89.4 CHRONIC PAIN SYNDROME: ICD-10-CM

## 2020-04-08 DIAGNOSIS — M34.1 CREST (CALCINOSIS, RAYNAUD'S PHENOMENON, ESOPHAGEAL DYSFUNCTION, SCLERODACTYLY, TELANGIECTASIA): ICD-10-CM

## 2020-04-08 PROCEDURE — 99499 UNLISTED E&M SERVICE: CPT | Mod: 95,,, | Performed by: INTERNAL MEDICINE

## 2020-04-08 PROCEDURE — 99214 OFFICE O/P EST MOD 30 MIN: CPT | Mod: HCNC,95,, | Performed by: INTERNAL MEDICINE

## 2020-04-08 PROCEDURE — 99499 RISK ADDL DX/OHS AUDIT: ICD-10-PCS | Mod: 95,,, | Performed by: INTERNAL MEDICINE

## 2020-04-08 PROCEDURE — 99214 PR OFFICE/OUTPT VISIT, EST, LEVL IV, 30-39 MIN: ICD-10-PCS | Mod: HCNC,95,, | Performed by: INTERNAL MEDICINE

## 2020-04-08 RX ORDER — TRAMADOL HYDROCHLORIDE 50 MG/1
50 TABLET ORAL EVERY 6 HOURS PRN
Qty: 120 TABLET | Refills: 4 | Status: SHIPPED | OUTPATIENT
Start: 2020-04-08 | End: 2020-10-23 | Stop reason: SDUPTHER

## 2020-04-08 NOTE — PROGRESS NOTES
Subjective:       Patient ID: Tina García is a 59 y.o. female.    Chief Complaint: Disease Management and Pain    Mrs 59 year old female who presents to clinic for follow up on CREST syndrome. . She has noticed increased  scleroderma. She denies redness, warmth, or swelling of the joint. No other small joint pain. She has intermittent pains involving her hands, but overall this is stable with minimal AM stiffness. No heel pain..  She is taking tramadol for pain and typically avoids NSAIDs secondary to GERD. She on imuran. Raynauds is controlled with revatio PRN. Labs and Echo due.     Also of note, fell out    Current tx:  1. Imuran 50 mg daily  2. revatio PRN  3. Flexeril PRN    Review of Systems   Constitutional: Negative for activity change, appetite change, chills, fatigue and fever.   Eyes: Negative for visual disturbance.   Respiratory: Negative for cough and shortness of breath.    Cardiovascular: Negative for chest pain, palpitations and leg swelling.   Gastrointestinal: Negative for abdominal pain, constipation, diarrhea, nausea and vomiting.   Musculoskeletal: Positive for arthralgias and gait problem. Negative for joint swelling.   Neurological: Negative for dizziness, weakness, light-headedness and headaches.         Objective:     There were no vitals filed for this visit.    Past Medical History:   Diagnosis Date    Anxiety     AR (allergic rhinitis)     Arthritis     rheumatoid?    Chronic constipation     Colon polyp     CREST syndrome     Depression     Dry eyes     Dry mouth     Esophageal dysmotility     Fibrocystic breast     Fibromyalgia     Food intolerance     GERD (gastroesophageal reflux disease)     HTN (hypertension)     Irritable bowel syndrome     Raynaud disease     Right wrist fracture     nov 2013    SOB (shortness of breath) on exertion     Thyroid disease     hypothyroid, thyroid cyst on U/S    Ulcer     Vitamin D deficiency disease      Past  Surgical History:   Procedure Laterality Date    COLONOSCOPY  7/2012    one polyp removed, 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    CYST REMOVAL      pilonidal    DILATION AND CURETTAGE OF UTERUS      ESOPHAGOGASTRODUODENOSCOPY N/A 4/17/2019    Procedure: EGD (ESOPHAGOGASTRODUODENOSCOPY);  Surgeon: Rich Kingsley Jr., MD;  Location: [facility];  Service: Endoscopy;  Laterality: N/A;    GANGLION CYST EXCISION      right thumb    HYSTERECTOMY  2008    complete    TOTAL ABDOMINAL HYSTERECTOMY W/ BILATERAL SALPINGOOPHORECTOMY  2008    w/Hysterectomy    UPPER GASTROINTESTINAL ENDOSCOPY  12/23/2011  Sancho    Fluid in the lower third of the esophagus  (stasis).  The esophageal examination was compatible with scleroderma.  (Dilataion done, 51 Fr.)    [place] Grade A reflux esophagitis.   Enlarged gastric folds.   Multiple gastric polyps.   Antritis (erythematous streaks).  JOELLEN Test  Negative.          Physical Exam   Constitutional: She is oriented to person, place, and time and well-developed, well-nourished, and in no distress.   Neurological: She is alert and oriented to person, place, and time.   Psychiatric: Mood, affect and judgment normal.         Results for orders placed or performed in visit on 03/02/20   T4, free   Result Value Ref Range    Free T4 0.85 0.71 - 1.51 ng/dL   TSH   Result Value Ref Range    TSH 0.461 0.400 - 4.000 uIU/mL   T3, free   Result Value Ref Range    T3, Free 2.9 2.3 - 4.2 pg/mL   CBC auto differential   Result Value Ref Range    WBC 6.93 3.90 - 12.70 K/uL    RBC 4.21 4.00 - 5.40 M/uL    Hemoglobin 12.8 12.0 - 16.0 g/dL    Hematocrit 39.9 37.0 - 48.5 %    Mean Corpuscular Volume 95 82 - 98 fL    Mean  Corpuscular Hemoglobin 30.4 27.0 - 31.0 pg    Mean Corpuscular Hemoglobin Conc 32.1 32.0 - 36.0 g/dL    RDW 14.6 (H) 11.5 - 14.5 %    Platelets 302 150 - 350 K/uL    MPV 10.9 9.2 - 12.9 fL    Immature Granulocytes 0.3 0.0 - 0.5 %    Gran # (ANC) 3.7 1.8 - 7.7 K/uL    Immature Grans (Abs) 0.02 0.00 - 0.04 K/uL    Lymph # 2.4 1.0 - 4.8 K/uL    Mono # 0.7 0.3 - 1.0 K/uL    Eos # 0.1 0.0 - 0.5 K/uL    Baso # 0.05 0.00 - 0.20 K/uL    nRBC 0 0 /100 WBC    Gran% 53.0 38.0 - 73.0 %    Lymph% 34.8 18.0 - 48.0 %    Mono% 10.0 4.0 - 15.0 %    Eosinophil% 1.2 0.0 - 8.0 %    Basophil% 0.7 0.0 - 1.9 %    Differential Method Automated    Comprehensive metabolic panel   Result Value Ref Range    Sodium 141 136 - 145 mmol/L    Potassium 4.0 3.5 - 5.1 mmol/L    Chloride 101 95 - 110 mmol/L    CO2 29 23 - 29 mmol/L    Glucose 99 70 - 110 mg/dL    BUN, Bld 15 6 - 20 mg/dL    Creatinine 0.8 0.5 - 1.4 mg/dL    Calcium 9.7 8.7 - 10.5 mg/dL    Total Protein 7.4 6.0 - 8.4 g/dL    Albumin 3.9 3.5 - 5.2 g/dL    Total Bilirubin 0.2 0.1 - 1.0 mg/dL    Alkaline Phosphatase 67 55 - 135 U/L    AST 20 10 - 40 U/L    ALT 24 10 - 44 U/L    Anion Gap 11 8 - 16 mmol/L    eGFR if African American >60.0 >60 mL/min/1.73 m^2    eGFR if non African American >60.0 >60 mL/min/1.73 m^2   C-reactive protein   Result Value Ref Range    CRP 2.9 0.0 - 8.2 mg/L   Sedimentation rate   Result Value Ref Range    Sed Rate 18 0 - 20 mm/Hr   JAMILA Screen w/Reflex   Result Value Ref Range    JAMILA Screen Positive (A) Negative <1:80   Anti Sm/RNP Antibody   Result Value Ref Range    Anti Sm/RNP Antibody 0.10 0.00 - 0.99 Ratio    Anti-Sm/RNP Interpretation Negative Negative   JAMILA Titer   Result Value Ref Range    JAMILA HEP-2 Titer Positive >=1:2560 Centromere Speckled     JAMILA Profile   Result Value Ref Range    Anti Sm Antibody 0.09 0.00 - 0.99 Ratio    Anti-Sm Interpretation Negative Negative    Anti-SSA Antibody 0.07 0.00 - 0.99 Ratio    Anti-SSA Interpretation Negative  Negative    Anti-SSB Antibody 0.14 0.00 - 0.99 Ratio    Anti-SSB Interpretation Negative Negative    ds DNA Ab Negative 1:10 Negative 1:10    Anti Sm/RNP Antibody 0.10 0.00 - 0.99 Ratio    Anti-Sm/RNP Interpretation Negative Negative     reviewed labs with patient during this visit       Assessment:       1. Sjogren's syndrome, with unspecified organ involvement    2. Pulmonary hypertension    3. CREST (calcinosis, Raynaud's phenomenon, esophageal dysfunction, sclerodactyly, telangiectasia)    4. Immunocompromised state due to drug therapy    5. Chronic pain syndrome            Plan:       Sjogren's syndrome, with unspecified organ involvement  -     Comprehensive metabolic panel; Future; Expected date: 04/08/2020  -     CBC auto differential; Future; Expected date: 04/08/2020  -     C-Reactive Protein; Future; Expected date: 04/08/2020  -     Sedimentation rate; Future; Expected date: 04/08/2020    Pulmonary hypertension  -     Comprehensive metabolic panel; Future; Expected date: 04/08/2020  -     CBC auto differential; Future; Expected date: 04/08/2020  -     C-Reactive Protein; Future; Expected date: 04/08/2020  -     Sedimentation rate; Future; Expected date: 04/08/2020    CREST (calcinosis, Raynaud's phenomenon, esophageal dysfunction, sclerodactyly, telangiectasia)  -     Comprehensive metabolic panel; Future; Expected date: 04/08/2020  -     CBC auto differential; Future; Expected date: 04/08/2020  -     C-Reactive Protein; Future; Expected date: 04/08/2020  -     Sedimentation rate; Future; Expected date: 04/08/2020    Immunocompromised state due to drug therapy  -     Comprehensive metabolic panel; Future; Expected date: 04/08/2020  -     CBC auto differential; Future; Expected date: 04/08/2020  -     C-Reactive Protein; Future; Expected date: 04/08/2020  -     Sedimentation rate; Future; Expected date: 04/08/2020    Chronic pain syndrome  -     traMADoL (ULTRAM) 50 mg tablet; Take 1 tablet (50 mg total)  by mouth every 6 (six) hours as needed.  Dispense: 120 tablet; Refill: 4  -     Comprehensive metabolic panel; Future; Expected date: 04/08/2020  -     CBC auto differential; Future; Expected date: 04/08/2020  -     C-Reactive Protein; Future; Expected date: 04/08/2020  -     Sedimentation rate; Future; Expected date: 04/08/2020        We will continue Imuran and adjust treatment  Patient is aware that there is a COVID B-19 pandemic and that the immunosuppressants being taken to treat the arthritis but can increased the  risk for infection/Viruses.  It is understood to hold all DMARD with the exception of Plaquenil,  if having fever chills, cough, shortness of breath loss of sense of smell and or myalgias.  It is understood to call the office as well as call their primary care physician and observe  social isolation    The patient location is: home  The chief complaint leading to consultation is: lupus crest  Visit type: Virtual visit with synchronous audio and video  Total time spent with patient: 30 min  Each patient to whom he or she provides medical services by telemedicine is:  (1) informed of the relationship between the physician and patient and the respective role of any other health care provider with respect to management of the patient; and (2) notified that he or she may decline to receive medical services by telemedicine and may withdraw from such care at any time.

## 2020-05-20 ENCOUNTER — PATIENT MESSAGE (OUTPATIENT)
Dept: RHEUMATOLOGY | Facility: CLINIC | Age: 60
End: 2020-05-20

## 2020-05-26 DIAGNOSIS — I10 ESSENTIAL HYPERTENSION: ICD-10-CM

## 2020-05-28 NOTE — PROGRESS NOTES
Refill Routing Note    Medication(s) are not appropriate for processing by [facility]:       Required vitals are abnormal        Medication Therapy Plan: BP elevated at Rheumatology visit; Defer to you  Medication reconciliation completed: No      Automatic Epic Protocol Generated Data:    Requested Prescriptions   Pending Prescriptions Disp Refills    losartan-hydrochlorothiazide 100-25 mg (HYZAAR) 100-25 mg per tablet [Pharmacy Med Name: losartan 100 mg-hydrochlorothiazide 25 mg tablet] 90 tablet 0     Sig: TAKE 1 TABLET BY MOUTH EVERY DAY       Cardiovascular: ARB + Diuretic Combos Failed - 5/26/2020 11:18 AM        Failed - Last BP in normal range within 360 days.     BP Readings from Last 3 Encounters:   03/02/20 (!) 147/85   02/23/20 (!) 138/92   01/24/20 138/83              Passed - Patient is at least 18 years old        Passed - Office visit in past 12 months or future 90 days.     Recent Outpatient Visits            1 month ago Sjogren's syndrome, with unspecified organ involvement    [facility] Joao Fry MD    2 months ago CREST (calcinosis, Raynaud's phenomenon, esophageal dysfunction, sclerodactyly, telangiectasia)    [facility] Joanna Paulson PA-C    3 months ago Great toe pain, right    [facility] [place] [facility] [facility]    4 months ago Closed fracture of nasal bone, initial encounter    [facility] [place] [facility] Jr[facility]    7 months ago CREST (calcinosis, Raynaud's phenomenon, esophageal dysfunction, sclerodactyly, telangiectasia)    [facility] Joao Fry MD          Future Appointments              In 4 months Joao Fry MD [facility], [place]                Passed - K in normal range and within 180 days     Potassium   Date Value Ref Range Status   03/02/2020 4.0 3.5 - 5.1 mmol/L Final   10/11/2019 4.1 3.5 - 5.1 mmol/L Final   01/04/2019 3.5 3.5 - 5.1 mmol/L Final               Passed - Na is between 130 and 148 and within 180 days     Sodium   Date Value Ref Range Status   03/02/2020 141 136 - 145 mmol/L Final   10/11/2019 141 136 - 145 mmol/L Final   01/04/2019 135 (L) 136 - 145 mmol/L Final              Passed - Cr is 1.3 or below and within 180 days     Creatinine   Date Value Ref Range Status   03/02/2020 0.8 0.5 - 1.4 mg/dL Final   10/11/2019 0.8 0.5 - 1.4 mg/dL Final   01/04/2019 0.8 0.5 - 1.4 mg/dL Final              Passed - Ca in normal range and within 360 days     Calcium   Date Value Ref Range Status   03/02/2020 9.7 8.7 - 10.5 mg/dL Final   10/11/2019 9.0 8.7 - 10.5 mg/dL Final   01/04/2019 9.5 8.7 - 10.5 mg/dL Final              Passed - eGFR within 180 days     eGFR if non    Date Value Ref Range Status   03/02/2020 >60.0 >60 mL/min/1.73 m^2 Final     Comment:     Calculation used to obtain the estimated glomerular filtration  rate (eGFR) is the CKD-EPI equation.      10/11/2019 >60.0 >60 mL/min/1.73 m^2 Final     Comment:     Calculation used to obtain the estimated glomerular filtration  rate (eGFR) is the CKD-EPI equation.      01/04/2019 >60.0 >60 mL/min/1.73 m^2 Final     Comment:     Calculation used to obtain the estimated glomerular filtration  rate (eGFR) is the CKD-EPI equation.        eGFR if    Date Value Ref Range Status   03/02/2020 >60.0 >60 mL/min/1.73 m^2 Final   10/11/2019 >60.0 >60 mL/min/1.73 m^2 Final   01/04/2019 >60.0 >60 mL/min/1.73 m^2 Final                 Appointments  past 12m or future 3m with PCP    Date Provider   Last Visit   2/22/2016 Aaron Whitten MD   Next Visit   Visit date not found Aaron Whitten MD   ED visits in past 90 days: 0     Note composed:10:38 PM 05/27/2020

## 2020-05-29 RX ORDER — LOSARTAN POTASSIUM AND HYDROCHLOROTHIAZIDE 25; 100 MG/1; MG/1
TABLET ORAL
Qty: 90 TABLET | Refills: 0 | OUTPATIENT
Start: 2020-05-29

## 2020-06-02 ENCOUNTER — PATIENT MESSAGE (OUTPATIENT)
Dept: GASTROENTEROLOGY | Facility: CLINIC | Age: 60
End: 2020-06-02

## 2020-06-02 RX ORDER — OMEPRAZOLE 40 MG/1
40 CAPSULE, DELAYED RELEASE ORAL
Qty: 90 CAPSULE | Refills: 6 | Status: SHIPPED | OUTPATIENT
Start: 2020-06-02 | End: 2021-09-24 | Stop reason: SDUPTHER

## 2020-06-03 ENCOUNTER — OFFICE VISIT (OUTPATIENT)
Dept: FAMILY MEDICINE | Facility: CLINIC | Age: 60
End: 2020-06-03
Payer: MEDICARE

## 2020-06-03 VITALS — DIASTOLIC BLOOD PRESSURE: 73 MMHG | SYSTOLIC BLOOD PRESSURE: 125 MMHG

## 2020-06-03 DIAGNOSIS — J45.20 REACTIVE AIRWAY DISEASE, MILD INTERMITTENT, UNCOMPLICATED: ICD-10-CM

## 2020-06-03 DIAGNOSIS — T78.40XA ALLERGIC STATE, INITIAL ENCOUNTER: ICD-10-CM

## 2020-06-03 DIAGNOSIS — I10 ESSENTIAL HYPERTENSION: Primary | ICD-10-CM

## 2020-06-03 DIAGNOSIS — K21.9 GASTROESOPHAGEAL REFLUX DISEASE, ESOPHAGITIS PRESENCE NOT SPECIFIED: ICD-10-CM

## 2020-06-03 PROCEDURE — 99214 OFFICE O/P EST MOD 30 MIN: CPT | Mod: 95,,, | Performed by: PHYSICIAN ASSISTANT

## 2020-06-03 PROCEDURE — 3078F PR MOST RECENT DIASTOLIC BLOOD PRESSURE < 80 MM HG: ICD-10-PCS | Mod: CPTII,95,, | Performed by: PHYSICIAN ASSISTANT

## 2020-06-03 PROCEDURE — 3074F PR MOST RECENT SYSTOLIC BLOOD PRESSURE < 130 MM HG: ICD-10-PCS | Mod: CPTII,95,, | Performed by: PHYSICIAN ASSISTANT

## 2020-06-03 PROCEDURE — 3078F DIAST BP <80 MM HG: CPT | Mod: CPTII,95,, | Performed by: PHYSICIAN ASSISTANT

## 2020-06-03 PROCEDURE — 99214 PR OFFICE/OUTPT VISIT, EST, LEVL IV, 30-39 MIN: ICD-10-PCS | Mod: 95,,, | Performed by: PHYSICIAN ASSISTANT

## 2020-06-03 PROCEDURE — 3074F SYST BP LT 130 MM HG: CPT | Mod: CPTII,95,, | Performed by: PHYSICIAN ASSISTANT

## 2020-06-03 RX ORDER — ALBUTEROL SULFATE 90 UG/1
2 AEROSOL, METERED RESPIRATORY (INHALATION) EVERY 6 HOURS PRN
Qty: 6.7 G | Refills: 3 | Status: SHIPPED | OUTPATIENT
Start: 2020-06-03 | End: 2021-08-04

## 2020-06-03 RX ORDER — LOSARTAN POTASSIUM AND HYDROCHLOROTHIAZIDE 25; 100 MG/1; MG/1
1 TABLET ORAL DAILY
Qty: 90 TABLET | Refills: 3 | Status: SHIPPED | OUTPATIENT
Start: 2020-06-03 | End: 2020-06-05

## 2020-06-03 RX ORDER — FAMOTIDINE 40 MG/1
40 TABLET, FILM COATED ORAL NIGHTLY PRN
Qty: 90 TABLET | Refills: 3 | Status: SHIPPED | OUTPATIENT
Start: 2020-06-03 | End: 2020-10-23 | Stop reason: SDUPTHER

## 2020-06-03 RX ORDER — MONTELUKAST SODIUM 10 MG/1
10 TABLET ORAL NIGHTLY
Qty: 90 TABLET | Refills: 3 | Status: SHIPPED | OUTPATIENT
Start: 2020-06-03 | End: 2021-03-24

## 2020-06-04 DIAGNOSIS — I10 ESSENTIAL HYPERTENSION: Primary | ICD-10-CM

## 2020-06-04 NOTE — TELEPHONE ENCOUNTER
----- Message from Valeriano Rivera sent at 6/3/2020  1:57 PM CDT -----  Type:  Pharmacy Calling to Clarify an RX    Name of Caller:  [place]  Pharmacy Name:    [facility] [place] [address]  [address]  [place] [id number]  Phone: [phone] Fax: [phone]          Prescription Name:  losartan-hydrochlorothiazide 100-25 mg (HYZAAR) 100-25 mg per tablet  What do they need to clarify?: Unavailable.  Caller wants to split the 2 medications into 2 different parts  Best Call Back Number:  [phone]  Additional Information:

## 2020-06-05 RX ORDER — LOSARTAN POTASSIUM 100 MG/1
100 TABLET ORAL DAILY
Qty: 90 TABLET | Refills: 3 | Status: SHIPPED | OUTPATIENT
Start: 2020-06-05 | End: 2021-03-16 | Stop reason: SDUPTHER

## 2020-06-05 RX ORDER — HYDROCHLOROTHIAZIDE 25 MG/1
25 TABLET ORAL DAILY
Qty: 30 TABLET | Refills: 11 | Status: SHIPPED | OUTPATIENT
Start: 2020-06-05 | End: 2021-03-16 | Stop reason: SDUPTHER

## 2020-06-05 NOTE — PROGRESS NOTES
Subjective:       Patient ID: Tina García is a 59 y.o. female    Chief Complaint: Follow-up    HPI  The patient location is:  [place]  The chief complaint leading to consultation is:  Follow-up    Visit type: audiovisual    Face to Face time with patient: 20 min  25 minutes of total time spent on the encounter, which includes face to face time and non-face to face time preparing to see the patient (eg, review of tests), Obtaining and/or reviewing separately obtained history, Documenting clinical information in the electronic or other health record, Independently interpreting results (not separately reported) and communicating results to the patient/family/caregiver, or Care coordination (not separately reported).         Each patient to whom he or she provides medical services by telemedicine is:  (1) informed of the relationship between the physician and patient and the respective role of any other health care provider with respect to management of the patient; and (2) notified that he or she may decline to receive medical services by telemedicine and may withdraw from such care at any time.    Notes:   The patient presents today for follow-up for her hypertension, hyperlipidemia and allergies.  She has a history of allergies and has been getting headaches intermittently.    Review of Systems   Constitutional: Negative for chills and fever.   HENT: Negative for congestion.    Eyes: Negative for visual disturbance.   Respiratory: Negative for cough and shortness of breath.    Cardiovascular: Negative for chest pain and palpitations.   Gastrointestinal: Negative for abdominal pain, diarrhea, nausea and vomiting.   Musculoskeletal: Negative for neck pain.   Skin: Negative for rash.   Neurological: Positive for headaches.        Objective:   Physical Exam  Constitutional:       General: She is not in acute distress.     Appearance: She is well-developed.   HENT:      Head: Normocephalic and atraumatic.    Neck:      Musculoskeletal: Normal range of motion and neck supple.      Thyroid: No thyromegaly.   Pulmonary:      Effort: Pulmonary effort is normal.   Musculoskeletal: Normal range of motion.   Skin:     Findings: No erythema or rash.   Neurological:      Mental Status: She is alert and oriented to person, place, and time.   Psychiatric:         Behavior: Behavior normal.         Thought Content: Thought content normal.         Judgment: Judgment normal.          Assessment:       1. Essential hypertension  Lipid Panel    DISCONTINUED: losartan-hydrochlorothiazide 100-25 mg (HYZAAR) 100-25 mg per tablet   2. Gastroesophageal reflux disease, esophagitis presence not specified  famotidine (PEPCID) 40 MG tablet   3. Allergic state, initial encounter  montelukast (SINGULAIR) 10 mg tablet   4. Reactive airway disease, mild intermittent, uncomplicated  albuterol (PROAIR HFA) 90 mcg/actuation inhaler        Plan:       Essential hypertension  -     Lipid Panel; Future; Expected date: 06/03/2020  -     Discontinue: losartan-hydrochlorothiazide 100-25 mg (HYZAAR) 100-25 mg per tablet; Take 1 tablet by mouth once daily.  Dispense: 90 tablet; Refill: 3    Gastroesophageal reflux disease, esophagitis presence not specified  -     famotidine (PEPCID) 40 MG tablet; Take 1 tablet (40 mg total) by mouth nightly as needed for Heartburn.  Dispense: 90 tablet; Refill: 3    Allergic state, initial encounter  -     montelukast (SINGULAIR) 10 mg tablet; Take 1 tablet (10 mg total) by mouth every evening.  Dispense: 90 tablet; Refill: 3    Reactive airway disease, mild intermittent, uncomplicated  -     albuterol (PROAIR HFA) 90 mcg/actuation inhaler; Inhale 2 puffs into the lungs every 6 (six) hours as needed for Wheezing.  Dispense: 6.7 g; Refill: 3

## 2020-06-18 DIAGNOSIS — R11.0 NAUSEA: ICD-10-CM

## 2020-06-19 NOTE — PROGRESS NOTES
Refill Routing Note    Medication(s) are not appropriate for processing by [facility]:       Medication not previously prescribed by PCP        Medication Therapy Plan: FOVS; MEDICATION NOT PREVIOUSLY PRESCRIBED BY PCP, DEFER TO YOU  Medication reconciliation completed: No      Automatic Epic Protocol Generated Data:    Requested Prescriptions   Pending Prescriptions Disp Refills    valACYclovir (VALTREX) 500 MG tablet [Pharmacy Med Name: VALACYCLOVIR 500MG TABLETS] 90 tablet 0     Sig: TAKE 1 TABLET BY MOUTH EVERY DAY       Antimicrobials:  Oral Antiviral Agents - Anti-Herpetic Passed - 6/18/2020  4:37 PM        Passed - Patient is at least 18 years old        Passed - Office visit in past 12 months or future 90 days.     Recent Outpatient Visits            2 weeks ago Essential hypertension    [facility] Medina Key PA-C    2 months ago Sjogren's syndrome, with unspecified organ involvement    [facility] Joao Fry MD    3 months ago CREST (calcinosis, Raynaud's phenomenon, esophageal dysfunction, sclerodactyly, telangiectasia)    [facility] Joanna Paulson PA-C    3 months ago Great toe pain, right    [facility] [place] Francois PA-C    4 months ago Closed fracture of nasal bone, initial encounter    [facility] [place] [facility]          Future Appointments              In 3 months Aaron Whitten MD [facility] [place]    In 4 months [facility]                Passed - Cr is 1.3 or below and within 360 days     Creatinine   Date Value Ref Range Status   03/02/2020 0.8 0.5 - 1.4 mg/dL Final   10/11/2019 0.8 0.5 - 1.4 mg/dL Final   01/04/2019 0.8 0.5 - 1.4 mg/dL Final              Passed - WBC in normal range and within 360 days     WBC   Date Value Ref Range Status   03/02/2020 6.93 3.90 - 12.70 K/uL Final   10/11/2019 6.02 3.90 - 12.70  K/uL Final   01/04/2019 6.79 3.90 - 12.70 K/uL Final              Passed - eGFR within 360 days     eGFR if non    Date Value Ref Range Status   03/02/2020 >60.0 >60 mL/min/1.73 m^2 Final     Comment:     Calculation used to obtain the estimated glomerular filtration  rate (eGFR) is the CKD-EPI equation.      10/11/2019 >60.0 >60 mL/min/1.73 m^2 Final     Comment:     Calculation used to obtain the estimated glomerular filtration  rate (eGFR) is the CKD-EPI equation.      01/04/2019 >60.0 >60 mL/min/1.73 m^2 Final     Comment:     Calculation used to obtain the estimated glomerular filtration  rate (eGFR) is the CKD-EPI equation.        eGFR if    Date Value Ref Range Status   03/02/2020 >60.0 >60 mL/min/1.73 m^2 Final   10/11/2019 >60.0 >60 mL/min/1.73 m^2 Final   01/04/2019 >60.0 >60 mL/min/1.73 m^2 Final                    Appointments  past 12m or future 3m with PCP    Date Provider   Last Visit   2/22/2016 Aaron Whitten MD   Next Visit   9/24/2020 Aaron Whitten MD   ED visits in past 90 days: 0     Note composed:7:02 AM 06/19/2020

## 2020-06-22 RX ORDER — VALACYCLOVIR HYDROCHLORIDE 500 MG/1
TABLET, FILM COATED ORAL
Qty: 90 TABLET | Refills: 0 | Status: SHIPPED | OUTPATIENT
Start: 2020-06-22 | End: 2020-10-10

## 2020-06-22 RX ORDER — PROMETHAZINE HYDROCHLORIDE 25 MG/1
25 TABLET ORAL EVERY 6 HOURS PRN
Qty: 30 TABLET | Refills: 3 | Status: SHIPPED | OUTPATIENT
Start: 2020-06-22 | End: 2020-10-14 | Stop reason: SDUPTHER

## 2020-06-30 ENCOUNTER — PES CALL (OUTPATIENT)
Dept: ADMINISTRATIVE | Facility: CLINIC | Age: 60
End: 2020-06-30

## 2020-09-03 ENCOUNTER — TELEPHONE (OUTPATIENT)
Dept: RHEUMATOLOGY | Facility: CLINIC | Age: 60
End: 2020-09-03

## 2020-09-03 DIAGNOSIS — I27.20 PULMONARY HYPERTENSION: ICD-10-CM

## 2020-09-03 DIAGNOSIS — M34.1 CREST (CALCINOSIS, RAYNAUD'S PHENOMENON, ESOPHAGEAL DYSFUNCTION, SCLERODACTYLY, TELANGIECTASIA): Primary | ICD-10-CM

## 2020-09-03 NOTE — TELEPHONE ENCOUNTER
----- Message from Medina [place] sent at 9/3/2020 11:32 AM CDT -----  Regarding: Pulmonary Functin Test  Contact: pt  Pt called to schedule Pulmonary Function Test      Pt needs orders .  Once orders are in Epic , please forward to Pulmonary Services so they can call pt and schedule.   Pt Ph [phone]  Thanks

## 2020-09-16 ENCOUNTER — TELEPHONE (OUTPATIENT)
Dept: FAMILY MEDICINE | Facility: CLINIC | Age: 60
End: 2020-09-16

## 2020-09-16 NOTE — TELEPHONE ENCOUNTER
----- Message from Jimmy Bangura sent at 9/16/2020  3:56 PM CDT -----  Type:  Patient Returning Call    Who Called:  Patient  Who Left Message for Patient:  Diannahamida Hoover  Does the patient know what this is regarding?:  no  Best Call Back Number:  [phone]  Additional Information:  NA

## 2020-09-16 NOTE — TELEPHONE ENCOUNTER
Spoke to pt. Pt states she received a call today regarding a new PCP. Pt states that she is fine with see a NP when she cannot see Dr. Whitten, and states she really does not any other PCP other than Dr. Whitten since she has been with him for so long.

## 2020-09-17 NOTE — TELEPHONE ENCOUNTER
OK to try that for a period of time, but inform her my availabilty is very limited   Private Vehicle

## 2020-09-18 NOTE — TELEPHONE ENCOUNTER
Spoke with pt, advised that she rec'd letter in Jan to est with new PCP, no Dr Whitten appts available. Pt chooses to schedule with Dr Harris and Medina Key. appt scheduled per pt req.

## 2020-09-24 ENCOUNTER — OFFICE VISIT (OUTPATIENT)
Dept: FAMILY MEDICINE | Facility: CLINIC | Age: 60
End: 2020-09-24
Payer: MEDICARE

## 2020-09-24 ENCOUNTER — TELEPHONE (OUTPATIENT)
Dept: FAMILY MEDICINE | Facility: CLINIC | Age: 60
End: 2020-09-24

## 2020-09-24 ENCOUNTER — LAB VISIT (OUTPATIENT)
Dept: LAB | Facility: HOSPITAL | Age: 60
End: 2020-09-24
Attending: INTERNAL MEDICINE
Payer: MEDICARE

## 2020-09-24 VITALS
SYSTOLIC BLOOD PRESSURE: 118 MMHG | TEMPERATURE: 99 F | HEART RATE: 98 BPM | WEIGHT: 178.38 LBS | BODY MASS INDEX: 30.61 KG/M2 | DIASTOLIC BLOOD PRESSURE: 78 MMHG | OXYGEN SATURATION: 99 %

## 2020-09-24 DIAGNOSIS — Z79.899 IMMUNOCOMPROMISED STATE DUE TO DRUG THERAPY: ICD-10-CM

## 2020-09-24 DIAGNOSIS — E78.49 OTHER HYPERLIPIDEMIA: ICD-10-CM

## 2020-09-24 DIAGNOSIS — M35.00 SJOGREN'S SYNDROME, WITH UNSPECIFIED ORGAN INVOLVEMENT: ICD-10-CM

## 2020-09-24 DIAGNOSIS — K22.4 ESOPHAGEAL DYSMOTILITY: ICD-10-CM

## 2020-09-24 DIAGNOSIS — K21.9 GASTROESOPHAGEAL REFLUX DISEASE WITHOUT ESOPHAGITIS: ICD-10-CM

## 2020-09-24 DIAGNOSIS — D84.821 IMMUNOCOMPROMISED STATE DUE TO DRUG THERAPY: ICD-10-CM

## 2020-09-24 DIAGNOSIS — I10 ESSENTIAL HYPERTENSION: Primary | ICD-10-CM

## 2020-09-24 DIAGNOSIS — I10 ESSENTIAL HYPERTENSION: ICD-10-CM

## 2020-09-24 DIAGNOSIS — I27.20 PULMONARY HYPERTENSION: ICD-10-CM

## 2020-09-24 DIAGNOSIS — M34.1 CREST (CALCINOSIS, RAYNAUD'S PHENOMENON, ESOPHAGEAL DYSFUNCTION, SCLERODACTYLY, TELANGIECTASIA): ICD-10-CM

## 2020-09-24 DIAGNOSIS — J20.9 ACUTE BRONCHITIS, UNSPECIFIED ORGANISM: ICD-10-CM

## 2020-09-24 DIAGNOSIS — M89.9 DISORDER OF BONE, UNSPECIFIED: ICD-10-CM

## 2020-09-24 DIAGNOSIS — G89.4 CHRONIC PAIN SYNDROME: ICD-10-CM

## 2020-09-24 DIAGNOSIS — R11.0 NAUSEA: ICD-10-CM

## 2020-09-24 LAB
BASOPHILS # BLD AUTO: 0.08 K/UL (ref 0–0.2)
BASOPHILS # BLD AUTO: 0.08 K/UL (ref 0–0.2)
BASOPHILS NFR BLD: 1 % (ref 0–1.9)
BASOPHILS NFR BLD: 1 % (ref 0–1.9)
DIFFERENTIAL METHOD: NORMAL
DIFFERENTIAL METHOD: NORMAL
EOSINOPHIL # BLD AUTO: 0.3 K/UL (ref 0–0.5)
EOSINOPHIL # BLD AUTO: 0.3 K/UL (ref 0–0.5)
EOSINOPHIL NFR BLD: 3.3 % (ref 0–8)
EOSINOPHIL NFR BLD: 3.3 % (ref 0–8)
ERYTHROCYTE [DISTWIDTH] IN BLOOD BY AUTOMATED COUNT: 12.9 % (ref 11.5–14.5)
ERYTHROCYTE [DISTWIDTH] IN BLOOD BY AUTOMATED COUNT: 12.9 % (ref 11.5–14.5)
ERYTHROCYTE [SEDIMENTATION RATE] IN BLOOD BY WESTERGREN METHOD: 17 MM/HR (ref 0–20)
ERYTHROCYTE [SEDIMENTATION RATE] IN BLOOD BY WESTERGREN METHOD: 17 MM/HR (ref 0–20)
HCT VFR BLD AUTO: 41.3 % (ref 37–48.5)
HCT VFR BLD AUTO: 41.3 % (ref 37–48.5)
HGB BLD-MCNC: 13.6 G/DL (ref 12–16)
HGB BLD-MCNC: 13.6 G/DL (ref 12–16)
IMM GRANULOCYTES # BLD AUTO: 0.02 K/UL (ref 0–0.04)
IMM GRANULOCYTES # BLD AUTO: 0.02 K/UL (ref 0–0.04)
IMM GRANULOCYTES NFR BLD AUTO: 0.3 % (ref 0–0.5)
IMM GRANULOCYTES NFR BLD AUTO: 0.3 % (ref 0–0.5)
LYMPHOCYTES # BLD AUTO: 2.4 K/UL (ref 1–4.8)
LYMPHOCYTES # BLD AUTO: 2.4 K/UL (ref 1–4.8)
LYMPHOCYTES NFR BLD: 31.8 % (ref 18–48)
LYMPHOCYTES NFR BLD: 31.8 % (ref 18–48)
MCH RBC QN AUTO: 29.3 PG (ref 27–31)
MCH RBC QN AUTO: 29.3 PG (ref 27–31)
MCHC RBC AUTO-ENTMCNC: 32.9 G/DL (ref 32–36)
MCHC RBC AUTO-ENTMCNC: 32.9 G/DL (ref 32–36)
MCV RBC AUTO: 89 FL (ref 82–98)
MCV RBC AUTO: 89 FL (ref 82–98)
MONOCYTES # BLD AUTO: 0.6 K/UL (ref 0.3–1)
MONOCYTES # BLD AUTO: 0.6 K/UL (ref 0.3–1)
MONOCYTES NFR BLD: 7.3 % (ref 4–15)
MONOCYTES NFR BLD: 7.3 % (ref 4–15)
NEUTROPHILS # BLD AUTO: 4.3 K/UL (ref 1.8–7.7)
NEUTROPHILS # BLD AUTO: 4.3 K/UL (ref 1.8–7.7)
NEUTROPHILS NFR BLD: 56.3 % (ref 38–73)
NEUTROPHILS NFR BLD: 56.3 % (ref 38–73)
NRBC BLD-RTO: 0 /100 WBC
NRBC BLD-RTO: 0 /100 WBC
PLATELET # BLD AUTO: 280 K/UL (ref 150–350)
PLATELET # BLD AUTO: 280 K/UL (ref 150–350)
PMV BLD AUTO: 10.8 FL (ref 9.2–12.9)
PMV BLD AUTO: 10.8 FL (ref 9.2–12.9)
RBC # BLD AUTO: 4.64 M/UL (ref 4–5.4)
RBC # BLD AUTO: 4.64 M/UL (ref 4–5.4)
WBC # BLD AUTO: 7.62 K/UL (ref 3.9–12.7)
WBC # BLD AUTO: 7.62 K/UL (ref 3.9–12.7)

## 2020-09-24 PROCEDURE — 99999 PR PBB SHADOW E&M-EST. PATIENT-LVL IV: ICD-10-PCS | Mod: PBBFAC,,, | Performed by: FAMILY MEDICINE

## 2020-09-24 PROCEDURE — 3074F SYST BP LT 130 MM HG: CPT | Mod: CPTII,S$GLB,, | Performed by: FAMILY MEDICINE

## 2020-09-24 PROCEDURE — 3008F PR BODY MASS INDEX (BMI) DOCUMENTED: ICD-10-PCS | Mod: CPTII,S$GLB,, | Performed by: FAMILY MEDICINE

## 2020-09-24 PROCEDURE — 3078F DIAST BP <80 MM HG: CPT | Mod: CPTII,S$GLB,, | Performed by: FAMILY MEDICINE

## 2020-09-24 PROCEDURE — G0008 FLU VACCINE (QUAD) GREATER THAN OR EQUAL TO 3YO PRESERVATIVE FREE IM: ICD-10-PCS | Mod: S$GLB,,, | Performed by: FAMILY MEDICINE

## 2020-09-24 PROCEDURE — 86235 NUCLEAR ANTIGEN ANTIBODY: CPT

## 2020-09-24 PROCEDURE — 85651 RBC SED RATE NONAUTOMATED: CPT | Mod: PO

## 2020-09-24 PROCEDURE — 3078F PR MOST RECENT DIASTOLIC BLOOD PRESSURE < 80 MM HG: ICD-10-PCS | Mod: CPTII,S$GLB,, | Performed by: FAMILY MEDICINE

## 2020-09-24 PROCEDURE — 80053 COMPREHEN METABOLIC PANEL: CPT

## 2020-09-24 PROCEDURE — 84443 ASSAY THYROID STIM HORMONE: CPT

## 2020-09-24 PROCEDURE — 90686 FLU VACCINE (QUAD) GREATER THAN OR EQUAL TO 3YO PRESERVATIVE FREE IM: ICD-10-PCS | Mod: S$GLB,,, | Performed by: FAMILY MEDICINE

## 2020-09-24 PROCEDURE — 86235 NUCLEAR ANTIGEN ANTIBODY: CPT | Mod: 59

## 2020-09-24 PROCEDURE — 86038 ANTINUCLEAR ANTIBODIES: CPT

## 2020-09-24 PROCEDURE — 3074F PR MOST RECENT SYSTOLIC BLOOD PRESSURE < 130 MM HG: ICD-10-PCS | Mod: CPTII,S$GLB,, | Performed by: FAMILY MEDICINE

## 2020-09-24 PROCEDURE — 36415 COLL VENOUS BLD VENIPUNCTURE: CPT | Mod: PO

## 2020-09-24 PROCEDURE — 82306 VITAMIN D 25 HYDROXY: CPT

## 2020-09-24 PROCEDURE — G0008 ADMIN INFLUENZA VIRUS VAC: HCPCS | Mod: S$GLB,,, | Performed by: FAMILY MEDICINE

## 2020-09-24 PROCEDURE — 86039 ANTINUCLEAR ANTIBODIES (ANA): CPT

## 2020-09-24 PROCEDURE — 86140 C-REACTIVE PROTEIN: CPT

## 2020-09-24 PROCEDURE — 80061 LIPID PANEL: CPT

## 2020-09-24 PROCEDURE — 99999 PR PBB SHADOW E&M-EST. PATIENT-LVL IV: CPT | Mod: PBBFAC,,, | Performed by: FAMILY MEDICINE

## 2020-09-24 PROCEDURE — 84481 FREE ASSAY (FT-3): CPT

## 2020-09-24 PROCEDURE — 85025 COMPLETE CBC W/AUTO DIFF WBC: CPT

## 2020-09-24 PROCEDURE — 99214 OFFICE O/P EST MOD 30 MIN: CPT | Mod: 25,S$GLB,, | Performed by: FAMILY MEDICINE

## 2020-09-24 PROCEDURE — 3008F BODY MASS INDEX DOCD: CPT | Mod: CPTII,S$GLB,, | Performed by: FAMILY MEDICINE

## 2020-09-24 PROCEDURE — 90686 IIV4 VACC NO PRSV 0.5 ML IM: CPT | Mod: S$GLB,,, | Performed by: FAMILY MEDICINE

## 2020-09-24 PROCEDURE — 99214 PR OFFICE/OUTPT VISIT, EST, LEVL IV, 30-39 MIN: ICD-10-PCS | Mod: 25,S$GLB,, | Performed by: FAMILY MEDICINE

## 2020-09-24 PROCEDURE — 99499 UNLISTED E&M SERVICE: CPT | Mod: HCNC,S$GLB,, | Performed by: FAMILY MEDICINE

## 2020-09-24 PROCEDURE — 99499 RISK ADDL DX/OHS AUDIT: ICD-10-PCS | Mod: HCNC,S$GLB,, | Performed by: FAMILY MEDICINE

## 2020-09-24 NOTE — TELEPHONE ENCOUNTER
Ms. García was in her pcp's office at the time of my call. Pt had questions concerning why she would need to see a NP for AWV if she is already here. Pt agreed I could call her back this afternoon to discuss since she is currently waiting on her flu shot now.

## 2020-09-24 NOTE — PATIENT INSTRUCTIONS
Eating Heart-Healthy Food: Using the DASH Plan    Eating for your heart doesnt have to be hard or boring. You just need to know how to make healthier choices. The DASH eating plan has been developed to help you do just that. DASH stands for Dietary Approaches to Stop Hypertension. It is a plan that has been proven to be healthier for your heart and to lower your risk for high blood pressure. It can also help lower your risk for cancer, heart disease, osteoporosis, and diabetes.  Choosing from each food group  Choose foods from each of the food groups below each day. Try to get the recommended number of servings for each food group. The serving numbers are based on a diet of 2,000 calories a day. Talk to your doctor if youre unsure about your calorie needs. Along with getting the correct servings, the DASH plan also recommends a sodium intake less than 2,300 mg per day.        Grains  Servings: 6 to 8 a day  A serving is:  · 1 slice bread  · 1 ounce dry cereal  · Half a cup cooked rice, pasta or cereal  Best choices: Whole grains and any grains high in fiber. Vegetables  Servings: 4 to 5 a day  A serving is:  · 1 cup raw leafy vegetable  · Half a cup cut-up raw or cooked vegetable  · Half a cup vegetable juice  Best choices: Fresh or frozen vegetables prepared without added salt or fat.   Fruits  Servings: 4 to 5 a day  A serving is:  · 1 medium fruit  · One-quarter cup dried fruit  · Half a cup fresh, frozen, or canned fruit  · Half a cup of 100% fruit juices  Best choices: A variety of fresh fruits of different colors. Whole fruits are a better choice than fruit juices. Low-fat or fat-free dairy  Servings: 2 to 3 a day  A serving is:  · 1 cup milk  · 1 cup yogurt  · One and a half ounces cheese  Best choices: Skim or 1% milk, low-fat or fat-free yogurt or buttermilk, and low-fat cheeses.         Lean meats, poultry, fish  Servings: 6 or fewer a day  A serving is:  · 1 ounce cooked meats, poultry, or fish  · 1  egg  Best choices: Lean poultry and fish. Trim away visible fat. Broil, grill, roast, or boil instead of frying. Remove skin from poultry before eating. Limit how much red meat you eat.  Nuts, seeds, beans  Servings: 4 to 5 a week  A serving is:  · One-third cup nuts (one and a half ounces)  · 2 tablespoons nut butter or seeds  · Half a cup cooked dry beans or legumes  Best choices: Dry roasted nuts with no salt added, lentils, kidney beans, garbanzo beans, and whole szymanski beans.   Fats and oils  Servings: 2 to 3 a day  A serving is:  · 1 teaspoon vegetable oil  · 1 teaspoon soft margarine  · 1 tablespoon mayonnaise  · 2 tablespoons salad dressing  Best choices: Nut and vegetable oils (nontropical vegetable oils), such as olive and canola oil. Sweets  Servings: 5 a week or fewer  A serving is:  · 1 tablespoon sugar, maple syrup, or honey  · 1 tablespoon jam or jelly  · 1 half-ounce jelly beans (about 15)  · 1 cup lemonade  Best choices: Dried fruit can be a satisfying sweet. Choose low-fat sweets. And watch your serving sizes!      For more on the DASH eating plan, visit:  www.nhlbi.nih.gov/health/health-topics/topics/dash   Date Last Reviewed: 6/1/2016  © 6746-7616 [employer]. [address] [place] [address], [place] [address]. All rights reserved. This information is not intended as a substitute for professional medical care. Always follow your healthcare professional's instructions.

## 2020-09-25 ENCOUNTER — CLINICAL SUPPORT (OUTPATIENT)
Dept: CARDIOLOGY | Facility: CLINIC | Age: 60
End: 2020-09-25
Attending: INTERNAL MEDICINE
Payer: MEDICARE

## 2020-09-25 VITALS — HEART RATE: 62 BPM | HEIGHT: 64 IN | BODY MASS INDEX: 30.56 KG/M2 | WEIGHT: 179 LBS

## 2020-09-25 DIAGNOSIS — M34.1 CREST (CALCINOSIS, RAYNAUD'S PHENOMENON, ESOPHAGEAL DYSFUNCTION, SCLERODACTYLY, TELANGIECTASIA): ICD-10-CM

## 2020-09-25 DIAGNOSIS — I27.20 PULMONARY HYPERTENSION: ICD-10-CM

## 2020-09-25 DIAGNOSIS — M35.00 SJOGREN'S SYNDROME, WITH UNSPECIFIED ORGAN INVOLVEMENT: ICD-10-CM

## 2020-09-25 LAB
25(OH)D3+25(OH)D2 SERPL-MCNC: 42 NG/ML (ref 30–96)
ALBUMIN SERPL BCP-MCNC: 3.9 G/DL (ref 3.5–5.2)
ALBUMIN SERPL BCP-MCNC: 3.9 G/DL (ref 3.5–5.2)
ALP SERPL-CCNC: 69 U/L (ref 55–135)
ALP SERPL-CCNC: 69 U/L (ref 55–135)
ALT SERPL W/O P-5'-P-CCNC: 24 U/L (ref 10–44)
ALT SERPL W/O P-5'-P-CCNC: 24 U/L (ref 10–44)
ANA PATTERN 1: NORMAL
ANA PATTERN 2: NORMAL
ANA SER QL IF: POSITIVE
ANA TITER 2: >2560
ANA TITR SER IF: NORMAL {TITER}
ANION GAP SERPL CALC-SCNC: 13 MMOL/L (ref 8–16)
ANION GAP SERPL CALC-SCNC: 13 MMOL/L (ref 8–16)
AST SERPL-CCNC: 20 U/L (ref 10–40)
AST SERPL-CCNC: 20 U/L (ref 10–40)
BILIRUB SERPL-MCNC: 0.3 MG/DL (ref 0.1–1)
BILIRUB SERPL-MCNC: 0.3 MG/DL (ref 0.1–1)
BUN SERPL-MCNC: 12 MG/DL (ref 6–20)
BUN SERPL-MCNC: 12 MG/DL (ref 6–20)
CALCIUM SERPL-MCNC: 8.8 MG/DL (ref 8.7–10.5)
CALCIUM SERPL-MCNC: 8.8 MG/DL (ref 8.7–10.5)
CHLORIDE SERPL-SCNC: 95 MMOL/L (ref 95–110)
CHLORIDE SERPL-SCNC: 95 MMOL/L (ref 95–110)
CHOLEST SERPL-MCNC: 264 MG/DL (ref 120–199)
CHOLEST/HDLC SERPL: 4.3 {RATIO} (ref 2–5)
CO2 SERPL-SCNC: 24 MMOL/L (ref 23–29)
CO2 SERPL-SCNC: 24 MMOL/L (ref 23–29)
CREAT SERPL-MCNC: 0.8 MG/DL (ref 0.5–1.4)
CREAT SERPL-MCNC: 0.8 MG/DL (ref 0.5–1.4)
CRP SERPL-MCNC: 2.6 MG/L (ref 0–8.2)
CRP SERPL-MCNC: 2.6 MG/L (ref 0–8.2)
EST. GFR  (AFRICAN AMERICAN): >60 ML/MIN/1.73 M^2
EST. GFR  (AFRICAN AMERICAN): >60 ML/MIN/1.73 M^2
EST. GFR  (NON AFRICAN AMERICAN): >60 ML/MIN/1.73 M^2
EST. GFR  (NON AFRICAN AMERICAN): >60 ML/MIN/1.73 M^2
GLUCOSE SERPL-MCNC: 109 MG/DL (ref 70–110)
GLUCOSE SERPL-MCNC: 109 MG/DL (ref 70–110)
HDLC SERPL-MCNC: 61 MG/DL (ref 40–75)
HDLC SERPL: 23.1 % (ref 20–50)
LDLC SERPL CALC-MCNC: 169.6 MG/DL (ref 63–159)
NONHDLC SERPL-MCNC: 203 MG/DL
POTASSIUM SERPL-SCNC: 3.6 MMOL/L (ref 3.5–5.1)
POTASSIUM SERPL-SCNC: 3.6 MMOL/L (ref 3.5–5.1)
PROT SERPL-MCNC: 7.6 G/DL (ref 6–8.4)
PROT SERPL-MCNC: 7.6 G/DL (ref 6–8.4)
SODIUM SERPL-SCNC: 132 MMOL/L (ref 136–145)
SODIUM SERPL-SCNC: 132 MMOL/L (ref 136–145)
T3FREE SERPL-MCNC: 3.4 PG/ML (ref 2.3–4.2)
TRIGL SERPL-MCNC: 167 MG/DL (ref 30–150)
TSH SERPL DL<=0.005 MIU/L-ACNC: 1.39 UIU/ML (ref 0.4–4)

## 2020-09-25 PROCEDURE — 93321 PR DOPPLER ECHO HEART,LIMITED,F/U: ICD-10-PCS | Mod: S$GLB,,, | Performed by: INTERNAL MEDICINE

## 2020-09-25 PROCEDURE — 99999 PR PBB SHADOW E&M-EST. PATIENT-LVL II: CPT | Mod: PBBFAC,,,

## 2020-09-25 PROCEDURE — 93308 ECHO (CUPID ONLY): ICD-10-PCS | Mod: S$GLB,,, | Performed by: INTERNAL MEDICINE

## 2020-09-25 PROCEDURE — 93321 DOPPLER ECHO F-UP/LMTD STD: CPT | Mod: S$GLB,,, | Performed by: INTERNAL MEDICINE

## 2020-09-25 PROCEDURE — 93325 DOPPLER ECHO COLOR FLOW MAPG: CPT | Mod: S$GLB,,, | Performed by: INTERNAL MEDICINE

## 2020-09-25 PROCEDURE — 93325 PR DOPPLER COLOR FLOW VELOCITY MAP: ICD-10-PCS | Mod: S$GLB,,, | Performed by: INTERNAL MEDICINE

## 2020-09-25 PROCEDURE — 93308 TTE F-UP OR LMTD: CPT | Mod: S$GLB,,, | Performed by: INTERNAL MEDICINE

## 2020-09-25 PROCEDURE — 99999 PR PBB SHADOW E&M-EST. PATIENT-LVL II: ICD-10-PCS | Mod: PBBFAC,,,

## 2020-09-28 LAB
ANTI SM ANTIBODY: 0.07 RATIO (ref 0–0.99)
ANTI SM/RNP ANTIBODY: 0.06 RATIO (ref 0–0.99)
ANTI SM/RNP ANTIBODY: 0.06 RATIO (ref 0–0.99)
ANTI-SM INTERPRETATION: NEGATIVE
ANTI-SM/RNP INTERPRETATION: NEGATIVE
ANTI-SM/RNP INTERPRETATION: NEGATIVE
ANTI-SSA ANTIBODY: 0.04 RATIO (ref 0–0.99)
ANTI-SSA INTERPRETATION: NEGATIVE
ANTI-SSB ANTIBODY: 0.11 RATIO (ref 0–0.99)
ANTI-SSB INTERPRETATION: NEGATIVE
ASCENDING AORTA: 2.3 CM
AV INDEX (PROSTH): 0.66
AV MEAN GRADIENT: 5 MMHG
AV PEAK GRADIENT: 9 MMHG
AV VALVE AREA: 2.18 CM2
AV VELOCITY RATIO: 0.72
BSA FOR ECHO PROCEDURE: 1.91 M2
CV ECHO LV RWT: 0.3 CM
DOP CALC AO PEAK VEL: 1.51 M/S
DOP CALC AO VTI: 26.82 CM
DOP CALC LVOT AREA: 3.3 CM2
DOP CALC LVOT DIAMETER: 2.05 CM
DOP CALC LVOT PEAK VEL: 1.08 M/S
DOP CALC LVOT STROKE VOLUME: 58.49 CM3
DOP CALCLVOT PEAK VEL VTI: 17.73 CM
DSDNA AB SER-ACNC: NORMAL [IU]/ML
E WAVE DECELERATION TIME: 354.63 MSEC
E/A RATIO: 0.68
E/E' RATIO: 4.6 M/S
ECHO LV POSTERIOR WALL: 0.72 CM (ref 0.6–1.1)
FRACTIONAL SHORTENING: 32 % (ref 28–44)
INTERVENTRICULAR SEPTUM: 0.86 CM (ref 0.6–1.1)
IVRT: 125.59 MSEC
LA MAJOR: 3.5 CM
LA MINOR: 3.67 CM
LA WIDTH: 2.85 CM
LEFT ATRIUM SIZE: 3.07 CM
LEFT ATRIUM VOLUME INDEX: 14.3 ML/M2
LEFT ATRIUM VOLUME: 26.65 CM3
LEFT INTERNAL DIMENSION IN SYSTOLE: 3.26 CM (ref 2.1–4)
LEFT VENTRICLE DIASTOLIC VOLUME INDEX: 56.65 ML/M2
LEFT VENTRICLE DIASTOLIC VOLUME: 105.71 ML
LEFT VENTRICLE MASS INDEX: 66 G/M2
LEFT VENTRICLE SYSTOLIC VOLUME INDEX: 23 ML/M2
LEFT VENTRICLE SYSTOLIC VOLUME: 42.98 ML
LEFT VENTRICULAR INTERNAL DIMENSION IN DIASTOLE: 4.77 CM (ref 3.5–6)
LEFT VENTRICULAR MASS: 123.33 G
LV LATERAL E/E' RATIO: 3.54 M/S
LV SEPTAL E/E' RATIO: 6.57 M/S
MV PEAK A VEL: 0.68 M/S
MV PEAK E VEL: 0.46 M/S
MV STENOSIS PRESSURE HALF TIME: 102.84 MS
MV VALVE AREA P 1/2 METHOD: 2.14 CM2
PISA TR MAX VEL: 1.8 M/S
PULM VEIN S/D RATIO: 1.29
PV PEAK D VEL: 0.28 M/S
PV PEAK S VEL: 0.36 M/S
RA MAJOR: 3.63 CM
RA PRESSURE: 3 MMHG
RA WIDTH: 2.64 CM
RIGHT VENTRICULAR END-DIASTOLIC DIMENSION: 2.78 CM
SINUS: 2.48 CM
STJ: 2.37 CM
TDI LATERAL: 0.13 M/S
TDI SEPTAL: 0.07 M/S
TDI: 0.1 M/S
TR MAX PG: 13 MMHG
TRICUSPID ANNULAR PLANE SYSTOLIC EXCURSION: 2.27 CM
TV REST PULMONARY ARTERY PRESSURE: 16 MMHG

## 2020-09-29 ENCOUNTER — PATIENT MESSAGE (OUTPATIENT)
Dept: OTHER | Facility: OTHER | Age: 60
End: 2020-09-29

## 2020-10-01 NOTE — PROGRESS NOTES
Subjective:       Patient ID: Tina García is a 60 y.o. female.    Chief Complaint: Establish Care    HPI     The patient is coming here today to establish a new primary care physician.    Hypertension:  The patient has been taking losartan daily as directed, denies any side effects of the medication.  The patient did not bring a log of the blood pressure readings from home.  The patient denies any symptoms of chest pain or shortness of breath.    Hyperlipidemia:  The last cholesterol levels were slightly elevated, the patient is currently not taking any cholesterol medication.  The patient denies any symptoms of chest pain and shortness of breath.    CREST:  The patient is currently under the care of the rheumatologist secondary to crest syndrome, she is taking Imuran daily.  The patient stated the symptoms are good control and still having some pain in the joints.    GERD:  The patient denies any worsening symptoms of acid reflux, currently she is taking omeprazole and also famotidine.  She denies any side effects of the medication.    Past medical history, past social history was reviewed and discussed with the patient.    Review of Systems   Constitutional: Negative for activity change and appetite change.   HENT: Negative for congestion and ear discharge.    Eyes: Negative for discharge and itching.   Respiratory: Negative for choking and chest tightness.    Cardiovascular: Negative for chest pain and leg swelling.   Gastrointestinal: Negative for abdominal distention and abdominal pain.        GERD   Endocrine: Negative for cold intolerance and heat intolerance.   Genitourinary: Negative for dysuria and flank pain.   Musculoskeletal: Positive for arthralgias. Negative for back pain.   Skin: Negative for pallor and rash.   Allergic/Immunologic: Negative for environmental allergies and food allergies.   Neurological: Negative for dizziness and facial asymmetry.   Hematological: Negative for  adenopathy. Does not bruise/bleed easily.   Psychiatric/Behavioral: Negative for agitation and confusion.       Objective:      Physical Exam  Vitals signs and nursing note reviewed.   Constitutional:       General: She is not in acute distress.     Appearance: She is well-developed. She is not diaphoretic.   HENT:      Head: Normocephalic and atraumatic.      Right Ear: External ear normal.      Left Ear: External ear normal.      Nose: Nose normal.   Eyes:      General:         Right eye: No discharge.         Left eye: No discharge.      Conjunctiva/sclera: Conjunctivae normal.   Neck:      Musculoskeletal: Neck supple.   Cardiovascular:      Rate and Rhythm: Normal rate and regular rhythm.      Heart sounds: Normal heart sounds. No murmur.   Pulmonary:      Effort: Pulmonary effort is normal. No respiratory distress.      Breath sounds: Normal breath sounds.   Abdominal:      General: There is no distension.      Palpations: There is no mass.   Musculoskeletal:         General: No tenderness or deformity.   Skin:     Coloration: Skin is not pale.      Findings: No erythema.   Neurological:      Cranial Nerves: No cranial nerve deficit.      Coordination: Coordination normal.   Psychiatric:         Behavior: Behavior normal.         Thought Content: Thought content normal.         Judgment: Judgment normal.         Assessment:       1. Essential hypertension    2. Gastroesophageal reflux disease without esophagitis    3. Other hyperlipidemia    4. CREST (calcinosis, Raynaud's phenomenon, esophageal dysfunction, sclerodactyly, telangiectasia)        Plan:       Essential hypertension:  Stable    Gastroesophageal reflux disease without esophagitis:  Stable    Other hyperlipidemia:  Stable    CREST (calcinosis, Raynaud's phenomenon, esophageal dysfunction, sclerodactyly, telangiectasia):  Stable    Other orders  -     Influenza - Quadrivalent (PF)      Healthy eating habits, avoid fried foods, red meat and processed  starches, continue current medications.  Follow with the rheumatologist as directed.  Influenza vaccination was given in the clinic.  The patient's BMI has been recorded in the chart. The patient has been provided educational materials regarding the benefits of attaining and maintaining a normal weight. We will continue to address and follow this issue during follow up visits.   Patient agreed with assessment and plan. Patient verbalized understanding.

## 2020-10-03 ENCOUNTER — PATIENT MESSAGE (OUTPATIENT)
Dept: RHEUMATOLOGY | Facility: CLINIC | Age: 60
End: 2020-10-03

## 2020-10-03 DIAGNOSIS — E87.6 HYPOKALEMIA: Primary | ICD-10-CM

## 2020-10-09 NOTE — TELEPHONE ENCOUNTER
No new care gaps identified.  Powered by [employer]. Reference number: [id number]. 10/09/2020 12:39:00 PM   CDT

## 2020-10-10 RX ORDER — VALACYCLOVIR HYDROCHLORIDE 500 MG/1
TABLET, FILM COATED ORAL
Qty: 90 TABLET | Refills: 3 | Status: SHIPPED | OUTPATIENT
Start: 2020-10-10 | End: 2021-10-10

## 2020-10-10 NOTE — PROGRESS NOTES
Refill Routing Note   Medication(s) are not appropriate for processing by [facility] for the following reason(s):     - Medication not previously prescribed by PCP    [facility] actions taken in this encounter: Defer       Medication Therapy Plan: CDMR.  Patient recently seen; med not prev prescribed by you; please review   Medication reconciliation completed: No   Automatic Epic Generated Protocol Data:        Requested Prescriptions   Pending Prescriptions Disp Refills    valACYclovir (VALTREX) 500 MG tablet [Pharmacy Med Name: VALACYCLOVIR 500MG TABLETS] 90 tablet 3     Sig: TAKE 1 TABLET BY MOUTH EVERY DAY       Antimicrobials:  Oral Antiviral Agents - Anti-Herpetic Passed - 10/9/2020 12:38 PM        Passed - Patient is at least 18 years old        Passed - Office Visit within last 12 months or future 90 days.     Recent Outpatient Visits            2 weeks ago Essential hypertension    [facility] Loan Harris MD    4 months ago Essential hypertension    [facility] [place] [place] PA-C    6 months ago Sjogren's syndrome, with unspecified organ involvement    [facility] Joao Fry MD    7 months ago CREST (calcinosis, Raynaud's phenomenon, esophageal dysfunction, sclerodactyly, telangiectasia)    [facility] Joanna Paulson PA-C    7 months ago Great toe pain, right    [facility] Mayuri Francois PA-C          Future Appointments              [facility]                Passed - Cr is 1.3 or below and within 360 days     Creatinine   Date Value Ref Range Status   09/24/2020 0.8 0.5 - 1.4 mg/dL Final   09/24/2020 0.8 0.5 - 1.4 mg/dL Final   03/02/2020 0.8 0.5 - 1.4 mg/dL Final              Passed - WBC in normal range and within 360 days     WBC   Date Value Ref Range Status   09/24/2020 7.62 3.90 - 12.70 K/uL Final   09/24/2020 7.62 3.90 - 12.70 K/uL Final   03/02/2020  6.93 3.90 - 12.70 K/uL Final              Passed - eGFR within 360 days     eGFR if non    Date Value Ref Range Status   09/24/2020 >60.0 >60 mL/min/1.73 m^2 Final     Comment:     Calculation used to obtain the estimated glomerular filtration  rate (eGFR) is the CKD-EPI equation.      09/24/2020 >60.0 >60 mL/min/1.73 m^2 Final     Comment:     Calculation used to obtain the estimated glomerular filtration  rate (eGFR) is the CKD-EPI equation.      03/02/2020 >60.0 >60 mL/min/1.73 m^2 Final     Comment:     Calculation used to obtain the estimated glomerular filtration  rate (eGFR) is the CKD-EPI equation.        eGFR if    Date Value Ref Range Status   09/24/2020 >60.0 >60 mL/min/1.73 m^2 Final   09/24/2020 >60.0 >60 mL/min/1.73 m^2 Final   03/02/2020 >60.0 >60 mL/min/1.73 m^2 Final                    Appointments  past 12m or future 3m with PCP    Date Provider   Last Visit   2/22/2016 Aaron Whitten MD   Next Visit   Visit date not found Aaron Whitten MD   ED visits in past 90 days: 0        Note composed:6:27 PM 10/10/2020

## 2020-10-12 ENCOUNTER — PATIENT MESSAGE (OUTPATIENT)
Dept: RHEUMATOLOGY | Facility: CLINIC | Age: 60
End: 2020-10-12

## 2020-10-12 RX ORDER — POTASSIUM CHLORIDE 750 MG/1
10 TABLET, EXTENDED RELEASE ORAL DAILY
Qty: 30 TABLET | Refills: 3 | Status: SHIPPED | OUTPATIENT
Start: 2020-10-12 | End: 2020-11-11

## 2020-10-14 DIAGNOSIS — R11.0 NAUSEA: ICD-10-CM

## 2020-10-16 RX ORDER — PROMETHAZINE HYDROCHLORIDE 25 MG/1
25 TABLET ORAL EVERY 6 HOURS PRN
Qty: 30 TABLET | Refills: 3 | Status: SHIPPED | OUTPATIENT
Start: 2020-10-16 | End: 2021-08-02 | Stop reason: SDUPTHER

## 2020-10-19 DIAGNOSIS — R11.0 NAUSEA: ICD-10-CM

## 2020-10-20 ENCOUNTER — PATIENT OUTREACH (OUTPATIENT)
Dept: ADMINISTRATIVE | Facility: OTHER | Age: 60
End: 2020-10-20

## 2020-10-20 RX ORDER — PROMETHAZINE HYDROCHLORIDE 25 MG/1
25 TABLET ORAL EVERY 6 HOURS PRN
Qty: 30 TABLET | Refills: 3 | OUTPATIENT
Start: 2020-10-20

## 2020-10-22 ENCOUNTER — PATIENT MESSAGE (OUTPATIENT)
Dept: RHEUMATOLOGY | Facility: CLINIC | Age: 60
End: 2020-10-22

## 2020-10-23 ENCOUNTER — PATIENT MESSAGE (OUTPATIENT)
Dept: RHEUMATOLOGY | Facility: CLINIC | Age: 60
End: 2020-10-23

## 2020-10-23 ENCOUNTER — OFFICE VISIT (OUTPATIENT)
Dept: RHEUMATOLOGY | Facility: CLINIC | Age: 60
End: 2020-10-23
Payer: MEDICARE

## 2020-10-23 DIAGNOSIS — M35.00 SJOGREN'S SYNDROME, WITH UNSPECIFIED ORGAN INVOLVEMENT: ICD-10-CM

## 2020-10-23 DIAGNOSIS — M34.1 CREST (CALCINOSIS, RAYNAUD'S PHENOMENON, ESOPHAGEAL DYSFUNCTION, SCLERODACTYLY, TELANGIECTASIA): Primary | ICD-10-CM

## 2020-10-23 DIAGNOSIS — D84.821 IMMUNOCOMPROMISED STATE DUE TO DRUG THERAPY: ICD-10-CM

## 2020-10-23 DIAGNOSIS — G89.4 CHRONIC PAIN SYNDROME: ICD-10-CM

## 2020-10-23 DIAGNOSIS — Z79.899 IMMUNOCOMPROMISED STATE DUE TO DRUG THERAPY: ICD-10-CM

## 2020-10-23 DIAGNOSIS — I27.20 PULMONARY HYPERTENSION: ICD-10-CM

## 2020-10-23 DIAGNOSIS — K21.00 GASTROESOPHAGEAL REFLUX DISEASE WITH ESOPHAGITIS WITHOUT HEMORRHAGE: ICD-10-CM

## 2020-10-23 PROCEDURE — 99499 RISK ADDL DX/OHS AUDIT: ICD-10-PCS | Mod: 95,,, | Performed by: INTERNAL MEDICINE

## 2020-10-23 PROCEDURE — 99499 UNLISTED E&M SERVICE: CPT | Mod: 95,,, | Performed by: INTERNAL MEDICINE

## 2020-10-23 PROCEDURE — 99214 PR OFFICE/OUTPT VISIT, EST, LEVL IV, 30-39 MIN: ICD-10-PCS | Mod: HCNC,95,, | Performed by: INTERNAL MEDICINE

## 2020-10-23 PROCEDURE — 99214 OFFICE O/P EST MOD 30 MIN: CPT | Mod: HCNC,95,, | Performed by: INTERNAL MEDICINE

## 2020-10-23 RX ORDER — TRAMADOL HYDROCHLORIDE 50 MG/1
50 TABLET ORAL EVERY 6 HOURS PRN
Qty: 120 TABLET | Refills: 4 | Status: SHIPPED | OUTPATIENT
Start: 2020-10-23 | End: 2021-12-08 | Stop reason: SDUPTHER

## 2020-10-23 RX ORDER — FAMOTIDINE 40 MG/1
40 TABLET, FILM COATED ORAL NIGHTLY PRN
Qty: 90 TABLET | Refills: 3 | Status: SHIPPED | OUTPATIENT
Start: 2020-10-23 | End: 2021-09-24 | Stop reason: SDUPTHER

## 2020-10-23 NOTE — PROGRESS NOTES
Subjective:       Patient ID: Tina García is a 60 y.o. female.    Chief Complaint: No chief complaint on file.    Follow up: 59 year old female who presents to clinic for follow up on CREST syndrome.  She had low potassium, rapid heart beat has resolved.  Patient has noted this neck cramping lower back cramping at leg cramping and some lower back pain.  She has started walking with her family very low impact walking.  She has had no issues with Imuran and she started using (prolux) shampoo that has helped her hair growth. She denies redness, warmth, or swelling of the joint. No other small joint pain. She has intermittent pains involving her hands, but overall this is stable with minimal AM stiffness.   .      Current tx:  1. Imuran 50 mg daily  2. revatio PRN  3. Flexeril PRN    Review of Systems   Constitutional: Negative for activity change, appetite change, chills, fatigue and fever.   Eyes: Negative for visual disturbance.   Respiratory: Negative for cough and shortness of breath.    Cardiovascular: Negative for chest pain, palpitations and leg swelling.   Gastrointestinal: Negative for abdominal pain, constipation, diarrhea, nausea and vomiting.   Musculoskeletal: Positive for arthralgias and gait problem. Negative for joint swelling.   Neurological: Negative for dizziness, weakness, light-headedness and headaches.         Objective:     There were no vitals filed for this visit.    Past Medical History:   Diagnosis Date    Anxiety     AR (allergic rhinitis)     Arthritis     rheumatoid?    Chronic constipation     Colon polyp     CREST syndrome     Depression     Dry eyes     Dry mouth     Esophageal dysmotility     Fibrocystic breast     Fibromyalgia     Food intolerance     GERD (gastroesophageal reflux disease)     HTN (hypertension)     Irritable bowel syndrome     Raynaud disease     Right wrist fracture     nov 2013    SOB (shortness of breath) on exertion     Thyroid  disease     hypothyroid, thyroid cyst on U/S    Ulcer     Vitamin D deficiency disease      Past Surgical History:   Procedure Laterality Date    COLONOSCOPY  7/2012    one polyp removed, biospy=TUBULOVILLOUS ADENOMA, otherwise normal findings, repeat in 4-5 years for surveillance    COLONOSCOPY N/A 4/17/2019    Procedure: COLONOSCOPY;  Surgeon: Rich Kingsley Jr., MD;  Location: [facility];  Service: Endoscopy;  Laterality: N/A;    COLONOSCOPY W/ POLYPECTOMY  7/24/2012  [facility]o    4-5 mm sigmoid polyp.    CYST REMOVAL      pilonidal    DILATION AND CURETTAGE OF UTERUS      ESOPHAGOGASTRODUODENOSCOPY N/A 4/17/2019    Procedure: EGD (ESOPHAGOGASTRODUODENOSCOPY);  Surgeon: Rich Kingsley Jr., MD;  Location: [facility];  Service: Endoscopy;  Laterality: N/A;    GANGLION CYST EXCISION      right thumb    HYSTERECTOMY  2008    complete    TOTAL ABDOMINAL HYSTERECTOMY W/ BILATERAL SALPINGOOPHORECTOMY  2008    w/Hysterectomy    UPPER GASTROINTESTINAL ENDOSCOPY  12/23/2011  Sancho    Fluid in the lower third of the esophagus  (stasis).  The esophageal examination was compatible with scleroderma.  (Dilataion done, 51 Fr.)    LA Grade A reflux esophagitis.   Enlarged gastric folds.   Multiple gastric polyps.   Antritis (erythematous streaks).  JOELLEN Test  Negative.          Physical Exam   Constitutional: She is oriented to person, place, and time and well-developed, well-nourished, and in no distress.   Neurological: She is alert and oriented to person, place, and time.   Psychiatric: Mood, affect and judgment normal.         Results for orders placed or performed in visit on 09/25/20   Echo Color Flow Doppler? Yes   Result Value Ref Range    BSA 1.91 m2    TDI SEPTAL 0.07 m/s    LV LATERAL E/E' RATIO 3.54 m/s    LV SEPTAL E/E' RATIO 6.57 m/s    LA WIDTH 2.85 cm    TDI LATERAL 0.13 m/s    LVIDd 4.77 3.5 - 6.0 cm    IVS 0.86 0.6 - 1.1 cm    Posterior Wall 0.72 0.6 - 1.1 cm    LVIDs 3.26 2.1 - 4.0 cm    FS 32 28 - 44  %    LA volume 26.65 cm3    Sinus 2.48 cm    STJ 2.37 cm    Ascending aorta 2.30 cm    LV mass 123.33 g    LA size 3.07 cm    RVDD 2.78 cm    TAPSE 2.27 cm    Left Ventricle Relative Wall Thickness 0.30 cm    AV mean gradient 5 mmHg    AV valve area 2.18 cm2    AV Velocity Ratio 0.72     AV index (prosthetic) 0.66     MV valve area p 1/2 method 2.14 cm2    E/A ratio 0.68     Mean e' 0.10 m/s    E wave decelartion time 354.63 msec    IVRT 125.59 msec    Pulm vein S/D ratio 1.29     LVOT diameter 2.05 cm    LVOT area 3.3 cm2    LVOT peak joselito 1.08 m/s    LVOT peak VTI 17.73 cm    Ao peak joselito 1.51 m/s    Ao VTI 26.82 cm    LVOT stroke volume 58.49 cm3    AV peak gradient 9 mmHg    E/E' ratio 4.60 m/s    MV Peak E Joselito 0.46 m/s    TR Max Joselito 1.80 m/s    MV stenosis pressure 1/2 time 102.84 ms    MV Peak A Joselito 0.68 m/s    PV Peak S Joselito 0.36 m/s    PV Peak D Joselito 0.28 m/s    LV Systolic Volume 42.98 mL    LV Systolic Volume Index 23.0 mL/m2    LV Diastolic Volume 105.71 mL    LV Diastolic Volume Index 56.65 mL/m2    LA Volume Index 14.3 mL/m2    LV Mass Index 66 g/m2    RA Major Axis 3.63 cm    Left Atrium Minor Axis 3.67 cm    Left Atrium Major Axis 3.50 cm    Triscuspid Valve Regurgitation Peak Gradient 13 mmHg    RA Width 2.64 cm    Right Atrial Pressure (from IVC) 3 mmHg    TV rest pulmonary artery pressure 16 mmHg     JAMILA Titer 1  Order: [id number]  Status:  Final result   Visible to patient:  Yes (Patient Portal)   Next appt:  None  Component 4wk ago   JAMILA Titer 1 1:1280    Resulting Agency [facility]               Assessment:       1. CREST (calcinosis, Raynaud's phenomenon, esophageal dysfunction, sclerodactyly, telangiectasia)    2. Gastroesophageal reflux disease with esophagitis without hemorrhage    3. Pulmonary hypertension    4. Sjogren's syndrome, with unspecified organ involvement    5. Immunocompromised state due to drug therapy    6. Chronic pain syndrome            Plan:       CREST (calcinosis, Raynaud's  phenomenon, esophageal dysfunction, sclerodactyly, telangiectasia)  -     CBC auto differential; Future; Expected date: 10/23/2020  -     Comprehensive Metabolic Panel; Future; Expected date: 10/23/2020  -     Sedimentation rate; Future; Expected date: 10/23/2020  -     C-Reactive Protein; Future; Expected date: 10/23/2020  -     TSH; Future; Expected date: 10/23/2020  -     T4, Free; Future; Expected date: 10/23/2020  -     T3, Free; Future; Expected date: 10/23/2020  -     JAMILA Screen w/Reflex; Future; Expected date: 10/23/2020  -     Anti-Scleroderma Antibody; Future; Expected date: 10/23/2020    Gastroesophageal reflux disease with esophagitis without hemorrhage  -     famotidine (PEPCID) 40 MG tablet; Take 1 tablet (40 mg total) by mouth nightly as needed for Heartburn.  Dispense: 90 tablet; Refill: 3  -     CBC auto differential; Future; Expected date: 10/23/2020  -     Comprehensive Metabolic Panel; Future; Expected date: 10/23/2020  -     Sedimentation rate; Future; Expected date: 10/23/2020  -     C-Reactive Protein; Future; Expected date: 10/23/2020  -     TSH; Future; Expected date: 10/23/2020  -     T4, Free; Future; Expected date: 10/23/2020  -     T3, Free; Future; Expected date: 10/23/2020  -     JAMILA Screen w/Reflex; Future; Expected date: 10/23/2020  -     Anti-Scleroderma Antibody; Future; Expected date: 10/23/2020    Pulmonary hypertension  -     CBC auto differential; Future; Expected date: 10/23/2020  -     Comprehensive Metabolic Panel; Future; Expected date: 10/23/2020  -     Sedimentation rate; Future; Expected date: 10/23/2020  -     C-Reactive Protein; Future; Expected date: 10/23/2020  -     TSH; Future; Expected date: 10/23/2020  -     T4, Free; Future; Expected date: 10/23/2020  -     T3, Free; Future; Expected date: 10/23/2020  -     JAMILA Screen w/Reflex; Future; Expected date: 10/23/2020  -     Anti-Scleroderma Antibody; Future; Expected date: 10/23/2020    Sjogren's syndrome, with  unspecified organ involvement  -     CBC auto differential; Future; Expected date: 10/23/2020  -     Comprehensive Metabolic Panel; Future; Expected date: 10/23/2020  -     Sedimentation rate; Future; Expected date: 10/23/2020  -     C-Reactive Protein; Future; Expected date: 10/23/2020  -     TSH; Future; Expected date: 10/23/2020  -     T4, Free; Future; Expected date: 10/23/2020  -     T3, Free; Future; Expected date: 10/23/2020  -     JAMILA Screen w/Reflex; Future; Expected date: 10/23/2020  -     Anti-Scleroderma Antibody; Future; Expected date: 10/23/2020    Immunocompromised state due to drug therapy  -     CBC auto differential; Future; Expected date: 10/23/2020  -     Comprehensive Metabolic Panel; Future; Expected date: 10/23/2020  -     Sedimentation rate; Future; Expected date: 10/23/2020  -     C-Reactive Protein; Future; Expected date: 10/23/2020  -     TSH; Future; Expected date: 10/23/2020  -     T4, Free; Future; Expected date: 10/23/2020  -     T3, Free; Future; Expected date: 10/23/2020  -     JAMILA Screen w/Reflex; Future; Expected date: 10/23/2020  -     Anti-Scleroderma Antibody; Future; Expected date: 10/23/2020    Chronic pain syndrome  -     CBC auto differential; Future; Expected date: 10/23/2020  -     Comprehensive Metabolic Panel; Future; Expected date: 10/23/2020  -     Sedimentation rate; Future; Expected date: 10/23/2020  -     C-Reactive Protein; Future; Expected date: 10/23/2020  -     TSH; Future; Expected date: 10/23/2020  -     T4, Free; Future; Expected date: 10/23/2020  -     T3, Free; Future; Expected date: 10/23/2020  -     JAMILA Screen w/Reflex; Future; Expected date: 10/23/2020  -     Anti-Scleroderma Antibody; Future; Expected date: 10/23/2020          --chronic pain syndrome on tramadol  --hx of Hashimoto's thyroiditis on NP thyroid  --hx of pulmonary HTN  Pt will do pft    The patient location is: home  The chief complaint leading to consultation is: crest    Visit type:  audiovisual    Face to Face time with patient: 30   minutes of total time spent on the encounter, which includes face to face time and non-face to face time preparing to see the patient (eg, review of tests), Obtaining and/or reviewing separately obtained history, Documenting clinical information in the electronic or other health record, Independently interpreting results (not separately reported) and communicating results to the patient/family/caregiver, or Care coordination (not separately reported).         Each patient to whom he or she provides medical services by telemedicine is:  (1) informed of the relationship between the physician and patient and the respective role of any other health care provider with respect to management of the patient; and (2) notified that he or she may decline to receive medical services by telemedicine and may withdraw from such care at any time.    Notes:

## 2020-11-25 ENCOUNTER — OFFICE VISIT (OUTPATIENT)
Dept: URGENT CARE | Facility: CLINIC | Age: 60
End: 2020-11-25
Payer: MEDICARE

## 2020-11-25 VITALS
OXYGEN SATURATION: 97 % | HEIGHT: 64 IN | TEMPERATURE: 99 F | BODY MASS INDEX: 28.51 KG/M2 | DIASTOLIC BLOOD PRESSURE: 83 MMHG | WEIGHT: 167 LBS | SYSTOLIC BLOOD PRESSURE: 126 MMHG | HEART RATE: 113 BPM

## 2020-11-25 DIAGNOSIS — R05.9 COUGH: Primary | ICD-10-CM

## 2020-11-25 DIAGNOSIS — J01.40 ACUTE PANSINUSITIS, RECURRENCE NOT SPECIFIED: ICD-10-CM

## 2020-11-25 LAB
CTP QC/QA: YES
SARS-COV-2 RDRP RESP QL NAA+PROBE: NEGATIVE

## 2020-11-25 PROCEDURE — 99214 OFFICE O/P EST MOD 30 MIN: CPT | Mod: S$GLB,,, | Performed by: INTERNAL MEDICINE

## 2020-11-25 PROCEDURE — U0002 COVID-19 LAB TEST NON-CDC: HCPCS | Mod: QW,S$GLB,, | Performed by: INTERNAL MEDICINE

## 2020-11-25 PROCEDURE — 99214 PR OFFICE/OUTPT VISIT, EST, LEVL IV, 30-39 MIN: ICD-10-PCS | Mod: S$GLB,,, | Performed by: INTERNAL MEDICINE

## 2020-11-25 PROCEDURE — 3008F BODY MASS INDEX DOCD: CPT | Mod: CPTII,S$GLB,, | Performed by: INTERNAL MEDICINE

## 2020-11-25 PROCEDURE — 3008F PR BODY MASS INDEX (BMI) DOCUMENTED: ICD-10-PCS | Mod: CPTII,S$GLB,, | Performed by: INTERNAL MEDICINE

## 2020-11-25 PROCEDURE — U0002: ICD-10-PCS | Mod: QW,S$GLB,, | Performed by: INTERNAL MEDICINE

## 2020-11-25 NOTE — PROGRESS NOTES
"Subjective:       Patient ID: Tina García is a 60 y.o. female.    Vitals:  height is 5' 4" (1.626 m) and weight is 75.8 kg (167 lb). Her temperature is 98.8 °F (37.1 °C). Her blood pressure is 126/83 and her pulse is 113 (abnormal). Her oxygen saturation is 97%.     Chief Complaint: COVID-19 Concerns    URI   This is a new problem. The current episode started in the past 7 days. The problem has been unchanged. There has been no fever. Associated symptoms include congestion and coughing. Pertinent negatives include no abdominal pain, chest pain, diarrhea, dysuria, ear pain, headaches, joint pain, joint swelling, nausea, neck pain, plugged ear sensation, rash, rhinorrhea, sinus pain, sneezing, sore throat, swollen glands, vomiting or wheezing. She has tried nothing for the symptoms.       Constitution: Negative for chills, sweating, fatigue and fever.   HENT: Positive for congestion. Negative for ear pain, sinus pain, sinus pressure, sore throat and voice change.    Neck: Negative for neck pain and painful lymph nodes.   Cardiovascular: Negative for chest pain.   Eyes: Negative for eye redness.   Respiratory: Positive for cough. Negative for chest tightness, sputum production, bloody sputum, COPD, shortness of breath, stridor, wheezing and asthma.    Gastrointestinal: Negative for abdominal pain, nausea, vomiting and diarrhea.   Genitourinary: Negative for dysuria.   Musculoskeletal: Negative for muscle ache.   Skin: Negative for rash.   Allergic/Immunologic: Negative for seasonal allergies, asthma and sneezing.   Neurological: Negative for headaches.   Hematologic/Lymphatic: Negative for swollen lymph nodes.       Objective:      Physical Exam   Constitutional: normal  HENT:   Head: Normocephalic and atraumatic.   Nose: Nose normal.   Mouth/Throat: Mucous membranes are moist. Oropharynx is clear.   Eyes: Pupils are equal, round, and reactive to light. Conjunctivae are normal. extraocular movement " intact  Neck: Normal range of motion. Neck supple.   Cardiovascular: Normal rate, regular rhythm, normal heart sounds and normal pulses.   Pulmonary/Chest: Effort normal and breath sounds normal.   Abdominal: Normal appearance.   Neurological: She is alert.   Nursing note and vitals reviewed.        Assessment:       1. Cough    2. Acute pansinusitis, recurrence not specified        Plan:         Cough  -     POCT COVID-19 Rapid Screening    Acute pansinusitis, recurrence not specified

## 2020-12-01 ENCOUNTER — PATIENT MESSAGE (OUTPATIENT)
Dept: RHEUMATOLOGY | Facility: CLINIC | Age: 60
End: 2020-12-01

## 2020-12-05 ENCOUNTER — OFFICE VISIT (OUTPATIENT)
Dept: URGENT CARE | Facility: CLINIC | Age: 60
End: 2020-12-05
Payer: MEDICARE

## 2020-12-05 VITALS
DIASTOLIC BLOOD PRESSURE: 97 MMHG | HEART RATE: 97 BPM | SYSTOLIC BLOOD PRESSURE: 146 MMHG | TEMPERATURE: 98 F | HEIGHT: 64 IN | OXYGEN SATURATION: 98 % | RESPIRATION RATE: 16 BRPM | WEIGHT: 167 LBS | BODY MASS INDEX: 28.51 KG/M2

## 2020-12-05 DIAGNOSIS — R05.9 COUGH: Primary | ICD-10-CM

## 2020-12-05 LAB
CTP QC/QA: YES
SARS-COV-2 RDRP RESP QL NAA+PROBE: NEGATIVE

## 2020-12-05 PROCEDURE — 99214 PR OFFICE/OUTPT VISIT, EST, LEVL IV, 30-39 MIN: ICD-10-PCS | Mod: S$GLB,,, | Performed by: PHYSICIAN ASSISTANT

## 2020-12-05 PROCEDURE — 3008F BODY MASS INDEX DOCD: CPT | Mod: CPTII,S$GLB,, | Performed by: PHYSICIAN ASSISTANT

## 2020-12-05 PROCEDURE — U0002 COVID-19 LAB TEST NON-CDC: HCPCS | Mod: QW,S$GLB,, | Performed by: PHYSICIAN ASSISTANT

## 2020-12-05 PROCEDURE — 3008F PR BODY MASS INDEX (BMI) DOCUMENTED: ICD-10-PCS | Mod: CPTII,S$GLB,, | Performed by: PHYSICIAN ASSISTANT

## 2020-12-05 PROCEDURE — U0002: ICD-10-PCS | Mod: QW,S$GLB,, | Performed by: PHYSICIAN ASSISTANT

## 2020-12-05 PROCEDURE — 99214 OFFICE O/P EST MOD 30 MIN: CPT | Mod: S$GLB,,, | Performed by: PHYSICIAN ASSISTANT

## 2020-12-05 NOTE — PROGRESS NOTES
"Subjective:       Patient ID: Tina García is a 60 y.o. female.    Vitals:  height is 5' 4" (1.626 m) and weight is 75.8 kg (167 lb). Her temperature is 97.9 °F (36.6 °C). Her blood pressure is 146/97 (abnormal) and her pulse is 97. Her respiration is 16 and oxygen saturation is 98%.     Chief Complaint: Cough    Patient presents to urgent care for cough and congestion. Patient reports positive exposure to COVID-19.    Cough  This is a new problem. The current episode started in the past 7 days. The problem has been unchanged. The cough is non-productive. Associated symptoms include nasal congestion and postnasal drip. Pertinent negatives include no chest pain, chills, ear congestion, ear pain, eye redness, fever, headaches, heartburn, hemoptysis, myalgias, rash, rhinorrhea, sore throat, shortness of breath, sweats, weight loss or wheezing. Nothing aggravates the symptoms. She has tried nothing for the symptoms.       Constitution: Negative for chills, sweating, fatigue and fever.   HENT: Positive for congestion, postnasal drip and sinus pressure. Negative for ear pain, drooling, nosebleeds, foreign body in nose, sinus pain, sore throat, trouble swallowing and voice change.    Neck: Negative for neck pain, neck stiffness, painful lymph nodes and neck swelling.   Cardiovascular: Negative for chest pain, leg swelling, palpitations, sob on exertion and passing out.   Eyes: Negative for eye pain, eye redness, photophobia, double vision, blurred vision and eyelid swelling.   Respiratory: Positive for cough. Negative for chest tightness, sputum production, bloody sputum, shortness of breath, stridor and wheezing.    Gastrointestinal: Negative for abdominal pain, abdominal bloating, nausea, vomiting, constipation, diarrhea and heartburn.   Genitourinary: Negative for urgency.   Musculoskeletal: Negative for joint pain, joint swelling, abnormal ROM of joint, back pain, muscle cramps and muscle ache.   Skin: " Negative for rash and hives.   Allergic/Immunologic: Negative for seasonal allergies, food allergies, hives, itching and sneezing.   Neurological: Negative for dizziness, light-headedness, passing out, facial drooping, speech difficulty, loss of balance, headaches, altered mental status, loss of consciousness and seizures.   Hematologic/Lymphatic: Negative for swollen lymph nodes.   Psychiatric/Behavioral: Negative for altered mental status and nervous/anxious. The patient is not nervous/anxious.        Objective:      Physical Exam   Constitutional: She is oriented to person, place, and time. She appears well-developed. She is cooperative.  Non-toxic appearance. She does not appear ill. No distress.   HENT:   Head: Normocephalic and atraumatic.   Ears:   Right Ear: Hearing, tympanic membrane, external ear and ear canal normal.   Left Ear: Hearing, tympanic membrane, external ear and ear canal normal.   Nose: Mucosal edema and rhinorrhea present. No nasal deformity. No epistaxis. Right sinus exhibits no maxillary sinus tenderness and no frontal sinus tenderness. Left sinus exhibits no maxillary sinus tenderness and no frontal sinus tenderness.   Mouth/Throat: Uvula is midline and mucous membranes are normal. No trismus in the jaw. Normal dentition. No uvula swelling. Posterior oropharyngeal erythema and cobblestoning present. No oropharyngeal exudate, posterior oropharyngeal edema or tonsillar abscesses. No tonsillar exudate.   Eyes: Conjunctivae and lids are normal. No scleral icterus.   Neck: Trachea normal, normal range of motion, full passive range of motion without pain and phonation normal. Neck supple. No neck rigidity. No edema and no erythema present.   Cardiovascular: Normal rate, regular rhythm, normal heart sounds and normal pulses.   Pulmonary/Chest: Effort normal and breath sounds normal. No accessory muscle usage or stridor. No respiratory distress. She has no decreased breath sounds. She has no  wheezes. She has no rhonchi. She has no rales.   Abdominal: Normal appearance.   Musculoskeletal: Normal range of motion.         General: No deformity.   Lymphadenopathy:     She has no cervical adenopathy.   Neurological: She is alert and oriented to person, place, and time. She exhibits normal muscle tone. Coordination normal.   Skin: Skin is warm, dry, intact, not diaphoretic, not pale and no rash. Capillary refill takes less than 2 seconds. Psychiatric: Her speech is normal and behavior is normal. Judgment and thought content normal.   Nursing note and vitals reviewed.    Results for orders placed or performed in visit on 12/05/20   POCT COVID-19 Rapid Screening   Result Value Ref Range    POC Rapid COVID Negative Negative     Acceptable Yes            Assessment:       1. Cough        Plan:         Cough  -     POCT COVID-19 Rapid Screening      Patient Instructions   You must understand that you've received an Urgent Care treatment only and that you may be released before all your medical problems are known or treated. You, the patient, will arrange for follow up care as instructed.  Follow up with your PCP or specialty clinic as directed if not improved or as needed. You can call [phone] to schedule an appointment with the appropriate provider.  If your condition worsens we recommend that you receive another evaluation at the Emergency Department for any concerns or worsening of condition.  Patient aware and verbalized understanding.    Reviewed COVID-19 results with patient.  Counseled patient and answered questions in regards to COVID-19 testing.  Advised patient to go home, treat symptoms and avoid contact with others at this time.  Increase fluids and rest is important.  Humidifier use at home.  OTC Claritin or Zyrtec daily as needed for nasal congestion/postnasal drip/allergies.  OTC Flonase Nasal Spray as directed for nasal congestion/postnasal drip/allergies.  Advised patient to  take OTC VITAMIN C and ZINC as discussed.  Alternate OTC Tylenol every 4-6 hours as needed for pain, headache, fever, etc.  Info given for virtual visit, covid 19 information line, state info line.   Advised patient to follow-up with PCP and/or Specialist for further evaluation as needed.   Strict ER precautions given to patient.  Follow local/state guidelines per covid emergency.   Patient aware, verbalized understanding and agreed with plan of care.    CDC RECOMMENDATIONS  --IF test results are NEGATIVE and NO known high risk exposure to covid-19 virus, you can be excluded from work/school until:  o MINIMUM OF 24 hours fever-free without the use of fever-reducing medications AND  o Improvement in symptoms (e.g. cough, shortness of breath, fatigue, GI symptoms, etc)     --IF YOU ARE BEING TESTED BECAUSE OF A HIGH RISK EXPOSURE, which the CDC defines as direct contact 6 feet or less for >15 minutes with a known positive person, you should follow CDC guidelines as well as your employer/school protocols for safely returning to work/school.   *Please be aware that there are False Negative possibilities with testing, so you should return to work/school based upon CDC guidelines, not simply a negative result, unless your employer/school has a different RTW protocol/guidance for you.     --IF test results are POSITIVE , you should be excluded from work/school until:  o At least 3 days (72 hours) FEVER-FREE without the use of fever-reducing medications AND  o Improvement in symptoms (e.g., cough, shortness of breathing, fatigue, GI symptoms, etc) AND  o At least 10 days have passed since symptoms first appeared.    IF NOT IMPROVING, FOLLOW UP WITH VIRTUAL ONLINE VISIT WITH A PROVIDER 24/7 - FOR MORE INFORMATION OR TO DOWNLOAD THE PATSY, VISIT [facility] ANYWHERE [email or web address]  FOR 24/7 NURSE ADVICE, CALL [phone]  FOR COVID 19 RELATED QUESTIONS, CALL the [facility] covid hotline: [phone]  [place] FOR UP  TO DATE INFORMATION: Text or dial 211, test keyword LACOVID -211 OR DIAL 211    HELPFUL EXTERNAL RESOURCES:  OFFICE OF PUBLIC HEALTH: [place] - http://ldh.la.gov/ and 1-546.476.8586  CENTER FOR DISEASE CONTROL - https://www.cdc.gov/   WORLD HEALTH ORGANIZATION (WHO) - https://www.who.int/   CDC WHEN TO QUARANTINE - https://www.cdc.gov/coronavirus/2019-ncov/if-you-are-sick/quarantine.html     INFO ABOUT [facility] COVID-19 RAPID TESTING:  This test utilizes isothermal nucleic acid amplification technology to detect the SARS-CoV-2 RdRp nucleic acid segment.   The analytical sensitivity (limit of detection) is 125 genome equivalents/mL.   A POSITIVE result implies infection with the SARS-CoV-2 virus; the patient is presumed to be contagious.     A NEGATIVE result means that SARS-CoV-2 nucleic acids are not present above the limit of detection.   A NEGATIVE result should be treated as presumptive. It does not rule out the possibility of COVID-19 and should not be the sole basis for treatment decisions.   This test is only for use under the Food and Drug Administration s Emergency Use Authorization (EUA).   Commercial kits are provided by [employer]. Performance characteristics of the EUA have been independently verified by [facility] Department of Pathology and Laboratory Medicine.   _________________________________________________________________   The authorized Fact Sheet for Healthcare Providers and the authorized Fact Sheet for Patients of the ID NOW COVID-19 are available on the FDA website:   https://www.fda.gov/media/367719/download  https://www.fda.gov/media/690813/download

## 2020-12-05 NOTE — PATIENT INSTRUCTIONS
You must understand that you've received an Urgent Care treatment only and that you may be released before all your medical problems are known or treated. You, the patient, will arrange for follow up care as instructed.  Follow up with your PCP or specialty clinic as directed if not improved or as needed. You can call [phone] to schedule an appointment with the appropriate provider.  If your condition worsens we recommend that you receive another evaluation at the Emergency Department for any concerns or worsening of condition.  Patient aware and verbalized understanding.    Reviewed COVID-19 results with patient.  Counseled patient and answered questions in regards to COVID-19 testing.  Advised patient to go home, treat symptoms and avoid contact with others at this time.  Increase fluids and rest is important.  Humidifier use at home.  OTC Claritin or Zyrtec daily as needed for nasal congestion/postnasal drip/allergies.  OTC Flonase Nasal Spray as directed for nasal congestion/postnasal drip/allergies.  Advised patient to take OTC VITAMIN C and ZINC as discussed.  Alternate OTC Tylenol every 4-6 hours as needed for pain, headache, fever, etc.  Info given for virtual visit, covid 19 information line, state info line.   Advised patient to follow-up with PCP and/or Specialist for further evaluation as needed.   Strict ER precautions given to patient.  Follow local/state guidelines per covid emergency.   Patient aware, verbalized understanding and agreed with plan of care.    CDC RECOMMENDATIONS  --IF test results are NEGATIVE and NO known high risk exposure to covid-19 virus, you can be excluded from work/school until:  o MINIMUM OF 24 hours fever-free without the use of fever-reducing medications AND  o Improvement in symptoms (e.g. cough, shortness of breath, fatigue, GI symptoms, etc)     --IF YOU ARE BEING TESTED BECAUSE OF A HIGH RISK EXPOSURE, which the CDC defines as direct contact 6 feet or less for >15  minutes with a known positive person, you should follow CDC guidelines as well as your employer/school protocols for safely returning to work/school.   *Please be aware that there are False Negative possibilities with testing, so you should return to work/school based upon CDC guidelines, not simply a negative result, unless your employer/school has a different RTW protocol/guidance for you.     --IF test results are POSITIVE , you should be excluded from work/school until:  o At least 3 days (72 hours) FEVER-FREE without the use of fever-reducing medications AND  o Improvement in symptoms (e.g., cough, shortness of breathing, fatigue, GI symptoms, etc) AND  o At least 10 days have passed since symptoms first appeared.    IF NOT IMPROVING, FOLLOW UP WITH VIRTUAL ONLINE VISIT WITH A PROVIDER 24/7 - FOR MORE INFORMATION OR TO DOWNLOAD THE PATSY, VISIT [facility] ANYWHERE [facility] AT [email or web address]/ANYWHERE  FOR 24/7 NURSE ADVICE, CALL [phone]  FOR COVID 19 RELATED QUESTIONS, CALL the [facility] covid hotline: [phone]  [place] FOR UP TO DATE INFORMATION: Text or dial 211, test keyword LACOVID -231 OR DIAL 211    HELPFUL EXTERNAL RESOURCES:  OFFICE OF PUBLIC HEALTH: [place] - http://ldh.la.gov/ and 1-131.247.6514  CENTER FOR DISEASE CONTROL - https://www.cdc.gov/   WORLD HEALTH ORGANIZATION (WHO) - https://www.who.int/   CDC WHEN TO QUARANTINE - https://www.cdc.gov/coronavirus/2019-ncov/if-you-are-sick/quarantine.html     INFO ABOUT [facility] COVID-19 RAPID TESTING:  This test utilizes isothermal nucleic acid amplification technology to detect the SARS-CoV-2 RdRp nucleic acid segment.   The analytical sensitivity (limit of detection) is 125 genome equivalents/mL.   A POSITIVE result implies infection with the SARS-CoV-2 virus; the patient is presumed to be contagious.     A NEGATIVE result means that SARS-CoV-2 nucleic acids are not present above the limit of detection.   A NEGATIVE result should be treated as  presumptive. It does not rule out the possibility of COVID-19 and should not be the sole basis for treatment decisions.   This test is only for use under the Food and Drug Administration s Emergency Use Authorization (EUA).   Commercial kits are provided by [employer]. Performance characteristics of the EUA have been independently verified by [facility] Department of Pathology and Laboratory Medicine.   _________________________________________________________________   The authorized Fact Sheet for Healthcare Providers and the authorized Fact Sheet for Patients of the ID NOW COVID-19 are available on the FDA website:   https://www.fda.gov/media/910995/download  https://www.fda.gov/media/265900/download

## 2020-12-11 ENCOUNTER — PATIENT MESSAGE (OUTPATIENT)
Dept: OTHER | Facility: OTHER | Age: 60
End: 2020-12-11

## 2020-12-22 ENCOUNTER — PATIENT MESSAGE (OUTPATIENT)
Dept: RHEUMATOLOGY | Facility: CLINIC | Age: 60
End: 2020-12-22

## 2020-12-22 DIAGNOSIS — S39.012S STRAIN OF LUMBAR REGION, SEQUELA: Primary | ICD-10-CM

## 2020-12-23 RX ORDER — CYCLOBENZAPRINE HCL 10 MG
10 TABLET ORAL 3 TIMES DAILY PRN
Qty: 90 TABLET | Refills: 3 | Status: SHIPPED | OUTPATIENT
Start: 2020-12-23 | End: 2021-09-24 | Stop reason: SDUPTHER

## 2021-01-21 DIAGNOSIS — E06.3 HASHIMOTO'S THYROIDITIS: ICD-10-CM

## 2021-01-21 RX ORDER — THYROID 90 MG/1
90 TABLET ORAL
Qty: 30 TABLET | Refills: 11 | Status: SHIPPED | OUTPATIENT
Start: 2021-01-21 | End: 2021-12-08 | Stop reason: SDUPTHER

## 2021-01-25 DIAGNOSIS — M34.1 CREST (CALCINOSIS, RAYNAUD'S PHENOMENON, ESOPHAGEAL DYSFUNCTION, SCLERODACTYLY, TELANGIECTASIA): ICD-10-CM

## 2021-01-25 DIAGNOSIS — K22.4 ESOPHAGEAL DYSMOTILITY: ICD-10-CM

## 2021-01-25 DIAGNOSIS — K58.0 IRRITABLE BOWEL SYNDROME WITH DIARRHEA: ICD-10-CM

## 2021-01-25 DIAGNOSIS — I27.20 PULMONARY HYPERTENSION: ICD-10-CM

## 2021-01-25 DIAGNOSIS — M35.00 SJOGREN'S SYNDROME, WITH UNSPECIFIED ORGAN INVOLVEMENT: ICD-10-CM

## 2021-01-25 RX ORDER — AZATHIOPRINE 50 MG/1
50 TABLET ORAL DAILY
Qty: 30 TABLET | Refills: 5 | Status: SHIPPED | OUTPATIENT
Start: 2021-01-25 | End: 2021-12-01

## 2021-02-25 ENCOUNTER — TELEPHONE (OUTPATIENT)
Dept: RHEUMATOLOGY | Facility: CLINIC | Age: 61
End: 2021-02-25

## 2021-02-25 ENCOUNTER — PATIENT MESSAGE (OUTPATIENT)
Dept: RHEUMATOLOGY | Facility: CLINIC | Age: 61
End: 2021-02-25

## 2021-03-08 ENCOUNTER — PES CALL (OUTPATIENT)
Dept: ADMINISTRATIVE | Facility: CLINIC | Age: 61
End: 2021-03-08

## 2021-04-15 ENCOUNTER — PATIENT MESSAGE (OUTPATIENT)
Dept: RESEARCH | Facility: HOSPITAL | Age: 61
End: 2021-04-15

## 2021-05-06 ENCOUNTER — LAB VISIT (OUTPATIENT)
Dept: LAB | Facility: HOSPITAL | Age: 61
End: 2021-05-06
Attending: INTERNAL MEDICINE
Payer: MEDICARE

## 2021-05-06 DIAGNOSIS — K21.00 GASTROESOPHAGEAL REFLUX DISEASE WITH ESOPHAGITIS WITHOUT HEMORRHAGE: ICD-10-CM

## 2021-05-06 DIAGNOSIS — D84.821 IMMUNOCOMPROMISED STATE DUE TO DRUG THERAPY: ICD-10-CM

## 2021-05-06 DIAGNOSIS — G89.4 CHRONIC PAIN SYNDROME: ICD-10-CM

## 2021-05-06 DIAGNOSIS — I27.20 PULMONARY HYPERTENSION: ICD-10-CM

## 2021-05-06 DIAGNOSIS — M34.1 CREST (CALCINOSIS, RAYNAUD'S PHENOMENON, ESOPHAGEAL DYSFUNCTION, SCLERODACTYLY, TELANGIECTASIA): ICD-10-CM

## 2021-05-06 DIAGNOSIS — M35.00 SJOGREN'S SYNDROME, WITH UNSPECIFIED ORGAN INVOLVEMENT: ICD-10-CM

## 2021-05-06 DIAGNOSIS — Z79.899 IMMUNOCOMPROMISED STATE DUE TO DRUG THERAPY: ICD-10-CM

## 2021-05-06 LAB
ALBUMIN SERPL BCP-MCNC: 3.6 G/DL (ref 3.5–5.2)
ALP SERPL-CCNC: 65 U/L (ref 55–135)
ALT SERPL W/O P-5'-P-CCNC: 26 U/L (ref 10–44)
ANION GAP SERPL CALC-SCNC: 9 MMOL/L (ref 8–16)
AST SERPL-CCNC: 21 U/L (ref 10–40)
BASOPHILS # BLD AUTO: 0.05 K/UL (ref 0–0.2)
BASOPHILS NFR BLD: 0.7 % (ref 0–1.9)
BILIRUB SERPL-MCNC: 0.4 MG/DL (ref 0.1–1)
BUN SERPL-MCNC: 14 MG/DL (ref 6–20)
CALCIUM SERPL-MCNC: 9.5 MG/DL (ref 8.7–10.5)
CHLORIDE SERPL-SCNC: 105 MMOL/L (ref 95–110)
CO2 SERPL-SCNC: 27 MMOL/L (ref 23–29)
CREAT SERPL-MCNC: 0.8 MG/DL (ref 0.5–1.4)
CRP SERPL-MCNC: 12.3 MG/L (ref 0–8.2)
DIFFERENTIAL METHOD: ABNORMAL
EOSINOPHIL # BLD AUTO: 0.2 K/UL (ref 0–0.5)
EOSINOPHIL NFR BLD: 2 % (ref 0–8)
ERYTHROCYTE [DISTWIDTH] IN BLOOD BY AUTOMATED COUNT: 14.1 % (ref 11.5–14.5)
ERYTHROCYTE [SEDIMENTATION RATE] IN BLOOD BY WESTERGREN METHOD: 24 MM/HR (ref 0–36)
EST. GFR  (AFRICAN AMERICAN): >60 ML/MIN/1.73 M^2
EST. GFR  (NON AFRICAN AMERICAN): >60 ML/MIN/1.73 M^2
GLUCOSE SERPL-MCNC: 98 MG/DL (ref 70–110)
HCT VFR BLD AUTO: 36.4 % (ref 37–48.5)
HGB BLD-MCNC: 11.6 G/DL (ref 12–16)
IMM GRANULOCYTES # BLD AUTO: 0 K/UL (ref 0–0.04)
IMM GRANULOCYTES NFR BLD AUTO: 0 % (ref 0–0.5)
LYMPHOCYTES # BLD AUTO: 2.4 K/UL (ref 1–4.8)
LYMPHOCYTES NFR BLD: 32 % (ref 18–48)
MCH RBC QN AUTO: 29.8 PG (ref 27–31)
MCHC RBC AUTO-ENTMCNC: 31.9 G/DL (ref 32–36)
MCV RBC AUTO: 94 FL (ref 82–98)
MONOCYTES # BLD AUTO: 0.5 K/UL (ref 0.3–1)
MONOCYTES NFR BLD: 6.6 % (ref 4–15)
NEUTROPHILS # BLD AUTO: 4.4 K/UL (ref 1.8–7.7)
NEUTROPHILS NFR BLD: 58.7 % (ref 38–73)
NRBC BLD-RTO: 0 /100 WBC
PLATELET # BLD AUTO: 284 K/UL (ref 150–450)
PMV BLD AUTO: 10.1 FL (ref 9.2–12.9)
POTASSIUM SERPL-SCNC: 3.8 MMOL/L (ref 3.5–5.1)
PROT SERPL-MCNC: 7.1 G/DL (ref 6–8.4)
RBC # BLD AUTO: 3.89 M/UL (ref 4–5.4)
SODIUM SERPL-SCNC: 141 MMOL/L (ref 136–145)
T3FREE SERPL-MCNC: 2.4 PG/ML (ref 2.3–4.2)
T4 FREE SERPL-MCNC: 0.83 NG/DL (ref 0.71–1.51)
TSH SERPL DL<=0.005 MIU/L-ACNC: 1.41 UIU/ML (ref 0.4–4)
WBC # BLD AUTO: 7.43 K/UL (ref 3.9–12.7)

## 2021-05-06 PROCEDURE — 85025 COMPLETE CBC W/AUTO DIFF WBC: CPT | Performed by: INTERNAL MEDICINE

## 2021-05-06 PROCEDURE — 80053 COMPREHEN METABOLIC PANEL: CPT | Performed by: INTERNAL MEDICINE

## 2021-05-06 PROCEDURE — 84481 FREE ASSAY (FT-3): CPT | Performed by: INTERNAL MEDICINE

## 2021-05-06 PROCEDURE — 86140 C-REACTIVE PROTEIN: CPT | Performed by: INTERNAL MEDICINE

## 2021-05-06 PROCEDURE — 86039 ANTINUCLEAR ANTIBODIES (ANA): CPT | Performed by: INTERNAL MEDICINE

## 2021-05-06 PROCEDURE — 85652 RBC SED RATE AUTOMATED: CPT | Performed by: INTERNAL MEDICINE

## 2021-05-06 PROCEDURE — 84439 ASSAY OF FREE THYROXINE: CPT | Performed by: INTERNAL MEDICINE

## 2021-05-06 PROCEDURE — 86038 ANTINUCLEAR ANTIBODIES: CPT | Performed by: INTERNAL MEDICINE

## 2021-05-06 PROCEDURE — 84443 ASSAY THYROID STIM HORMONE: CPT | Performed by: INTERNAL MEDICINE

## 2021-05-06 PROCEDURE — 36415 COLL VENOUS BLD VENIPUNCTURE: CPT | Mod: PO | Performed by: INTERNAL MEDICINE

## 2021-05-06 PROCEDURE — 86235 NUCLEAR ANTIGEN ANTIBODY: CPT | Mod: 59 | Performed by: INTERNAL MEDICINE

## 2021-05-06 PROCEDURE — 86235 NUCLEAR ANTIGEN ANTIBODY: CPT | Performed by: INTERNAL MEDICINE

## 2021-05-07 LAB
ANA PATTERN 1: NORMAL
ANA PATTERN 2: NORMAL
ANA SER QL IF: POSITIVE
ANA TITER 2: >2560
ANA TITR SER IF: NORMAL {TITER}

## 2021-05-10 LAB — ENA SCL70 AB SER-ACNC: 2 UNITS

## 2021-05-11 LAB
ANTI SM ANTIBODY: 0.06 RATIO (ref 0–0.99)
ANTI SM/RNP ANTIBODY: 0.09 RATIO (ref 0–0.99)
ANTI-SM INTERPRETATION: NEGATIVE
ANTI-SM/RNP INTERPRETATION: NEGATIVE
ANTI-SSA ANTIBODY: 0.05 RATIO (ref 0–0.99)
ANTI-SSA INTERPRETATION: NEGATIVE
ANTI-SSB ANTIBODY: 0.12 RATIO (ref 0–0.99)
ANTI-SSB INTERPRETATION: NEGATIVE
DSDNA AB SER-ACNC: NORMAL [IU]/ML

## 2021-05-19 ENCOUNTER — PATIENT OUTREACH (OUTPATIENT)
Dept: ADMINISTRATIVE | Facility: OTHER | Age: 61
End: 2021-05-19

## 2021-05-20 ENCOUNTER — PATIENT MESSAGE (OUTPATIENT)
Dept: GASTROENTEROLOGY | Facility: CLINIC | Age: 61
End: 2021-05-20

## 2021-05-20 ENCOUNTER — OFFICE VISIT (OUTPATIENT)
Dept: GASTROENTEROLOGY | Facility: CLINIC | Age: 61
End: 2021-05-20
Payer: MEDICARE

## 2021-05-20 ENCOUNTER — TELEPHONE (OUTPATIENT)
Dept: RHEUMATOLOGY | Facility: CLINIC | Age: 61
End: 2021-05-20

## 2021-05-20 ENCOUNTER — PATIENT MESSAGE (OUTPATIENT)
Dept: RHEUMATOLOGY | Facility: CLINIC | Age: 61
End: 2021-05-20

## 2021-05-20 ENCOUNTER — OFFICE VISIT (OUTPATIENT)
Dept: RHEUMATOLOGY | Facility: CLINIC | Age: 61
End: 2021-05-20
Payer: MEDICARE

## 2021-05-20 ENCOUNTER — TELEPHONE (OUTPATIENT)
Dept: GASTROENTEROLOGY | Facility: CLINIC | Age: 61
End: 2021-05-20

## 2021-05-20 DIAGNOSIS — E06.3 HASHIMOTO'S THYROIDITIS: ICD-10-CM

## 2021-05-20 DIAGNOSIS — M34.1 CREST (CALCINOSIS, RAYNAUD'S PHENOMENON, ESOPHAGEAL DYSFUNCTION, SCLERODACTYLY, TELANGIECTASIA): Primary | ICD-10-CM

## 2021-05-20 DIAGNOSIS — I27.20 PULMONARY HYPERTENSION: ICD-10-CM

## 2021-05-20 DIAGNOSIS — R51.9 NONINTRACTABLE HEADACHE, UNSPECIFIED CHRONICITY PATTERN, UNSPECIFIED HEADACHE TYPE: ICD-10-CM

## 2021-05-20 DIAGNOSIS — M35.00 SJOGREN'S SYNDROME, WITH UNSPECIFIED ORGAN INVOLVEMENT: ICD-10-CM

## 2021-05-20 DIAGNOSIS — K21.9 GASTROESOPHAGEAL REFLUX DISEASE, UNSPECIFIED WHETHER ESOPHAGITIS PRESENT: Primary | ICD-10-CM

## 2021-05-20 DIAGNOSIS — M34.1 CREST (CALCINOSIS, RAYNAUD'S PHENOMENON, ESOPHAGEAL DYSFUNCTION, SCLERODACTYLY, TELANGIECTASIA): ICD-10-CM

## 2021-05-20 DIAGNOSIS — E61.1 IRON DEFICIENCY: ICD-10-CM

## 2021-05-20 DIAGNOSIS — D64.9 ANEMIA, UNSPECIFIED TYPE: ICD-10-CM

## 2021-05-20 PROCEDURE — 99214 PR OFFICE/OUTPT VISIT, EST, LEVL IV, 30-39 MIN: ICD-10-PCS | Mod: 95,,, | Performed by: PHYSICIAN ASSISTANT

## 2021-05-20 PROCEDURE — 99213 PR OFFICE/OUTPT VISIT, EST, LEVL III, 20-29 MIN: ICD-10-PCS | Mod: 95,,, | Performed by: INTERNAL MEDICINE

## 2021-05-20 PROCEDURE — 99214 OFFICE O/P EST MOD 30 MIN: CPT | Mod: 95,,, | Performed by: PHYSICIAN ASSISTANT

## 2021-05-20 PROCEDURE — 99213 OFFICE O/P EST LOW 20 MIN: CPT | Mod: 95,,, | Performed by: INTERNAL MEDICINE

## 2021-05-20 PROCEDURE — 99499 RISK ADDL DX/OHS AUDIT: ICD-10-PCS | Mod: 95,,, | Performed by: PHYSICIAN ASSISTANT

## 2021-05-20 PROCEDURE — 99499 UNLISTED E&M SERVICE: CPT | Mod: 95,,, | Performed by: PHYSICIAN ASSISTANT

## 2021-05-21 ENCOUNTER — TELEPHONE (OUTPATIENT)
Dept: NEUROLOGY | Facility: CLINIC | Age: 61
End: 2021-05-21

## 2021-05-21 ENCOUNTER — TELEPHONE (OUTPATIENT)
Dept: GASTROENTEROLOGY | Facility: CLINIC | Age: 61
End: 2021-05-21

## 2021-05-24 ENCOUNTER — PES CALL (OUTPATIENT)
Dept: ADMINISTRATIVE | Facility: CLINIC | Age: 61
End: 2021-05-24

## 2021-05-24 ENCOUNTER — PATIENT MESSAGE (OUTPATIENT)
Dept: GASTROENTEROLOGY | Facility: CLINIC | Age: 61
End: 2021-05-24

## 2021-05-26 DIAGNOSIS — Z01.818 PRE-OP TESTING: ICD-10-CM

## 2021-07-09 ENCOUNTER — PATIENT MESSAGE (OUTPATIENT)
Dept: RHEUMATOLOGY | Facility: CLINIC | Age: 61
End: 2021-07-09

## 2021-07-09 DIAGNOSIS — F43.21 SITUATIONAL DEPRESSION: ICD-10-CM

## 2021-07-09 DIAGNOSIS — F32.9 MAJOR DEPRESSION, CHRONIC: Primary | ICD-10-CM

## 2021-07-13 RX ORDER — SERTRALINE HYDROCHLORIDE 50 MG/1
50 TABLET, FILM COATED ORAL DAILY
Qty: 30 TABLET | Refills: 3 | Status: SHIPPED | OUTPATIENT
Start: 2021-07-13 | End: 2021-07-21

## 2021-07-20 DIAGNOSIS — E06.3 HASHIMOTO'S THYROIDITIS: ICD-10-CM

## 2021-07-21 ENCOUNTER — PATIENT MESSAGE (OUTPATIENT)
Dept: RHEUMATOLOGY | Facility: CLINIC | Age: 61
End: 2021-07-21

## 2021-07-21 DIAGNOSIS — F43.21 SITUATIONAL DEPRESSION: Primary | ICD-10-CM

## 2021-07-21 RX ORDER — SERTRALINE HYDROCHLORIDE 100 MG/1
100 TABLET, FILM COATED ORAL DAILY
Qty: 30 TABLET | Refills: 6 | Status: SHIPPED | OUTPATIENT
Start: 2021-07-21 | End: 2022-07-22

## 2021-07-26 ENCOUNTER — TELEPHONE (OUTPATIENT)
Dept: GASTROENTEROLOGY | Facility: CLINIC | Age: 61
End: 2021-07-26

## 2021-08-02 ENCOUNTER — PATIENT MESSAGE (OUTPATIENT)
Dept: RHEUMATOLOGY | Facility: CLINIC | Age: 61
End: 2021-08-02

## 2021-08-02 DIAGNOSIS — R11.0 NAUSEA: ICD-10-CM

## 2021-08-02 DIAGNOSIS — M35.00 SJOGREN'S SYNDROME, WITH UNSPECIFIED ORGAN INVOLVEMENT: Primary | ICD-10-CM

## 2021-08-03 RX ORDER — HYDROXYCHLOROQUINE SULFATE 200 MG/1
200 TABLET, FILM COATED ORAL 2 TIMES DAILY
Qty: 60 TABLET | Refills: 6 | Status: SHIPPED | OUTPATIENT
Start: 2021-08-03 | End: 2022-07-22 | Stop reason: SDUPTHER

## 2021-08-03 RX ORDER — PROMETHAZINE HYDROCHLORIDE 25 MG/1
25 TABLET ORAL EVERY 6 HOURS PRN
Qty: 30 TABLET | Refills: 3 | Status: SHIPPED | OUTPATIENT
Start: 2021-08-03 | End: 2022-04-29 | Stop reason: SDUPTHER

## 2021-09-12 ENCOUNTER — PATIENT MESSAGE (OUTPATIENT)
Dept: RHEUMATOLOGY | Facility: CLINIC | Age: 61
End: 2021-09-12

## 2021-09-24 DIAGNOSIS — F43.21 SITUATIONAL DEPRESSION: ICD-10-CM

## 2021-09-24 DIAGNOSIS — K21.00 GASTROESOPHAGEAL REFLUX DISEASE WITH ESOPHAGITIS WITHOUT HEMORRHAGE: ICD-10-CM

## 2021-09-24 DIAGNOSIS — I10 ESSENTIAL HYPERTENSION: ICD-10-CM

## 2021-09-24 DIAGNOSIS — K58.0 IRRITABLE BOWEL SYNDROME WITH DIARRHEA: ICD-10-CM

## 2021-09-24 DIAGNOSIS — I27.20 PULMONARY HYPERTENSION: ICD-10-CM

## 2021-09-24 DIAGNOSIS — R11.0 NAUSEA: ICD-10-CM

## 2021-09-24 DIAGNOSIS — J20.9 ACUTE BRONCHITIS, UNSPECIFIED ORGANISM: ICD-10-CM

## 2021-09-24 DIAGNOSIS — M35.00 SJOGREN'S SYNDROME, WITH UNSPECIFIED ORGAN INVOLVEMENT: ICD-10-CM

## 2021-09-24 DIAGNOSIS — S39.012S STRAIN OF LUMBAR REGION, SEQUELA: ICD-10-CM

## 2021-09-24 DIAGNOSIS — K22.4 ESOPHAGEAL DYSMOTILITY: ICD-10-CM

## 2021-09-24 RX ORDER — PROMETHAZINE HYDROCHLORIDE 25 MG/1
25 TABLET ORAL EVERY 6 HOURS PRN
Qty: 30 TABLET | Refills: 3 | Status: CANCELLED | OUTPATIENT
Start: 2021-09-24

## 2021-09-24 RX ORDER — SERTRALINE HYDROCHLORIDE 100 MG/1
100 TABLET, FILM COATED ORAL DAILY
Qty: 30 TABLET | Refills: 6 | Status: CANCELLED | OUTPATIENT
Start: 2021-09-24 | End: 2022-09-24

## 2021-09-24 RX ORDER — HYDROXYCHLOROQUINE SULFATE 200 MG/1
200 TABLET, FILM COATED ORAL 2 TIMES DAILY
Qty: 60 TABLET | Refills: 6 | Status: CANCELLED | OUTPATIENT
Start: 2021-09-24

## 2021-09-26 RX ORDER — OMEPRAZOLE 40 MG/1
40 CAPSULE, DELAYED RELEASE ORAL
Qty: 90 CAPSULE | Refills: 6 | Status: SHIPPED | OUTPATIENT
Start: 2021-09-26 | End: 2022-04-28

## 2021-09-27 RX ORDER — CYCLOBENZAPRINE HCL 10 MG
10 TABLET ORAL 3 TIMES DAILY PRN
Qty: 90 TABLET | Refills: 3 | Status: SHIPPED | OUTPATIENT
Start: 2021-09-27 | End: 2023-09-21 | Stop reason: SDUPTHER

## 2021-09-27 RX ORDER — FAMOTIDINE 40 MG/1
40 TABLET, FILM COATED ORAL NIGHTLY PRN
Qty: 90 TABLET | Refills: 3 | Status: SHIPPED | OUTPATIENT
Start: 2021-09-27 | End: 2022-07-22

## 2021-09-27 RX ORDER — ONDANSETRON 4 MG/1
8 TABLET, ORALLY DISINTEGRATING ORAL 3 TIMES DAILY PRN
Qty: 30 TABLET | Refills: 3 | Status: SHIPPED | OUTPATIENT
Start: 2021-09-27 | End: 2023-09-21 | Stop reason: SDUPTHER

## 2021-09-28 RX ORDER — LOSARTAN POTASSIUM AND HYDROCHLOROTHIAZIDE 25; 100 MG/1; MG/1
1 TABLET ORAL DAILY
Qty: 90 TABLET | Refills: 3 | OUTPATIENT
Start: 2021-09-28

## 2021-10-10 RX ORDER — VALACYCLOVIR HYDROCHLORIDE 500 MG/1
TABLET, FILM COATED ORAL
Qty: 90 TABLET | Refills: 0 | Status: SHIPPED | OUTPATIENT
Start: 2021-10-10 | End: 2022-01-09

## 2021-10-18 ENCOUNTER — PATIENT MESSAGE (OUTPATIENT)
Dept: TRANSPLANT | Facility: CLINIC | Age: 61
End: 2021-10-18
Payer: MEDICARE

## 2021-10-28 ENCOUNTER — OFFICE VISIT (OUTPATIENT)
Dept: FAMILY MEDICINE | Facility: CLINIC | Age: 61
End: 2021-10-28
Payer: MEDICARE

## 2021-10-28 VITALS
SYSTOLIC BLOOD PRESSURE: 132 MMHG | HEART RATE: 95 BPM | DIASTOLIC BLOOD PRESSURE: 74 MMHG | TEMPERATURE: 98 F | OXYGEN SATURATION: 99 % | WEIGHT: 183.44 LBS | BODY MASS INDEX: 31.48 KG/M2

## 2021-10-28 DIAGNOSIS — Z13.6 ENCOUNTER FOR SCREENING FOR CARDIOVASCULAR DISORDERS: Primary | ICD-10-CM

## 2021-10-28 DIAGNOSIS — I10 ESSENTIAL HYPERTENSION: ICD-10-CM

## 2021-10-28 PROCEDURE — 1159F MED LIST DOCD IN RCRD: CPT | Mod: HCNC,CPTII,S$GLB, | Performed by: PHYSICIAN ASSISTANT

## 2021-10-28 PROCEDURE — 99999 PR PBB SHADOW E&M-EST. PATIENT-LVL IV: ICD-10-PCS | Mod: PBBFAC,HCNC,, | Performed by: PHYSICIAN ASSISTANT

## 2021-10-28 PROCEDURE — 3078F PR MOST RECENT DIASTOLIC BLOOD PRESSURE < 80 MM HG: ICD-10-PCS | Mod: HCNC,CPTII,S$GLB, | Performed by: PHYSICIAN ASSISTANT

## 2021-10-28 PROCEDURE — 99214 PR OFFICE/OUTPT VISIT, EST, LEVL IV, 30-39 MIN: ICD-10-PCS | Mod: HCNC,S$GLB,, | Performed by: PHYSICIAN ASSISTANT

## 2021-10-28 PROCEDURE — 3008F PR BODY MASS INDEX (BMI) DOCUMENTED: ICD-10-PCS | Mod: HCNC,CPTII,S$GLB, | Performed by: PHYSICIAN ASSISTANT

## 2021-10-28 PROCEDURE — 1159F PR MEDICATION LIST DOCUMENTED IN MEDICAL RECORD: ICD-10-PCS | Mod: HCNC,CPTII,S$GLB, | Performed by: PHYSICIAN ASSISTANT

## 2021-10-28 PROCEDURE — 3075F SYST BP GE 130 - 139MM HG: CPT | Mod: HCNC,CPTII,S$GLB, | Performed by: PHYSICIAN ASSISTANT

## 2021-10-28 PROCEDURE — 3008F BODY MASS INDEX DOCD: CPT | Mod: HCNC,CPTII,S$GLB, | Performed by: PHYSICIAN ASSISTANT

## 2021-10-28 PROCEDURE — 99999 PR PBB SHADOW E&M-EST. PATIENT-LVL IV: CPT | Mod: PBBFAC,HCNC,, | Performed by: PHYSICIAN ASSISTANT

## 2021-10-28 PROCEDURE — 3078F DIAST BP <80 MM HG: CPT | Mod: HCNC,CPTII,S$GLB, | Performed by: PHYSICIAN ASSISTANT

## 2021-10-28 PROCEDURE — 3075F PR MOST RECENT SYSTOLIC BLOOD PRESS GE 130-139MM HG: ICD-10-PCS | Mod: HCNC,CPTII,S$GLB, | Performed by: PHYSICIAN ASSISTANT

## 2021-10-28 PROCEDURE — 99214 OFFICE O/P EST MOD 30 MIN: CPT | Mod: HCNC,S$GLB,, | Performed by: PHYSICIAN ASSISTANT

## 2021-10-28 RX ORDER — LOSARTAN POTASSIUM AND HYDROCHLOROTHIAZIDE 25; 100 MG/1; MG/1
1 TABLET ORAL DAILY
Qty: 90 TABLET | Refills: 3 | Status: SHIPPED | OUTPATIENT
Start: 2021-10-28 | End: 2022-11-08 | Stop reason: SDUPTHER

## 2021-10-29 ENCOUNTER — PATIENT MESSAGE (OUTPATIENT)
Dept: FAMILY MEDICINE | Facility: CLINIC | Age: 61
End: 2021-10-29
Payer: MEDICARE

## 2021-10-29 DIAGNOSIS — R41.9 UNSPECIFIED SYMPTOMS AND SIGNS INVOLVING COGNITIVE FUNCTIONS AND AWARENESS: ICD-10-CM

## 2021-10-29 DIAGNOSIS — R53.83 FATIGUE, UNSPECIFIED TYPE: Primary | ICD-10-CM

## 2021-11-03 ENCOUNTER — PATIENT MESSAGE (OUTPATIENT)
Dept: FAMILY MEDICINE | Facility: CLINIC | Age: 61
End: 2021-11-03
Payer: MEDICARE

## 2021-11-15 DIAGNOSIS — J30.2 SEASONAL ALLERGIES: ICD-10-CM

## 2021-11-16 ENCOUNTER — OFFICE VISIT (OUTPATIENT)
Dept: URGENT CARE | Facility: CLINIC | Age: 61
End: 2021-11-16
Payer: MEDICARE

## 2021-11-16 VITALS
HEART RATE: 96 BPM | HEIGHT: 64 IN | RESPIRATION RATE: 16 BRPM | DIASTOLIC BLOOD PRESSURE: 82 MMHG | WEIGHT: 177 LBS | OXYGEN SATURATION: 98 % | SYSTOLIC BLOOD PRESSURE: 133 MMHG | BODY MASS INDEX: 30.22 KG/M2 | TEMPERATURE: 99 F

## 2021-11-16 DIAGNOSIS — J06.9 VIRAL URI: ICD-10-CM

## 2021-11-16 DIAGNOSIS — R05.9 COUGH: Primary | ICD-10-CM

## 2021-11-16 LAB
CTP QC/QA: YES
SARS-COV-2 RDRP RESP QL NAA+PROBE: NEGATIVE

## 2021-11-16 PROCEDURE — 3075F SYST BP GE 130 - 139MM HG: CPT | Mod: CPTII,S$GLB,, | Performed by: EMERGENCY MEDICINE

## 2021-11-16 PROCEDURE — U0002: ICD-10-PCS | Mod: QW,S$GLB,, | Performed by: EMERGENCY MEDICINE

## 2021-11-16 PROCEDURE — 1160F RVW MEDS BY RX/DR IN RCRD: CPT | Mod: CPTII,S$GLB,, | Performed by: EMERGENCY MEDICINE

## 2021-11-16 PROCEDURE — 99214 PR OFFICE/OUTPT VISIT, EST, LEVL IV, 30-39 MIN: ICD-10-PCS | Mod: S$GLB,CS,, | Performed by: EMERGENCY MEDICINE

## 2021-11-16 PROCEDURE — 3079F DIAST BP 80-89 MM HG: CPT | Mod: CPTII,S$GLB,, | Performed by: EMERGENCY MEDICINE

## 2021-11-16 PROCEDURE — 3075F PR MOST RECENT SYSTOLIC BLOOD PRESS GE 130-139MM HG: ICD-10-PCS | Mod: CPTII,S$GLB,, | Performed by: EMERGENCY MEDICINE

## 2021-11-16 PROCEDURE — 1160F PR REVIEW ALL MEDS BY PRESCRIBER/CLIN PHARMACIST DOCUMENTED: ICD-10-PCS | Mod: CPTII,S$GLB,, | Performed by: EMERGENCY MEDICINE

## 2021-11-16 PROCEDURE — 3079F PR MOST RECENT DIASTOLIC BLOOD PRESSURE 80-89 MM HG: ICD-10-PCS | Mod: CPTII,S$GLB,, | Performed by: EMERGENCY MEDICINE

## 2021-11-16 PROCEDURE — 3008F BODY MASS INDEX DOCD: CPT | Mod: CPTII,S$GLB,, | Performed by: EMERGENCY MEDICINE

## 2021-11-16 PROCEDURE — U0002 COVID-19 LAB TEST NON-CDC: HCPCS | Mod: QW,S$GLB,, | Performed by: EMERGENCY MEDICINE

## 2021-11-16 PROCEDURE — 1159F PR MEDICATION LIST DOCUMENTED IN MEDICAL RECORD: ICD-10-PCS | Mod: CPTII,S$GLB,, | Performed by: EMERGENCY MEDICINE

## 2021-11-16 PROCEDURE — 99214 OFFICE O/P EST MOD 30 MIN: CPT | Mod: S$GLB,CS,, | Performed by: EMERGENCY MEDICINE

## 2021-11-16 PROCEDURE — 3008F PR BODY MASS INDEX (BMI) DOCUMENTED: ICD-10-PCS | Mod: CPTII,S$GLB,, | Performed by: EMERGENCY MEDICINE

## 2021-11-16 PROCEDURE — 1159F MED LIST DOCD IN RCRD: CPT | Mod: CPTII,S$GLB,, | Performed by: EMERGENCY MEDICINE

## 2021-11-16 RX ORDER — FLUTICASONE PROPIONATE 50 MCG
1 SPRAY, SUSPENSION (ML) NASAL DAILY
Qty: 16 G | Refills: 3 | Status: SHIPPED | OUTPATIENT
Start: 2021-11-16

## 2021-11-17 ENCOUNTER — PATIENT MESSAGE (OUTPATIENT)
Dept: RHEUMATOLOGY | Facility: CLINIC | Age: 61
End: 2021-11-17
Payer: MEDICARE

## 2021-11-17 DIAGNOSIS — J40 BRONCHITIS: Primary | ICD-10-CM

## 2021-11-17 RX ORDER — METHYLPREDNISOLONE 4 MG/1
TABLET ORAL
Qty: 1 EACH | Refills: 0 | Status: SHIPPED | OUTPATIENT
Start: 2021-11-17 | End: 2021-12-01 | Stop reason: ALTCHOICE

## 2021-11-17 RX ORDER — AZITHROMYCIN 250 MG/1
TABLET, FILM COATED ORAL
Qty: 6 TABLET | Refills: 0 | Status: SHIPPED | OUTPATIENT
Start: 2021-11-17 | End: 2021-12-01 | Stop reason: ALTCHOICE

## 2021-11-29 ENCOUNTER — TELEPHONE (OUTPATIENT)
Dept: GASTROENTEROLOGY | Facility: CLINIC | Age: 61
End: 2021-11-29
Payer: MEDICARE

## 2021-11-29 ENCOUNTER — PATIENT MESSAGE (OUTPATIENT)
Dept: ENDOSCOPY | Facility: HOSPITAL | Age: 61
End: 2021-11-29
Payer: MEDICARE

## 2021-11-30 ENCOUNTER — LAB VISIT (OUTPATIENT)
Dept: SPORTS MEDICINE | Facility: CLINIC | Age: 61
End: 2021-11-30
Payer: MEDICARE

## 2021-11-30 DIAGNOSIS — Z01.818 PRE-OP TESTING: ICD-10-CM

## 2021-11-30 PROCEDURE — U0005 INFEC AGEN DETEC AMPLI PROBE: HCPCS | Performed by: INTERNAL MEDICINE

## 2021-11-30 PROCEDURE — U0003 INFECTIOUS AGENT DETECTION BY NUCLEIC ACID (DNA OR RNA); SEVERE ACUTE RESPIRATORY SYNDROME CORONAVIRUS 2 (SARS-COV-2) (CORONAVIRUS DISEASE [COVID-19]), AMPLIFIED PROBE TECHNIQUE, MAKING USE OF HIGH THROUGHPUT TECHNOLOGIES AS DESCRIBED BY CMS-2020-01-R: HCPCS | Mod: HCNC | Performed by: INTERNAL MEDICINE

## 2021-12-01 ENCOUNTER — LAB VISIT (OUTPATIENT)
Dept: LAB | Facility: HOSPITAL | Age: 61
End: 2021-12-01
Attending: PHYSICIAN ASSISTANT
Payer: MEDICARE

## 2021-12-01 DIAGNOSIS — M35.00 SJOGREN'S SYNDROME, WITH UNSPECIFIED ORGAN INVOLVEMENT: ICD-10-CM

## 2021-12-01 DIAGNOSIS — E61.1 IRON DEFICIENCY: ICD-10-CM

## 2021-12-01 DIAGNOSIS — M34.1 CREST (CALCINOSIS, RAYNAUD'S PHENOMENON, ESOPHAGEAL DYSFUNCTION, SCLERODACTYLY, TELANGIECTASIA): ICD-10-CM

## 2021-12-01 DIAGNOSIS — E06.3 HASHIMOTO'S THYROIDITIS: ICD-10-CM

## 2021-12-01 LAB
ALBUMIN SERPL BCP-MCNC: 3.7 G/DL (ref 3.5–5.2)
ALP SERPL-CCNC: 57 U/L (ref 55–135)
ALT SERPL W/O P-5'-P-CCNC: 18 U/L (ref 10–44)
ANION GAP SERPL CALC-SCNC: 10 MMOL/L (ref 8–16)
AST SERPL-CCNC: 17 U/L (ref 10–40)
BASOPHILS # BLD AUTO: 0.05 K/UL (ref 0–0.2)
BASOPHILS NFR BLD: 0.9 % (ref 0–1.9)
BILIRUB SERPL-MCNC: 0.3 MG/DL (ref 0.1–1)
BUN SERPL-MCNC: 10 MG/DL (ref 6–20)
CALCIUM SERPL-MCNC: 9.3 MG/DL (ref 8.7–10.5)
CHLORIDE SERPL-SCNC: 103 MMOL/L (ref 95–110)
CO2 SERPL-SCNC: 24 MMOL/L (ref 23–29)
CREAT SERPL-MCNC: 0.8 MG/DL (ref 0.5–1.4)
CRP SERPL-MCNC: 4.6 MG/L (ref 0–8.2)
DIFFERENTIAL METHOD: ABNORMAL
EOSINOPHIL # BLD AUTO: 0.1 K/UL (ref 0–0.5)
EOSINOPHIL NFR BLD: 2 % (ref 0–8)
ERYTHROCYTE [DISTWIDTH] IN BLOOD BY AUTOMATED COUNT: 13 % (ref 11.5–14.5)
ERYTHROCYTE [SEDIMENTATION RATE] IN BLOOD BY WESTERGREN METHOD: 31 MM/HR (ref 0–36)
EST. GFR  (AFRICAN AMERICAN): >60 ML/MIN/1.73 M^2
EST. GFR  (NON AFRICAN AMERICAN): >60 ML/MIN/1.73 M^2
FERRITIN SERPL-MCNC: 72 NG/ML (ref 20–300)
GLUCOSE SERPL-MCNC: 91 MG/DL (ref 70–110)
HCT VFR BLD AUTO: 34.6 % (ref 37–48.5)
HGB BLD-MCNC: 11.1 G/DL (ref 12–16)
IMM GRANULOCYTES # BLD AUTO: 0.02 K/UL (ref 0–0.04)
IMM GRANULOCYTES NFR BLD AUTO: 0.4 % (ref 0–0.5)
IRON SERPL-MCNC: 73 UG/DL (ref 30–160)
LYMPHOCYTES # BLD AUTO: 1.8 K/UL (ref 1–4.8)
LYMPHOCYTES NFR BLD: 32.3 % (ref 18–48)
MCH RBC QN AUTO: 29.8 PG (ref 27–31)
MCHC RBC AUTO-ENTMCNC: 32.1 G/DL (ref 32–36)
MCV RBC AUTO: 93 FL (ref 82–98)
MONOCYTES # BLD AUTO: 0.5 K/UL (ref 0.3–1)
MONOCYTES NFR BLD: 9.5 % (ref 4–15)
NEUTROPHILS # BLD AUTO: 3.1 K/UL (ref 1.8–7.7)
NEUTROPHILS NFR BLD: 54.9 % (ref 38–73)
NRBC BLD-RTO: 0 /100 WBC
PLATELET # BLD AUTO: 289 K/UL (ref 150–450)
PMV BLD AUTO: 10.4 FL (ref 9.2–12.9)
POTASSIUM SERPL-SCNC: 3.8 MMOL/L (ref 3.5–5.1)
PROT SERPL-MCNC: 7 G/DL (ref 6–8.4)
RBC # BLD AUTO: 3.72 M/UL (ref 4–5.4)
RHEUMATOID FACT SERPL-ACNC: <13 IU/ML (ref 0–15)
SARS-COV-2 RNA RESP QL NAA+PROBE: NOT DETECTED
SARS-COV-2- CYCLE NUMBER: NORMAL
SATURATED IRON: 19 % (ref 20–50)
SODIUM SERPL-SCNC: 137 MMOL/L (ref 136–145)
T4 FREE SERPL-MCNC: 0.71 NG/DL (ref 0.71–1.51)
TOTAL IRON BINDING CAPACITY: 385 UG/DL (ref 250–450)
TRANSFERRIN SERPL-MCNC: 260 MG/DL (ref 200–375)
TSH SERPL DL<=0.005 MIU/L-ACNC: 0.76 UIU/ML (ref 0.4–4)
WBC # BLD AUTO: 5.57 K/UL (ref 3.9–12.7)

## 2021-12-01 PROCEDURE — 85025 COMPLETE CBC W/AUTO DIFF WBC: CPT | Mod: HCNC | Performed by: PHYSICIAN ASSISTANT

## 2021-12-01 PROCEDURE — 36415 COLL VENOUS BLD VENIPUNCTURE: CPT | Mod: HCNC,PO | Performed by: PHYSICIAN ASSISTANT

## 2021-12-01 PROCEDURE — 86235 NUCLEAR ANTIGEN ANTIBODY: CPT | Mod: HCNC | Performed by: PHYSICIAN ASSISTANT

## 2021-12-01 PROCEDURE — 84466 ASSAY OF TRANSFERRIN: CPT | Mod: HCNC | Performed by: PHYSICIAN ASSISTANT

## 2021-12-01 PROCEDURE — 80053 COMPREHEN METABOLIC PANEL: CPT | Mod: HCNC | Performed by: PHYSICIAN ASSISTANT

## 2021-12-01 PROCEDURE — 85652 RBC SED RATE AUTOMATED: CPT | Mod: HCNC | Performed by: PHYSICIAN ASSISTANT

## 2021-12-01 PROCEDURE — 86039 ANTINUCLEAR ANTIBODIES (ANA): CPT | Mod: HCNC | Performed by: PHYSICIAN ASSISTANT

## 2021-12-01 PROCEDURE — 82728 ASSAY OF FERRITIN: CPT | Mod: HCNC | Performed by: PHYSICIAN ASSISTANT

## 2021-12-01 PROCEDURE — 86431 RHEUMATOID FACTOR QUANT: CPT | Mod: HCNC | Performed by: PHYSICIAN ASSISTANT

## 2021-12-01 PROCEDURE — 84439 ASSAY OF FREE THYROXINE: CPT | Mod: HCNC | Performed by: PHYSICIAN ASSISTANT

## 2021-12-01 PROCEDURE — 86235 NUCLEAR ANTIGEN ANTIBODY: CPT | Mod: 59,HCNC | Performed by: PHYSICIAN ASSISTANT

## 2021-12-01 PROCEDURE — 84443 ASSAY THYROID STIM HORMONE: CPT | Mod: HCNC | Performed by: PHYSICIAN ASSISTANT

## 2021-12-01 PROCEDURE — 86038 ANTINUCLEAR ANTIBODIES: CPT | Mod: HCNC | Performed by: PHYSICIAN ASSISTANT

## 2021-12-01 PROCEDURE — 86140 C-REACTIVE PROTEIN: CPT | Mod: HCNC | Performed by: PHYSICIAN ASSISTANT

## 2021-12-02 LAB
ANA PATTERN 1: NORMAL
ANA PATTERN 2: NORMAL
ANA SER QL IF: POSITIVE
ANA TITER 2: NORMAL
ANA TITR SER IF: NORMAL {TITER}

## 2021-12-03 ENCOUNTER — ANESTHESIA EVENT (OUTPATIENT)
Dept: ENDOSCOPY | Facility: HOSPITAL | Age: 61
End: 2021-12-03
Payer: MEDICARE

## 2021-12-03 ENCOUNTER — ANESTHESIA (OUTPATIENT)
Dept: ENDOSCOPY | Facility: HOSPITAL | Age: 61
End: 2021-12-03
Payer: MEDICARE

## 2021-12-03 ENCOUNTER — HOSPITAL ENCOUNTER (OUTPATIENT)
Facility: HOSPITAL | Age: 61
Discharge: HOME | End: 2021-12-03
Attending: INTERNAL MEDICINE | Admitting: INTERNAL MEDICINE
Payer: MEDICARE

## 2021-12-03 VITALS
OXYGEN SATURATION: 98 % | DIASTOLIC BLOOD PRESSURE: 64 MMHG | SYSTOLIC BLOOD PRESSURE: 132 MMHG | HEIGHT: 64 IN | TEMPERATURE: 98 F | RESPIRATION RATE: 16 BRPM | BODY MASS INDEX: 28.51 KG/M2 | HEART RATE: 74 BPM | WEIGHT: 167 LBS

## 2021-12-03 DIAGNOSIS — K21.9 GERD (GASTROESOPHAGEAL REFLUX DISEASE): ICD-10-CM

## 2021-12-03 PROCEDURE — 88342 CHG IMMUNOCYTOCHEMISTRY: ICD-10-PCS | Mod: 26,HCNC,, | Performed by: PATHOLOGY

## 2021-12-03 PROCEDURE — D9220A PRA ANESTHESIA: Mod: HCNC,CRNA,, | Performed by: NURSE ANESTHETIST, CERTIFIED REGISTERED

## 2021-12-03 PROCEDURE — 63600175 PHARM REV CODE 636 W HCPCS: Mod: HCNC,PO | Performed by: NURSE ANESTHETIST, CERTIFIED REGISTERED

## 2021-12-03 PROCEDURE — D9220A PRA ANESTHESIA: ICD-10-PCS | Mod: HCNC,ANES,, | Performed by: ANESTHESIOLOGY

## 2021-12-03 PROCEDURE — 43248 EGD GUIDE WIRE INSERTION: CPT | Mod: HCNC,PO | Performed by: INTERNAL MEDICINE

## 2021-12-03 PROCEDURE — 43239 EGD BIOPSY SINGLE/MULTIPLE: CPT | Mod: 59,HCNC,, | Performed by: INTERNAL MEDICINE

## 2021-12-03 PROCEDURE — 43248 PR EGD, FLEX, W/DILATION OVER GUIDEWIRE: ICD-10-PCS | Mod: HCNC,,, | Performed by: INTERNAL MEDICINE

## 2021-12-03 PROCEDURE — 37000009 HC ANESTHESIA EA ADD 15 MINS: Mod: HCNC,PO | Performed by: INTERNAL MEDICINE

## 2021-12-03 PROCEDURE — 88305 TISSUE EXAM BY PATHOLOGIST: ICD-10-PCS | Mod: 26,HCNC,, | Performed by: PATHOLOGY

## 2021-12-03 PROCEDURE — 88305 TISSUE EXAM BY PATHOLOGIST: CPT | Mod: 26,HCNC,, | Performed by: PATHOLOGY

## 2021-12-03 PROCEDURE — 27201012 HC FORCEPS, HOT/COLD, DISP: Mod: HCNC,PO | Performed by: INTERNAL MEDICINE

## 2021-12-03 PROCEDURE — 37000008 HC ANESTHESIA 1ST 15 MINUTES: Mod: HCNC,PO | Performed by: INTERNAL MEDICINE

## 2021-12-03 PROCEDURE — 43248 EGD GUIDE WIRE INSERTION: CPT | Mod: HCNC,,, | Performed by: INTERNAL MEDICINE

## 2021-12-03 PROCEDURE — 88305 TISSUE EXAM BY PATHOLOGIST: CPT | Mod: 59,HCNC | Performed by: PATHOLOGY

## 2021-12-03 PROCEDURE — 88342 IMHCHEM/IMCYTCHM 1ST ANTB: CPT | Mod: 26,HCNC,, | Performed by: PATHOLOGY

## 2021-12-03 PROCEDURE — 63600175 PHARM REV CODE 636 W HCPCS: Mod: HCNC,PO | Performed by: INTERNAL MEDICINE

## 2021-12-03 PROCEDURE — D9220A PRA ANESTHESIA: Mod: HCNC,ANES,, | Performed by: ANESTHESIOLOGY

## 2021-12-03 PROCEDURE — 25000003 PHARM REV CODE 250: Mod: HCNC,PO | Performed by: NURSE ANESTHETIST, CERTIFIED REGISTERED

## 2021-12-03 PROCEDURE — 43239 EGD BIOPSY SINGLE/MULTIPLE: CPT | Mod: HCNC,PO | Performed by: INTERNAL MEDICINE

## 2021-12-03 PROCEDURE — D9220A PRA ANESTHESIA: ICD-10-PCS | Mod: HCNC,CRNA,, | Performed by: NURSE ANESTHETIST, CERTIFIED REGISTERED

## 2021-12-03 PROCEDURE — 43239 PR EGD, FLEX, W/BIOPSY, SGL/MULTI: ICD-10-PCS | Mod: 59,HCNC,, | Performed by: INTERNAL MEDICINE

## 2021-12-03 PROCEDURE — 88342 IMHCHEM/IMCYTCHM 1ST ANTB: CPT | Mod: HCNC | Performed by: PATHOLOGY

## 2021-12-03 RX ORDER — SUCRALFATE 1 G/1
1 TABLET ORAL
Qty: 120 TABLET | Refills: 11 | Status: SHIPPED | OUTPATIENT
Start: 2021-12-03 | End: 2022-12-03

## 2021-12-03 RX ORDER — LIDOCAINE HCL/PF 100 MG/5ML
SYRINGE (ML) INTRAVENOUS
Status: DISCONTINUED | OUTPATIENT
Start: 2021-12-03 | End: 2021-12-03

## 2021-12-03 RX ORDER — PROPOFOL 10 MG/ML
VIAL (ML) INTRAVENOUS
Status: DISCONTINUED | OUTPATIENT
Start: 2021-12-03 | End: 2021-12-03

## 2021-12-03 RX ORDER — SODIUM CHLORIDE, SODIUM LACTATE, POTASSIUM CHLORIDE, CALCIUM CHLORIDE 600; 310; 30; 20 MG/100ML; MG/100ML; MG/100ML; MG/100ML
INJECTION, SOLUTION INTRAVENOUS CONTINUOUS
Status: DISCONTINUED | OUTPATIENT
Start: 2021-12-03 | End: 2021-12-03 | Stop reason: HOSPADM

## 2021-12-03 RX ORDER — SODIUM CHLORIDE 0.9 % (FLUSH) 0.9 %
10 SYRINGE (ML) INJECTION
Status: DISCONTINUED | OUTPATIENT
Start: 2021-12-03 | End: 2021-12-03 | Stop reason: HOSPADM

## 2021-12-03 RX ADMIN — PROPOFOL 30 MG: 10 INJECTION, EMULSION INTRAVENOUS at 09:12

## 2021-12-03 RX ADMIN — LIDOCAINE HYDROCHLORIDE 100 MG: 20 INJECTION, SOLUTION INTRAVENOUS at 09:12

## 2021-12-03 RX ADMIN — SODIUM CHLORIDE, SODIUM LACTATE, POTASSIUM CHLORIDE, AND CALCIUM CHLORIDE: .6; .31; .03; .02 INJECTION, SOLUTION INTRAVENOUS at 09:12

## 2021-12-03 RX ADMIN — PROPOFOL 60 MG: 10 INJECTION, EMULSION INTRAVENOUS at 09:12

## 2021-12-03 RX ADMIN — PROPOFOL 90 MG: 10 INJECTION, EMULSION INTRAVENOUS at 09:12

## 2021-12-06 LAB
ANTI SM ANTIBODY: 0.06 RATIO (ref 0–0.99)
ANTI SM/RNP ANTIBODY: 0.07 RATIO (ref 0–0.99)
ANTI-SM INTERPRETATION: NEGATIVE
ANTI-SM/RNP INTERPRETATION: NEGATIVE
ANTI-SSA ANTIBODY: 0.06 RATIO (ref 0–0.99)
ANTI-SSA INTERPRETATION: NEGATIVE
ANTI-SSB ANTIBODY: 0.1 RATIO (ref 0–0.99)
ANTI-SSB INTERPRETATION: NEGATIVE
DSDNA AB SER-ACNC: NORMAL [IU]/ML
ENA SCL70 AB SER-ACNC: 4 UNITS

## 2021-12-07 DIAGNOSIS — K21.9 GASTROESOPHAGEAL REFLUX DISEASE, UNSPECIFIED WHETHER ESOPHAGITIS PRESENT: Primary | ICD-10-CM

## 2021-12-07 DIAGNOSIS — E06.3 HASHIMOTO'S THYROIDITIS: ICD-10-CM

## 2021-12-07 RX ORDER — THYROID 90 MG/1
90 TABLET ORAL
Qty: 30 TABLET | Refills: 11 | Status: CANCELLED | OUTPATIENT
Start: 2021-12-07

## 2021-12-07 NOTE — TELEPHONE ENCOUNTER
Pharmacy requesting refill on Thyroid NP 90mg  Pt's LOV 05/20/2021  Pt's NOV 12/08/2021  Medication pending

## 2021-12-08 ENCOUNTER — OFFICE VISIT (OUTPATIENT)
Dept: RHEUMATOLOGY | Facility: CLINIC | Age: 61
End: 2021-12-08
Payer: MEDICARE

## 2021-12-08 VITALS
BODY MASS INDEX: 31.24 KG/M2 | DIASTOLIC BLOOD PRESSURE: 84 MMHG | SYSTOLIC BLOOD PRESSURE: 134 MMHG | HEIGHT: 64 IN | HEART RATE: 84 BPM | WEIGHT: 183 LBS

## 2021-12-08 DIAGNOSIS — B37.81 THRUSH OF MOUTH AND ESOPHAGUS: ICD-10-CM

## 2021-12-08 DIAGNOSIS — J40 BRONCHITIS: ICD-10-CM

## 2021-12-08 DIAGNOSIS — M34.1 CREST (CALCINOSIS, RAYNAUD'S PHENOMENON, ESOPHAGEAL DYSFUNCTION, SCLERODACTYLY, TELANGIECTASIA): Primary | ICD-10-CM

## 2021-12-08 DIAGNOSIS — E06.3 HASHIMOTO'S THYROIDITIS: ICD-10-CM

## 2021-12-08 DIAGNOSIS — I27.20 PHT (PULMONARY HYPERTENSION): ICD-10-CM

## 2021-12-08 DIAGNOSIS — E55.9 VITAMIN D DEFICIENCY, UNSPECIFIED: ICD-10-CM

## 2021-12-08 DIAGNOSIS — B37.0 THRUSH OF MOUTH AND ESOPHAGUS: ICD-10-CM

## 2021-12-08 DIAGNOSIS — K21.00 GASTROESOPHAGEAL REFLUX DISEASE WITH ESOPHAGITIS, UNSPECIFIED WHETHER HEMORRHAGE: ICD-10-CM

## 2021-12-08 DIAGNOSIS — M35.00 SJOGREN'S SYNDROME, WITH UNSPECIFIED ORGAN INVOLVEMENT: ICD-10-CM

## 2021-12-08 DIAGNOSIS — G89.4 CHRONIC PAIN SYNDROME: ICD-10-CM

## 2021-12-08 DIAGNOSIS — L29.9 ITCHING: ICD-10-CM

## 2021-12-08 PROCEDURE — 3079F PR MOST RECENT DIASTOLIC BLOOD PRESSURE 80-89 MM HG: ICD-10-PCS | Mod: HCNC,CPTII,S$GLB, | Performed by: INTERNAL MEDICINE

## 2021-12-08 PROCEDURE — 3008F PR BODY MASS INDEX (BMI) DOCUMENTED: ICD-10-PCS | Mod: HCNC,CPTII,S$GLB, | Performed by: INTERNAL MEDICINE

## 2021-12-08 PROCEDURE — 3008F BODY MASS INDEX DOCD: CPT | Mod: HCNC,CPTII,S$GLB, | Performed by: INTERNAL MEDICINE

## 2021-12-08 PROCEDURE — 99499 UNLISTED E&M SERVICE: CPT | Mod: HCNC,S$GLB,, | Performed by: INTERNAL MEDICINE

## 2021-12-08 PROCEDURE — 99215 PR OFFICE/OUTPT VISIT, EST, LEVL V, 40-54 MIN: ICD-10-PCS | Mod: HCNC,S$GLB,, | Performed by: INTERNAL MEDICINE

## 2021-12-08 PROCEDURE — 99215 OFFICE O/P EST HI 40 MIN: CPT | Mod: HCNC,S$GLB,, | Performed by: INTERNAL MEDICINE

## 2021-12-08 PROCEDURE — 3079F DIAST BP 80-89 MM HG: CPT | Mod: HCNC,CPTII,S$GLB, | Performed by: INTERNAL MEDICINE

## 2021-12-08 PROCEDURE — 3075F PR MOST RECENT SYSTOLIC BLOOD PRESS GE 130-139MM HG: ICD-10-PCS | Mod: HCNC,CPTII,S$GLB, | Performed by: INTERNAL MEDICINE

## 2021-12-08 PROCEDURE — 1159F PR MEDICATION LIST DOCUMENTED IN MEDICAL RECORD: ICD-10-PCS | Mod: HCNC,CPTII,S$GLB, | Performed by: INTERNAL MEDICINE

## 2021-12-08 PROCEDURE — 3075F SYST BP GE 130 - 139MM HG: CPT | Mod: HCNC,CPTII,S$GLB, | Performed by: INTERNAL MEDICINE

## 2021-12-08 PROCEDURE — 1159F MED LIST DOCD IN RCRD: CPT | Mod: HCNC,CPTII,S$GLB, | Performed by: INTERNAL MEDICINE

## 2021-12-08 PROCEDURE — 99499 RISK ADDL DX/OHS AUDIT: ICD-10-PCS | Mod: HCNC,S$GLB,, | Performed by: INTERNAL MEDICINE

## 2021-12-08 PROCEDURE — 99999 PR PBB SHADOW E&M-EST. PATIENT-LVL IV: CPT | Mod: PBBFAC,HCNC,, | Performed by: INTERNAL MEDICINE

## 2021-12-08 PROCEDURE — 99999 PR PBB SHADOW E&M-EST. PATIENT-LVL IV: ICD-10-PCS | Mod: PBBFAC,HCNC,, | Performed by: INTERNAL MEDICINE

## 2021-12-08 RX ORDER — LIDOCAINE HYDROCHLORIDE 20 MG/ML
SOLUTION OROPHARYNGEAL EVERY 6 HOURS
Qty: 100 ML | Refills: 3 | Status: SHIPPED | OUTPATIENT
Start: 2021-12-08

## 2021-12-08 RX ORDER — MONTELUKAST SODIUM 10 MG/1
10 TABLET ORAL NIGHTLY
COMMUNITY
Start: 2021-09-24 | End: 2022-09-30 | Stop reason: SDUPTHER

## 2021-12-08 RX ORDER — HYDROXYZINE PAMOATE 25 MG/1
25 CAPSULE ORAL 4 TIMES DAILY
Qty: 40 CAPSULE | Refills: 1 | Status: SHIPPED | OUTPATIENT
Start: 2021-12-08 | End: 2021-12-23

## 2021-12-08 RX ORDER — AZITHROMYCIN 250 MG/1
TABLET, FILM COATED ORAL
Qty: 10 TABLET | Refills: 0 | Status: SHIPPED | OUTPATIENT
Start: 2021-12-08 | End: 2022-07-22

## 2021-12-08 RX ORDER — BENZONATATE 200 MG/1
200 CAPSULE ORAL 3 TIMES DAILY PRN
Qty: 30 CAPSULE | Refills: 0 | Status: SHIPPED | OUTPATIENT
Start: 2021-12-08 | End: 2021-12-18

## 2021-12-08 RX ORDER — NYSTATIN 100000 [USP'U]/ML
6 SUSPENSION ORAL 4 TIMES DAILY
Qty: 240 ML | Refills: 1 | Status: SHIPPED | OUTPATIENT
Start: 2021-12-08 | End: 2021-12-18

## 2021-12-08 RX ORDER — TRAMADOL HYDROCHLORIDE 50 MG/1
50 TABLET ORAL EVERY 6 HOURS PRN
Qty: 120 TABLET | Refills: 4 | Status: SHIPPED | OUTPATIENT
Start: 2021-12-08 | End: 2022-09-30 | Stop reason: SDUPTHER

## 2021-12-08 RX ORDER — METHYLPREDNISOLONE 4 MG/1
TABLET ORAL
Qty: 21 EACH | Refills: 0 | Status: SHIPPED | OUTPATIENT
Start: 2021-12-08 | End: 2021-12-29

## 2021-12-08 RX ORDER — THYROID 90 MG/1
90 TABLET ORAL
Qty: 90 TABLET | Refills: 3 | Status: SHIPPED | OUTPATIENT
Start: 2021-12-08 | End: 2021-12-10 | Stop reason: SDUPTHER

## 2021-12-09 ENCOUNTER — TELEPHONE (OUTPATIENT)
Dept: GASTROENTEROLOGY | Facility: CLINIC | Age: 61
End: 2021-12-09
Payer: MEDICARE

## 2021-12-09 ENCOUNTER — TELEPHONE (OUTPATIENT)
Dept: FAMILY MEDICINE | Facility: CLINIC | Age: 61
End: 2021-12-09
Payer: MEDICARE

## 2021-12-09 ENCOUNTER — PATIENT MESSAGE (OUTPATIENT)
Dept: GASTROENTEROLOGY | Facility: CLINIC | Age: 61
End: 2021-12-09
Payer: MEDICARE

## 2021-12-09 ENCOUNTER — PATIENT MESSAGE (OUTPATIENT)
Dept: RHEUMATOLOGY | Facility: CLINIC | Age: 61
End: 2021-12-09
Payer: MEDICARE

## 2021-12-09 ENCOUNTER — PATIENT MESSAGE (OUTPATIENT)
Dept: FAMILY MEDICINE | Facility: CLINIC | Age: 61
End: 2021-12-09
Payer: MEDICARE

## 2021-12-09 DIAGNOSIS — E06.3 HASHIMOTO'S THYROIDITIS: ICD-10-CM

## 2021-12-10 ENCOUNTER — HOSPITAL ENCOUNTER (OUTPATIENT)
Dept: RADIOLOGY | Facility: HOSPITAL | Age: 61
Discharge: HOME | End: 2021-12-10
Attending: INTERNAL MEDICINE
Payer: MEDICARE

## 2021-12-10 DIAGNOSIS — M34.1 CREST (CALCINOSIS, RAYNAUD'S PHENOMENON, ESOPHAGEAL DYSFUNCTION, SCLERODACTYLY, TELANGIECTASIA): ICD-10-CM

## 2021-12-10 DIAGNOSIS — E06.3 HASHIMOTO'S THYROIDITIS: ICD-10-CM

## 2021-12-10 DIAGNOSIS — G89.4 CHRONIC PAIN SYNDROME: ICD-10-CM

## 2021-12-10 DIAGNOSIS — J40 BRONCHITIS: ICD-10-CM

## 2021-12-10 PROCEDURE — 71046 X-RAY EXAM CHEST 2 VIEWS: CPT | Mod: TC,HCNC,PO

## 2021-12-10 PROCEDURE — 71046 XR CHEST PA AND LATERAL: ICD-10-PCS | Mod: 26,HCNC,, | Performed by: RADIOLOGY

## 2021-12-10 PROCEDURE — 71046 X-RAY EXAM CHEST 2 VIEWS: CPT | Mod: 26,HCNC,, | Performed by: RADIOLOGY

## 2021-12-10 RX ORDER — THYROID 90 MG/1
90 TABLET ORAL
Qty: 90 TABLET | Refills: 3 | Status: SHIPPED | OUTPATIENT
Start: 2021-12-10 | End: 2022-07-22

## 2021-12-14 LAB
FINAL PATHOLOGIC DIAGNOSIS: NORMAL
Lab: NORMAL

## 2021-12-22 ENCOUNTER — HOSPITAL ENCOUNTER (OUTPATIENT)
Dept: RADIOLOGY | Facility: HOSPITAL | Age: 61
Discharge: HOME | End: 2021-12-22
Attending: INTERNAL MEDICINE
Payer: MEDICARE

## 2021-12-22 DIAGNOSIS — L29.9 ITCHING: ICD-10-CM

## 2021-12-22 DIAGNOSIS — K21.9 GASTROESOPHAGEAL REFLUX DISEASE, UNSPECIFIED WHETHER ESOPHAGITIS PRESENT: ICD-10-CM

## 2021-12-22 PROCEDURE — 25500020 PHARM REV CODE 255: Mod: HCNC | Performed by: INTERNAL MEDICINE

## 2021-12-22 PROCEDURE — 74240 X-RAY XM UPR GI TRC 1CNTRST: CPT | Mod: 26,HCNC,, | Performed by: RADIOLOGY

## 2021-12-22 PROCEDURE — A9698 NON-RAD CONTRAST MATERIALNOC: HCPCS | Mod: HCNC | Performed by: INTERNAL MEDICINE

## 2021-12-22 PROCEDURE — 74248 X-RAY SM INT F-THRU STD: CPT | Mod: TC,HCNC

## 2021-12-22 PROCEDURE — 74248 X-RAY SM INT F-THRU STD: CPT | Mod: 26,HCNC,, | Performed by: RADIOLOGY

## 2021-12-22 PROCEDURE — 74240 FL UPPER GI WITH SMALL BOWEL (XPD): ICD-10-PCS | Mod: 26,HCNC,, | Performed by: RADIOLOGY

## 2021-12-22 PROCEDURE — 74248 FL UPPER GI WITH SMALL BOWEL (XPD): ICD-10-PCS | Mod: 26,HCNC,, | Performed by: RADIOLOGY

## 2021-12-22 PROCEDURE — 74240 X-RAY XM UPR GI TRC 1CNTRST: CPT | Mod: TC,HCNC,FY

## 2021-12-22 RX ADMIN — BARIUM SULFATE 280 ML: 0.6 SUSPENSION ORAL at 09:12

## 2021-12-23 RX ORDER — HYDROXYZINE PAMOATE 25 MG/1
CAPSULE ORAL
Qty: 40 CAPSULE | Refills: 4 | Status: SHIPPED | OUTPATIENT
Start: 2021-12-23 | End: 2022-10-14

## 2021-12-24 ENCOUNTER — PATIENT MESSAGE (OUTPATIENT)
Dept: FAMILY MEDICINE | Facility: CLINIC | Age: 61
End: 2021-12-24
Payer: MEDICARE

## 2021-12-27 ENCOUNTER — PATIENT MESSAGE (OUTPATIENT)
Dept: RHEUMATOLOGY | Facility: CLINIC | Age: 61
End: 2021-12-27
Payer: MEDICARE

## 2021-12-28 ENCOUNTER — PATIENT MESSAGE (OUTPATIENT)
Dept: RHEUMATOLOGY | Facility: CLINIC | Age: 61
End: 2021-12-28
Payer: MEDICARE

## 2021-12-30 ENCOUNTER — PATIENT MESSAGE (OUTPATIENT)
Dept: RHEUMATOLOGY | Facility: CLINIC | Age: 61
End: 2021-12-30
Payer: MEDICARE

## 2022-01-09 RX ORDER — VALACYCLOVIR HYDROCHLORIDE 500 MG/1
TABLET, FILM COATED ORAL
Qty: 90 TABLET | Refills: 0 | Status: SHIPPED | OUTPATIENT
Start: 2022-01-09 | End: 2022-04-22

## 2022-01-09 NOTE — TELEPHONE ENCOUNTER
No new care gaps identified.  Powered by [employer] by [email or web address]. Reference number: [id number].   1/09/2022 3:42:32 AM CST

## 2022-01-12 ENCOUNTER — PATIENT MESSAGE (OUTPATIENT)
Dept: GASTROENTEROLOGY | Facility: CLINIC | Age: 62
End: 2022-01-12
Payer: MEDICARE

## 2022-01-12 DIAGNOSIS — K22.4 ESOPHAGEAL DYSMOTILITY: Primary | ICD-10-CM

## 2022-01-13 NOTE — TELEPHONE ENCOUNTER
On the Xray, one of the things they mentioned was that the motility in the esophagus wasn't completely normal (maybe related to the scleroderma).   So, the actual next step would be to do a motility / manometry study on the esophagus, and to get the opinion of an esophagus specialist (both at the [facility]).    Let's see if we can get her set up for the motility study, and to see Dr. Cobos or Dr. Gregory at the main [place]  (can these be set up for the same day?)

## 2022-01-14 ENCOUNTER — PATIENT MESSAGE (OUTPATIENT)
Dept: GASTROENTEROLOGY | Facility: CLINIC | Age: 62
End: 2022-01-14
Payer: MEDICARE

## 2022-01-18 DIAGNOSIS — R13.10 DYSPHAGIA, UNSPECIFIED TYPE: Primary | ICD-10-CM

## 2022-01-25 ENCOUNTER — PATIENT MESSAGE (OUTPATIENT)
Dept: GASTROENTEROLOGY | Facility: CLINIC | Age: 62
End: 2022-01-25
Payer: MEDICARE

## 2022-01-25 DIAGNOSIS — K21.9 GASTROESOPHAGEAL REFLUX DISEASE, UNSPECIFIED WHETHER ESOPHAGITIS PRESENT: Primary | ICD-10-CM

## 2022-01-26 ENCOUNTER — PATIENT MESSAGE (OUTPATIENT)
Dept: GASTROENTEROLOGY | Facility: CLINIC | Age: 62
End: 2022-01-26
Payer: MEDICARE

## 2022-01-26 DIAGNOSIS — K21.9 GASTROESOPHAGEAL REFLUX DISEASE, UNSPECIFIED WHETHER ESOPHAGITIS PRESENT: Primary | ICD-10-CM

## 2022-01-27 ENCOUNTER — PATIENT MESSAGE (OUTPATIENT)
Dept: GASTROENTEROLOGY | Facility: CLINIC | Age: 62
End: 2022-01-27
Payer: MEDICARE

## 2022-01-27 ENCOUNTER — PATIENT MESSAGE (OUTPATIENT)
Dept: ENDOSCOPY | Facility: HOSPITAL | Age: 62
End: 2022-01-27
Payer: MEDICARE

## 2022-01-27 DIAGNOSIS — Z01.818 PRE-OP TESTING: Primary | ICD-10-CM

## 2022-01-27 DIAGNOSIS — K21.9 GASTROESOPHAGEAL REFLUX DISEASE, UNSPECIFIED WHETHER ESOPHAGITIS PRESENT: Primary | ICD-10-CM

## 2022-02-01 ENCOUNTER — LAB VISIT (OUTPATIENT)
Dept: FAMILY MEDICINE | Facility: CLINIC | Age: 62
End: 2022-02-01
Payer: MEDICARE

## 2022-02-01 DIAGNOSIS — Z01.818 PRE-OP TESTING: ICD-10-CM

## 2022-02-01 PROCEDURE — U0005 INFEC AGEN DETEC AMPLI PROBE: HCPCS | Performed by: FAMILY MEDICINE

## 2022-02-01 PROCEDURE — U0003 INFECTIOUS AGENT DETECTION BY NUCLEIC ACID (DNA OR RNA); SEVERE ACUTE RESPIRATORY SYNDROME CORONAVIRUS 2 (SARS-COV-2) (CORONAVIRUS DISEASE [COVID-19]), AMPLIFIED PROBE TECHNIQUE, MAKING USE OF HIGH THROUGHPUT TECHNOLOGIES AS DESCRIBED BY CMS-2020-01-R: HCPCS | Mod: HCNC | Performed by: FAMILY MEDICINE

## 2022-02-02 LAB
SARS-COV-2 RNA RESP QL NAA+PROBE: NOT DETECTED
SARS-COV-2- CYCLE NUMBER: NORMAL

## 2022-02-03 ENCOUNTER — PATIENT MESSAGE (OUTPATIENT)
Dept: ENDOSCOPY | Facility: HOSPITAL | Age: 62
End: 2022-02-03
Payer: MEDICARE

## 2022-02-04 ENCOUNTER — HOSPITAL ENCOUNTER (OUTPATIENT)
Facility: HOSPITAL | Age: 62
Discharge: HOME | End: 2022-02-04
Attending: INTERNAL MEDICINE | Admitting: INTERNAL MEDICINE
Payer: MEDICARE

## 2022-02-04 VITALS
RESPIRATION RATE: 20 BRPM | TEMPERATURE: 98 F | SYSTOLIC BLOOD PRESSURE: 134 MMHG | HEIGHT: 64 IN | OXYGEN SATURATION: 100 % | DIASTOLIC BLOOD PRESSURE: 78 MMHG | BODY MASS INDEX: 30.22 KG/M2 | WEIGHT: 177 LBS | HEART RATE: 81 BPM

## 2022-02-04 DIAGNOSIS — R13.10 DYSPHAGIA: ICD-10-CM

## 2022-02-04 PROCEDURE — 91037 PR GERD TST W/ NASAL IMPEDENCE ELECTROD: ICD-10-PCS | Mod: 26,HCNC,ICN, | Performed by: INTERNAL MEDICINE

## 2022-02-04 PROCEDURE — 91037 ESOPH IMPED FUNCTION TEST: CPT | Mod: 26,HCNC,ICN, | Performed by: INTERNAL MEDICINE

## 2022-02-04 PROCEDURE — 91010 ESOPHAGUS MOTILITY STUDY: CPT | Mod: HCNC | Performed by: INTERNAL MEDICINE

## 2022-02-04 PROCEDURE — 25000003 PHARM REV CODE 250: Mod: HCNC | Performed by: INTERNAL MEDICINE

## 2022-02-04 PROCEDURE — 91010 PR ESOPHAGEAL MOTILITY STUDY, MA2METRY: ICD-10-PCS | Mod: 26,HCNC,ICN, | Performed by: INTERNAL MEDICINE

## 2022-02-04 PROCEDURE — 91037 ESOPH IMPED FUNCTION TEST: CPT | Mod: HCNC | Performed by: INTERNAL MEDICINE

## 2022-02-04 PROCEDURE — 91010 ESOPHAGUS MOTILITY STUDY: CPT | Mod: 26,HCNC,ICN, | Performed by: INTERNAL MEDICINE

## 2022-02-04 RX ORDER — LIDOCAINE HYDROCHLORIDE 20 MG/ML
JELLY TOPICAL ONCE
Status: COMPLETED | OUTPATIENT
Start: 2022-02-04 | End: 2022-02-04

## 2022-02-04 RX ADMIN — LIDOCAINE HYDROCHLORIDE 10 ML: 20 JELLY TOPICAL at 07:02

## 2022-02-04 NOTE — [employer] PATIENT INSTRUCTIONS
Discharge Summary/Instructions after an Endoscopic Procedure  Patient Name: Tina García  Patient MRN: [id number]  Patient YOB: 1960 Friday, February 4, 2022  aKmi Cobos MD  Dear patient,  As a result of recent federal legislation (The Federal Cures Act), you may   receive lab or pathology results from your procedure in your [facility]   account before your physician is able to contact you. Your physician or   their representative will relay the results to you with their   recommendations at their soonest availability.  Thank you,  RESTRICTIONS:  During your procedure today, you received medications for sedation.  These   medications may affect your judgment, balance and coordination.  Therefore,   for 24 hours, you have the following restrictions:   - DO NOT drive a car, operate machinery, make legal/financial decisions,   sign important papers or drink alcohol.    ACTIVITY:  Today: no heavy lifting, straining or running due to procedural   sedation/anesthesia.  The following day: return to full activity including work.  DIET:  Eat and drink normally unless instructed otherwise.     TREATMENT FOR COMMON SIDE EFFECTS:  - Mild abdominal pain, nausea, belching, bloating or excessive gas:  rest,   eat lightly and use a heating pad.  - Sore Throat: treat with throat lozenges and/or gargle with warm salt   water.  - Because air was used during the procedure, expelling large amounts of air   from your rectum or belching is normal.  - If a bowel prep was taken, you may not have a bowel movement for 1-3 days.    This is normal.  SYMPTOMS TO WATCH FOR AND REPORT TO YOUR PHYSICIAN:  1. Abdominal pain or bloating, other than gas cramps.  2. Chest pain.  3. Back pain.  4. Signs of infection such as: chills or fever occurring within 24 hours   after the procedure.  5. Rectal bleeding, which would show as bright red, maroon, or black stools.   (A tablespoon of blood from the rectum is not serious, especially  if   hemorrhoids are present.)  6. Vomiting.  7. Weakness or dizziness.  GO DIRECTLY TO THE NEAREST EMERGENCY ROOM IF YOU HAVE ANY OF THE FOLLOWING:      Difficulty breathing              Chills and/or fever over 101 F   Persistent vomiting and/or vomiting blood   Severe abdominal pain   Severe chest pain   Black, tarry stools   Bleeding- more than one tablespoon   Any other symptom or condition that you feel may need urgent attention  Your doctor recommends these additional instructions:  If any biopsies were taken, your doctors clinic will contact you in 1 to 2   weeks with any results.  - Follow up with your referring provider.  For questions, problems or results please call your physician - Kami Cobos MD at Work:  [phone].  [facility] [place], EMERGENCY ROOM PHONE NUMBER: [phone]  IF A COMPLICATION OR EMERGENCY SITUATION ARISES AND YOU ARE UNABLE TO REACH   YOUR PHYSICIAN - GO DIRECTLY TO THE EMERGENCY ROOM.  Kami Cobos MD  2/4/2022 4:48:56 PM  This report has been verified and signed electronically.  Dear patient,  As a result of recent federal legislation (The Federal Cures Act), you may   receive lab or pathology results from your procedure in your [facility]   account before your physician is able to contact you. Your physician or   their representative will relay the results to you with their   recommendations at their soonest availability.  Thank you,  [employer]

## 2022-02-04 NOTE — H&P
Short Stay Endoscopy History and Physical    PCP - Loan Harris MD     Procedure - Esophageal manometry for dysphagia  ASA - per anesthesia  Mallampati - per anesthesia  History of Anesthesia problems - no  Family history Anesthesia problems -  no   Plan of anesthesia - General    HPI:  This is a 61 y.o. female here for evaluation of : Dysphagia    ROS:  Constitutional: No fevers, chills, No weight loss  CV: No chest pain  Pulm: No cough, No shortness of breath  Ophtho: No vision changes  GI: see HPI  Derm: No rash    Medical History:  has a past medical history of Anxiety, AR (allergic rhinitis), Arthritis, Chronic constipation, Colon polyp, CREST syndrome, Depression, Dry eyes, Dry mouth, Esophageal dysmotility, Fibrocystic breast, Fibromyalgia, Food intolerance, GERD (gastroesophageal reflux disease), HTN (hypertension), Irritable bowel syndrome, Raynaud disease, Right wrist fracture, SOB (shortness of breath) on exertion, Thyroid disease, Ulcer, and Vitamin D deficiency disease.    Surgical History:  has a past surgical history that includes Cyst Removal; Ganglion cyst excision; Colonoscopy w/ polypectomy (7/24/2012  [facility]o); Upper gastrointestinal endoscopy (12/23/2011  Sancho); Colonoscopy (7/2012); Dilation and curettage of uterus; Esophagogastroduodenoscopy (N/A, 4/17/2019); Colonoscopy (N/A, 4/17/2019); Hysterectomy (2008); Total abdominal hysterectomy w/ bilateral salpingoophorectomy (2008); and Esophagogastroduodenoscopy (N/A, 12/3/2021).    Family History: family history includes Cloud's esophagus in her father; Breast cancer (age of onset: 40) in her paternal cousin; Breast cancer (age of onset: 53) in her paternal cousin; Cancer in her father, maternal grandfather, mother, and paternal uncle; Colon polyps in her sister; Diabetes in her unknown relative; Hypertension in her unknown relative; Liver cancer in her mother; Stomach cancer in her paternal grandfather; Stomach cancer (age of onset: 73) in  her father.. Otherwise no colon cancer, inflammatory bowel disease, or GI malignancies.    Social History:  reports that she has never smoked. She has never used smokeless tobacco. She reports previous alcohol use. She reports that she does not use drugs.    Review of patient's allergies indicates:   Allergen Reactions    Celebrex [celecoxib] Other (See Comments)     Reflux & stomach symptoms    Nsaids (non-steroidal anti-inflammatory drug)      Irritates reflux       Medications:   Medications Prior to Admission   Medication Sig Dispense Refill Last Dose    albuterol (PROVENTIL/VENTOLIN HFA) 90 mcg/actuation inhaler INHALE TWO PUFFS INTO THE LUNGS EVERY 6 HOURS AS NEEDED FOR WHEEZING 8.5 g 3 2/3/2022 at Unknown time    cetirizine (ZYRTEC) 10 MG tablet Take 1 tablet (10 mg total) by mouth once daily. 30 tablet 11 2/3/2022 at Unknown time    cyclobenzaprine (FLEXERIL) 10 MG tablet Take 1 tablet (10 mg total) by mouth 3 (three) times daily as needed for Muscle spasms. 90 tablet 3 Past Week at Unknown time    famotidine (PEPCID) 40 MG tablet Take 1 tablet (40 mg total) by mouth nightly as needed for Heartburn. 90 tablet 3 2/3/2022 at Unknown time    fluticasone propionate (FLONASE) 50 mcg/actuation nasal spray 1 spray (50 mcg total) by Each Nostril route once daily. 16 g 3 Past Week at Unknown time    hydrOXYchloroQUINE (PLAQUENIL) 200 mg tablet Take 1 tablet (200 mg total) by mouth 2 (two) times daily. 60 tablet 6 2/3/2022 at Unknown time    hydrOXYzine pamoate (VISTARIL) 25 MG Cap TAKE 1 CAPSULE BY MOUTH FOUR TIMES DAILY FOR 10 DAYS 40 capsule 4 2/3/2022 at Unknown time    losartan-hydrochlorothiazide 100-25 mg (HYZAAR) 100-25 mg per tablet Take 1 tablet by mouth once daily. 90 tablet 3 2/3/2022 at Unknown time    montelukast (SINGULAIR) 10 mg tablet Take 10 mg by mouth every evening.   2/3/2022 at Unknown time    omeprazole (PRILOSEC) 40 MG capsule Take 1 capsule (40 mg total) by mouth before breakfast.  90 capsule 6 2/3/2022 at Unknown time    ondansetron (ZOFRAN-ODT) 4 MG TbDL Take 2 tablets (8 mg total) by mouth 3 (three) times daily as needed. 30 tablet 3 Past Month at Unknown time    promethazine (PHENERGAN) 25 MG tablet Take 1 tablet (25 mg total) by mouth every 6 (six) hours as needed for Nausea. 30 tablet 3 Past Week at Unknown time    sertraline (ZOLOFT) 100 MG tablet Take 1 tablet (100 mg total) by mouth once daily. 30 tablet 6 2/3/2022 at Unknown time    sucralfate (CARAFATE) 1 gram tablet Take 1 tablet (1 g total) by mouth 4 (four) times daily before meals and nightly. 120 tablet 11 2/3/2022 at Unknown time    thyroid, pork, (NP THYROID) 90 mg Tab Take 1 tablet (90 mg total) by mouth before breakfast. 90 tablet 3 2/3/2022 at Unknown time    traMADoL (ULTRAM) 50 mg tablet Take 1 tablet (50 mg total) by mouth every 6 (six) hours as needed for Pain. 120 tablet 4 Past Month at Unknown time    valACYclovir (VALTREX) 500 MG tablet TAKE 1 TABLET BY MOUTH EVERY DAY 90 tablet 0 2/3/2022 at Unknown time    azithromycin (Z-NATALIE) 250 MG tablet Take1 tablet by mouth x 10 days 10 tablet 0 Unknown at Unknown time    LIDOcaine HCl 2% (LIDOCAINE VISCOUS) 2 % Soln by Mucous Membrane route every 6 (six) hours. 100 mL 3     sildenafil (REVATIO) 20 mg Tab Take 1 tablet (20 mg total) by mouth 3 (three) times daily. 90 tablet 5        Physical Exam:    Vital Signs:   Vitals:    02/04/22 0723   BP: 134/78   Pulse: 81   Resp: 20   Temp: 98.1 °F (36.7 °C)       Gen: NAD, lying comfortably  HENT: NCAT, oropharynx clear  Eyes: anicteric sclerae, EOMI grossly  Neck: supple, no visible masses/goiter  Cardiac: RRR  Lungs: non-labored breathing  Abd: soft, NT/ND, normoactive BS  Ext: no LE edema, warm, well perfused  Skin: skin intact on exposed body parts, no visible rashes, lesions  Neuro: A&Ox4, neuro exam grossly intact, moves all extremities  Psych: appropriate mood, affect      Labs:  Lab Results   Component Value Date     WBC 5.57 12/01/2021    HGB 11.1 (L) 12/01/2021    HCT 34.6 (L) 12/01/2021     12/01/2021    CHOL 264 (H) 09/24/2020    TRIG 167 (H) 09/24/2020    HDL 61 09/24/2020    ALT 18 12/01/2021    AST 17 12/01/2021     12/01/2021    K 3.8 12/01/2021     12/01/2021    CREATININE 0.8 12/01/2021    BUN 10 12/01/2021    CO2 24 12/01/2021    TSH 0.762 12/01/2021       Plan:  Esophageal manometry for dysphagia.    I have explained the risks and benefits of endoscopy procedures to the patient including but not limited to bleeding, perforation, infection, and death.  The patient was asked if they understand and allowed to ask any further questions to their satisfaction.      Kami Cobos MD

## 2022-03-10 ENCOUNTER — PATIENT MESSAGE (OUTPATIENT)
Dept: RHEUMATOLOGY | Facility: CLINIC | Age: 62
End: 2022-03-10
Payer: MEDICARE

## 2022-03-29 ENCOUNTER — PATIENT OUTREACH (OUTPATIENT)
Dept: ADMINISTRATIVE | Facility: OTHER | Age: 62
End: 2022-03-29
Payer: MEDICARE

## 2022-03-30 ENCOUNTER — TELEPHONE (OUTPATIENT)
Dept: GASTROENTEROLOGY | Facility: CLINIC | Age: 62
End: 2022-03-30
Payer: MEDICARE

## 2022-03-30 NOTE — TELEPHONE ENCOUNTER
Called and spoke with the patient, patient states that she is coming in to speak with Dr. Kingsley because her appointment with the motility specialist has been cancelled and rescheduled until later this year and she is upset regarding this, patient was advised that we will be happy to see her however she will drive from [place], talk with Dr. Kingsley and be referred back to the motility clinic, patient states that gas is too high to come in and be sent back to the motility clinic, patient states that she is going to reach out to the motility clinic to see if a sooner appointment is available.  Patient's appointment was cancelled with patient's permission.

## 2022-03-30 NOTE — PROGRESS NOTES
Health Maintenance Due   Topic Date Due    COVID-19 Vaccine (1) Never done    Pneumococcal Vaccines (Age 0-64) (1 of 4 - PCV13) Never done    HIV Screening  Never done    Shingles Vaccine (1 of 2) Never done    Influenza Vaccine (1) 09/01/2021     Updates were requested from care everywhere.  Chart was reviewed for overdue Proactive [facility] Encounters (JULEE) topics (CRS, Breast Cancer Screening, Eye exam)  Health Maintenance has been updated.  LINKS immunization registry triggered.  Immunizations were reconciled.

## 2022-04-19 DIAGNOSIS — K21.9 GASTROESOPHAGEAL REFLUX DISEASE, UNSPECIFIED WHETHER ESOPHAGITIS PRESENT: Primary | ICD-10-CM

## 2022-04-19 DIAGNOSIS — Z12.11 SCREEN FOR COLON CANCER: ICD-10-CM

## 2022-04-28 ENCOUNTER — OFFICE VISIT (OUTPATIENT)
Dept: GASTROENTEROLOGY | Facility: CLINIC | Age: 62
End: 2022-04-28
Payer: MEDICARE

## 2022-04-28 DIAGNOSIS — K21.9 GASTROESOPHAGEAL REFLUX DISEASE, UNSPECIFIED WHETHER ESOPHAGITIS PRESENT: Primary | ICD-10-CM

## 2022-04-28 PROCEDURE — 99213 PR OFFICE/OUTPT VISIT, EST, LEVL III, 20-29 MIN: ICD-10-PCS | Mod: 95,,, | Performed by: INTERNAL MEDICINE

## 2022-04-28 PROCEDURE — 99213 OFFICE O/P EST LOW 20 MIN: CPT | Mod: 95,,, | Performed by: INTERNAL MEDICINE

## 2022-04-28 RX ORDER — OMEPRAZOLE 40 MG/1
40 CAPSULE, DELAYED RELEASE ORAL
Qty: 60 CAPSULE | Refills: 11 | Status: SHIPPED | OUTPATIENT
Start: 2022-04-28 | End: 2022-06-30

## 2022-04-28 RX ORDER — FAMOTIDINE 40 MG/1
40 TABLET, FILM COATED ORAL NIGHTLY PRN
Qty: 30 TABLET | Refills: 11 | Status: SHIPPED | OUTPATIENT
Start: 2022-04-28 | End: 2023-06-23

## 2022-04-28 NOTE — PROGRESS NOTES
The patient location is: [place]  The chief complaint leading to consultation is: GERD    Visit type: audiovisual    Face to Face time with patient: 10  15 minutes of total time spent on the encounter, which includes face to face time and non-face to face time preparing to see the patient (eg, review of tests), Obtaining and/or reviewing separately obtained history, Documenting clinical information in the electronic or other health record, Independently interpreting results (not separately reported) and communicating results to the patient/family/caregiver, or Care coordination (not separately reported).         Each patient to whom he or she provides medical services by telemedicine is:  (1) informed of the relationship between the physician and patient and the respective role of any other health care provider with respect to management of the patient; and (2) notified that he or she may decline to receive medical services by telemedicine and may withdraw from such care at any time.    Notes:       [facility]    CC: acid reflux    HPI 61 y.o. female with past medical history of Scleroderma, CREST syndrome, HTN, Hashimoto's thyroiditis, exercise induced asthma who presents with chronic, daily, painful, burning acid reflux without nausea and vomiting.  Currently she is taking Omeprazole 40mg in the AM with PRN OTC Nexium, tums, alkaline water, carafate and magic mouthwash for her symptoms.    She was scheduled for a pre surgical evaluation esophageal manometry study which showed aperistalsis of the esophagus.  She reports occasional dysphagia to meats which resolves with drinking fluids and occasional pill dysphagia.     Past Medical History  Past Medical History:   Diagnosis Date    Anxiety     AR (allergic rhinitis)     Arthritis     rheumatoid?    Chronic constipation     Colon polyp     CREST syndrome     Depression     Dry eyes     Dry mouth     Esophageal dysmotility     Fibrocystic  breast     Fibromyalgia     Food intolerance     GERD (gastroesophageal reflux disease)     HTN (hypertension)     Irritable bowel syndrome     Raynaud disease     Right wrist fracture     nov 2013    SOB (shortness of breath) on exertion     Thyroid disease     hypothyroid, thyroid cyst on U/S    Ulcer     Vitamin D deficiency disease      Past Surgical History  Hysterectomy  Pilonidal cyst removal  Ganglion cyst removal   [place] teeth removed    Family History  Her father had paredes's esophagus with esophageal cancer s/p surgery and is doing well.    No blood thinners    Review of Systems  General ROS: negative for chills, fever or weight loss.  Positive for weight gain  Cardiovascular ROS: no chest pain or dyspnea on exertion  Gastrointestinal ROS: no abdominal pain, change in bowel habits, or black/ bloody stools    Physical Examination  There were no vitals taken for this visit.  General appearance: alert, cooperative, no distress  HENT: Normocephalic, atraumatic, neck symmetrical, no nasal discharge   Neurologic: Alert and oriented X 3, no dysarthria or facial droop    Labs:  Lab Results   Component Value Date    WBC 5.57 12/01/2021    HGB 11.1 (L) 12/01/2021    HCT 34.6 (L) 12/01/2021    MCV 93 12/01/2021     12/01/2021         CMP  Sodium   Date Value Ref Range Status   12/01/2021 137 136 - 145 mmol/L Final     Potassium   Date Value Ref Range Status   12/01/2021 3.8 3.5 - 5.1 mmol/L Final     Chloride   Date Value Ref Range Status   12/01/2021 103 95 - 110 mmol/L Final     CO2   Date Value Ref Range Status   12/01/2021 24 23 - 29 mmol/L Final     Glucose   Date Value Ref Range Status   12/01/2021 91 70 - 110 mg/dL Final     BUN   Date Value Ref Range Status   12/01/2021 10 6 - 20 mg/dL Final     Creatinine   Date Value Ref Range Status   12/01/2021 0.8 0.5 - 1.4 mg/dL Final     Calcium   Date Value Ref Range Status   12/01/2021 9.3 8.7 - 10.5 mg/dL Final     Total Protein   Date Value Ref  Range Status   12/01/2021 7.0 6.0 - 8.4 g/dL Final     Albumin   Date Value Ref Range Status   12/01/2021 3.7 3.5 - 5.2 g/dL Final     Total Bilirubin   Date Value Ref Range Status   12/01/2021 0.3 0.1 - 1.0 mg/dL Final     Comment:     For infants and newborns, interpretation of results should be based  on gestational age, weight and in agreement with clinical  observations.    Premature Infant recommended reference ranges:  Up to 24 hours.............<8.0 mg/dL  Up to 48 hours............<12.0 mg/dL  3-5 days..................<15.0 mg/dL  6-29 days.................<15.0 mg/dL       Alkaline Phosphatase   Date Value Ref Range Status   12/01/2021 57 55 - 135 U/L Final     AST   Date Value Ref Range Status   12/01/2021 17 10 - 40 U/L Final     ALT   Date Value Ref Range Status   12/01/2021 18 10 - 44 U/L Final     Anion Gap   Date Value Ref Range Status   12/01/2021 10 8 - 16 mmol/L Final     eGFR if    Date Value Ref Range Status   12/01/2021 >60.0 >60 mL/min/1.73 m^2 Final     eGFR if non    Date Value Ref Range Status   12/01/2021 >60.0 >60 mL/min/1.73 m^2 Final     Comment:     Calculation used to obtain the estimated glomerular filtration  rate (eGFR) is the CKD-EPI equation.          Assessment:   The patient is a 60 yo female with past medical history of Scleroderma, CREST syndrome with chronic GERD symptoms not improved with current medical therapy.  Anti reflux surgery was being considered but the patient was found to have aperistalsis of the esophagus on esophageal manometry.  She has rare dysphagia to meat and pills.    Plan:  -Increase Omeprazole to 40mg BID with PRN Pepcid 40mg nightly as needed for breakthrough GERD symptoms.  -Can consider changing her PPI in the future.  -If we are unable to control her symptoms then we can send her to the esophageal foregut surgeons to discuss anti reflux surgery.  Recommend EGD with [facility] to prove that she has GERD prior to sending to  surgery as she has had only [place] grade A or B esophagitis on endoscopy in the past.    Kami Cobos MD

## 2022-04-29 DIAGNOSIS — R11.0 NAUSEA: ICD-10-CM

## 2022-04-29 RX ORDER — PROMETHAZINE HYDROCHLORIDE 25 MG/1
25 TABLET ORAL EVERY 6 HOURS PRN
Qty: 30 TABLET | Refills: 3 | Status: SHIPPED | OUTPATIENT
Start: 2022-04-29 | End: 2023-09-21 | Stop reason: SDUPTHER

## 2022-04-29 NOTE — TELEPHONE ENCOUNTER
Pharmacy requesting refill on Promethazine 25mg  Pt's LOV 12/08/2021  Pt's NOV 06/27/2022  Medication pending

## 2022-05-02 ENCOUNTER — PATIENT MESSAGE (OUTPATIENT)
Dept: GASTROENTEROLOGY | Facility: CLINIC | Age: 62
End: 2022-05-02
Payer: MEDICARE

## 2022-05-03 NOTE — TELEPHONE ENCOUNTER
[facility]    I called Tina to discuss her question.  I do not recommend starting buspar as she is fairly asymptomatic from her aperistalsis of the esophagus and no medications have been shown to bring back the contractile function of the esophagus.    Kami Cobos MD

## 2022-05-24 NOTE — TELEPHONE ENCOUNTER
No new care gaps identified.  [facility] Embedded Care Gaps. Reference number: [id number]. 5/24/2022   9:22:29 AM JUDYT

## 2022-05-24 NOTE — TELEPHONE ENCOUNTER
Refill Routing Note   Medication(s) are not appropriate for processing by [facility] for the following reason(s):      - Patient has been seen in the ED/Hospital since the last PCP visit    [facility] action(s):  Defer          Medication reconciliation completed: No     Appointments  past 12m or future 3m with PCP    Date Provider   Last Visit   9/24/2020 Loan Harris MD   Next Visit   Visit date not found Loan Harris MD   ED visits in past 90 days: 0        Note composed:3:44 PM 05/24/2022

## 2022-05-25 RX ORDER — VALACYCLOVIR HYDROCHLORIDE 500 MG/1
TABLET, FILM COATED ORAL
Qty: 30 TABLET | Refills: 0 | Status: SHIPPED | OUTPATIENT
Start: 2022-05-25 | End: 2022-09-07

## 2022-06-01 ENCOUNTER — TELEPHONE (OUTPATIENT)
Dept: RHEUMATOLOGY | Facility: CLINIC | Age: 62
End: 2022-06-01
Payer: MEDICARE

## 2022-06-01 NOTE — TELEPHONE ENCOUNTER
----- Message from Yao Rodriguez sent at 6/1/2022 11:54 AM CDT -----  Contact: pt at [phone]  Type: Needs Medical Advice  Who Called:  pt  Best Call Back Number: [phone]  Additional Information: pt is calling the office trying to schedule her echo test. Please call back and advise.

## 2022-06-08 ENCOUNTER — LAB VISIT (OUTPATIENT)
Dept: LAB | Facility: HOSPITAL | Age: 62
End: 2022-06-08
Attending: INTERNAL MEDICINE
Payer: MEDICARE

## 2022-06-08 DIAGNOSIS — E06.3 HASHIMOTO'S THYROIDITIS: ICD-10-CM

## 2022-06-08 DIAGNOSIS — B37.81 THRUSH OF MOUTH AND ESOPHAGUS: ICD-10-CM

## 2022-06-08 DIAGNOSIS — K21.00 GASTROESOPHAGEAL REFLUX DISEASE WITH ESOPHAGITIS, UNSPECIFIED WHETHER HEMORRHAGE: ICD-10-CM

## 2022-06-08 DIAGNOSIS — B37.0 THRUSH OF MOUTH AND ESOPHAGUS: ICD-10-CM

## 2022-06-08 DIAGNOSIS — I27.20 PHT (PULMONARY HYPERTENSION): ICD-10-CM

## 2022-06-08 DIAGNOSIS — J40 BRONCHITIS: ICD-10-CM

## 2022-06-08 DIAGNOSIS — M34.1 CREST (CALCINOSIS, RAYNAUD'S PHENOMENON, ESOPHAGEAL DYSFUNCTION, SCLERODACTYLY, TELANGIECTASIA): ICD-10-CM

## 2022-06-08 DIAGNOSIS — E55.9 VITAMIN D DEFICIENCY, UNSPECIFIED: ICD-10-CM

## 2022-06-08 DIAGNOSIS — L29.9 ITCHING: ICD-10-CM

## 2022-06-08 DIAGNOSIS — M35.00 SJOGREN'S SYNDROME, WITH UNSPECIFIED ORGAN INVOLVEMENT: ICD-10-CM

## 2022-06-08 DIAGNOSIS — G89.4 CHRONIC PAIN SYNDROME: ICD-10-CM

## 2022-06-08 LAB
25(OH)D3+25(OH)D2 SERPL-MCNC: 59 NG/ML (ref 30–96)
ALBUMIN SERPL BCP-MCNC: 4.2 G/DL (ref 3.5–5.2)
ALP SERPL-CCNC: 56 U/L (ref 55–135)
ALT SERPL W/O P-5'-P-CCNC: 20 U/L (ref 10–44)
ANION GAP SERPL CALC-SCNC: 11 MMOL/L (ref 8–16)
AST SERPL-CCNC: 17 U/L (ref 10–40)
BASOPHILS # BLD AUTO: 0.07 K/UL (ref 0–0.2)
BASOPHILS NFR BLD: 1.1 % (ref 0–1.9)
BILIRUB SERPL-MCNC: 0.3 MG/DL (ref 0.1–1)
BUN SERPL-MCNC: 16 MG/DL (ref 6–20)
CALCIUM SERPL-MCNC: 9.7 MG/DL (ref 8.7–10.5)
CHLORIDE SERPL-SCNC: 98 MMOL/L (ref 95–110)
CHOLEST SERPL-MCNC: 253 MG/DL (ref 120–199)
CHOLEST/HDLC SERPL: 4.8 {RATIO} (ref 2–5)
CO2 SERPL-SCNC: 25 MMOL/L (ref 23–29)
CREAT SERPL-MCNC: 1 MG/DL (ref 0.5–1.4)
CRP SERPL-MCNC: 2.5 MG/L (ref 0–8.2)
DIFFERENTIAL METHOD: NORMAL
EOSINOPHIL # BLD AUTO: 0.1 K/UL (ref 0–0.5)
EOSINOPHIL NFR BLD: 1.9 % (ref 0–8)
ERYTHROCYTE [DISTWIDTH] IN BLOOD BY AUTOMATED COUNT: 13 % (ref 11.5–14.5)
ERYTHROCYTE [SEDIMENTATION RATE] IN BLOOD BY WESTERGREN METHOD: 27 MM/HR (ref 0–36)
EST. GFR  (AFRICAN AMERICAN): >60 ML/MIN/1.73 M^2
EST. GFR  (NON AFRICAN AMERICAN): >60 ML/MIN/1.73 M^2
ESTIMATED AVG GLUCOSE: 123 MG/DL (ref 68–131)
GLUCOSE SERPL-MCNC: 99 MG/DL (ref 70–110)
HBA1C MFR BLD: 5.9 % (ref 4–5.6)
HCT VFR BLD AUTO: 37 % (ref 37–48.5)
HDLC SERPL-MCNC: 53 MG/DL (ref 40–75)
HDLC SERPL: 20.9 % (ref 20–50)
HGB BLD-MCNC: 12 G/DL (ref 12–16)
IMM GRANULOCYTES # BLD AUTO: 0.01 K/UL (ref 0–0.04)
IMM GRANULOCYTES NFR BLD AUTO: 0.2 % (ref 0–0.5)
LDLC SERPL CALC-MCNC: 166.6 MG/DL (ref 63–159)
LYMPHOCYTES # BLD AUTO: 2.3 K/UL (ref 1–4.8)
LYMPHOCYTES NFR BLD: 35.3 % (ref 18–48)
MCH RBC QN AUTO: 29.5 PG (ref 27–31)
MCHC RBC AUTO-ENTMCNC: 32.4 G/DL (ref 32–36)
MCV RBC AUTO: 91 FL (ref 82–98)
MONOCYTES # BLD AUTO: 0.6 K/UL (ref 0.3–1)
MONOCYTES NFR BLD: 9.1 % (ref 4–15)
NEUTROPHILS # BLD AUTO: 3.3 K/UL (ref 1.8–7.7)
NEUTROPHILS NFR BLD: 52.4 % (ref 38–73)
NONHDLC SERPL-MCNC: 200 MG/DL
NRBC BLD-RTO: 0 /100 WBC
PLATELET # BLD AUTO: 338 K/UL (ref 150–450)
PMV BLD AUTO: 10.1 FL (ref 9.2–12.9)
POTASSIUM SERPL-SCNC: 3.8 MMOL/L (ref 3.5–5.1)
PROT SERPL-MCNC: 7.4 G/DL (ref 6–8.4)
PTH-INTACT SERPL-MCNC: 69.1 PG/ML (ref 9–77)
RBC # BLD AUTO: 4.07 M/UL (ref 4–5.4)
SODIUM SERPL-SCNC: 134 MMOL/L (ref 136–145)
TRIGL SERPL-MCNC: 167 MG/DL (ref 30–150)
WBC # BLD AUTO: 6.37 K/UL (ref 3.9–12.7)

## 2022-06-08 PROCEDURE — 85652 RBC SED RATE AUTOMATED: CPT | Performed by: INTERNAL MEDICINE

## 2022-06-08 PROCEDURE — 36415 COLL VENOUS BLD VENIPUNCTURE: CPT | Mod: PO | Performed by: INTERNAL MEDICINE

## 2022-06-08 PROCEDURE — 82306 VITAMIN D 25 HYDROXY: CPT | Performed by: INTERNAL MEDICINE

## 2022-06-08 PROCEDURE — 86038 ANTINUCLEAR ANTIBODIES: CPT | Performed by: INTERNAL MEDICINE

## 2022-06-08 PROCEDURE — 86140 C-REACTIVE PROTEIN: CPT | Performed by: INTERNAL MEDICINE

## 2022-06-08 PROCEDURE — 86039 ANTINUCLEAR ANTIBODIES (ANA): CPT | Performed by: INTERNAL MEDICINE

## 2022-06-08 PROCEDURE — 86235 NUCLEAR ANTIGEN ANTIBODY: CPT | Mod: 59 | Performed by: INTERNAL MEDICINE

## 2022-06-08 PROCEDURE — 83036 HEMOGLOBIN GLYCOSYLATED A1C: CPT | Performed by: INTERNAL MEDICINE

## 2022-06-08 PROCEDURE — 85025 COMPLETE CBC W/AUTO DIFF WBC: CPT | Performed by: INTERNAL MEDICINE

## 2022-06-08 PROCEDURE — 80061 LIPID PANEL: CPT | Performed by: INTERNAL MEDICINE

## 2022-06-08 PROCEDURE — 83970 ASSAY OF PARATHORMONE: CPT | Performed by: INTERNAL MEDICINE

## 2022-06-08 PROCEDURE — 80053 COMPREHEN METABOLIC PANEL: CPT | Performed by: INTERNAL MEDICINE

## 2022-06-10 LAB
ANA PATTERN 1: NORMAL
ANA SER QL IF: POSITIVE
ANA TITR SER IF: >2560 {TITER}

## 2022-06-13 LAB
ANTI SM ANTIBODY: 0.06 RATIO (ref 0–0.99)
ANTI SM/RNP ANTIBODY: 0.06 RATIO (ref 0–0.99)
ANTI-SM INTERPRETATION: NEGATIVE
ANTI-SM/RNP INTERPRETATION: NEGATIVE
ANTI-SSA ANTIBODY: 0.05 RATIO (ref 0–0.99)
ANTI-SSA INTERPRETATION: NEGATIVE
ANTI-SSB ANTIBODY: 0.08 RATIO (ref 0–0.99)
ANTI-SSB INTERPRETATION: NEGATIVE
DSDNA AB SER-ACNC: NORMAL [IU]/ML

## 2022-06-30 ENCOUNTER — OFFICE VISIT (OUTPATIENT)
Dept: GASTROENTEROLOGY | Facility: CLINIC | Age: 62
End: 2022-06-30
Payer: MEDICARE

## 2022-06-30 DIAGNOSIS — K21.9 GASTROESOPHAGEAL REFLUX DISEASE, UNSPECIFIED WHETHER ESOPHAGITIS PRESENT: Primary | ICD-10-CM

## 2022-06-30 PROCEDURE — 1160F RVW MEDS BY RX/DR IN RCRD: CPT | Mod: CPTII,95,, | Performed by: INTERNAL MEDICINE

## 2022-06-30 PROCEDURE — 3044F HG A1C LEVEL LT 7.0%: CPT | Mod: CPTII,95,, | Performed by: INTERNAL MEDICINE

## 2022-06-30 PROCEDURE — 99213 OFFICE O/P EST LOW 20 MIN: CPT | Mod: 95,,, | Performed by: INTERNAL MEDICINE

## 2022-06-30 PROCEDURE — 99213 PR OFFICE/OUTPT VISIT, EST, LEVL III, 20-29 MIN: ICD-10-PCS | Mod: 95,,, | Performed by: INTERNAL MEDICINE

## 2022-06-30 PROCEDURE — 3044F PR MOST RECENT HEMOGLOBIN A1C LEVEL <7.0%: ICD-10-PCS | Mod: CPTII,95,, | Performed by: INTERNAL MEDICINE

## 2022-06-30 PROCEDURE — 1160F PR REVIEW ALL MEDS BY PRESCRIBER/CLIN PHARMACIST DOCUMENTED: ICD-10-PCS | Mod: CPTII,95,, | Performed by: INTERNAL MEDICINE

## 2022-06-30 PROCEDURE — 1159F PR MEDICATION LIST DOCUMENTED IN MEDICAL RECORD: ICD-10-PCS | Mod: CPTII,95,, | Performed by: INTERNAL MEDICINE

## 2022-06-30 PROCEDURE — 1159F MED LIST DOCD IN RCRD: CPT | Mod: CPTII,95,, | Performed by: INTERNAL MEDICINE

## 2022-06-30 RX ORDER — ESOMEPRAZOLE MAGNESIUM 40 MG/1
40 CAPSULE, DELAYED RELEASE ORAL 2 TIMES DAILY
Qty: 60 CAPSULE | Refills: 11 | Status: SHIPPED | OUTPATIENT
Start: 2022-06-30 | End: 2023-09-21 | Stop reason: SDUPTHER

## 2022-06-30 NOTE — PROGRESS NOTES
The patient location is: [place]  The chief complaint leading to consultation is: GERD    Visit type: audiovisual    Face to Face time with patient: 5  10 minutes of total time spent on the encounter, which includes face to face time and non-face to face time preparing to see the patient (eg, review of tests), Obtaining and/or reviewing separately obtained history, Documenting clinical information in the electronic or other health record, Independently interpreting results (not separately reported) and communicating results to the patient/family/caregiver, or Care coordination (not separately reported).         Each patient to whom he or she provides medical services by telemedicine is:  (1) informed of the relationship between the physician and patient and the respective role of any other health care provider with respect to management of the patient; and (2) notified that he or she may decline to receive medical services by telemedicine and may withdraw from such care at any time.    Notes:     [facility]      CC: acid reflux    HPI 62 y.o. female with past medical history of scleroderma, CREST syndrome, aperistalsis of the esophagus,  HTN, Hashimoto's thyroiditis who presents for follow up of chronic, intermittent, burning acid reflux without nausea and vomiting.  She has these symptoms about 4 days per week despite Omeprazole 40mg BID with PRN Pepcid.    Past Medical History  Past Medical History:   Diagnosis Date    Anxiety     AR (allergic rhinitis)     Arthritis     rheumatoid?    Chronic constipation     Colon polyp     CREST syndrome     Depression     Dry eyes     Dry mouth     Esophageal dysmotility     Fibrocystic breast     Fibromyalgia     Food intolerance     GERD (gastroesophageal reflux disease)     HTN (hypertension)     Irritable bowel syndrome     Raynaud disease     Right wrist fracture     nov 2013    SOB (shortness of breath) on exertion     Thyroid disease      hypothyroid, thyroid cyst on U/S    Ulcer     Vitamin D deficiency disease          Review of Systems  General ROS: negative for chills, fever.  Positive for intentional weight loss  Cardiovascular ROS: no chest pain or dyspnea on exertion  Gastrointestinal ROS: no abdominal pain, change in bowel habits, or black/ bloody stools    Physical Examination  There were no vitals taken for this visit.  General appearance: alert, cooperative, no distress  HENT: Normocephalic, atraumatic, neck symmetrical, no nasal discharge   Neurologic: Alert and oriented X 3, no dysarthria or facial droop    Labs:  Lab Results   Component Value Date    WBC 6.37 06/08/2022    HGB 12.0 06/08/2022    HCT 37.0 06/08/2022    MCV 91 06/08/2022     06/08/2022         CMP  Sodium   Date Value Ref Range Status   06/08/2022 134 (L) 136 - 145 mmol/L Final     Potassium   Date Value Ref Range Status   06/08/2022 3.8 3.5 - 5.1 mmol/L Final     Chloride   Date Value Ref Range Status   06/08/2022 98 95 - 110 mmol/L Final     CO2   Date Value Ref Range Status   06/08/2022 25 23 - 29 mmol/L Final     Glucose   Date Value Ref Range Status   06/08/2022 99 70 - 110 mg/dL Final     BUN   Date Value Ref Range Status   06/08/2022 16 6 - 20 mg/dL Final     Creatinine   Date Value Ref Range Status   06/08/2022 1.0 0.5 - 1.4 mg/dL Final     Calcium   Date Value Ref Range Status   06/08/2022 9.7 8.7 - 10.5 mg/dL Final     Total Protein   Date Value Ref Range Status   06/08/2022 7.4 6.0 - 8.4 g/dL Final     Albumin   Date Value Ref Range Status   06/08/2022 4.2 3.5 - 5.2 g/dL Final     Total Bilirubin   Date Value Ref Range Status   06/08/2022 0.3 0.1 - 1.0 mg/dL Final     Comment:     For infants and newborns, interpretation of results should be based  on gestational age, weight and in agreement with clinical  observations.    Premature Infant recommended reference ranges:  Up to 24 hours.............<8.0 mg/dL  Up to 48 hours............<12.0  mg/dL  3-5 days..................<15.0 mg/dL  6-29 days.................<15.0 mg/dL       Alkaline Phosphatase   Date Value Ref Range Status   06/08/2022 56 55 - 135 U/L Final     AST   Date Value Ref Range Status   06/08/2022 17 10 - 40 U/L Final     ALT   Date Value Ref Range Status   06/08/2022 20 10 - 44 U/L Final     Anion Gap   Date Value Ref Range Status   06/08/2022 11 8 - 16 mmol/L Final     eGFR if    Date Value Ref Range Status   06/08/2022 >60.0 >60 mL/min/1.73 m^2 Final     eGFR if non    Date Value Ref Range Status   06/08/2022 >60.0 >60 mL/min/1.73 m^2 Final     Comment:     Calculation used to obtain the estimated glomerular filtration  rate (eGFR) is the CKD-EPI equation.            Assessment:   The patient is a 63 yo female with past medical history of scleroderma, CREST syndrome, aperistalsis of the esophagus,  HTN, Hashimoto's thyroiditis who presents for follow up of GERD symptoms not controlled with Omeprazole 40mg BID and PRN Pepcid.    Plan:  -Stop Omeprazole, start Esomeprazole 40mg BID.  -Schedule EGD with [facility] off of acid lowering medications for 96 hours.    Kami Cobos MD

## 2022-07-22 ENCOUNTER — TELEPHONE (OUTPATIENT)
Dept: PULMONOLOGY | Facility: CLINIC | Age: 62
End: 2022-07-22
Payer: MEDICARE

## 2022-07-22 ENCOUNTER — OFFICE VISIT (OUTPATIENT)
Dept: RHEUMATOLOGY | Facility: CLINIC | Age: 62
End: 2022-07-22
Payer: MEDICARE

## 2022-07-22 ENCOUNTER — PATIENT MESSAGE (OUTPATIENT)
Dept: RHEUMATOLOGY | Facility: CLINIC | Age: 62
End: 2022-07-22

## 2022-07-22 DIAGNOSIS — M34.1 CREST (CALCINOSIS, RAYNAUD'S PHENOMENON, ESOPHAGEAL DYSFUNCTION, SCLERODACTYLY, TELANGIECTASIA): Primary | ICD-10-CM

## 2022-07-22 DIAGNOSIS — M35.00 SJOGREN'S SYNDROME, WITH UNSPECIFIED ORGAN INVOLVEMENT: ICD-10-CM

## 2022-07-22 DIAGNOSIS — E06.3 HASHIMOTO'S THYROIDITIS: ICD-10-CM

## 2022-07-22 DIAGNOSIS — Z79.899 HIGH RISK MEDICATION USE: ICD-10-CM

## 2022-07-22 DIAGNOSIS — I27.20 PULMONARY HYPERTENSION: ICD-10-CM

## 2022-07-22 PROCEDURE — 99499 RISK ADDL DX/OHS AUDIT: ICD-10-PCS | Mod: 95,,, | Performed by: PHYSICIAN ASSISTANT

## 2022-07-22 PROCEDURE — 3044F HG A1C LEVEL LT 7.0%: CPT | Mod: CPTII,95,, | Performed by: PHYSICIAN ASSISTANT

## 2022-07-22 PROCEDURE — 3044F PR MOST RECENT HEMOGLOBIN A1C LEVEL <7.0%: ICD-10-PCS | Mod: CPTII,95,, | Performed by: PHYSICIAN ASSISTANT

## 2022-07-22 PROCEDURE — 99499 UNLISTED E&M SERVICE: CPT | Mod: 95,,, | Performed by: PHYSICIAN ASSISTANT

## 2022-07-22 PROCEDURE — 99214 PR OFFICE/OUTPT VISIT, EST, LEVL IV, 30-39 MIN: ICD-10-PCS | Mod: 95,,, | Performed by: PHYSICIAN ASSISTANT

## 2022-07-22 PROCEDURE — 99214 OFFICE O/P EST MOD 30 MIN: CPT | Mod: 95,,, | Performed by: PHYSICIAN ASSISTANT

## 2022-07-22 RX ORDER — HYDROXYCHLOROQUINE SULFATE 200 MG/1
200 TABLET, FILM COATED ORAL 2 TIMES DAILY
Qty: 60 TABLET | Refills: 6 | Status: SHIPPED | OUTPATIENT
Start: 2022-07-22 | End: 2022-09-30 | Stop reason: SDUPTHER

## 2022-07-22 NOTE — PROGRESS NOTES
The patient location is: home  The chief complaint leading to consultation is: CREST    Visit type: audiovisual    Face to Face time with patient: 29 minutes  40 minutes of total time spent on the encounter, which includes face to face time and non-face to face time preparing to see the patient (eg, review of tests), Obtaining and/or reviewing separately obtained history, Documenting clinical information in the electronic or other health record, Independently interpreting results (not separately reported) and communicating results to the patient/family/caregiver, or Care coordination (not separately reported).   Each patient to whom he or she provides medical services by telemedicine is:  (1) informed of the relationship between the physician and patient and the respective role of any other health care provider with respect to management of the patient; and (2) notified that he or she may decline to receive medical services by telemedicine and may withdraw from such care at any time.    Notes:     Subjective:       Patient ID: Tina García is a 62 y.o. female.    Chief Complaint: Disease Management    Mrs. García is a 62 year old female who presents to telemedicine virtual visit for follow up on CREST syndrome.      GI: Anti reflux surgery was being considered but the patient was found to have aperistalsis of the esophagus on esophageal manometry.  PPI was changed to nexium. If they are unable to control her symptoms then will consider foregut surgeons to discuss anti reflux surgery.  Recommend EGD with [facility] to prove that she has GERD prior to sending to surgery as she has had only LA grade A or B esophagitis on endoscopy in the past.    She stopped her thyroid medication due to hair loss and started sea kelp and topical minoxidil which is working.     She is off imuran since she had covid infection in December 2021.     Raynauds is stable at this time with conservative measures. She has not taken  revatio recently.    She is due for PFTs and Echocardiogram.     She has intermittent clicking sound in her right hand when she makes a fist. She also has R elbow pain that radiates down the forearm. She has been lifting heavy box with a move recently. She is taking tramadol for pain and typically avoids NSAIDs secondary to GERD.     We reviewed her recent labs.    Current tx:  1. plaquenil  2. revatio PRN  3. Flexeril PRN    Review of Systems   Constitutional: Positive for unexpected weight change. Negative for activity change, appetite change, chills, fatigue and fever.   HENT: Positive for mouth sores.    Eyes: Negative for redness and visual disturbance.   Respiratory: Negative for cough and shortness of breath.    Cardiovascular: Negative for chest pain, palpitations and leg swelling.   Gastrointestinal: Positive for constipation. Negative for abdominal pain, diarrhea, nausea and vomiting.   Genitourinary: Negative for dysuria and genital sores.   Musculoskeletal: Positive for arthralgias, gait problem and myalgias. Negative for joint swelling.   Skin: Negative for rash.   Allergic/Immunologic: Positive for immunocompromised state.   Neurological: Negative for dizziness, weakness, light-headedness and headaches.   Hematological: Does not bruise/bleed easily.         Objective:     There were no vitals filed for this visit.    Past Medical History:   Diagnosis Date    Anxiety     AR (allergic rhinitis)     Arthritis     rheumatoid?    Chronic constipation     Colon polyp     CREST syndrome     Depression     Dry eyes     Dry mouth     Esophageal dysmotility     Fibrocystic breast     Fibromyalgia     Food intolerance     GERD (gastroesophageal reflux disease)     HTN (hypertension)     Irritable bowel syndrome     Raynaud disease     Right wrist fracture     nov 2013    SOB (shortness of breath) on exertion     Thyroid disease     hypothyroid, thyroid cyst on U/S    Ulcer     Vitamin D  Likely 2/2 moderate COVID19. CXR c/w COVID19 PNA. SpO2 >=93 on room air on admission, and amb sat normal, but 80s when asleep 1/6. Maybe a component of obesity-related upper airway obstruction. Unlikely bacterial PNA or PE.  - remdesivir for symptom control  - patient holding off on dexamethasone to see if remdesivir improves nocturnal hypoxiA  - declines COVID19 vaccine for "personal reasons" including distrust/dislike of Dr. NADYA Richards  - benzonatate PRN  - LFNC PRN deficiency disease      Past Surgical History:   Procedure Laterality Date    COLONOSCOPY  7/2012    one polyp removed, biospy=TUBULOVILLOUS ADENOMA, otherwise normal findings, repeat in 4-5 years for surveillance    COLONOSCOPY N/A 4/17/2019    Procedure: COLONOSCOPY;  Surgeon: Rich Kingsley Jr., MD;  Location: [facility];  Service: Endoscopy;  Laterality: N/A;    COLONOSCOPY W/ POLYPECTOMY  7/24/2012  [facility]    4-5 mm sigmoid polyp.    CYST REMOVAL      pilonidal    DILATION AND CURETTAGE OF UTERUS      ESOPHAGEAL MANOMETRY WITH MEASUREMENT OF IMPEDANCE N/A 2/4/2022    Procedure: MANOMETRY, ESOPHAGUS, WITH IMPEDANCE MEASUREMENT;  Surgeon: Kami Cobos MD;  Location: [facility] ([address] [place] [address]);  Service: Endoscopy;  Laterality: N/A;  COVID test on 2/1 at [facility]    ESOPHAGOGASTRODUODENOSCOPY N/A 4/17/2019    Procedure: EGD (ESOPHAGOGASTRODUODENOSCOPY);  Surgeon: Rich Kingsley Jr., MD;  Location: [facility];  Service: Endoscopy;  Laterality: N/A;    ESOPHAGOGASTRODUODENOSCOPY N/A 12/3/2021    Procedure: EGD (ESOPHAGOGASTRODUODENOSCOPY);  Surgeon: Rich Kingsley Jr., MD;  Location: [facility];  Service: Endoscopy;  Laterality: N/A;    GANGLION CYST EXCISION      right thumb    HYSTERECTOMY  2008    complete    TOTAL ABDOMINAL HYSTERECTOMY W/ BILATERAL SALPINGOOPHORECTOMY  2008    w/Hysterectomy    UPPER GASTROINTESTINAL ENDOSCOPY  12/23/2011  [facility]    Fluid in the lower third of the esophagus  (stasis).  The esophageal examination was compatible with scleroderma.  (Dilataion done, 51 Fr.)    LA Grade A reflux esophagitis.   Enlarged gastric folds.   Multiple gastric polyps.   Antritis (erythematous streaks).  JOELLEN Test  Negative.          Physical Exam   Constitutional: She is oriented to person, place, and time.   Neurological: She is alert and oriented to person, place, and time.       Recent labs:  Component      Latest Ref Rng & Units 6/8/2022   WBC      3.90 - 12.70 K/uL 6.37   RBC      4.00  - 5.40 M/uL 4.07   Hemoglobin      12.0 - 16.0 g/dL 12.0   Hematocrit      37.0 - 48.5 % 37.0   MCV      82 - 98 fL 91   MCH      27.0 - 31.0 pg 29.5   MCHC      32.0 - 36.0 g/dL 32.4   RDW      11.5 - 14.5 % 13.0   Platelets      150 - 450 K/uL 338   MPV      9.2 - 12.9 fL 10.1   Immature Granulocytes      0.0 - 0.5 % 0.2   Gran # (ANC)      1.8 - 7.7 K/uL 3.3   Immature Grans (Abs)      0.00 - 0.04 K/uL 0.01   Lymph #      1.0 - 4.8 K/uL 2.3   Mono #      0.3 - 1.0 K/uL 0.6   Eos #      0.0 - 0.5 K/uL 0.1   Baso #      0.00 - 0.20 K/uL 0.07   nRBC      0 /100 WBC 0   Gran %      38.0 - 73.0 % 52.4   Lymph %      18.0 - 48.0 % 35.3   Mono %      4.0 - 15.0 % 9.1   Eosinophil %      0.0 - 8.0 % 1.9   Basophil %      0.0 - 1.9 % 1.1   Differential Method       Automated   Sodium      136 - 145 mmol/L 134 (L)   Potassium      3.5 - 5.1 mmol/L 3.8   Chloride      95 - 110 mmol/L 98   CO2      23 - 29 mmol/L 25   Glucose      70 - 110 mg/dL 99   BUN      6 - 20 mg/dL 16   Creatinine      0.5 - 1.4 mg/dL 1.0   Calcium      8.7 - 10.5 mg/dL 9.7   PROTEIN TOTAL      6.0 - 8.4 g/dL 7.4   Albumin      3.5 - 5.2 g/dL 4.2   BILIRUBIN TOTAL      0.1 - 1.0 mg/dL 0.3   Alkaline Phosphatase      55 - 135 U/L 56   AST      10 - 40 U/L 17   ALT      10 - 44 U/L 20   Anion Gap      8 - 16 mmol/L 11   eGFR if African American      >60 mL/min/1.73 m:2 >60.0   eGFR if non African American      >60 mL/min/1.73 m:2 >60.0   Anti Sm Antibody      0.00 - 0.99 Ratio 0.06   Anti-Sm Interpretation      Negative Negative   Anti-SSA Antibody      0.00 - 0.99 Ratio 0.05   Anti-SSA Interpretation      Negative Negative   Anti-SSB Antibody      0.00 - 0.99 Ratio 0.08   Anti-SSB Interpretation      Negative Negative   ds DNA Ab      Negative 1:10 Negative 1:10   Anti Sm/RNP Antibody      0.00 - 0.99 Ratio 0.06   Anti-Sm/RNP Interpretation      Negative Negative   Cholesterol      120 - 199 mg/dL 253 (H)   Triglycerides      30 - 150 mg/dL 167 (H)    HDL      40 - 75 mg/dL 53   LDL Cholesterol External      63.0 - 159.0 mg/dL 166.6 (H)   HDL/Cholesterol Ratio      20.0 - 50.0 % 20.9   Total Cholesterol/HDL Ratio      2.0 - 5.0 4.8   Non-HDL Cholesterol      mg/dL 200   Hemoglobin A1C External      4.0 - 5.6 % 5.9 (H)   Estimated Avg Glucose      68 - 131 mg/dL 123   JAMILA Screen      Negative <1:80 Positive (A)   Sed Rate      0 - 36 mm/Hr 27   CRP      0.0 - 8.2 mg/L 2.5   PTH      9.0 - 77.0 pg/mL 69.1   Vit D, 25-Hydroxy      30 - 96 ng/mL 59   JAMILA PATTERN 1       Speckled   JAMILA Titer 1       >2560     Your cholesterol is high.  Your 10 year risk of having a heart attack or a stroke is:The 10-year ASCVD risk score (Jenna YAP Jr., et al., 2013) is: 6%    Values used to calculate the score:      Age: 62 years      Sex: Female      Is Non- : No      Diabetic: No      Tobacco smoker: No      Systolic Blood Pressure: 134 mmHg      Is BP treated: Yes      HDL Cholesterol: 53 mg/dL      Total Cholesterol: 253 mg/dL    I recommend a heart healthy diet rich in fiber, fresh vegetables and fruit and low in saturated fats (fried foods, red meat, etc.).  I also recommend regular exercise including a minimum of 150 minutes of cardio exercise per week and 2-30 minute workouts of strength training like light weights, yoga, pilates, etc.  You should work toward a body mass index of < 25.      Assessment:       1. CREST (calcinosis, Raynaud's phenomenon, esophageal dysfunction, sclerodactyly, telangiectasia)    2. Sjogren's syndrome, with unspecified organ involvement    3. Pulmonary hypertension    4. Hashimoto's thyroiditis    5. High risk medication use            Plan:       CREST (calcinosis, Raynaud's phenomenon, esophageal dysfunction, sclerodactyly, telangiectasia)  -     Echo; Future  -     Complete PFT w/ bronchodilator; Future    Sjogren's syndrome, with unspecified organ involvement  -     hydrOXYchloroQUINE (PLAQUENIL) 200 mg tablet; Take 1  tablet (200 mg total) by mouth 2 (two) times daily.  Dispense: 60 tablet; Refill: 6    Pulmonary hypertension  -     Complete PFT w/ bronchodilator; Future    Hashimoto's thyroiditis  -     TSH; Future; Expected date: 07/22/2022  -     T4, Free; Future; Expected date: 07/22/2022    High risk medication use        Assessment:  62 year old female with  CREST syndrome (raynauds on revatio PRN, esophageal dysfunction/GERD on ppi/zantac, sclerodactyly hands), Sjogren's syndrome on imuran  --chronic pain syndrome on tramadol  --hx of Hashimoto's thyroiditis on NP thyroid  --hx of pulmonary HTN  --new onset anemia  --elevated CRP    Plan:  1. Cont. Plaquenil bid, revatio prn  2. Cont. Tramadol PRN severe pain per Dr. Fry. I have  check [place] prescription monitoring program site and no unusual or abnormal behavior has occurred pt understand the risk and benefits of taking opioid medications and has decided to continue the  medication   3. PFTs, Echo  4. Repeat TFTs prior to next visit  5. Elbow brace for tendonitis    Follow up:  3 months Dr. Fry     Likely 2/2 moderate COVID19. CXR c/w COVID19 PNA. SpO2 >=93 on room air on admission, and amb sat normal, but 80s when asleep 1/6.   - pt desatted off of oxygen while ambulating 1/6  - may have a component fo WIN, will follow up   - remdesivir 1/6-  - Dexamethasone 1/6  - Not COVID vaccinated  - benzonatate PRN  - LFNC 4L

## 2022-07-22 NOTE — TELEPHONE ENCOUNTER
"Spoke with patient informing her that I received her message in regards to getting her PFT"s scheduled. Patient stated that she wanted to be scheduled on Friday August 5, 2022 in the morning. I scheduled patient for August 5, 2022 at 9:15 am but patient will come for 9:00 am to do a rapid swab. Patient verbalized that she understands.  "

## 2022-08-04 ENCOUNTER — HOSPITAL ENCOUNTER (OUTPATIENT)
Dept: CARDIOLOGY | Facility: HOSPITAL | Age: 62
Discharge: HOME | End: 2022-08-04
Attending: PHYSICIAN ASSISTANT
Payer: MEDICARE

## 2022-08-04 VITALS
WEIGHT: 177 LBS | SYSTOLIC BLOOD PRESSURE: 130 MMHG | BODY MASS INDEX: 30.22 KG/M2 | DIASTOLIC BLOOD PRESSURE: 78 MMHG | HEART RATE: 76 BPM | HEIGHT: 64 IN

## 2022-08-04 DIAGNOSIS — M34.1 CREST (CALCINOSIS, RAYNAUD'S PHENOMENON, ESOPHAGEAL DYSFUNCTION, SCLERODACTYLY, TELANGIECTASIA): ICD-10-CM

## 2022-08-04 LAB
ASCENDING AORTA: 2.71 CM
AV INDEX (PROSTH): 0.93
AV MEAN GRADIENT: 4 MMHG
AV PEAK GRADIENT: 6 MMHG
AV VALVE AREA: 3.37 CM2
AV VELOCITY RATIO: 0.96
BSA FOR ECHO PROCEDURE: 1.9 M2
CV ECHO LV RWT: 0.37 CM
DOP CALC AO PEAK VEL: 1.27 M/S
DOP CALC AO VTI: 26.73 CM
DOP CALC LVOT AREA: 3.6 CM2
DOP CALC LVOT DIAMETER: 2.15 CM
DOP CALC LVOT PEAK VEL: 1.22 M/S
DOP CALC LVOT STROKE VOLUME: 90.17 CM3
DOP CALCLVOT PEAK VEL VTI: 24.85 CM
E WAVE DECELERATION TIME: 200.16 MSEC
E/A RATIO: 0.75
E/E' RATIO: 7.73 M/S
ECHO LV POSTERIOR WALL: 0.7 CM (ref 0.6–1.1)
EJECTION FRACTION: 60 %
FRACTIONAL SHORTENING: 27 % (ref 28–44)
INTERVENTRICULAR SEPTUM: 0.81 CM (ref 0.6–1.1)
LA MAJOR: 4.27 CM
LA MINOR: 4.2 CM
LA WIDTH: 3.35 CM
LEFT ATRIUM SIZE: 3.3 CM
LEFT ATRIUM VOLUME INDEX MOD: 17.2 ML/M2
LEFT ATRIUM VOLUME INDEX: 21.4 ML/M2
LEFT ATRIUM VOLUME MOD: 32.08 CM3
LEFT ATRIUM VOLUME: 39.79 CM3
LEFT INTERNAL DIMENSION IN SYSTOLE: 2.79 CM (ref 2.1–4)
LEFT VENTRICLE DIASTOLIC VOLUME INDEX: 33.61 ML/M2
LEFT VENTRICLE DIASTOLIC VOLUME: 62.51 ML
LEFT VENTRICLE MASS INDEX: 43 G/M2
LEFT VENTRICLE SYSTOLIC VOLUME INDEX: 15.8 ML/M2
LEFT VENTRICLE SYSTOLIC VOLUME: 29.33 ML
LEFT VENTRICULAR INTERNAL DIMENSION IN DIASTOLE: 3.81 CM (ref 3.5–6)
LEFT VENTRICULAR MASS: 79.86 G
LV LATERAL E/E' RATIO: 6.44 M/S
LV SEPTAL E/E' RATIO: 9.67 M/S
MV A" WAVE DURATION": 8.56 MSEC
MV PEAK A VEL: 0.77 M/S
MV PEAK E VEL: 0.58 M/S
MV STENOSIS PRESSURE HALF TIME: 58.05 MS
MV VALVE AREA P 1/2 METHOD: 3.79 CM2
PULM VEIN S/D RATIO: 1.5
PV PEAK D VEL: 0.34 M/S
PV PEAK S VEL: 0.51 M/S
RA MAJOR: 4.23 CM
RA PRESSURE: 3 MMHG
RA WIDTH: 3.19 CM
RIGHT VENTRICULAR END-DIASTOLIC DIMENSION: 2.98 CM
RV TISSUE DOPPLER FREE WALL SYSTOLIC VELOCITY 1 (APICAL 4 CHAMBER VIEW): 9.72 CM/S
SINUS: 2.95 CM
STJ: 2.52 CM
TDI LATERAL: 0.09 M/S
TDI SEPTAL: 0.06 M/S
TDI: 0.08 M/S
TRICUSPID ANNULAR PLANE SYSTOLIC EXCURSION: 2.42 CM

## 2022-08-04 PROCEDURE — 93306 TTE W/DOPPLER COMPLETE: CPT

## 2022-08-04 PROCEDURE — 93306 ECHO (CUPID ONLY): ICD-10-PCS | Mod: 26,,, | Performed by: INTERNAL MEDICINE

## 2022-08-04 PROCEDURE — 93306 TTE W/DOPPLER COMPLETE: CPT | Mod: 26,,, | Performed by: INTERNAL MEDICINE

## 2022-08-05 ENCOUNTER — HOSPITAL ENCOUNTER (OUTPATIENT)
Dept: PULMONOLOGY | Facility: CLINIC | Age: 62
Discharge: HOME | End: 2022-08-05
Payer: MEDICARE

## 2022-08-05 DIAGNOSIS — I27.20 PULMONARY HYPERTENSION: ICD-10-CM

## 2022-08-05 DIAGNOSIS — M34.1 CREST (CALCINOSIS, RAYNAUD'S PHENOMENON, ESOPHAGEAL DYSFUNCTION, SCLERODACTYLY, TELANGIECTASIA): ICD-10-CM

## 2022-08-05 DIAGNOSIS — Z13.9 SCREENING PROCEDURE: Primary | ICD-10-CM

## 2022-08-05 LAB
CTP QC/QA: YES
SARS-COV-2 AG RESP QL IA.RAPID: NEGATIVE

## 2022-08-05 PROCEDURE — 94060 PR EVAL OF BRONCHOSPASM: ICD-10-PCS | Mod: S$GLB,,, | Performed by: INTERNAL MEDICINE

## 2022-08-05 PROCEDURE — 94727 PR PULM FUNCTION TEST BY GAS: ICD-10-PCS | Mod: S$GLB,,, | Performed by: INTERNAL MEDICINE

## 2022-08-05 PROCEDURE — 87811 SARS-COV-2 COVID19 W/OPTIC: CPT | Mod: QW,S$GLB,, | Performed by: PHYSICIAN ASSISTANT

## 2022-08-05 PROCEDURE — 94729 PR C02/MEMBANE DIFFUSE CAPACITY: ICD-10-PCS | Mod: S$GLB,,, | Performed by: INTERNAL MEDICINE

## 2022-08-05 PROCEDURE — 87811 SARS CORONAVIRUS 2 ANTIGEN POCT, MANUAL READ: ICD-10-PCS | Mod: QW,S$GLB,, | Performed by: PHYSICIAN ASSISTANT

## 2022-08-05 PROCEDURE — 94729 DIFFUSING CAPACITY: CPT | Mod: S$GLB,,, | Performed by: INTERNAL MEDICINE

## 2022-08-05 PROCEDURE — 94060 EVALUATION OF WHEEZING: CPT | Mod: S$GLB,,, | Performed by: INTERNAL MEDICINE

## 2022-08-05 PROCEDURE — 94727 GAS DIL/WSHOT DETER LNG VOL: CPT | Mod: S$GLB,,, | Performed by: INTERNAL MEDICINE

## 2022-08-27 NOTE — PROGRESS NOTES
Health Maintenance Due   Topic Date Due    HIV Screening  06/26/1975    Pneumococcal Vaccine (Highest Risk) (1 of 3 - PCV13) 06/26/1981    Shingles Vaccine (1 of 2) 06/26/2012     Updates were requested from care everywhere.  Chart was reviewed for overdue Proactive [facility] Encounters (JULEE) topics (CRS, Breast Cancer Screening, Eye exam)  Health Maintenance has been updated.  LINKS immunization registry triggered.  Immunizations were reconciled.      
- - -

## 2022-09-09 ENCOUNTER — PES CALL (OUTPATIENT)
Dept: ADMINISTRATIVE | Facility: CLINIC | Age: 62
End: 2022-09-09
Payer: MEDICARE

## 2022-09-21 ENCOUNTER — PATIENT MESSAGE (OUTPATIENT)
Dept: RHEUMATOLOGY | Facility: CLINIC | Age: 62
End: 2022-09-21
Payer: MEDICARE

## 2022-09-21 ENCOUNTER — LAB VISIT (OUTPATIENT)
Dept: LAB | Facility: HOSPITAL | Age: 62
End: 2022-09-21
Payer: MEDICARE

## 2022-09-21 DIAGNOSIS — M35.00 SJOGREN'S SYNDROME, WITH UNSPECIFIED ORGAN INVOLVEMENT: ICD-10-CM

## 2022-09-21 DIAGNOSIS — M34.1 CREST (CALCINOSIS, RAYNAUD'S PHENOMENON, ESOPHAGEAL DYSFUNCTION, SCLERODACTYLY, TELANGIECTASIA): Primary | ICD-10-CM

## 2022-09-21 DIAGNOSIS — E06.3 HASHIMOTO'S THYROIDITIS: ICD-10-CM

## 2022-09-21 LAB
T4 FREE SERPL-MCNC: 0.8 NG/DL (ref 0.71–1.51)
TSH SERPL DL<=0.005 MIU/L-ACNC: 5.98 UIU/ML (ref 0.4–4)

## 2022-09-21 PROCEDURE — 84439 ASSAY OF FREE THYROXINE: CPT | Performed by: PHYSICIAN ASSISTANT

## 2022-09-21 PROCEDURE — 36415 COLL VENOUS BLD VENIPUNCTURE: CPT | Mod: PO | Performed by: PHYSICIAN ASSISTANT

## 2022-09-21 PROCEDURE — 84443 ASSAY THYROID STIM HORMONE: CPT | Performed by: PHYSICIAN ASSISTANT

## 2022-09-22 ENCOUNTER — PATIENT MESSAGE (OUTPATIENT)
Dept: RHEUMATOLOGY | Facility: CLINIC | Age: 62
End: 2022-09-22
Payer: MEDICARE

## 2022-09-26 ENCOUNTER — LAB VISIT (OUTPATIENT)
Dept: LAB | Facility: HOSPITAL | Age: 62
End: 2022-09-26
Attending: PHYSICIAN ASSISTANT
Payer: MEDICARE

## 2022-09-26 DIAGNOSIS — M35.00 SJOGREN'S SYNDROME, WITH UNSPECIFIED ORGAN INVOLVEMENT: ICD-10-CM

## 2022-09-26 DIAGNOSIS — M34.1 CREST (CALCINOSIS, RAYNAUD'S PHENOMENON, ESOPHAGEAL DYSFUNCTION, SCLERODACTYLY, TELANGIECTASIA): ICD-10-CM

## 2022-09-26 LAB
ALBUMIN SERPL BCP-MCNC: 3.8 G/DL (ref 3.5–5.2)
ALP SERPL-CCNC: 55 U/L (ref 55–135)
ALT SERPL W/O P-5'-P-CCNC: 31 U/L (ref 10–44)
ANION GAP SERPL CALC-SCNC: 8 MMOL/L (ref 8–16)
AST SERPL-CCNC: 22 U/L (ref 10–40)
BASOPHILS # BLD AUTO: 0.04 K/UL (ref 0–0.2)
BASOPHILS NFR BLD: 0.6 % (ref 0–1.9)
BILIRUB SERPL-MCNC: 0.3 MG/DL (ref 0.1–1)
BUN SERPL-MCNC: 14 MG/DL (ref 6–20)
CALCIUM SERPL-MCNC: 9.3 MG/DL (ref 8.7–10.5)
CHLORIDE SERPL-SCNC: 101 MMOL/L (ref 95–110)
CO2 SERPL-SCNC: 26 MMOL/L (ref 23–29)
CREAT SERPL-MCNC: 0.8 MG/DL (ref 0.5–1.4)
CRP SERPL-MCNC: 3.2 MG/L (ref 0–8.2)
DIFFERENTIAL METHOD: ABNORMAL
EOSINOPHIL # BLD AUTO: 0.2 K/UL (ref 0–0.5)
EOSINOPHIL NFR BLD: 2.6 % (ref 0–8)
ERYTHROCYTE [DISTWIDTH] IN BLOOD BY AUTOMATED COUNT: 13.5 % (ref 11.5–14.5)
ERYTHROCYTE [SEDIMENTATION RATE] IN BLOOD BY PHOTOMETRIC METHOD: 17 MM/HR (ref 0–36)
EST. GFR  (NO RACE VARIABLE): >60 ML/MIN/1.73 M^2
GLUCOSE SERPL-MCNC: 96 MG/DL (ref 70–110)
HCT VFR BLD AUTO: 36.6 % (ref 37–48.5)
HGB BLD-MCNC: 11.6 G/DL (ref 12–16)
IMM GRANULOCYTES # BLD AUTO: 0.01 K/UL (ref 0–0.04)
IMM GRANULOCYTES NFR BLD AUTO: 0.2 % (ref 0–0.5)
LYMPHOCYTES # BLD AUTO: 2.6 K/UL (ref 1–4.8)
LYMPHOCYTES NFR BLD: 39.1 % (ref 18–48)
MCH RBC QN AUTO: 29.2 PG (ref 27–31)
MCHC RBC AUTO-ENTMCNC: 31.7 G/DL (ref 32–36)
MCV RBC AUTO: 92 FL (ref 82–98)
MONOCYTES # BLD AUTO: 0.5 K/UL (ref 0.3–1)
MONOCYTES NFR BLD: 8 % (ref 4–15)
NEUTROPHILS # BLD AUTO: 3.3 K/UL (ref 1.8–7.7)
NEUTROPHILS NFR BLD: 49.5 % (ref 38–73)
NRBC BLD-RTO: 0 /100 WBC
PLATELET # BLD AUTO: 313 K/UL (ref 150–450)
PMV BLD AUTO: 10.4 FL (ref 9.2–12.9)
POTASSIUM SERPL-SCNC: 3.9 MMOL/L (ref 3.5–5.1)
PROT SERPL-MCNC: 7 G/DL (ref 6–8.4)
RBC # BLD AUTO: 3.97 M/UL (ref 4–5.4)
SODIUM SERPL-SCNC: 135 MMOL/L (ref 136–145)
WBC # BLD AUTO: 6.63 K/UL (ref 3.9–12.7)

## 2022-09-26 PROCEDURE — 85025 COMPLETE CBC W/AUTO DIFF WBC: CPT | Performed by: PHYSICIAN ASSISTANT

## 2022-09-26 PROCEDURE — 85652 RBC SED RATE AUTOMATED: CPT | Performed by: PHYSICIAN ASSISTANT

## 2022-09-26 PROCEDURE — 86039 ANTINUCLEAR ANTIBODIES (ANA): CPT | Performed by: PHYSICIAN ASSISTANT

## 2022-09-26 PROCEDURE — 36415 COLL VENOUS BLD VENIPUNCTURE: CPT | Mod: PO | Performed by: PHYSICIAN ASSISTANT

## 2022-09-26 PROCEDURE — 86235 NUCLEAR ANTIGEN ANTIBODY: CPT | Mod: 59 | Performed by: PHYSICIAN ASSISTANT

## 2022-09-26 PROCEDURE — 86038 ANTINUCLEAR ANTIBODIES: CPT | Performed by: PHYSICIAN ASSISTANT

## 2022-09-26 PROCEDURE — 80053 COMPREHEN METABOLIC PANEL: CPT | Performed by: PHYSICIAN ASSISTANT

## 2022-09-26 PROCEDURE — 86140 C-REACTIVE PROTEIN: CPT | Performed by: PHYSICIAN ASSISTANT

## 2022-09-28 LAB
ANTI SM ANTIBODY: 0.07 RATIO (ref 0–0.99)
ANTI SM/RNP ANTIBODY: 0.07 RATIO (ref 0–0.99)
ANTI-SM INTERPRETATION: NEGATIVE
ANTI-SM/RNP INTERPRETATION: NEGATIVE
ANTI-SSA ANTIBODY: 0.08 RATIO (ref 0–0.99)
ANTI-SSA INTERPRETATION: NEGATIVE
ANTI-SSB ANTIBODY: 0.1 RATIO (ref 0–0.99)
ANTI-SSB INTERPRETATION: NEGATIVE
DSDNA AB SER-ACNC: NORMAL [IU]/ML

## 2022-09-30 ENCOUNTER — OFFICE VISIT (OUTPATIENT)
Dept: RHEUMATOLOGY | Facility: CLINIC | Age: 62
End: 2022-09-30
Payer: MEDICARE

## 2022-09-30 VITALS
SYSTOLIC BLOOD PRESSURE: 130 MMHG | HEIGHT: 64 IN | BODY MASS INDEX: 33.12 KG/M2 | WEIGHT: 194 LBS | DIASTOLIC BLOOD PRESSURE: 81 MMHG | HEART RATE: 93 BPM

## 2022-09-30 DIAGNOSIS — M34.1 CREST (CALCINOSIS, RAYNAUD'S PHENOMENON, ESOPHAGEAL DYSFUNCTION, SCLERODACTYLY, TELANGIECTASIA): ICD-10-CM

## 2022-09-30 DIAGNOSIS — J40 BRONCHITIS: ICD-10-CM

## 2022-09-30 DIAGNOSIS — Z91.09 ENVIRONMENTAL ALLERGIES: Primary | ICD-10-CM

## 2022-09-30 DIAGNOSIS — G89.4 CHRONIC PAIN SYNDROME: ICD-10-CM

## 2022-09-30 DIAGNOSIS — M35.00 SJOGREN'S SYNDROME, WITH UNSPECIFIED ORGAN INVOLVEMENT: ICD-10-CM

## 2022-09-30 DIAGNOSIS — E09.638: ICD-10-CM

## 2022-09-30 DIAGNOSIS — R79.9 ABNORMAL FINDING OF BLOOD CHEMISTRY, UNSPECIFIED: ICD-10-CM

## 2022-09-30 DIAGNOSIS — E06.3 HASHIMOTO'S THYROIDITIS: ICD-10-CM

## 2022-09-30 PROCEDURE — 99499 RISK ADDL DX/OHS AUDIT: ICD-10-PCS | Mod: HCNC,S$GLB,, | Performed by: INTERNAL MEDICINE

## 2022-09-30 PROCEDURE — 99499 UNLISTED E&M SERVICE: CPT | Mod: HCNC,S$GLB,, | Performed by: INTERNAL MEDICINE

## 2022-09-30 PROCEDURE — 1159F PR MEDICATION LIST DOCUMENTED IN MEDICAL RECORD: ICD-10-PCS | Mod: CPTII,S$GLB,, | Performed by: INTERNAL MEDICINE

## 2022-09-30 PROCEDURE — 1159F MED LIST DOCD IN RCRD: CPT | Mod: CPTII,S$GLB,, | Performed by: INTERNAL MEDICINE

## 2022-09-30 PROCEDURE — 3044F PR MOST RECENT HEMOGLOBIN A1C LEVEL <7.0%: ICD-10-PCS | Mod: CPTII,S$GLB,, | Performed by: INTERNAL MEDICINE

## 2022-09-30 PROCEDURE — 3044F HG A1C LEVEL LT 7.0%: CPT | Mod: CPTII,S$GLB,, | Performed by: INTERNAL MEDICINE

## 2022-09-30 PROCEDURE — 3008F PR BODY MASS INDEX (BMI) DOCUMENTED: ICD-10-PCS | Mod: CPTII,S$GLB,, | Performed by: INTERNAL MEDICINE

## 2022-09-30 PROCEDURE — 99215 OFFICE O/P EST HI 40 MIN: CPT | Mod: S$GLB,,, | Performed by: INTERNAL MEDICINE

## 2022-09-30 PROCEDURE — 99999 PR PBB SHADOW E&M-EST. PATIENT-LVL IV: ICD-10-PCS | Mod: PBBFAC,,, | Performed by: INTERNAL MEDICINE

## 2022-09-30 PROCEDURE — 3008F BODY MASS INDEX DOCD: CPT | Mod: CPTII,S$GLB,, | Performed by: INTERNAL MEDICINE

## 2022-09-30 PROCEDURE — 99215 PR OFFICE/OUTPT VISIT, EST, LEVL V, 40-54 MIN: ICD-10-PCS | Mod: S$GLB,,, | Performed by: INTERNAL MEDICINE

## 2022-09-30 PROCEDURE — 99999 PR PBB SHADOW E&M-EST. PATIENT-LVL IV: CPT | Mod: PBBFAC,,, | Performed by: INTERNAL MEDICINE

## 2022-09-30 RX ORDER — MONTELUKAST SODIUM 10 MG/1
10 TABLET ORAL NIGHTLY
Qty: 90 TABLET | Refills: 3 | Status: SHIPPED | OUTPATIENT
Start: 2022-09-30 | End: 2023-05-10 | Stop reason: SDUPTHER

## 2022-09-30 RX ORDER — SEMAGLUTIDE 1.34 MG/ML
0.25 INJECTION, SOLUTION SUBCUTANEOUS
Qty: 1 PEN | Refills: 5 | Status: SHIPPED | OUTPATIENT
Start: 2022-09-30 | End: 2022-10-28 | Stop reason: SDUPTHER

## 2022-09-30 RX ORDER — HYDROXYCHLOROQUINE SULFATE 200 MG/1
200 TABLET, FILM COATED ORAL 2 TIMES DAILY
Qty: 180 TABLET | Refills: 3 | Status: SHIPPED | OUTPATIENT
Start: 2022-09-30 | End: 2023-05-10 | Stop reason: SDUPTHER

## 2022-09-30 RX ORDER — TRAMADOL HYDROCHLORIDE 50 MG/1
50 TABLET ORAL EVERY 6 HOURS PRN
Qty: 120 TABLET | Refills: 4 | Status: SHIPPED | OUTPATIENT
Start: 2022-09-30 | End: 2023-09-21 | Stop reason: SDUPTHER

## 2022-09-30 RX ORDER — THYROID 30 MG/1
30 TABLET ORAL
Qty: 30 TABLET | Refills: 11 | Status: SHIPPED | OUTPATIENT
Start: 2022-09-30 | End: 2023-01-26

## 2022-09-30 ASSESSMENT — ROUTINE ASSESSMENT OF PATIENT INDEX DATA (RAPID3)
PAIN SCORE: 3.5
MDHAQ FUNCTION SCORE: 0.5
TOTAL RAPID3 SCORE: 3.56
FATIGUE SCORE: 2.2
PATIENT GLOBAL ASSESSMENT SCORE: 5.5
PSYCHOLOGICAL DISTRESS SCORE: 0

## 2022-09-30 NOTE — LETTER
[place] [facility]  [address] [facility] [address]  Phone: [phone]  Fax: [phone] September 30, 2022    Tina García  [address] [facility] [address]      To Whom It May Concern:    Tina García is unable to participate in jury duty due to severe arthritis, crest syndrome and lupus erythematosis and her immune system can not handle multiple exposes to different pathogens and people..    If you have any questions or concerns, please feel free to call my office.    Sincerely,          Joao Fry MD

## 2022-09-30 NOTE — PROGRESS NOTES
Subjective:       Patient ID: Tina García is a 62 y.o. female.    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  She is seeing dismotility, GI: Anti reflux surgery was being considered but the patient was found to have aperistalsis of the esophagus on esophageal manometry.  PPI was changed to nexium. If they are unable to control her symptoms then will consider surgeons to discuss anti reflux surgery.  Recommend EGD with [facility] to prove that she has GERD prior to sending to surgery as she has had only LA grade A or B esophagitis on endoscopy in the past.    She stopped her thyroid medication due to hair loss and started sea kelp and topical minoxidil which is working.     She is off imuran since she had covid infection in December 2021.     Raynauds is stable at this time with conservative measures. She has not taken revatio recently.    She is due for PFTs and Echocardiogram.       We reviewed her recent labs.    Current tx:  1. plaquenil  2. revatio PRN  3. Flexeril PRN    Review of Systems   Constitutional:  Positive for unexpected weight change. Negative for activity change, appetite change and chills.   HENT:  Positive for mouth sores.    Eyes:  Negative for redness and visual disturbance.   Respiratory:  Negative for cough and shortness of breath.    Cardiovascular:  Negative for chest pain, palpitations and leg swelling.   Gastrointestinal:  Positive for constipation. Negative for abdominal pain, diarrhea, nausea and vomiting.   Genitourinary:  Negative for genital sores.   Musculoskeletal:  Positive for arthralgias and gait problem.   Skin:  Negative for rash.   Allergic/Immunologic: Positive for immunocompromised state.   Neurological:  Negative for dizziness, weakness and light-headedness.   Hematological:  Does not bruise/bleed easily.       Objective:     Vitals:    09/30/22 0823   BP: 130/81   Pulse: 93       Past Medical History:   Diagnosis Date    Anxiety      AR (allergic rhinitis)     Arthritis     rheumatoid?    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    CYST REMOVAL      pilonidal    DILATION AND CURETTAGE OF UTERUS      ESOPHAGEAL MANOMETRY WITH MEASUREMENT OF IMPEDANCE N/A 2/4/2022    Procedure: MANOMETRY, ESOPHAGUS, WITH IMPEDANCE MEASUREMENT;  Surgeon: Kami Cobos MD;  Location: [address]);  Service: Endoscopy;  Laterality: N/A;  COVID test on 2/1 at [facility]    ESOPHAGOGASTRODUODENOSCOPY N/A 4/17/2019    Procedure: EGD (ESOPHAGOGASTRODUODENOSCOPY);  Surgeon: Rich Kingsley Jr., MD;  Location: [facility];  Service: Endoscopy;  Laterality: N/A;    ESOPHAGOGASTRODUODENOSCOPY N/A 12/3/2021    Procedure: EGD (ESOPHAGOGASTRODUODENOSCOPY);  Surgeon: Rich Kingsley Jr., MD;  Location: [facility];  Service: Endoscopy;  Laterality: N/A;    GANGLION CYST EXCISION      right thumb    HYSTERECTOMY  2008    complete    TOTAL ABDOMINAL HYSTERECTOMY W/ BILATERAL SALPINGOOPHORECTOMY  2008    w/Hysterectomy    UPPER GASTROINTESTINAL ENDOSCOPY  12/23/2011  [facility]    Fluid in the lower third of the esophagus   (stasis).  The esophageal examination was compatible with scleroderma.  (Dilataion done, 51 Fr.)    LA Grade A reflux esophagitis.   Enlarged gastric folds.   Multiple gastric polyps.   Antritis (erythematous streaks).  JOELLNE Test  Negative.          Physical Exam   Constitutional: She is oriented to person, place, and time.   HENT:   Head: Normocephalic and atraumatic.   Mouth/Throat: Oropharynx is clear and moist.   Eyes: Pupils are equal, round, and reactive to light.   Neck: No thyromegaly present.   Cardiovascular: Normal rate, regular rhythm and normal heart sounds. Exam reveals no gallop and no friction rub.   No murmur heard.  Pulmonary/Chest: Breath sounds normal. She has no wheezes. She has no rales. She exhibits no tenderness.   Abdominal: There is no abdominal tenderness. There is no rebound and no guarding.   Musculoskeletal:      Right shoulder: Tenderness present.      Left shoulder: Tenderness present.      Right elbow: Normal.      Left elbow: Normal.      Right wrist: Tenderness present.      Left wrist: Tenderness present.      Cervical back: Neck supple.      Right knee: No effusion. Tenderness present.      Left knee: No effusion. Tenderness present.   Lymphadenopathy:     She has no cervical adenopathy.   Neurological: She is alert and oriented to person, place, and time. Gait normal.   Skin: No rash noted. No erythema. No pallor.   Psychiatric: Mood and affect normal.   Nursing note and vitals reviewed.      Right Side Rheumatological Exam     Examination finds the elbow normal.    The patient is tender to palpation of the shoulder, wrist, knee, 1st PIP, 1st MCP, 2nd PIP, 2nd MCP, 3rd PIP, 3rd MCP, 4th PIP, 4th MCP, 5th PIP and 5th MCP    She has swelling of the 1st PIP, 1st MCP, 2nd PIP, 2nd MCP, 3rd PIP, 3rd MCP, 4th PIP, 4th MCP, 5th PIP and 5th MCP    Shoulder Exam   Tenderness Location: no tenderness    Range of Motion   Active abduction:  abnormal   Adduction: abnormal  Sensation:  normal    Knee Exam   Patellofemoral Crepitus: positive  Effusion: negative  Sensation: normal    Hip Exam   Tenderness Location: posterior  Sensation: normal    Elbow/Wrist Exam   Tenderness Location: no tenderness  Sensation: normal    Left Side Rheumatological Exam     Examination finds the elbow normal.    The patient is tender to palpation of the shoulder, wrist, knee, 1st PIP, 1st MCP, 2nd PIP, 2nd MCP, 3rd PIP, 3rd MCP, 4th PIP, 4th MCP, 5th PIP and 5th MCP.    She has swelling of the 1st PIP, 1st MCP, 2nd PIP, 2nd MCP, 3rd PIP, 3rd MCP, 4th PIP, 4th MCP, 5th PIP and 5th MCP    Shoulder Exam   Tenderness Location: no tenderness    Range of Motion   Active abduction:  abnormal   Sensation: normal    Knee Exam     Patellofemoral Crepitus: positive  Effusion: negative  Sensation: normal    Hip Exam   Tenderness Location: posterior  Sensation: normal    Elbow/Wrist Exam   Sensation: normal      Back/Neck Exam   General Inspection   Gait: normal           Results for orders placed or performed in visit on 09/26/22   CBC Auto Differential   Result Value Ref Range    WBC 6.63 3.90 - 12.70 K/uL    RBC 3.97 (L) 4.00 - 5.40 M/uL    Hemoglobin 11.6 (L) 12.0 - 16.0 g/dL    Hematocrit 36.6 (L) 37.0 - 48.5 %    MCV 92 82 - 98 fL    MCH 29.2 27.0 - 31.0 pg    MCHC 31.7 (L) 32.0 - 36.0 g/dL    RDW 13.5 11.5 - 14.5 %    Platelets 313 150 - 450 K/uL    MPV 10.4 9.2 - 12.9 fL    Immature Granulocytes 0.2 0.0 - 0.5 %    Gran # (ANC) 3.3 1.8 - 7.7 K/uL    Immature Grans (Abs) 0.01 0.00 - 0.04 K/uL    Lymph # 2.6 1.0 - 4.8 K/uL    Mono # 0.5 0.3 - 1.0 K/uL    Eos # 0.2 0.0 - 0.5 K/uL    Baso # 0.04 0.00 - 0.20 K/uL    nRBC 0 0 /100 WBC    Gran % 49.5 38.0 - 73.0 %    Lymph % 39.1 18.0 - 48.0 %    Mono % 8.0 4.0 - 15.0 %    Eosinophil % 2.6 0.0 - 8.0 %    Basophil % 0.6 0.0 - 1.9 %    Differential Method Automated    Comprehensive Metabolic Panel   Result Value Ref Range    Sodium 135 (L) 136 - 145 mmol/L    Potassium 3.9 3.5 - 5.1  mmol/L    Chloride 101 95 - 110 mmol/L    CO2 26 23 - 29 mmol/L    Glucose 96 70 - 110 mg/dL    BUN 14 6 - 20 mg/dL    Creatinine 0.8 0.5 - 1.4 mg/dL    Calcium 9.3 8.7 - 10.5 mg/dL    Total Protein 7.0 6.0 - 8.4 g/dL    Albumin 3.8 3.5 - 5.2 g/dL    Total Bilirubin 0.3 0.1 - 1.0 mg/dL    Alkaline Phosphatase 55 55 - 135 U/L    AST 22 10 - 40 U/L    ALT 31 10 - 44 U/L    Anion Gap 8 8 - 16 mmol/L    eGFR >60.0 >60 mL/min/1.73 m^2   C-Reactive Protein   Result Value Ref Range    CRP 3.2 0.0 - 8.2 mg/L   Sedimentation rate   Result Value Ref Range    Sed Rate 17 0 - 36 mm/Hr   JAMILA Screen w/Reflex   Result Value Ref Range    JAMILA Screen Positive (A) Negative <1:80   JAMILA Profile   Result Value Ref Range    Anti Sm Antibody 0.07 0.00 - 0.99 Ratio    Anti-Sm Interpretation Negative Negative    Anti-SSA Antibody 0.08 0.00 - 0.99 Ratio    Anti-SSA Interpretation Negative Negative    Anti-SSB Antibody 0.10 0.00 - 0.99 Ratio    Anti-SSB Interpretation Negative Negative    ds DNA Ab Negative 1:10 Negative 1:10    Anti Sm/RNP Antibody 0.07 0.00 - 0.99 Ratio    Anti-Sm/RNP Interpretation Negative Negative   JAMILA Pattern 2   Result Value Ref Range    JAMILA Pattern 2 Centromere    JAMILA Titer 1   Result Value Ref Range    JAMILA Titer 1 1:320    JAMILA Pattern 1   Result Value Ref Range    JAMILA PATTERN 1 Speckled    JAMILA Titer 2   Result Value Ref Range    JAMLIA Titer 2 >=1:2560        Your cholesterol is high.  Your 10 year risk of having a heart attack or a stroke is:The 10-year ASCVD risk score (Emma DK, et al., 2019) is: 5.6%    Values used to calculate the score:      Age: 62 years      Sex: Female      Is Non- : No      Diabetic: No      Tobacco smoker: No      Systolic Blood Pressure: 130 mmHg      Is BP treated: Yes      HDL Cholesterol: 53 mg/dL      Total Cholesterol: 253 mg/dL    I      1. Environmental allergies    2. Sjogren's syndrome, with unspecified organ involvement    3. CREST (calcinosis, Raynaud's  phenomenon, esophageal dysfunction, sclerodactyly, telangiectasia)    4. Hashimoto's thyroiditis    5. 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 Abnormal finding of blood chemistry, unspecified    7. Chronic pain syndrome    8. Bronchitis              Plan:       Environmental allergies  -     montelukast (SINGULAIR) 10 mg tablet; Take 1 tablet (10 mg total) by mouth every evening.  Dispense: 90 tablet; Refill: 3    Sjogren's syndrome, with unspecified organ involvement  -     hydrOXYchloroQUINE (PLAQUENIL) 200 mg tablet; Take 1 tablet (200 mg total) by mouth 2 (two) times daily.  Dispense: 180 tablet; Refill: 3  -     traMADoL (ULTRAM) 50 mg tablet; Take 1 tablet (50 mg total) by mouth every 6 (six) hours as needed for Pain.  Dispense: 120 tablet; Refill: 4    CREST (calcinosis, Raynaud's phenomenon, esophageal dysfunction, sclerodactyly, telangiectasia)  -     traMADoL (ULTRAM) 50 mg tablet; Take 1 tablet (50 mg total) by mouth every 6 (six) hours as needed for Pain.  Dispense: 120 tablet; Refill: 4    Hashimoto's thyroiditis  -     thyroid, pork, (NP THYROID) 30 mg Tab; Take 1 tablet (30 mg total) by mouth before breakfast.  Dispense: 30 tablet; Refill: 11  -     Iron and TIBC; Future; Expected date: 09/30/2022  -     TSH; Future; Expected date: 09/30/2022  -     T4, Free; Future; Expected date: 09/30/2022  -     T3; Future; Expected date: 09/30/2022  -     traMADoL (ULTRAM) 50 mg tablet; Take 1 tablet (50 mg total) by mouth every 6 (six) hours as needed for Pain.  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  Dispense: 1 pen; Refill: 5    Abnormal finding of blood chemistry, unspecified  -     Iron and TIBC; Future;  Expected date: 09/30/2022    Chronic pain syndrome  -     traMADoL (ULTRAM) 50 mg tablet; Take 1 tablet (50 mg total) by mouth every 6 (six) hours as needed for Pain.  Dispense: 120 tablet; Refill: 4    Bronchitis  -     traMADoL (ULTRAM) 50 mg tablet; Take 1 tablet (50 mg total) by mouth every 6 (six) hours as needed for Pain.  Dispense: 120 tablet; Refill: 4        Assessment:  62 year old female with  CREST syndrome (raynauds on revatio PRN, esophageal dysfunction/GERD on ppi/zantac, sclerodactyly hands), Sjogren's syndrome on imuran  --chronic pain syndrome on tramadol  --hx of Hashimoto's thyroiditis on NP thyroid  --hx of pulmonary HTN  --new onset anemia  --elevated CRP    Plan:  1. Cont. Plaquenil bid, revatio prn  2. Cont. Tramadol PRN severe pain  I have  check [place] prescription monitoring program site and no unusual or abnormal behavior has occurred pt understand the risk and benefits of taking opioid medications and has decided to continue the  medication

## 2022-10-01 ENCOUNTER — PATIENT MESSAGE (OUTPATIENT)
Dept: RHEUMATOLOGY | Facility: CLINIC | Age: 62
End: 2022-10-01
Payer: MEDICARE

## 2022-10-03 DIAGNOSIS — B02.9 HERPES ZOSTER WITHOUT COMPLICATION: ICD-10-CM

## 2022-10-03 DIAGNOSIS — M35.00 SJOGREN'S SYNDROME, WITH UNSPECIFIED ORGAN INVOLVEMENT: Primary | ICD-10-CM

## 2022-10-04 ENCOUNTER — PATIENT MESSAGE (OUTPATIENT)
Dept: RHEUMATOLOGY | Facility: CLINIC | Age: 62
End: 2022-10-04
Payer: MEDICARE

## 2022-10-04 RX ORDER — VALACYCLOVIR HYDROCHLORIDE 500 MG/1
500 TABLET, FILM COATED ORAL DAILY
Qty: 90 TABLET | Refills: 0 | Status: SHIPPED | OUTPATIENT
Start: 2022-10-04 | End: 2023-02-27

## 2022-10-11 ENCOUNTER — PATIENT MESSAGE (OUTPATIENT)
Dept: RHEUMATOLOGY | Facility: CLINIC | Age: 62
End: 2022-10-11
Payer: MEDICARE

## 2022-10-11 DIAGNOSIS — M35.00 SJOGREN'S SYNDROME, WITH UNSPECIFIED ORGAN INVOLVEMENT: Primary | ICD-10-CM

## 2022-10-11 DIAGNOSIS — J40 BRONCHITIS: ICD-10-CM

## 2022-10-13 RX ORDER — PROMETHAZINE HYDROCHLORIDE AND DEXTROMETHORPHAN HYDROBROMIDE 6.25; 15 MG/5ML; MG/5ML
5 SYRUP ORAL EVERY 8 HOURS PRN
Qty: 180 ML | Refills: 0 | Status: SHIPPED | OUTPATIENT
Start: 2022-10-13 | End: 2022-10-23

## 2022-10-13 RX ORDER — AZITHROMYCIN 250 MG/1
TABLET, FILM COATED ORAL
Qty: 6 TABLET | Refills: 0 | Status: SHIPPED | OUTPATIENT
Start: 2022-10-13 | End: 2022-12-28 | Stop reason: ALTCHOICE

## 2022-10-13 RX ORDER — METHYLPREDNISOLONE 4 MG/1
TABLET ORAL
Qty: 1 EACH | Refills: 0 | Status: SHIPPED | OUTPATIENT
Start: 2022-10-13 | End: 2022-12-28 | Stop reason: ALTCHOICE

## 2022-10-28 ENCOUNTER — PATIENT MESSAGE (OUTPATIENT)
Dept: RHEUMATOLOGY | Facility: CLINIC | Age: 62
End: 2022-10-28
Payer: MEDICARE

## 2022-10-28 DIAGNOSIS — E09.638: ICD-10-CM

## 2022-11-08 DIAGNOSIS — I10 ESSENTIAL HYPERTENSION: ICD-10-CM

## 2022-11-08 RX ORDER — LOSARTAN POTASSIUM AND HYDROCHLOROTHIAZIDE 25; 100 MG/1; MG/1
1 TABLET ORAL DAILY
Qty: 30 TABLET | Refills: 0 | Status: SHIPPED | OUTPATIENT
Start: 2022-11-08 | End: 2022-12-07 | Stop reason: SDUPTHER

## 2022-11-08 NOTE — TELEPHONE ENCOUNTER
No new care gaps identified.  [facility] Embedded Care Gaps. Reference number: [id number]. 11/08/2022   4:52:56 PM CST

## 2022-11-09 NOTE — TELEPHONE ENCOUNTER
Refill Routing Note   Medication(s) are not appropriate for processing by [facility] for the following reason(s):      - Patient has not been seen in over 15 months by PCP    [facility] action(s):  Defer          Medication reconciliation completed: No     Appointments  past 12m or future 3m with PCP    Date Provider   Last Visit   9/24/2020 Loan Harris MD   Next Visit   Visit date not found Loan Harris MD   ED visits in past 90 days: 0        Note composed:8:31 PM 11/08/2022

## 2022-11-10 RX ORDER — SEMAGLUTIDE 1.34 MG/ML
0.5 INJECTION, SOLUTION SUBCUTANEOUS
Qty: 1 PEN | Refills: 5 | Status: SHIPPED | OUTPATIENT
Start: 2022-11-10 | End: 2023-02-26

## 2022-12-02 NOTE — TRANSFER OF CARE
"Anesthesia Transfer of Care Note    Patient: Tina García    Procedure(s) Performed: Procedure(s) (LRB):  EGD (ESOPHAGOGASTRODUODENOSCOPY) (N/A)  COLONOSCOPY (N/A)    Patient location: PACU    Anesthesia Type: general    Transport from OR: Transported from OR on room air with adequate spontaneous ventilation    Post pain: adequate analgesia    Post assessment: no apparent anesthetic complications and tolerated procedure well    Post vital signs: stable    Level of consciousness: sedated    Nausea/Vomiting: no nausea/vomiting    Complications: none    Transfer of care protocol was followed      Last vitals:   Visit Vitals  /71 (BP Location: Right arm, Patient Position: Lying)   Pulse 71   Temp 36.7 °C (98.1 °F) (Skin)   Resp 16   Ht 5' 3" (1.6 m)   Wt 71.7 kg (158 lb)   SpO2 100%   Breastfeeding? No   BMI 27.99 kg/m²     "
Yes

## 2022-12-07 ENCOUNTER — PATIENT MESSAGE (OUTPATIENT)
Dept: FAMILY MEDICINE | Facility: CLINIC | Age: 62
End: 2022-12-07
Payer: MEDICARE

## 2022-12-07 ENCOUNTER — PATIENT MESSAGE (OUTPATIENT)
Dept: RHEUMATOLOGY | Facility: CLINIC | Age: 62
End: 2022-12-07
Payer: MEDICARE

## 2022-12-08 ENCOUNTER — PATIENT MESSAGE (OUTPATIENT)
Dept: FAMILY MEDICINE | Facility: CLINIC | Age: 62
End: 2022-12-08
Payer: MEDICARE

## 2022-12-28 ENCOUNTER — OFFICE VISIT (OUTPATIENT)
Dept: FAMILY MEDICINE | Facility: CLINIC | Age: 62
End: 2022-12-28
Payer: MEDICARE

## 2022-12-28 ENCOUNTER — PATIENT MESSAGE (OUTPATIENT)
Dept: RHEUMATOLOGY | Facility: CLINIC | Age: 62
End: 2022-12-28
Payer: MEDICARE

## 2022-12-28 VITALS
DIASTOLIC BLOOD PRESSURE: 72 MMHG | OXYGEN SATURATION: 97 % | HEART RATE: 77 BPM | SYSTOLIC BLOOD PRESSURE: 118 MMHG | BODY MASS INDEX: 31.13 KG/M2 | WEIGHT: 181.44 LBS

## 2022-12-28 DIAGNOSIS — D84.821 IMMUNOCOMPROMISED STATE DUE TO DRUG THERAPY: ICD-10-CM

## 2022-12-28 DIAGNOSIS — R73.03 PREDIABETES: ICD-10-CM

## 2022-12-28 DIAGNOSIS — Z00.01 ANNUAL VISIT FOR GENERAL ADULT MEDICAL EXAMINATION WITH ABNORMAL FINDINGS: Primary | ICD-10-CM

## 2022-12-28 DIAGNOSIS — M35.00 SJOGREN'S SYNDROME, WITH UNSPECIFIED ORGAN INVOLVEMENT: Primary | ICD-10-CM

## 2022-12-28 DIAGNOSIS — M34.1 CREST (CALCINOSIS, RAYNAUD'S PHENOMENON, ESOPHAGEAL DYSFUNCTION, SCLERODACTYLY, TELANGIECTASIA): ICD-10-CM

## 2022-12-28 DIAGNOSIS — E78.49 OTHER HYPERLIPIDEMIA: ICD-10-CM

## 2022-12-28 DIAGNOSIS — Z79.899 IMMUNOCOMPROMISED STATE DUE TO DRUG THERAPY: ICD-10-CM

## 2022-12-28 DIAGNOSIS — I10 ESSENTIAL HYPERTENSION: ICD-10-CM

## 2022-12-28 PROCEDURE — 3078F PR MOST RECENT DIASTOLIC BLOOD PRESSURE < 80 MM HG: ICD-10-PCS | Mod: HCNC,CPTII,S$GLB, | Performed by: FAMILY MEDICINE

## 2022-12-28 PROCEDURE — 99396 PREV VISIT EST AGE 40-64: CPT | Mod: HCNC,S$GLB,, | Performed by: FAMILY MEDICINE

## 2022-12-28 PROCEDURE — 1159F MED LIST DOCD IN RCRD: CPT | Mod: HCNC,CPTII,S$GLB, | Performed by: FAMILY MEDICINE

## 2022-12-28 PROCEDURE — 99999 PR PBB SHADOW E&M-EST. PATIENT-LVL IV: ICD-10-PCS | Mod: PBBFAC,HCNC,, | Performed by: FAMILY MEDICINE

## 2022-12-28 PROCEDURE — 99396 PR PREVENTIVE VISIT,EST,40-64: ICD-10-PCS | Mod: HCNC,S$GLB,, | Performed by: FAMILY MEDICINE

## 2022-12-28 PROCEDURE — 3008F BODY MASS INDEX DOCD: CPT | Mod: HCNC,CPTII,S$GLB, | Performed by: FAMILY MEDICINE

## 2022-12-28 PROCEDURE — 3074F PR MOST RECENT SYSTOLIC BLOOD PRESSURE < 130 MM HG: ICD-10-PCS | Mod: HCNC,CPTII,S$GLB, | Performed by: FAMILY MEDICINE

## 2022-12-28 PROCEDURE — 1159F PR MEDICATION LIST DOCUMENTED IN MEDICAL RECORD: ICD-10-PCS | Mod: HCNC,CPTII,S$GLB, | Performed by: FAMILY MEDICINE

## 2022-12-28 PROCEDURE — 99999 PR PBB SHADOW E&M-EST. PATIENT-LVL IV: CPT | Mod: PBBFAC,HCNC,, | Performed by: FAMILY MEDICINE

## 2022-12-28 PROCEDURE — 3074F SYST BP LT 130 MM HG: CPT | Mod: HCNC,CPTII,S$GLB, | Performed by: FAMILY MEDICINE

## 2022-12-28 PROCEDURE — 3078F DIAST BP <80 MM HG: CPT | Mod: HCNC,CPTII,S$GLB, | Performed by: FAMILY MEDICINE

## 2022-12-28 PROCEDURE — 3008F PR BODY MASS INDEX (BMI) DOCUMENTED: ICD-10-PCS | Mod: HCNC,CPTII,S$GLB, | Performed by: FAMILY MEDICINE

## 2022-12-28 RX ORDER — LOSARTAN POTASSIUM AND HYDROCHLOROTHIAZIDE 12.5; 1 MG/1; MG/1
1 TABLET ORAL DAILY
Qty: 90 TABLET | Refills: 3 | Status: SHIPPED | OUTPATIENT
Start: 2022-12-28 | End: 2023-06-28 | Stop reason: RX

## 2023-01-11 PROBLEM — I27.20 PHT (PULMONARY HYPERTENSION): Status: RESOLVED | Noted: 2017-04-12 | Resolved: 2023-01-11

## 2023-01-12 NOTE — PROGRESS NOTES
Assessment:       1. Annual visit for general adult medical examination with abnormal findings    2. Prediabetes    3. Other hyperlipidemia    4. Essential hypertension    5. Immunocompromised state due to drug therapy    6. CREST (calcinosis, Raynaud's phenomenon, esophageal dysfunction, sclerodactyly, telangiectasia)          Plan:       Annual visit for general adult medical examination with abnormal findings  -     Hemoglobin A1C; Future; Expected date: 12/28/2022    Prediabetes:  Stable  -     Hemoglobin A1C; Future; Expected date: 12/28/2022    Other hyperlipidemia:  Uncontrolled    Essential hypertension:  Stable  -     losartan-hydrochlorothiazide 100-12.5 mg (HYZAAR) 100-12.5 mg Tab; Take 1 tablet by mouth once daily.  Dispense: 90 tablet; Refill: 3    Immunocompromised state due to drug therapy:  Stable    CREST (calcinosis, Raynaud's phenomenon, esophageal dysfunction, sclerodactyly, telangiectasia):  Stable         Recommend the patient to eat healthy, drink more water, avoid refined sugars, follow-up with the rheumatologist as directed, will continue with blood pressure medication as directed.  The patient's BMI has been recorded in the chart. The patient has been provided educational materials regarding the benefits of attaining and maintaining a normal weight. We will continue to address and follow this issue during follow up visits.   Patient agreed with assessment and plan. Patient verbalized understanding.     Subjective:       Patient ID: Tina García is a 62 y.o. female.    Chief Complaint: Annual Exam    HPI    The patient here today for an annual examination, the patient currently seen the rheumatologist secondary to crest syndrome, the patient also was referred to see the GI specialist secondary to esophageal dysmotility.  The patient has hypertension, she will need refills for losartan HCTZ, denies any side effects of the medication.  The patient also has hyperlipidemia, the  LDL cholesterol levels were elevated, the patient also has prediabetes, the patient was prescribed Ozempic.  Denies symptoms of chest pain,   Worsening symptoms of shortness of breath, fever and chills at this office visit.    Past medical history, past social history was reviewed and discussed with the patient.    Review of Systems   Constitutional:  Positive for activity change, appetite change and fatigue. Negative for chills.   HENT:  Negative for congestion and ear discharge.    Eyes:  Negative for discharge and itching.   Respiratory:  Negative for choking and chest tightness.    Cardiovascular:  Negative for chest pain and leg swelling.   Gastrointestinal:  Positive for abdominal distention, abdominal pain and constipation.   Endocrine: Negative for cold intolerance and heat intolerance.   Genitourinary:  Negative for dysuria and flank pain.   Musculoskeletal:  Positive for arthralgias, back pain and joint swelling.   Skin:  Negative for pallor and rash.   Allergic/Immunologic: Negative for environmental allergies and food allergies.   Neurological:  Negative for dizziness, facial asymmetry and headaches.   Hematological:  Negative for adenopathy. Does not bruise/bleed easily.   Psychiatric/Behavioral:  Positive for dysphoric mood and sleep disturbance. Negative for agitation and confusion.      Objective:      Physical Exam  Vitals and nursing note reviewed.   Constitutional:       General: She is not in acute distress.     Appearance: Normal appearance. She is well-developed. She is obese. She is not diaphoretic.   HENT:      Head: Normocephalic and atraumatic.      Right Ear: External ear normal.      Left Ear: External ear normal.      Nose: Nose normal.   Eyes:      General: No scleral icterus.        Right eye: No discharge.         Left eye: No discharge.      Conjunctiva/sclera: Conjunctivae normal.      Pupils: Pupils are equal, round, and reactive to light.   Cardiovascular:      Rate and Rhythm:  Normal rate and regular rhythm.      Heart sounds: Normal heart sounds.   Pulmonary:      Effort: Pulmonary effort is normal. No respiratory distress.      Breath sounds: Normal breath sounds. No wheezing.   Abdominal:      General: Abdomen is flat. Bowel sounds are normal. There is no distension.      Palpations: There is no mass.      Tenderness: There is no abdominal tenderness.   Musculoskeletal:         General: No tenderness or deformity.      Cervical back: Neck supple.   Skin:     Coloration: Skin is not pale.      Findings: No erythema.   Neurological:      Mental Status: She is alert.   Psychiatric:         Behavior: Behavior normal.         Thought Content: Thought content normal.         Judgment: Judgment normal.

## 2023-01-18 ENCOUNTER — LAB VISIT (OUTPATIENT)
Dept: LAB | Facility: HOSPITAL | Age: 63
End: 2023-01-18
Attending: INTERNAL MEDICINE
Payer: MEDICARE

## 2023-01-18 DIAGNOSIS — R79.9 ABNORMAL FINDING OF BLOOD CHEMISTRY, UNSPECIFIED: ICD-10-CM

## 2023-01-18 DIAGNOSIS — Z00.01 ANNUAL VISIT FOR GENERAL ADULT MEDICAL EXAMINATION WITH ABNORMAL FINDINGS: ICD-10-CM

## 2023-01-18 DIAGNOSIS — M35.00 SJOGREN'S SYNDROME, WITH UNSPECIFIED ORGAN INVOLVEMENT: ICD-10-CM

## 2023-01-18 DIAGNOSIS — R73.03 PREDIABETES: ICD-10-CM

## 2023-01-18 DIAGNOSIS — E06.3 HASHIMOTO'S THYROIDITIS: ICD-10-CM

## 2023-01-18 LAB
ALBUMIN SERPL BCP-MCNC: 3.9 G/DL (ref 3.5–5.2)
ALP SERPL-CCNC: 49 U/L (ref 55–135)
ALT SERPL W/O P-5'-P-CCNC: 23 U/L (ref 10–44)
ANION GAP SERPL CALC-SCNC: 9 MMOL/L (ref 8–16)
AST SERPL-CCNC: 18 U/L (ref 10–40)
BASOPHILS # BLD AUTO: 0.06 K/UL (ref 0–0.2)
BASOPHILS NFR BLD: 1.1 % (ref 0–1.9)
BILIRUB SERPL-MCNC: 0.2 MG/DL (ref 0.1–1)
BUN SERPL-MCNC: 14 MG/DL (ref 6–20)
CALCIUM SERPL-MCNC: 9.5 MG/DL (ref 8.7–10.5)
CHLORIDE SERPL-SCNC: 105 MMOL/L (ref 95–110)
CO2 SERPL-SCNC: 24 MMOL/L (ref 23–29)
CREAT SERPL-MCNC: 0.8 MG/DL (ref 0.5–1.4)
CRP SERPL-MCNC: 0.9 MG/L (ref 0–8.2)
DIFFERENTIAL METHOD: ABNORMAL
EOSINOPHIL # BLD AUTO: 0.2 K/UL (ref 0–0.5)
EOSINOPHIL NFR BLD: 3 % (ref 0–8)
ERYTHROCYTE [DISTWIDTH] IN BLOOD BY AUTOMATED COUNT: 13.3 % (ref 11.5–14.5)
ERYTHROCYTE [SEDIMENTATION RATE] IN BLOOD BY PHOTOMETRIC METHOD: 4 MM/HR (ref 0–36)
EST. GFR  (NO RACE VARIABLE): >60 ML/MIN/1.73 M^2
ESTIMATED AVG GLUCOSE: 114 MG/DL (ref 68–131)
GLUCOSE SERPL-MCNC: 74 MG/DL (ref 70–110)
HBA1C MFR BLD: 5.6 % (ref 4–5.6)
HCT VFR BLD AUTO: 33.7 % (ref 37–48.5)
HGB BLD-MCNC: 10.9 G/DL (ref 12–16)
IMM GRANULOCYTES # BLD AUTO: 0.01 K/UL (ref 0–0.04)
IMM GRANULOCYTES NFR BLD AUTO: 0.2 % (ref 0–0.5)
IRON SERPL-MCNC: 66 UG/DL (ref 30–160)
LYMPHOCYTES # BLD AUTO: 2.3 K/UL (ref 1–4.8)
LYMPHOCYTES NFR BLD: 41.3 % (ref 18–48)
MCH RBC QN AUTO: 29.1 PG (ref 27–31)
MCHC RBC AUTO-ENTMCNC: 32.3 G/DL (ref 32–36)
MCV RBC AUTO: 90 FL (ref 82–98)
MONOCYTES # BLD AUTO: 0.5 K/UL (ref 0.3–1)
MONOCYTES NFR BLD: 9.3 % (ref 4–15)
NEUTROPHILS # BLD AUTO: 2.5 K/UL (ref 1.8–7.7)
NEUTROPHILS NFR BLD: 45.1 % (ref 38–73)
NRBC BLD-RTO: 0 /100 WBC
PLATELET # BLD AUTO: 313 K/UL (ref 150–450)
PMV BLD AUTO: 10.4 FL (ref 9.2–12.9)
POTASSIUM SERPL-SCNC: 3.8 MMOL/L (ref 3.5–5.1)
PROT SERPL-MCNC: 7 G/DL (ref 6–8.4)
RBC # BLD AUTO: 3.74 M/UL (ref 4–5.4)
SATURATED IRON: 18 % (ref 20–50)
SODIUM SERPL-SCNC: 138 MMOL/L (ref 136–145)
T3 SERPL-MCNC: 82 NG/DL (ref 60–180)
T4 FREE SERPL-MCNC: 0.78 NG/DL (ref 0.71–1.51)
TOTAL IRON BINDING CAPACITY: 361 UG/DL (ref 250–450)
TRANSFERRIN SERPL-MCNC: 244 MG/DL (ref 200–375)
TSH SERPL DL<=0.005 MIU/L-ACNC: 6.23 UIU/ML (ref 0.4–4)
WBC # BLD AUTO: 5.62 K/UL (ref 3.9–12.7)

## 2023-01-18 PROCEDURE — 85025 COMPLETE CBC W/AUTO DIFF WBC: CPT | Mod: HCNC | Performed by: PHYSICIAN ASSISTANT

## 2023-01-18 PROCEDURE — 83036 HEMOGLOBIN GLYCOSYLATED A1C: CPT | Mod: HCNC | Performed by: FAMILY MEDICINE

## 2023-01-18 PROCEDURE — 84466 ASSAY OF TRANSFERRIN: CPT | Mod: HCNC | Performed by: INTERNAL MEDICINE

## 2023-01-18 PROCEDURE — 36415 COLL VENOUS BLD VENIPUNCTURE: CPT | Mod: HCNC,PO | Performed by: INTERNAL MEDICINE

## 2023-01-18 PROCEDURE — 84443 ASSAY THYROID STIM HORMONE: CPT | Mod: HCNC | Performed by: INTERNAL MEDICINE

## 2023-01-18 PROCEDURE — 84480 ASSAY TRIIODOTHYRONINE (T3): CPT | Mod: HCNC | Performed by: INTERNAL MEDICINE

## 2023-01-18 PROCEDURE — 85652 RBC SED RATE AUTOMATED: CPT | Mod: HCNC | Performed by: PHYSICIAN ASSISTANT

## 2023-01-18 PROCEDURE — 86140 C-REACTIVE PROTEIN: CPT | Mod: HCNC | Performed by: PHYSICIAN ASSISTANT

## 2023-01-18 PROCEDURE — 80053 COMPREHEN METABOLIC PANEL: CPT | Mod: HCNC | Performed by: PHYSICIAN ASSISTANT

## 2023-01-18 PROCEDURE — 84439 ASSAY OF FREE THYROXINE: CPT | Mod: HCNC | Performed by: INTERNAL MEDICINE

## 2023-01-19 ENCOUNTER — TELEPHONE (OUTPATIENT)
Dept: RHEUMATOLOGY | Facility: CLINIC | Age: 63
End: 2023-01-19

## 2023-01-19 NOTE — TELEPHONE ENCOUNTER
Health Maintenance Due   Topic Date Due   • COVID-19 Vaccine (1) Never done       Patient is due for topics as listed above but is not proceeding with Immunization(s) COVID-19 at this time.     Recent PHQ 2/9 Score    PHQ 2:  Date Adult PHQ 2 Score Adult PHQ 2 Interpretation   8/10/2021 2 No further screening needed       PHQ 9:            Spoke to pt and adv of Dr Fry's previous message. Pt stated she stopped her thyroid meds 3 wks ago r/t hair loss and is waiting for iron sup to come in. .

## 2023-01-19 NOTE — TELEPHONE ENCOUNTER
----- Message from Joao Fry MD sent at 1/18/2023 11:30 PM CST -----  Your thyroid level is slightly low showing hypothyroid were going to have to adjust your medication at your visit on the 23rd you do have low iron as well

## 2023-01-26 ENCOUNTER — PATIENT MESSAGE (OUTPATIENT)
Dept: RHEUMATOLOGY | Facility: CLINIC | Age: 63
End: 2023-01-26
Payer: MEDICARE

## 2023-01-26 ENCOUNTER — OFFICE VISIT (OUTPATIENT)
Dept: RHEUMATOLOGY | Facility: CLINIC | Age: 63
End: 2023-01-26
Payer: MEDICARE

## 2023-01-26 DIAGNOSIS — J40 BRONCHITIS: ICD-10-CM

## 2023-01-26 DIAGNOSIS — M34.1 CREST (CALCINOSIS, RAYNAUD'S PHENOMENON, ESOPHAGEAL DYSFUNCTION, SCLERODACTYLY, TELANGIECTASIA): Primary | ICD-10-CM

## 2023-01-26 DIAGNOSIS — M79.7 FIBROMYALGIA: ICD-10-CM

## 2023-01-26 DIAGNOSIS — E06.3 HASHIMOTO'S THYROIDITIS: ICD-10-CM

## 2023-01-26 DIAGNOSIS — Z79.899 HIGH RISK MEDICATION USE: ICD-10-CM

## 2023-01-26 PROCEDURE — 3044F HG A1C LEVEL LT 7.0%: CPT | Mod: HCNC,CPTII,95, | Performed by: PHYSICIAN ASSISTANT

## 2023-01-26 PROCEDURE — 99214 PR OFFICE/OUTPT VISIT, EST, LEVL IV, 30-39 MIN: ICD-10-PCS | Mod: HCNC,95,, | Performed by: PHYSICIAN ASSISTANT

## 2023-01-26 PROCEDURE — 3044F PR MOST RECENT HEMOGLOBIN A1C LEVEL <7.0%: ICD-10-PCS | Mod: HCNC,CPTII,95, | Performed by: PHYSICIAN ASSISTANT

## 2023-01-26 PROCEDURE — 99214 OFFICE O/P EST MOD 30 MIN: CPT | Mod: HCNC,95,, | Performed by: PHYSICIAN ASSISTANT

## 2023-01-26 PROCEDURE — 1159F PR MEDICATION LIST DOCUMENTED IN MEDICAL RECORD: ICD-10-PCS | Mod: HCNC,CPTII,95, | Performed by: PHYSICIAN ASSISTANT

## 2023-01-26 PROCEDURE — 1159F MED LIST DOCD IN RCRD: CPT | Mod: HCNC,CPTII,95, | Performed by: PHYSICIAN ASSISTANT

## 2023-01-26 PROCEDURE — 1160F RVW MEDS BY RX/DR IN RCRD: CPT | Mod: HCNC,CPTII,95, | Performed by: PHYSICIAN ASSISTANT

## 2023-01-26 PROCEDURE — 1160F PR REVIEW ALL MEDS BY PRESCRIBER/CLIN PHARMACIST DOCUMENTED: ICD-10-PCS | Mod: HCNC,CPTII,95, | Performed by: PHYSICIAN ASSISTANT

## 2023-01-26 RX ORDER — LEVOTHYROXINE SODIUM 25 UG/1
25 TABLET ORAL
Qty: 30 TABLET | Refills: 5 | Status: SHIPPED | OUTPATIENT
Start: 2023-01-26 | End: 2023-06-29 | Stop reason: DRUGHIGH

## 2023-01-26 RX ORDER — PROMETHAZINE HYDROCHLORIDE AND DEXTROMETHORPHAN HYDROBROMIDE 6.25; 15 MG/5ML; MG/5ML
5 SYRUP ORAL EVERY 6 HOURS PRN
Qty: 180 ML | Refills: 0 | Status: SHIPPED | OUTPATIENT
Start: 2023-01-26 | End: 2023-02-05

## 2023-01-26 RX ORDER — AZITHROMYCIN 250 MG/1
TABLET, FILM COATED ORAL
Qty: 6 TABLET | Refills: 0 | Status: SHIPPED | OUTPATIENT
Start: 2023-01-26 | End: 2023-05-10

## 2023-01-26 NOTE — PROGRESS NOTES
The patient location is: home  The chief complaint leading to consultation is: CREST    Visit type: audiovisual    Face to Face time with patient: 25 minutes  30 minutes of total time spent on the encounter, which includes face to face time and non-face to face time preparing to see the patient (eg, review of tests), Obtaining and/or reviewing separately obtained history, Documenting clinical information in the electronic or other health record, Independently interpreting results (not separately reported) and communicating results to the patient/family/caregiver, or Care coordination (not separately reported).   Each patient to whom he or she provides medical services by telemedicine is:  (1) informed of the relationship between the physician and patient and the respective role of any other health care provider with respect to management of the patient; and (2) notified that he or she may decline to receive medical services by telemedicine and may withdraw from such care at any time.    Notes:     Subjective:       Patient ID: Tina García is a 62 y.o. female.    Chief Complaint: Disease Management    Mrs. García is a 62 year old female who presents to telemedicine virtual visit for follow up on CREST syndrome.      GI: Anti reflux surgery was being considered but the patient was found to have aperistalsis of the esophagus on esophageal manometry.  PPI was changed to nexium. If they are unable to control her symptoms then will consider foregut surgeons to discuss anti reflux surgery.  Recommend EGD with [facility] to prove that she has GERD prior to sending to surgery as she has had only LA grade A or B esophagitis on endoscopy in the past. She has been unable to completed EGD w/[facility] since she can not hold PPI for 96 hours due to severity of her symptoms.    She stopped NP thyroid as she thought this was contributing to her hair loss.    She is off imuran since she had covid infection in December 2021.      Raynauds is stable at this time with conservative measures. She has not taken revatio recently. PCP decreased hctz/losartan dose recently due to low blood pressure. She has lost 15 -20 lbs since starting Ozempic in September and blood sugar has improved A1c 5.6%.     She has trigger finger of the 3rd digit on both hands, which is new. Elbow tendonitis resolved w/conservative measures. She reports increased puffiness and stiffness in her hands, which seems to respond to voltaren gel. She reports increased low back pain and myalgias since she moved recently and did a lot of heavy lifting. She relocated to [place]. She is avoiding tramadol due to constipation.     We reviewed her recent labs.    She also complains of productive cough, chest congestion, wheezing x 5 days. Recent exposure to sick grandchild. Temp today 99.3 at home.     Current tx:  1. plaquenil  2. revatio PRN  3. Flexeril PRN    Review of Systems   Constitutional:  Positive for unexpected weight change. Negative for activity change, appetite change, chills, fatigue and fever.   HENT:  Negative for mouth sores and trouble swallowing.    Eyes:  Negative for redness and visual disturbance.   Respiratory:  Positive for cough, shortness of breath and wheezing.    Cardiovascular:  Negative for chest pain, palpitations and leg swelling.   Gastrointestinal:  Positive for constipation. Negative for abdominal pain, diarrhea, nausea and vomiting.        Reflux sx   Genitourinary:  Negative for dysuria and genital sores.   Musculoskeletal:  Positive for arthralgias, back pain, gait problem and myalgias. Negative for joint swelling.   Skin:  Negative for rash.   Allergic/Immunologic: Negative for immunocompromised state.   Neurological:  Negative for dizziness, weakness, light-headedness and headaches.   Hematological:  Does not bruise/bleed easily.       Objective:     There were no vitals filed for this visit.    Past Medical History:   Diagnosis Date    Anxiety      AR (allergic rhinitis)     Arthritis     rheumatoid?    Chronic constipation     Colon polyp     CREST syndrome     Depression     Dry eyes     Dry mouth     Esophageal dysmotility     Fibrocystic breast     Fibromyalgia     Food intolerance     GERD (gastroesophageal reflux disease)     HTN (hypertension)     Irritable bowel syndrome     Raynaud disease     Right wrist fracture     nov 2013    SOB (shortness of breath) on exertion     Thyroid disease     hypothyroid, thyroid cyst on U/S    Ulcer     Vitamin D deficiency disease      Past Surgical History:   Procedure Laterality Date    COLONOSCOPY  7/2012    one polyp removed, biospy=TUBULOVILLOUS ADENOMA, otherwise normal findings, repeat in 4-5 years for surveillance    COLONOSCOPY N/A 4/17/2019    Procedure: COLONOSCOPY;  Surgeon: Rich Kingsley Jr., MD;  Location: [facility];  Service: Endoscopy;  Laterality: N/A;    COLONOSCOPY W/ POLYPECTOMY  7/24/2012  [facility]    4-5 mm sigmoid polyp.    CYST REMOVAL      pilonidal    DILATION AND CURETTAGE OF UTERUS      ESOPHAGEAL MANOMETRY WITH MEASUREMENT OF IMPEDANCE N/A 2/4/2022    Procedure: MANOMETRY, ESOPHAGUS, WITH IMPEDANCE MEASUREMENT;  Surgeon: Kami Cobos MD;  Location: [address]);  Service: Endoscopy;  Laterality: N/A;  COVID test on 2/1 at [facility]    ESOPHAGOGASTRODUODENOSCOPY N/A 4/17/2019    Procedure: EGD (ESOPHAGOGASTRODUODENOSCOPY);  Surgeon: Rich Kingsley Jr., MD;  Location: [facility];  Service: Endoscopy;  Laterality: N/A;    ESOPHAGOGASTRODUODENOSCOPY N/A 12/3/2021    Procedure: EGD (ESOPHAGOGASTRODUODENOSCOPY);  Surgeon: Rich Kingsley Jr., MD;  Location: [facility];  Service: Endoscopy;  Laterality: N/A;    GANGLION CYST EXCISION      right thumb    HYSTERECTOMY  2008    complete    TOTAL ABDOMINAL HYSTERECTOMY W/ BILATERAL SALPINGOOPHORECTOMY  2008    w/Hysterectomy    UPPER GASTROINTESTINAL ENDOSCOPY  12/23/2011  [facility]    Fluid in the lower third of the esophagus   (stasis).  The esophageal examination was compatible with scleroderma.  (Dilataion done, 51 Fr.)    LA Grade A reflux esophagitis.   Enlarged gastric folds.   Multiple gastric polyps.   Antritis (erythematous streaks).  JOELLEN Test  Negative.          Physical Exam   Constitutional: She is oriented to person, place, and time.   Neurological: She is alert and oriented to person, place, and time.     Recent labs:  Component      Latest Ref Rng & Units 1/18/2023   WBC      3.90 - 12.70 K/uL 5.62   RBC      4.00 - 5.40 M/uL 3.74 (L)   Hemoglobin      12.0 - 16.0 g/dL 10.9 (L)   Hematocrit      37.0 - 48.5 % 33.7 (L)   MCV      82 - 98 fL 90   MCH      27.0 - 31.0 pg 29.1   MCHC      32.0 - 36.0 g/dL 32.3   RDW      11.5 - 14.5 % 13.3   Platelets      150 - 450 K/uL 313   MPV      9.2 - 12.9 fL 10.4   Immature Granulocytes      0.0 - 0.5 % 0.2   Gran # (ANC)      1.8 - 7.7 K/uL 2.5   Immature Grans (Abs)      0.00 - 0.04 K/uL 0.01   Lymph #      1.0 - 4.8 K/uL 2.3   Mono #      0.3 - 1.0 K/uL 0.5   Eos #      0.0 - 0.5 K/uL 0.2   Baso #      0.00 - 0.20 K/uL 0.06   nRBC      0 /100 WBC 0   Gran %      38.0 - 73.0 % 45.1   Lymph %      18.0 - 48.0 % 41.3   Mono %      4.0 - 15.0 % 9.3   Eosinophil %      0.0 - 8.0 % 3.0   Basophil %      0.0 - 1.9 % 1.1   Differential Method       Automated   Sodium      136 - 145 mmol/L 138   Potassium      3.5 - 5.1 mmol/L 3.8   Chloride      95 - 110 mmol/L 105   CO2      23 - 29 mmol/L 24   Glucose      70 - 110 mg/dL 74   BUN      6 - 20 mg/dL 14   Creatinine      0.5 - 1.4 mg/dL 0.8   Calcium      8.7 - 10.5 mg/dL 9.5   PROTEIN TOTAL      6.0 - 8.4 g/dL 7.0   Albumin      3.5 - 5.2 g/dL 3.9   BILIRUBIN TOTAL      0.1 - 1.0 mg/dL 0.2   Alkaline Phosphatase      55 - 135 U/L 49 (L)   AST      10 - 40 U/L 18   ALT      10 - 44 U/L 23   Anion Gap      8 - 16 mmol/L 9   eGFR      >60 mL/min/1.73 m:2 >60.0   Iron      30 - 160 ug/dL 66   Transferrin      200 - 375 mg/dL 244   TIBC      250 -  450 ug/dL 361   Saturated Iron      20 - 50 % 18 (L)   Hemoglobin A1C External      4.0 - 5.6 % 5.6   Estimated Avg Glucose      68 - 131 mg/dL 114   TSH      0.400 - 4.000 uIU/mL 6.226 (H)   Free T4      0.71 - 1.51 ng/dL 0.78   T3, Total      60 - 180 ng/dL 82   Sed Rate      0 - 36 mm/Hr 4   CRP      0.0 - 8.2 mg/L 0.9       Assessment:       1. CREST (calcinosis, Raynaud's phenomenon, esophageal dysfunction, sclerodactyly, telangiectasia)    2. Hashimoto's thyroiditis    3. Bronchitis    4. Fibromyalgia    5. High risk medication use              Plan:       CREST (calcinosis, Raynaud's phenomenon, esophageal dysfunction, sclerodactyly, telangiectasia)  -     CBC Auto Differential; Future; Expected date: 01/26/2023  -     Comprehensive Metabolic Panel; Future; Expected date: 01/26/2023  -     C-Reactive Protein; Future; Expected date: 01/26/2023  -     Sedimentation rate; Future; Expected date: 01/26/2023    Hashimoto's thyroiditis  -     levothyroxine (SYNTHROID) 25 MCG tablet; Take 1 tablet (25 mcg total) by mouth before breakfast.  Dispense: 30 tablet; Refill: 5  -     TSH; Future; Expected date: 01/26/2023  -     T4, Free; Future; Expected date: 01/26/2023    Bronchitis  -     azithromycin (Z-NATALIE) 250 MG tablet; Take 2 tablets by mouth on day 1; Take 1 tablet by mouth on days 2-5  Dispense: 6 tablet; Refill: 0  -     promethazine-dextromethorphan (PROMETHAZINE-DM) 6.25-15 mg/5 mL Syrp; Take 5 mLs by mouth every 6 (six) hours as needed (cough).  Dispense: 180 mL; Refill: 0    Fibromyalgia    High risk medication use          Assessment:  62 year old female with  CREST syndrome (raynauds on revatio PRN, esophageal dysfunction/GERD on ppi/zantac, sclerodactyly hands), Sjogren's syndrome   --chronic pain syndrome on tramadol  --hx of Hashimoto's thyroiditis on NP thyroid  --hx of pulmonary HTN  --new onset anemia    Plan:  1. Cont. Plaquenil bid, revatio prn  2. Cont. Tramadol PRN  and flexeril prn severe pain  per Dr. Fry. I have  check [place] prescription monitoring program site and no unusual or abnormal behavior has occurred pt understand the risk and benefits of taking opioid medications and has decided to continue the  medication   3. Start levothyroxine 25 mcg daily. Repeat labs in 3-4 months  4. Azithromycin. Prometh/DM cough syrup  5. Cont ozempic 0.5 mg weekly  6. Follow up with GI for EGD  7. Ice and splinting for trigger fingers, if no improvement then referral to [facility]    Follow up:  3 months Dr. Fry w/labs prior

## 2023-02-09 DIAGNOSIS — Z00.00 ENCOUNTER FOR MEDICARE ANNUAL WELLNESS EXAM: ICD-10-CM

## 2023-02-22 ENCOUNTER — PATIENT MESSAGE (OUTPATIENT)
Dept: RHEUMATOLOGY | Facility: CLINIC | Age: 63
End: 2023-02-22
Payer: MEDICARE

## 2023-02-22 DIAGNOSIS — E09.638: ICD-10-CM

## 2023-02-26 ENCOUNTER — PATIENT MESSAGE (OUTPATIENT)
Dept: RHEUMATOLOGY | Facility: CLINIC | Age: 63
End: 2023-02-26
Payer: MEDICARE

## 2023-02-26 RX ORDER — SEMAGLUTIDE 1.34 MG/ML
1 INJECTION, SOLUTION SUBCUTANEOUS
Qty: 1 PEN | Refills: 11 | Status: SHIPPED | OUTPATIENT
Start: 2023-02-26 | End: 2023-05-10

## 2023-02-27 DIAGNOSIS — B02.9 HERPES ZOSTER WITHOUT COMPLICATION: ICD-10-CM

## 2023-02-27 DIAGNOSIS — M35.00 SJOGREN'S SYNDROME, WITH UNSPECIFIED ORGAN INVOLVEMENT: ICD-10-CM

## 2023-02-27 RX ORDER — VALACYCLOVIR HYDROCHLORIDE 500 MG/1
TABLET, FILM COATED ORAL
Qty: 180 TABLET | Refills: 3 | Status: SHIPPED | OUTPATIENT
Start: 2023-02-27 | End: 2024-03-22

## 2023-02-27 NOTE — TELEPHONE ENCOUNTER
No new care gaps identified.  [facility] Embedded Care Gaps. Reference number: [id number]. 2/27/2023   12:05:50 PM CST

## 2023-02-28 DIAGNOSIS — I10 ESSENTIAL HYPERTENSION: ICD-10-CM

## 2023-02-28 RX ORDER — LOSARTAN POTASSIUM AND HYDROCHLOROTHIAZIDE 25; 100 MG/1; MG/1
TABLET ORAL
Qty: 30 TABLET | Refills: 0 | OUTPATIENT
Start: 2023-02-28

## 2023-02-28 NOTE — TELEPHONE ENCOUNTER
Quick DC. Inappropriate Request    Refill Authorization Note   Tina García  is requesting a refill authorization.  Brief Assessment and Rationale for Refill:  Quick Discontinue  Medication Therapy Plan:  Dose adjustment 12/28/22    Medication Reconciliation Completed:  No      Comments:     Note composed:2:00 PM 02/28/2023

## 2023-02-28 NOTE — TELEPHONE ENCOUNTER
No new care gaps identified.  [facility] Embedded Care Gaps. Reference number: [id number]. 2/28/2023   1:59:50 PM CST

## 2023-02-28 NOTE — TELEPHONE ENCOUNTER
Refill Decision Note   Tina Raquel  is requesting a refill authorization.  Brief Assessment and Rationale for Refill:  Approve     Medication Therapy Plan:       Medication Reconciliation Completed: No   Comments:     No Care Gaps recommended.     Note composed:9:52 PM 02/27/2023

## 2023-04-20 ENCOUNTER — TELEPHONE (OUTPATIENT)
Dept: OPTOMETRY | Facility: CLINIC | Age: 63
End: 2023-04-20
Payer: MEDICARE

## 2023-04-20 NOTE — TELEPHONE ENCOUNTER
----- Message from Macy Gaurav sent at 4/20/2023  4:46 PM CDT -----  Contact: pt  Type:  Needs Medical Advice    Who Called: pt  Would the patient rather a call back or a response via [facility]? call  Best Call Back Number: [phone] (home)     Additional Information: States she need a callback as soon as possible. States that she need clarification on her appts. States she has 2 appts scheduled for the same day but 2 different times. Please advise thank you

## 2023-04-25 ENCOUNTER — OFFICE VISIT (OUTPATIENT)
Dept: OPTOMETRY | Facility: CLINIC | Age: 63
End: 2023-04-25
Payer: MEDICARE

## 2023-04-25 DIAGNOSIS — H52.03 HYPEROPIA WITH ASTIGMATISM AND PRESBYOPIA, BILATERAL: ICD-10-CM

## 2023-04-25 DIAGNOSIS — H52.203 HYPEROPIA WITH ASTIGMATISM AND PRESBYOPIA, BILATERAL: ICD-10-CM

## 2023-04-25 DIAGNOSIS — M35.00 SJOGREN'S SYNDROME, WITH UNSPECIFIED ORGAN INVOLVEMENT: ICD-10-CM

## 2023-04-25 DIAGNOSIS — Z79.899 LONG-TERM USE OF PLAQUENIL: ICD-10-CM

## 2023-04-25 DIAGNOSIS — Z13.5 GLAUCOMA SCREENING: ICD-10-CM

## 2023-04-25 DIAGNOSIS — H40.033 ANATOMICAL NARROW ANGLE OF BOTH EYES: ICD-10-CM

## 2023-04-25 DIAGNOSIS — H52.4 HYPEROPIA WITH ASTIGMATISM AND PRESBYOPIA, BILATERAL: ICD-10-CM

## 2023-04-25 DIAGNOSIS — R73.03 PRE-DIABETES: Primary | ICD-10-CM

## 2023-04-25 DIAGNOSIS — H43.393 VITREOUS FLOATERS, BILATERAL: ICD-10-CM

## 2023-04-25 DIAGNOSIS — H25.13 NUCLEAR SCLEROSIS, BILATERAL: ICD-10-CM

## 2023-04-25 PROCEDURE — 92004 COMPRE OPH EXAM NEW PT 1/>: CPT | Mod: HCNC,S$GLB,, | Performed by: OPTOMETRIST

## 2023-04-25 PROCEDURE — 1159F PR MEDICATION LIST DOCUMENTED IN MEDICAL RECORD: ICD-10-PCS | Mod: HCNC,CPTII,S$GLB, | Performed by: OPTOMETRIST

## 2023-04-25 PROCEDURE — 92004 PR EYE EXAM, NEW PATIENT,COMPREHESV: ICD-10-PCS | Mod: HCNC,S$GLB,, | Performed by: OPTOMETRIST

## 2023-04-25 PROCEDURE — 3044F PR MOST RECENT HEMOGLOBIN A1C LEVEL <7.0%: ICD-10-PCS | Mod: HCNC,CPTII,S$GLB, | Performed by: OPTOMETRIST

## 2023-04-25 PROCEDURE — 1159F MED LIST DOCD IN RCRD: CPT | Mod: HCNC,CPTII,S$GLB, | Performed by: OPTOMETRIST

## 2023-04-25 PROCEDURE — 3044F HG A1C LEVEL LT 7.0%: CPT | Mod: HCNC,CPTII,S$GLB, | Performed by: OPTOMETRIST

## 2023-04-25 PROCEDURE — 99999 PR PBB SHADOW E&M-EST. PATIENT-LVL III: ICD-10-PCS | Mod: PBBFAC,HCNC,, | Performed by: OPTOMETRIST

## 2023-04-25 PROCEDURE — 99999 PR PBB SHADOW E&M-EST. PATIENT-LVL III: CPT | Mod: PBBFAC,HCNC,, | Performed by: OPTOMETRIST

## 2023-04-25 NOTE — PATIENT INSTRUCTIONS
"DRY EYES -- BURNING OR LANCE SYMPTOMS:  Use Over The Counter artificial tears as needed for dry eye symptoms.   Some common brands include:  Systane, Optive, Refresh, and Thera-Tears.  These drops can be used as frequently as desired, but may be most helpful use during long periods of concentrated work.  For example, reading / working at the computer. Start with 3-4x per day.     Nighttime Ophthalmic gel or ointments are available: Refresh PM, Genteal, and Lacrilube.    Avoid drops that "get redness out" (Visine, Murine, Clear Eyes), as these may contain medication that could further irritate the eyes, especially with chronic use.    ALLERGY EYES -- ITCHING SYMPTOMS:  Over the counter medications include--Pataday, Zaditor, and Alaway.  Use as directed 1-2 drops daily for symptoms of itching / watering eyes.  These drops will not help for dry eye or exposure symptoms.    REDNESS RELIEF:  Lumify---is a good redness reliever that will not cause irritation if used chronically.        FLASHES / FLOATERS / POSTERIOR VITREOUS DETACHMENT    Call the clinic if you have any further changes in symptoms.  Including:  Increased numbers of floaters or flashing lights, dimness or darkness that moves through or stays constant in your vision, or any pain in the eye (s).    You may sometimes see small specks or clouds moving in your field of vision.  They are called FLOATERS.  You can often see them when looking at a plain background, like a blank wall or blue perlita.  Floaters are actually tiny clumps of gel or cells inside the VITREOUS, the clear jelly-like fluid that fills the inside of your eye.    While these objects look like they are in front of your eye, they are actually floating inside.  What you see are the shadows they cast on the RETINA, the nerve layer at the back of the eye that senses light and allows you to see.      POSTERIOR VITREOUS DETACHMENT    The appearance of new floaters may be alarming.  If you suddenly " develop new floaters, you should contact your eye care professional  right away.    The retina can tear if the shrinking vitreous pulls away from the wall of the eye.  This sometimes causes a small amount of bleeding in the eye that may appear as new floaters.    A torn retina is always a serious problem, since it can lead to a retinal detachment.  You should see your eye care professional as soon as possible if:    even one new floater appears suddenly;  you see sudden flashes of light;  you notice other symptoms, like the loss of side vision, or a curtain closes down in your vision        POSTERIOR VITREOUS DETACHMENT is more common for people who:    are nearsighted;  have had cataract surgery;  have had YAG laser surgery of the eye;  have had inflammation inside the eye;  are over age 60.      While some floaters may remain visible, many of them will fade over time and become less noticeable.  Even if you've had some floaters for years, you should have your eyes checked as soon as possible if you notice new ones.    FLASHING LIGHTS    When the vitreous gel rubs or pulls on the retina, you may see what look like flashing lights or lightning streaks.  These flashes can appear off and on for several weeks or months.      Some people experience flashes of light that appear as jagged lines or heat waves in both eyes, lasting 10-20 minutes.  These flashes are caused by a spasm of blood vessels in the brain, which is called a migraine.    If a headache follows these flashes, it's called a migraine headache.  If   no headache occurs, these flashes are called Ophthalmic or Ocular Migraine.           Using the Amsler Grid  If you are at risk for vision loss, you may be told to check your eyesight regularly using the Amsler grid. Below is the grid and instructions for using it.         The Amsler grid helps you track changes in your vision.    How to Use the Amsler Grid  Use the grid in a well-lighted area.  Wear  glasses or contact lenses if you usually wear them.  Hold the grid at your normal reading distance (about 16 inches).  Cover your left eye.  With your right eye, look at the dot in the center of the grid.  While looking at the dot, notice if any of the lines look wavy, if any lines disappear, or if the boxes change shape.  Write down on a piece of paper any vision changes from the last time you used the grid.  Now repeat the exercise, this time covering your right eye.  Call your doctor right away if you notice any vision changes.  How Often Should I Check My Vision?  Use the Amsler grid as often as your eye doctor suggests. Keep the grid where youll remember to use it. Call your eye doctor right away if you notice any changes with your eyesight. This includes if your vision improves.  © 4920-2246 [employer], [address] [place] [address]. All rights reserved. This information is not intended as a substitute for professional medical care. Always follow your healthcare professional's instructions. NARROW ANTERIOR CHAMBER ANGLE    The anterior chamber angle is the measured distance from the back surface of the cornea (the clear surface of the eye), to the iris (the color part of the eye).  Fluids that are normally produced inside the eye drain out through this so called angle. If the chamber angle is very narrow, it can impede this fluid outflow, and cause the eye pressure to rise.  This could lead to one type of glaucoma, Narrow Angle Glaucoma.    A test used to measure the angle depth is called GONIOSCOPY.  An instrument with a reflecting mirror is placed on the front of the eye to view the angle.     Very rarely, patients with narrow angle can have a sudden rise in eye pressure.  Symptoms of Acute Narrow Angle Glaucoma could include: intense ache/ pain, deep redness, cloudy / foggy/ blurred vision, headache and nausea.  Contact our office if you should ever experience any of these  symptoms.    Treatment in the office can be accomplished with a procedure called LASER PERIPHERAL IRIDOTOMY, which opens a secondary channel inside the eye for fluid transfer.

## 2023-04-25 NOTE — PROGRESS NOTES
"HPI    Last DFE 10/12/17  + HTN  + Pre-DM2  + Scleroderma (on plaqu "for years")     + blurry VA at all distances "since my 40's".  Denies floaters, flashes, pain    *Would like to try CL. States she wore CL "many years ago".    OTC allergy gtts PRN OU  Lumify QD OU    Hemoglobin A1C       Date                     Value               Ref Range             Status                01/18/2023               5.6                 4.0 - 5.6 %           Final                   06/08/2022               5.9 (H)             4.0 - 5.6 %           Final            Last edited by Valery Leslie on 4/25/2023 10:22 AM.            Assessment /Plan     For exam results, see Encounter Report.    Pre-diabetes    Vitreous floaters, bilateral    Nuclear sclerosis, bilateral    Glaucoma screening    Anatomical narrow angle of both eyes    Sjogren's syndrome, with unspecified organ involvement  -     Cazares Visual Field - OU - Extended - Both Eyes; Future  -     OCT, Retina - OU - Both Eyes; Future    Long-term use of Plaquenil  -     Cazares Visual Field - OU - Extended - Both Eyes; Future  -     OCT, Retina - OU - Both Eyes; Future    Hyperopia with astigmatism and presbyopia, bilateral      No boni/ no csme, gave Diabetic Retinopathy info, advise tight control glucose, BP---Advise annual dilated fundus exam  Mild OU, RD precautions given and reviewed. Patient knows to call/ message if any further changes in symptoms occur.  Early NS changes, not vis sig for consult, gave info  4,5. Low suspect w/ moderate c/d, mid teens IOP, neg fhx glaucoma  Angles narrowing w/ age and NS onset ---open 1+ on VH    Gonio next exam   ACG info given    6,7.   Longstanding, no gross spk noted, has resumed plaquenil since last visit w/ me 2017  Schedule new baseline 10-2 fields and OCT macula --can obtain at cl fitting     8.   Updated specs rx, gave copy will need full time wear PAL for driving     Discussed cl's options and fitting   RTC for plaquenil " work up and cl fitting prn ----then annual exam pending

## 2023-04-25 NOTE — Clinical Note
Please call to schedule 10-2 fields and OCT macula for plaquenil patient ---2-4 weeks is fine. She may return for cl's fitting and she could do all on the same day per her preference

## 2023-04-26 ENCOUNTER — PATIENT MESSAGE (OUTPATIENT)
Dept: RHEUMATOLOGY | Facility: CLINIC | Age: 63
End: 2023-04-26
Payer: MEDICARE

## 2023-05-03 ENCOUNTER — LAB VISIT (OUTPATIENT)
Dept: LAB | Facility: HOSPITAL | Age: 63
End: 2023-05-03
Payer: MEDICARE

## 2023-05-03 DIAGNOSIS — M34.1 CREST (CALCINOSIS, RAYNAUD'S PHENOMENON, ESOPHAGEAL DYSFUNCTION, SCLERODACTYLY, TELANGIECTASIA): ICD-10-CM

## 2023-05-03 DIAGNOSIS — E06.3 HASHIMOTO'S THYROIDITIS: ICD-10-CM

## 2023-05-03 LAB
ALBUMIN SERPL BCP-MCNC: 3.7 G/DL (ref 3.5–5.2)
ALP SERPL-CCNC: 53 U/L (ref 55–135)
ALT SERPL W/O P-5'-P-CCNC: 23 U/L (ref 10–44)
ANION GAP SERPL CALC-SCNC: 9 MMOL/L (ref 8–16)
AST SERPL-CCNC: 17 U/L (ref 10–40)
BASOPHILS # BLD AUTO: 0.04 K/UL (ref 0–0.2)
BASOPHILS NFR BLD: 0.7 % (ref 0–1.9)
BILIRUB SERPL-MCNC: 0.2 MG/DL (ref 0.1–1)
BUN SERPL-MCNC: 14 MG/DL (ref 6–20)
CALCIUM SERPL-MCNC: 8.8 MG/DL (ref 8.7–10.5)
CHLORIDE SERPL-SCNC: 101 MMOL/L (ref 95–110)
CO2 SERPL-SCNC: 26 MMOL/L (ref 23–29)
CREAT SERPL-MCNC: 0.8 MG/DL (ref 0.5–1.4)
CRP SERPL-MCNC: 0.9 MG/L (ref 0–8.2)
DIFFERENTIAL METHOD: ABNORMAL
EOSINOPHIL # BLD AUTO: 0.1 K/UL (ref 0–0.5)
EOSINOPHIL NFR BLD: 2 % (ref 0–8)
ERYTHROCYTE [DISTWIDTH] IN BLOOD BY AUTOMATED COUNT: 13.3 % (ref 11.5–14.5)
ERYTHROCYTE [SEDIMENTATION RATE] IN BLOOD BY PHOTOMETRIC METHOD: 8 MM/HR (ref 0–36)
EST. GFR  (NO RACE VARIABLE): >60 ML/MIN/1.73 M^2
GLUCOSE SERPL-MCNC: 78 MG/DL (ref 70–110)
HCT VFR BLD AUTO: 33.3 % (ref 37–48.5)
HGB BLD-MCNC: 10.7 G/DL (ref 12–16)
IMM GRANULOCYTES # BLD AUTO: 0.01 K/UL (ref 0–0.04)
IMM GRANULOCYTES NFR BLD AUTO: 0.2 % (ref 0–0.5)
LYMPHOCYTES # BLD AUTO: 2.5 K/UL (ref 1–4.8)
LYMPHOCYTES NFR BLD: 46.4 % (ref 18–48)
MCH RBC QN AUTO: 29.5 PG (ref 27–31)
MCHC RBC AUTO-ENTMCNC: 32.1 G/DL (ref 32–36)
MCV RBC AUTO: 92 FL (ref 82–98)
MONOCYTES # BLD AUTO: 0.5 K/UL (ref 0.3–1)
MONOCYTES NFR BLD: 9.7 % (ref 4–15)
NEUTROPHILS # BLD AUTO: 2.2 K/UL (ref 1.8–7.7)
NEUTROPHILS NFR BLD: 41 % (ref 38–73)
NRBC BLD-RTO: 0 /100 WBC
PLATELET # BLD AUTO: 290 K/UL (ref 150–450)
PMV BLD AUTO: 10.1 FL (ref 9.2–12.9)
POTASSIUM SERPL-SCNC: 4 MMOL/L (ref 3.5–5.1)
PROT SERPL-MCNC: 6.6 G/DL (ref 6–8.4)
RBC # BLD AUTO: 3.63 M/UL (ref 4–5.4)
SODIUM SERPL-SCNC: 136 MMOL/L (ref 136–145)
T4 FREE SERPL-MCNC: 0.85 NG/DL (ref 0.71–1.51)
TSH SERPL DL<=0.005 MIU/L-ACNC: 3.07 UIU/ML (ref 0.4–4)
WBC # BLD AUTO: 5.47 K/UL (ref 3.9–12.7)

## 2023-05-03 PROCEDURE — 36415 COLL VENOUS BLD VENIPUNCTURE: CPT | Mod: HCNC,PO | Performed by: PHYSICIAN ASSISTANT

## 2023-05-03 PROCEDURE — 85652 RBC SED RATE AUTOMATED: CPT | Mod: HCNC | Performed by: PHYSICIAN ASSISTANT

## 2023-05-03 PROCEDURE — 85025 COMPLETE CBC W/AUTO DIFF WBC: CPT | Mod: HCNC | Performed by: PHYSICIAN ASSISTANT

## 2023-05-03 PROCEDURE — 84439 ASSAY OF FREE THYROXINE: CPT | Mod: HCNC | Performed by: PHYSICIAN ASSISTANT

## 2023-05-03 PROCEDURE — 84443 ASSAY THYROID STIM HORMONE: CPT | Mod: HCNC | Performed by: PHYSICIAN ASSISTANT

## 2023-05-03 PROCEDURE — 80053 COMPREHEN METABOLIC PANEL: CPT | Mod: HCNC | Performed by: PHYSICIAN ASSISTANT

## 2023-05-03 PROCEDURE — 86140 C-REACTIVE PROTEIN: CPT | Mod: HCNC | Performed by: PHYSICIAN ASSISTANT

## 2023-05-10 ENCOUNTER — OFFICE VISIT (OUTPATIENT)
Dept: RHEUMATOLOGY | Facility: CLINIC | Age: 63
End: 2023-05-10
Payer: MEDICARE

## 2023-05-10 VITALS
WEIGHT: 182.31 LBS | HEART RATE: 82 BPM | HEIGHT: 64 IN | DIASTOLIC BLOOD PRESSURE: 77 MMHG | SYSTOLIC BLOOD PRESSURE: 144 MMHG | BODY MASS INDEX: 31.12 KG/M2

## 2023-05-10 DIAGNOSIS — J40 BRONCHITIS: ICD-10-CM

## 2023-05-10 DIAGNOSIS — E09.9 DRUG OR CHEMICAL INDUCED DIABETES MELLITUS WITHOUT COMPLICATIONS: ICD-10-CM

## 2023-05-10 DIAGNOSIS — M35.00 SJOGREN'S SYNDROME, WITH UNSPECIFIED ORGAN INVOLVEMENT: ICD-10-CM

## 2023-05-10 DIAGNOSIS — K59.03 DRUG-INDUCED CONSTIPATION: ICD-10-CM

## 2023-05-10 DIAGNOSIS — Z79.899 IMMUNOCOMPROMISED STATE DUE TO DRUG THERAPY: ICD-10-CM

## 2023-05-10 DIAGNOSIS — D64.9 ANEMIA, UNSPECIFIED TYPE: ICD-10-CM

## 2023-05-10 DIAGNOSIS — G89.4 CHRONIC PAIN SYNDROME: ICD-10-CM

## 2023-05-10 DIAGNOSIS — E06.3 HASHIMOTO'S THYROIDITIS: ICD-10-CM

## 2023-05-10 DIAGNOSIS — E09.638: Primary | ICD-10-CM

## 2023-05-10 DIAGNOSIS — M34.1 CREST (CALCINOSIS, RAYNAUD'S PHENOMENON, ESOPHAGEAL DYSFUNCTION, SCLERODACTYLY, TELANGIECTASIA): ICD-10-CM

## 2023-05-10 DIAGNOSIS — D84.821 IMMUNOCOMPROMISED STATE DUE TO DRUG THERAPY: ICD-10-CM

## 2023-05-10 DIAGNOSIS — Z91.09 ENVIRONMENTAL ALLERGIES: ICD-10-CM

## 2023-05-10 DIAGNOSIS — K21.00 GASTROESOPHAGEAL REFLUX DISEASE WITH ESOPHAGITIS, UNSPECIFIED WHETHER HEMORRHAGE: ICD-10-CM

## 2023-05-10 PROCEDURE — 3077F SYST BP >= 140 MM HG: CPT | Mod: HCNC,CPTII,S$GLB, | Performed by: INTERNAL MEDICINE

## 2023-05-10 PROCEDURE — 99999 PR PBB SHADOW E&M-EST. PATIENT-LVL III: ICD-10-PCS | Mod: PBBFAC,HCNC,, | Performed by: INTERNAL MEDICINE

## 2023-05-10 PROCEDURE — 3044F HG A1C LEVEL LT 7.0%: CPT | Mod: HCNC,CPTII,S$GLB, | Performed by: INTERNAL MEDICINE

## 2023-05-10 PROCEDURE — 1159F MED LIST DOCD IN RCRD: CPT | Mod: HCNC,CPTII,S$GLB, | Performed by: INTERNAL MEDICINE

## 2023-05-10 PROCEDURE — 96372 PR INJECTION,THERAP/PROPH/DIAG2ST, IM OR SUBCUT: ICD-10-PCS | Mod: HCNC,S$GLB,, | Performed by: INTERNAL MEDICINE

## 2023-05-10 PROCEDURE — 3044F PR MOST RECENT HEMOGLOBIN A1C LEVEL <7.0%: ICD-10-PCS | Mod: HCNC,CPTII,S$GLB, | Performed by: INTERNAL MEDICINE

## 2023-05-10 PROCEDURE — 99214 PR OFFICE/OUTPT VISIT, EST, LEVL IV, 30-39 MIN: ICD-10-PCS | Mod: 25,HCNC,S$GLB, | Performed by: INTERNAL MEDICINE

## 2023-05-10 PROCEDURE — 1160F RVW MEDS BY RX/DR IN RCRD: CPT | Mod: HCNC,CPTII,S$GLB, | Performed by: INTERNAL MEDICINE

## 2023-05-10 PROCEDURE — 1160F PR REVIEW ALL MEDS BY PRESCRIBER/CLIN PHARMACIST DOCUMENTED: ICD-10-PCS | Mod: HCNC,CPTII,S$GLB, | Performed by: INTERNAL MEDICINE

## 2023-05-10 PROCEDURE — 3078F DIAST BP <80 MM HG: CPT | Mod: HCNC,CPTII,S$GLB, | Performed by: INTERNAL MEDICINE

## 2023-05-10 PROCEDURE — 3078F PR MOST RECENT DIASTOLIC BLOOD PRESSURE < 80 MM HG: ICD-10-PCS | Mod: HCNC,CPTII,S$GLB, | Performed by: INTERNAL MEDICINE

## 2023-05-10 PROCEDURE — 3077F PR MOST RECENT SYSTOLIC BLOOD PRESSURE >= 140 MM HG: ICD-10-PCS | Mod: HCNC,CPTII,S$GLB, | Performed by: INTERNAL MEDICINE

## 2023-05-10 PROCEDURE — 3008F BODY MASS INDEX DOCD: CPT | Mod: HCNC,CPTII,S$GLB, | Performed by: INTERNAL MEDICINE

## 2023-05-10 PROCEDURE — 99999 PR PBB SHADOW E&M-EST. PATIENT-LVL III: CPT | Mod: PBBFAC,HCNC,, | Performed by: INTERNAL MEDICINE

## 2023-05-10 PROCEDURE — 1159F PR MEDICATION LIST DOCUMENTED IN MEDICAL RECORD: ICD-10-PCS | Mod: HCNC,CPTII,S$GLB, | Performed by: INTERNAL MEDICINE

## 2023-05-10 PROCEDURE — 96372 THER/PROPH/DIAG INJ SC/IM: CPT | Mod: HCNC,S$GLB,, | Performed by: INTERNAL MEDICINE

## 2023-05-10 PROCEDURE — 99214 OFFICE O/P EST MOD 30 MIN: CPT | Mod: 25,HCNC,S$GLB, | Performed by: INTERNAL MEDICINE

## 2023-05-10 PROCEDURE — 3008F PR BODY MASS INDEX (BMI) DOCUMENTED: ICD-10-PCS | Mod: HCNC,CPTII,S$GLB, | Performed by: INTERNAL MEDICINE

## 2023-05-10 RX ORDER — HYDROXYCHLOROQUINE SULFATE 200 MG/1
200 TABLET, FILM COATED ORAL 2 TIMES DAILY
Qty: 180 TABLET | Refills: 3 | Status: SHIPPED | OUTPATIENT
Start: 2023-05-10 | End: 2024-05-09

## 2023-05-10 RX ORDER — CYANOCOBALAMIN 1000 UG/ML
1000 INJECTION, SOLUTION INTRAMUSCULAR; SUBCUTANEOUS
Status: COMPLETED | OUTPATIENT
Start: 2023-05-10 | End: 2023-05-10

## 2023-05-10 RX ORDER — KETOROLAC TROMETHAMINE 30 MG/ML
60 INJECTION, SOLUTION INTRAMUSCULAR; INTRAVENOUS
Status: COMPLETED | OUTPATIENT
Start: 2023-05-10 | End: 2023-05-10

## 2023-05-10 RX ORDER — MONTELUKAST SODIUM 10 MG/1
10 TABLET ORAL NIGHTLY
Qty: 90 TABLET | Refills: 3 | Status: SHIPPED | OUTPATIENT
Start: 2023-05-10 | End: 2024-01-31

## 2023-05-10 RX ORDER — TIRZEPATIDE 2.5 MG/.5ML
2.5 INJECTION, SOLUTION SUBCUTANEOUS
Qty: 4 PEN | Refills: 5 | Status: SHIPPED | OUTPATIENT
Start: 2023-05-10 | End: 2023-06-28

## 2023-05-10 RX ORDER — DEXLANSOPRAZOLE 60 MG/1
60 CAPSULE, DELAYED RELEASE ORAL DAILY
Qty: 30 CAPSULE | Refills: 11 | Status: SHIPPED | OUTPATIENT
Start: 2023-05-10 | End: 2023-06-28

## 2023-05-10 RX ADMIN — KETOROLAC TROMETHAMINE 60 MG: 30 INJECTION, SOLUTION INTRAMUSCULAR; INTRAVENOUS at 04:05

## 2023-05-10 RX ADMIN — CYANOCOBALAMIN 1000 MCG: 1000 INJECTION, SOLUTION INTRAMUSCULAR; SUBCUTANEOUS at 04:05

## 2023-05-10 NOTE — PROGRESS NOTES
Subjective:       Patient ID: Tina García is a 62 y.o. female.    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  She recently moved and  she had the flu, Anti reflux surgery was being considered but the patient was found to have aperistalsis of the esophagus on esophageal manometry.  PPI was changed to nexium. Recommend EGD with [facility] to prove that she has GERD prior to sending to surgery as she has had only LA grade A or B esophagitis on endoscopy in the past. Raynauds is stable at this time with conservative measures. She has not taken revatio recently. PCP decreased hctz/losartan dose recently due to low blood pressure. She has lost 15 -20 lbs since starting Ozempic in September and blood sugar has improved A1c 5.6%.       Current tx:  1. plaquenil  2. revatio PRN  3. Flexeril PRN    Review of Systems   Constitutional:  Positive for activity change and unexpected weight change. Negative for appetite change and chills.   HENT:  Negative for mouth sores.    Eyes:  Negative for redness and visual disturbance.   Respiratory:  Positive for cough, shortness of breath and wheezing.    Cardiovascular:  Negative for chest pain, palpitations and leg swelling.   Gastrointestinal:  Positive for constipation. Negative for abdominal pain, diarrhea, nausea and vomiting.        Reflux sx   Genitourinary:  Negative for genital sores.   Musculoskeletal:  Positive for arthralgias, back pain and gait problem.   Skin:  Negative for rash.   Allergic/Immunologic: Negative for immunocompromised state.   Neurological:  Negative for dizziness, weakness and light-headedness.   Hematological:  Does not bruise/bleed easily.       Objective:     Vitals:    05/10/23 1444   BP: (!) 144/77   Pulse: 82       Past Medical History:   Diagnosis Date    Anxiety     AR (allergic rhinitis)     Arthritis     rheumatoid?    Chronic constipation     Colon polyp     CREST syndrome     Depression      Dry eyes     Dry mouth     Esophageal dysmotility     Fibrocystic breast     Fibromyalgia     Food intolerance     GERD (gastroesophageal reflux disease)     HTN (hypertension)     Irritable bowel syndrome     Raynaud disease     Right wrist fracture     nov 2013    SOB (shortness of breath) on exertion     Thyroid disease     hypothyroid, thyroid cyst on U/S    Ulcer     Vitamin D deficiency disease      Past Surgical History:   Procedure Laterality Date    COLONOSCOPY  7/2012    one polyp removed, biospy=TUBULOVILLOUS ADENOMA, otherwise normal findings, repeat in 4-5 years for surveillance    COLONOSCOPY N/A 4/17/2019    Procedure: COLONOSCOPY;  Surgeon: Rich Kingsley Jr., MD;  Location: [facility];  Service: Endoscopy;  Laterality: N/A;    COLONOSCOPY W/ POLYPECTOMY  7/24/2012  [facility]    4-5 mm sigmoid polyp.    CYST REMOVAL      pilonidal    DILATION AND CURETTAGE OF UTERUS      ESOPHAGEAL MANOMETRY WITH MEASUREMENT OF IMPEDANCE N/A 2/4/2022    Procedure: MANOMETRY, ESOPHAGUS, WITH IMPEDANCE MEASUREMENT;  Surgeon: Kami Cobos MD;  Location: [address]);  Service: Endoscopy;  Laterality: N/A;  COVID test on 2/1 at [facility]    ESOPHAGOGASTRODUODENOSCOPY N/A 4/17/2019    Procedure: EGD (ESOPHAGOGASTRODUODENOSCOPY);  Surgeon: Rich Kingsley Jr., MD;  Location: [facility];  Service: Endoscopy;  Laterality: N/A;    ESOPHAGOGASTRODUODENOSCOPY N/A 12/3/2021    Procedure: EGD (ESOPHAGOGASTRODUODENOSCOPY);  Surgeon: Rich Kingsley Jr., MD;  Location: [facility];  Service: Endoscopy;  Laterality: N/A;    GANGLION CYST EXCISION      right thumb    HYSTERECTOMY  2008    complete    TOTAL ABDOMINAL HYSTERECTOMY W/ BILATERAL SALPINGOOPHORECTOMY  2008    w/Hysterectomy    UPPER GASTROINTESTINAL ENDOSCOPY  12/23/2011  [facility]    Fluid in the lower third of the esophagus  (stasis).  The esophageal examination was compatible with scleroderma.  (Dilataion done, 51 Fr.)    LA Grade A reflux esophagitis.    Enlarged gastric folds.   Multiple gastric polyps.   Antritis (erythematous streaks).  JOELLEN Test  Negative.          Physical Exam   Constitutional: She is oriented to person, place, and time. She appears distressed.   HENT:   Head: Normocephalic and atraumatic.   Mouth/Throat: Oropharynx is clear and moist.   Eyes: Pupils are equal, round, and reactive to light.   Neck: No thyromegaly present.   Cardiovascular: Normal rate, regular rhythm and normal heart sounds. Exam reveals no gallop and no friction rub.   No murmur heard.  Pulmonary/Chest: Breath sounds normal. She has no wheezes. She has no rales. She exhibits no tenderness.   Abdominal: There is no abdominal tenderness. There is no rebound and no guarding.   Musculoskeletal:         General: Tenderness and deformity present.      Right shoulder: Tenderness present.      Left shoulder: Tenderness present.      Right elbow: Tenderness present.      Left elbow: Tenderness present.      Right wrist: Tenderness present.      Left wrist: Tenderness present.      Cervical back: Neck supple.      Right knee: No effusion. Tenderness present.      Left knee: No effusion. Tenderness present.   Lymphadenopathy:     She has no cervical adenopathy.   Neurological: She is alert and oriented to person, place, and time.   Skin: No rash noted. No erythema. No pallor.   Psychiatric: Mood and affect normal.   Nursing note and vitals reviewed.      Right Side Rheumatological Exam     The patient is tender to palpation of the shoulder, elbow, wrist, knee, 1st PIP, 1st MCP, 2nd PIP, 2nd MCP, 3rd PIP, 3rd MCP, 4th PIP, 4th MCP, 5th PIP and 5th MCP    She has swelling of the 1st PIP, 1st MCP, 2nd PIP, 2nd MCP, 3rd PIP, 3rd MCP, 4th PIP, 4th MCP, 5th PIP and 5th MCP    Shoulder Exam   Tenderness Location: biceps tendon and clavicle    Range of Motion   Active abduction:  abnormal   Adduction: abnormal  Sensation: normal    Knee Exam   Patellofemoral Crepitus: positive  Effusion:  negative  Sensation: normal    Hip Exam   Tenderness Location: posterior, greater trochanter and lateral  Sensation: normal    Elbow/Wrist Exam   Tenderness Location: lateral epicondyle and medial epicondyle  Sensation: normal    Foot Exam   Right foot exam exhibits signs of inflamed dorsum  Right foot exam exhibits signs of no podagra, no tophus and no plantar fasciitis    Muscle Strength (0-5 scale):  : 4/5     Left Side Rheumatological Exam     The patient is tender to palpation of the shoulder, elbow, wrist, knee, 1st PIP, 1st MCP, 2nd PIP, 2nd MCP, 3rd PIP, 3rd MCP, 4th PIP, 4th MCP, 5th PIP and 5th MCP.    She has swelling of the 1st PIP, 1st MCP, 2nd PIP, 2nd MCP, 3rd PIP, 3rd MCP, 4th PIP, 4th MCP, 5th PIP and 5th MCP    Shoulder Exam   Tenderness Location: biceps tendon and clavicle    Range of Motion   Active abduction:  abnormal   Sensation: normal    Knee Exam     Patellofemoral Crepitus: positive  Effusion: negative  Sensation: normal    Hip Exam   Tenderness Location: posterior, greater trochanter and lateral  Sensation: normal    Elbow/Wrist Exam   Tenderness Location: lateral epicondyle and medial epicondyle  Sensation: normal    Foot Exam   Left foot exam exhibits signs of inflamed dorsum  Left foot exam exhibits signs of no podagra and no plantar fasciitis    Muscle Strength (0-5 scale):  :  4/5       Back/Neck Exam   General Inspection   Gait: normal       Tenderness Right paramedian tenderness of the Occ, Upper C-Spine, Lower L-Spine and SI Joint.Left paramedian tenderness of the Occ, Upper C-Spine, Lower L-Spine and SI Joint.      Comments:  15 out of 18 tender points        Results for orders placed or performed in visit on 05/03/23   CBC Auto Differential   Result Value Ref Range    WBC 5.47 3.90 - 12.70 K/uL    RBC 3.63 (L) 4.00 - 5.40 M/uL    Hemoglobin 10.7 (L) 12.0 - 16.0 g/dL    Hematocrit 33.3 (L) 37.0 - 48.5 %    MCV 92 82 - 98 fL    MCH 29.5 27.0 - 31.0 pg    MCHC 32.1 32.0 -  36.0 g/dL    RDW 13.3 11.5 - 14.5 %    Platelets 290 150 - 450 K/uL    MPV 10.1 9.2 - 12.9 fL    Immature Granulocytes 0.2 0.0 - 0.5 %    Gran # (ANC) 2.2 1.8 - 7.7 K/uL    Immature Grans (Abs) 0.01 0.00 - 0.04 K/uL    Lymph # 2.5 1.0 - 4.8 K/uL    Mono # 0.5 0.3 - 1.0 K/uL    Eos # 0.1 0.0 - 0.5 K/uL    Baso # 0.04 0.00 - 0.20 K/uL    nRBC 0 0 /100 WBC    Gran % 41.0 38.0 - 73.0 %    Lymph % 46.4 18.0 - 48.0 %    Mono % 9.7 4.0 - 15.0 %    Eosinophil % 2.0 0.0 - 8.0 %    Basophil % 0.7 0.0 - 1.9 %    Differential Method Automated    Comprehensive Metabolic Panel   Result Value Ref Range    Sodium 136 136 - 145 mmol/L    Potassium 4.0 3.5 - 5.1 mmol/L    Chloride 101 95 - 110 mmol/L    CO2 26 23 - 29 mmol/L    Glucose 78 70 - 110 mg/dL    BUN 14 6 - 20 mg/dL    Creatinine 0.8 0.5 - 1.4 mg/dL    Calcium 8.8 8.7 - 10.5 mg/dL    Total Protein 6.6 6.0 - 8.4 g/dL    Albumin 3.7 3.5 - 5.2 g/dL    Total Bilirubin 0.2 0.1 - 1.0 mg/dL    Alkaline Phosphatase 53 (L) 55 - 135 U/L    AST 17 10 - 40 U/L    ALT 23 10 - 44 U/L    Anion Gap 9 8 - 16 mmol/L    eGFR >60.0 >60 mL/min/1.73 m^2   C-Reactive Protein   Result Value Ref Range    CRP 0.9 0.0 - 8.2 mg/L   Sedimentation rate   Result Value Ref Range    Sed Rate 8 0 - 36 mm/Hr   TSH   Result Value Ref Range    TSH 3.070 0.400 - 4.000 uIU/mL   T4, Free   Result Value Ref Range    Free T4 0.85 0.71 - 1.51 ng/dL       Assessment:       1. 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 Gastroesophageal reflux disease with esophagitis, unspecified whether hemorrhage    3. Anemia, unspecified type    4. 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 complications    5. Immunocompromised state due to drug therapy    6. Sjogren's syndrome, with unspecified organ involvement    7. CREST (calcinosis, Raynaud's phenomenon, esophageal dysfunction, sclerodactyly, telangiectasia)    8. Hashimoto's thyroiditis     9. Chronic pain syndrome    10. Bronchitis    11. Drug-induced constipation    12. 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  Dispense: 4 pen; Refill: 5  -     Complement, Total; Future; Expected date: 05/10/2023  -     C4 Complement; Future; Expected date: 05/10/2023  -     C3 Complement; Future; Expected date: 05/10/2023  -     Sedimentation rate; Future; Expected date: 05/10/2023  -     Sjogrens syndrome-B extractable nuclear antibody; Future; Expected date: 05/10/2023  -     Sjogrens syndrome-A extractable nuclear antibody; Future; Expected date: 05/10/2023  -     Comprehensive Metabolic Panel; Future; Expected date: 05/10/2023  -     CBC Auto Differential; Future; Expected date: 05/10/2023  -     Anti-Smith Antibody; Future; Expected date: 05/10/2023  -     Anti-Scleroderma Antibody; Future; Expected date: 05/10/2023  -     Anti-Histone Antibody; Future; Expected date: 05/10/2023  -     Anti-DNA Ab, Double-Stranded; Future; Expected date: 05/10/2023  -     Anti Sm/RNP Antibody; Future; Expected date: 05/10/2023  -     JMAILA Screen w/Reflex; Future; Expected date: 05/10/2023    Gastroesophageal reflux disease with esophagitis, unspecified whether hemorrhage  -     dexlansoprazole (DEXILANT) 60 mg capsule; Take 1 capsule (60 mg total) by mouth once daily.  Dispense: 30 capsule; Refill: 11  -     Complement, Total; Future; Expected date: 05/10/2023  -     C4 Complement; Future; Expected date: 05/10/2023  -     C3 Complement; Future; Expected date: 05/10/2023  -     Sedimentation rate; Future; Expected date: 05/10/2023  -     Sjogrens syndrome-B extractable nuclear antibody; Future; Expected date: 05/10/2023  -     Sjogrens syndrome-A extractable nuclear antibody; Future; Expected date: 05/10/2023  -      Comprehensive Metabolic Panel; Future; Expected date: 05/10/2023  -     CBC Auto Differential; Future; Expected date: 05/10/2023  -     Anti-Smith Antibody; Future; Expected date: 05/10/2023  -     Anti-Scleroderma Antibody; Future; Expected date: 05/10/2023  -     Anti-Histone Antibody; Future; Expected date: 05/10/2023  -     Anti-DNA Ab, Double-Stranded; Future; Expected date: 05/10/2023  -     Anti Sm/RNP Antibody; Future; Expected date: 05/10/2023  -     JAMILA Screen w/Reflex; Future; Expected date: 05/10/2023  -     hydrOXYchloroQUINE (PLAQUENIL) 200 mg tablet; Take 1 tablet (200 mg total) by mouth 2 (two) times daily.  Dispense: 180 tablet; Refill: 3  -     linaCLOtide (LINZESS) 72 mcg Cap capsule; Take 1 capsule (72 mcg total) by mouth before breakfast.  Dispense: 30 capsule; Refill: 11  -     ketorolac injection 60 mg  -     cyanocobalamin injection 1,000 mcg    Anemia, unspecified type  -     Transferrin; Future; Expected date: 05/10/2023  -     Iron and TIBC; Future; Expected date: 05/10/2023  -     Complement, Total; Future; Expected date: 05/10/2023  -     C4 Complement; Future; Expected date: 05/10/2023  -     C3 Complement; Future; Expected date: 05/10/2023  -     Sedimentation rate; Future; Expected date: 05/10/2023  -     Sjogrens syndrome-B extractable nuclear antibody; Future; Expected date: 05/10/2023  -     Sjogrens syndrome-A extractable nuclear antibody; Future; Expected date: 05/10/2023  -     Comprehensive Metabolic Panel; Future; Expected date: 05/10/2023  -     CBC Auto Differential; Future; Expected date: 05/10/2023  -     Anti-Smith Antibody; Future; Expected date: 05/10/2023  -     Anti-Scleroderma Antibody; Future; Expected date: 05/10/2023  -     Anti-Histone Antibody; Future; Expected date: 05/10/2023  -     Anti-DNA Ab, Double-Stranded; Future; Expected date: 05/10/2023  -     Anti Sm/RNP Antibody; Future; Expected date: 05/10/2023  -     JAMILA Screen w/Reflex; Future; Expected date:  05/10/2023  -     ketorolac injection 60 mg  -     cyanocobalamin injection 1,000 mcg    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 complications  -     Complement, Total; Future; Expected date: 05/10/2023  -     C4 Complement; Future; Expected date: 05/10/2023  -     C3 Complement; Future; Expected date: 05/10/2023  -     Sedimentation rate; Future; Expected date: 05/10/2023  -     Sjogrens syndrome-B extractable nuclear antibody; Future; Expected date: 05/10/2023  -     Sjogrens syndrome-A extractable nuclear antibody; Future; Expected date: 05/10/2023  -     Comprehensive Metabolic Panel; Future; Expected date: 05/10/2023  -     CBC Auto Differential; Future; Expected date: 05/10/2023  -     Anti-Smith Antibody; Future; Expected date: 05/10/2023  -     Anti-Scleroderma Antibody; Future; Expected date: 05/10/2023  -     Anti-Histone Antibody; Future; Expected date: 05/10/2023  -     Anti-DNA Ab, Double-Stranded; Future; Expected date: 05/10/2023  -     Anti Sm/RNP Antibody; Future; Expected date: 05/10/2023  -     JAMILA Screen w/Reflex; Future; Expected date: 05/10/2023  -     ketorolac injection 60 mg  -     cyanocobalamin injection 1,000 mcg    Immunocompromised state due to drug therapy  -     Complement, Total; Future; Expected date: 05/10/2023  -     C4 Complement; Future; Expected date: 05/10/2023  -     C3 Complement; Future; Expected date: 05/10/2023  -     Sedimentation rate; Future; Expected date: 05/10/2023  -     Sjogrens syndrome-B extractable nuclear antibody; Future; Expected date: 05/10/2023  -     Sjogrens syndrome-A extractable nuclear antibody; Future; Expected date: 05/10/2023  -     Comprehensive Metabolic Panel; Future; Expected date: 05/10/2023  -     CBC Auto Differential; Future; Expected date: 05/10/2023  -     Anti-Smith Antibody; Future; Expected date: 05/10/2023  -     Anti-Scleroderma Antibody; Future; Expected date: 05/10/2023  -     Anti-Histone Antibody; Future; Expected  date: 05/10/2023  -     Anti-DNA Ab, Double-Stranded; Future; Expected date: 05/10/2023  -     Anti Sm/RNP Antibody; Future; Expected date: 05/10/2023  -     JAMILA Screen w/Reflex; Future; Expected date: 05/10/2023  -     ketorolac injection 60 mg  -     cyanocobalamin injection 1,000 mcg    Sjogren's syndrome, with unspecified organ involvement  -     hydrOXYchloroQUINE (PLAQUENIL) 200 mg tablet; Take 1 tablet (200 mg total) by mouth 2 (two) times daily.  Dispense: 180 tablet; Refill: 3  -     ketorolac injection 60 mg  -     cyanocobalamin injection 1,000 mcg    CREST (calcinosis, Raynaud's phenomenon, esophageal dysfunction, sclerodactyly, telangiectasia)  -     ketorolac injection 60 mg  -     cyanocobalamin injection 1,000 mcg    Hashimoto's thyroiditis  -     ketorolac injection 60 mg  -     cyanocobalamin injection 1,000 mcg    Chronic pain syndrome  -     ketorolac injection 60 mg  -     cyanocobalamin injection 1,000 mcg    Bronchitis  -     ketorolac injection 60 mg  -     cyanocobalamin injection 1,000 mcg    Drug-induced constipation  -     hydrOXYchloroQUINE (PLAQUENIL) 200 mg tablet; Take 1 tablet (200 mg total) by mouth 2 (two) times daily.  Dispense: 180 tablet; Refill: 3  -     linaCLOtide (LINZESS) 72 mcg Cap capsule; Take 1 capsule (72 mcg total) by mouth before breakfast.  Dispense: 30 capsule; Refill: 11  -     ketorolac injection 60 mg  -     cyanocobalamin injection 1,000 mcg    Environmental allergies  -     montelukast (SINGULAIR) 10 mg tablet; Take 1 tablet (10 mg total) by mouth every evening.  Dispense: 90 tablet; Refill: 3  -     ketorolac injection 60 mg  -     cyanocobalamin injection 1,000 mcg          Assessment:  62 year old female with  CREST syndrome (raynauds on revatio PRN, esophageal dysfunction/GERD on ppi/zantac, sclerodactyly hands), Sjogren's syndrome   --chronic pain syndrome on tramadol  --hx of Hashimoto's thyroiditis on NP thyroid  --hx of pulmonary HTN  --new onset  anemia    Plan:  1. Cont. Plaquenil bid, revatio prn  2. Cont. Tramadol PRN  and flexeril prn severe pain per Dr. Fry. I have  check [place] prescription monitoring program site and no unusual or abnormal behavior has occurred pt understand the risk and benefits of taking opioid medications and has decided to continue the  medication   3. Start levothyroxine 25 mcg daily. Repeat labs in 3-4 months  4. Dc ozempic add mounjaro      More than 50% of the  30 minute encounter was spent face to face counseling the patient regarding current status and future plan of care as well as side effects  of the medications. All questions were answered to patient's satisfaction also includes  non-face to face time preparing to see the patient (eg, review of tests), Obtaining and/or reviewing separately obtained history, Documenting clinical information in the electronic or other health record, Independently interpreting results

## 2023-05-11 NOTE — PROGRESS NOTES
2 pt identifiers used  Allergies reviewed      Administered 2 cc ( 30 mg/ml ) of toradol to the left upper outer gluteal. Patient tolerated injections well. Informed of s/s to report verbalized understanding. No adverse reactions noted. Left facility in stable condition.    Administered 1 cc ( 1000 mcg/ml ) of b12 to the right arm. Informed of s/s to report verbalized understanding. No adverse reactions noted.      Answers submitted by the patient for this visit:  Rheumatology Questionnaire (Submitted on 5/3/2023)  fever: No  eye redness: No  mouth sores: No  headaches: No  shortness of breath: No  chest pain: No  trouble swallowing: Yes  diarrhea: No  constipation: Yes  unexpected weight change: No  genital sore: No  dysuria: Yes  During the last 3 days, have you had a skin rash?: No  Bruises or bleeds easily: No  cough: No

## 2023-05-19 ENCOUNTER — PATIENT MESSAGE (OUTPATIENT)
Dept: RHEUMATOLOGY | Facility: CLINIC | Age: 63
End: 2023-05-19
Payer: MEDICARE

## 2023-06-23 DIAGNOSIS — K21.9 GASTROESOPHAGEAL REFLUX DISEASE, UNSPECIFIED WHETHER ESOPHAGITIS PRESENT: ICD-10-CM

## 2023-06-23 RX ORDER — FAMOTIDINE 40 MG/1
40 TABLET, FILM COATED ORAL NIGHTLY PRN
Qty: 30 TABLET | Refills: 11 | Status: SHIPPED | OUTPATIENT
Start: 2023-06-23 | End: 2023-09-21 | Stop reason: SDUPTHER

## 2023-06-28 ENCOUNTER — OFFICE VISIT (OUTPATIENT)
Dept: FAMILY MEDICINE | Facility: CLINIC | Age: 63
End: 2023-06-28
Payer: MEDICARE

## 2023-06-28 ENCOUNTER — LAB VISIT (OUTPATIENT)
Dept: LAB | Facility: HOSPITAL | Age: 63
End: 2023-06-28
Attending: FAMILY MEDICINE
Payer: MEDICARE

## 2023-06-28 VITALS
HEIGHT: 64 IN | BODY MASS INDEX: 31.88 KG/M2 | WEIGHT: 186.75 LBS | OXYGEN SATURATION: 98 % | SYSTOLIC BLOOD PRESSURE: 118 MMHG | DIASTOLIC BLOOD PRESSURE: 84 MMHG | HEART RATE: 98 BPM

## 2023-06-28 DIAGNOSIS — R20.0 NUMBNESS: ICD-10-CM

## 2023-06-28 DIAGNOSIS — Z78.0 POST-MENOPAUSAL: ICD-10-CM

## 2023-06-28 DIAGNOSIS — D50.8 OTHER IRON DEFICIENCY ANEMIA: ICD-10-CM

## 2023-06-28 DIAGNOSIS — I10 PRIMARY HYPERTENSION: Primary | ICD-10-CM

## 2023-06-28 DIAGNOSIS — E03.8 OTHER SPECIFIED HYPOTHYROIDISM: ICD-10-CM

## 2023-06-28 LAB
BASOPHILS # BLD AUTO: 0.06 K/UL (ref 0–0.2)
BASOPHILS NFR BLD: 1 % (ref 0–1.9)
DIFFERENTIAL METHOD: ABNORMAL
EOSINOPHIL # BLD AUTO: 0.2 K/UL (ref 0–0.5)
EOSINOPHIL NFR BLD: 2.5 % (ref 0–8)
ERYTHROCYTE [DISTWIDTH] IN BLOOD BY AUTOMATED COUNT: 13 % (ref 11.5–14.5)
HCT VFR BLD AUTO: 33.6 % (ref 37–48.5)
HGB BLD-MCNC: 11.2 G/DL (ref 12–16)
IMM GRANULOCYTES # BLD AUTO: 0.02 K/UL (ref 0–0.04)
IMM GRANULOCYTES NFR BLD AUTO: 0.3 % (ref 0–0.5)
IRON SERPL-MCNC: 50 UG/DL (ref 30–160)
LYMPHOCYTES # BLD AUTO: 2.2 K/UL (ref 1–4.8)
LYMPHOCYTES NFR BLD: 36.7 % (ref 18–48)
MCH RBC QN AUTO: 29.7 PG (ref 27–31)
MCHC RBC AUTO-ENTMCNC: 33.3 G/DL (ref 32–36)
MCV RBC AUTO: 89 FL (ref 82–98)
MONOCYTES # BLD AUTO: 0.6 K/UL (ref 0.3–1)
MONOCYTES NFR BLD: 9.2 % (ref 4–15)
NEUTROPHILS # BLD AUTO: 3.1 K/UL (ref 1.8–7.7)
NEUTROPHILS NFR BLD: 50.3 % (ref 38–73)
NRBC BLD-RTO: 0 /100 WBC
PLATELET # BLD AUTO: 291 K/UL (ref 150–450)
PMV BLD AUTO: 10.8 FL (ref 9.2–12.9)
RBC # BLD AUTO: 3.77 M/UL (ref 4–5.4)
SATURATED IRON: 13 % (ref 20–50)
TOTAL IRON BINDING CAPACITY: 388 UG/DL (ref 250–450)
TRANSFERRIN SERPL-MCNC: 262 MG/DL (ref 200–375)
WBC # BLD AUTO: 6.11 K/UL (ref 3.9–12.7)

## 2023-06-28 PROCEDURE — 99999 PR PBB SHADOW E&M-EST. PATIENT-LVL IV: CPT | Mod: PBBFAC,,, | Performed by: FAMILY MEDICINE

## 2023-06-28 PROCEDURE — 3079F DIAST BP 80-89 MM HG: CPT | Mod: CPTII,S$GLB,, | Performed by: FAMILY MEDICINE

## 2023-06-28 PROCEDURE — 3079F PR MOST RECENT DIASTOLIC BLOOD PRESSURE 80-89 MM HG: ICD-10-PCS | Mod: CPTII,S$GLB,, | Performed by: FAMILY MEDICINE

## 2023-06-28 PROCEDURE — 3074F PR MOST RECENT SYSTOLIC BLOOD PRESSURE < 130 MM HG: ICD-10-PCS | Mod: CPTII,S$GLB,, | Performed by: FAMILY MEDICINE

## 2023-06-28 PROCEDURE — 99999 PR PBB SHADOW E&M-EST. PATIENT-LVL IV: ICD-10-PCS | Mod: PBBFAC,,, | Performed by: FAMILY MEDICINE

## 2023-06-28 PROCEDURE — 85025 COMPLETE CBC W/AUTO DIFF WBC: CPT | Performed by: FAMILY MEDICINE

## 2023-06-28 PROCEDURE — 82746 ASSAY OF FOLIC ACID SERUM: CPT | Performed by: FAMILY MEDICINE

## 2023-06-28 PROCEDURE — 3074F SYST BP LT 130 MM HG: CPT | Mod: CPTII,S$GLB,, | Performed by: FAMILY MEDICINE

## 2023-06-28 PROCEDURE — 1159F MED LIST DOCD IN RCRD: CPT | Mod: CPTII,S$GLB,, | Performed by: FAMILY MEDICINE

## 2023-06-28 PROCEDURE — 36415 COLL VENOUS BLD VENIPUNCTURE: CPT | Mod: PO | Performed by: FAMILY MEDICINE

## 2023-06-28 PROCEDURE — 84466 ASSAY OF TRANSFERRIN: CPT | Performed by: FAMILY MEDICINE

## 2023-06-28 PROCEDURE — 3008F PR BODY MASS INDEX (BMI) DOCUMENTED: ICD-10-PCS | Mod: CPTII,S$GLB,, | Performed by: FAMILY MEDICINE

## 2023-06-28 PROCEDURE — 82728 ASSAY OF FERRITIN: CPT | Performed by: FAMILY MEDICINE

## 2023-06-28 PROCEDURE — 3044F HG A1C LEVEL LT 7.0%: CPT | Mod: CPTII,S$GLB,, | Performed by: FAMILY MEDICINE

## 2023-06-28 PROCEDURE — 3008F BODY MASS INDEX DOCD: CPT | Mod: CPTII,S$GLB,, | Performed by: FAMILY MEDICINE

## 2023-06-28 PROCEDURE — 3044F PR MOST RECENT HEMOGLOBIN A1C LEVEL <7.0%: ICD-10-PCS | Mod: CPTII,S$GLB,, | Performed by: FAMILY MEDICINE

## 2023-06-28 PROCEDURE — 4010F PR ACE/ARB THEARPY RXD/TAKEN: ICD-10-PCS | Mod: CPTII,S$GLB,, | Performed by: FAMILY MEDICINE

## 2023-06-28 PROCEDURE — 1159F PR MEDICATION LIST DOCUMENTED IN MEDICAL RECORD: ICD-10-PCS | Mod: CPTII,S$GLB,, | Performed by: FAMILY MEDICINE

## 2023-06-28 PROCEDURE — 99214 PR OFFICE/OUTPT VISIT, EST, LEVL IV, 30-39 MIN: ICD-10-PCS | Mod: S$GLB,,, | Performed by: FAMILY MEDICINE

## 2023-06-28 PROCEDURE — 82607 VITAMIN B-12: CPT | Performed by: FAMILY MEDICINE

## 2023-06-28 PROCEDURE — 4010F ACE/ARB THERAPY RXD/TAKEN: CPT | Mod: CPTII,S$GLB,, | Performed by: FAMILY MEDICINE

## 2023-06-28 PROCEDURE — 99214 OFFICE O/P EST MOD 30 MIN: CPT | Mod: S$GLB,,, | Performed by: FAMILY MEDICINE

## 2023-06-28 PROCEDURE — 84443 ASSAY THYROID STIM HORMONE: CPT | Performed by: FAMILY MEDICINE

## 2023-06-28 RX ORDER — LOSARTAN POTASSIUM 100 MG/1
100 TABLET ORAL DAILY
Qty: 90 TABLET | Refills: 3 | Status: SHIPPED | OUTPATIENT
Start: 2023-06-28

## 2023-06-28 RX ORDER — HYDROCHLOROTHIAZIDE 12.5 MG/1
12.5 TABLET ORAL DAILY
Qty: 90 TABLET | Refills: 3 | Status: SHIPPED | OUTPATIENT
Start: 2023-06-28

## 2023-06-28 RX ORDER — HYDROCHLOROTHIAZIDE 12.5 MG/1
12.5 TABLET ORAL DAILY
COMMUNITY
End: 2023-06-28 | Stop reason: SDUPTHER

## 2023-06-28 RX ORDER — LOSARTAN POTASSIUM 100 MG/1
100 TABLET ORAL DAILY
COMMUNITY
End: 2023-06-28 | Stop reason: SDUPTHER

## 2023-06-29 ENCOUNTER — PATIENT MESSAGE (OUTPATIENT)
Dept: FAMILY MEDICINE | Facility: CLINIC | Age: 63
End: 2023-06-29
Payer: MEDICARE

## 2023-06-29 LAB
FERRITIN SERPL-MCNC: 39 NG/ML (ref 20–300)
FOLATE SERPL-MCNC: 13.4 NG/ML (ref 4–24)
TSH SERPL DL<=0.005 MIU/L-ACNC: 3.59 UIU/ML (ref 0.4–4)
VIT B12 SERPL-MCNC: 875 PG/ML (ref 210–950)

## 2023-06-29 RX ORDER — LEVOTHYROXINE SODIUM 50 UG/1
50 TABLET ORAL
Qty: 30 TABLET | Refills: 11 | Status: SHIPPED | OUTPATIENT
Start: 2023-06-29 | End: 2023-10-28 | Stop reason: SDUPTHER

## 2023-07-03 NOTE — PROGRESS NOTES
Assessment:       1. Primary hypertension    2. Other iron deficiency anemia    3. Other specified hypothyroidism    4. Numbness    5. Post-menopausal        Plan:       Primary hypertension:  Stable  -     losartan (COZAAR) 100 MG tablet; Take 1 tablet (100 mg total) by mouth once daily.  Dispense: 90 tablet; Refill: 3  -     hydroCHLOROthiazide (HYDRODIURIL) 12.5 MG Tab; Take 1 tablet (12.5 mg total) by mouth once daily.  Dispense: 90 tablet; Refill: 3    Other iron deficiency anemia:  Stable  -     CBC Auto Differential; Future; Expected date: 06/28/2023  -     Iron and TIBC; Future; Expected date: 06/28/2023  -     FERRITIN; Future; Expected date: 06/28/2023    Other specified hypothyroidism:  Stable  -     TSH; Future; Expected date: 06/28/2023    Numbness:  Stable  -     Vitamin B12; Future; Expected date: 06/28/2023  -     Folate; Future; Expected date: 06/28/2023    Post-menopausal:  -     DXA Bone Density Axial Skeleton 1 or more sites; Future; Expected date: 06/28/2023       Medications were refill, will order bone density test, will send a message when we have the results of the test.  The patient's BMI has been recorded in the chart. The patient has been provided educational materials regarding the benefits of attaining and maintaining a normal weight. We will continue to address and follow this issue during follow up visits.   Patient agreed with assessment and plan. Patient verbalized understanding.     Subjective:       Patient ID: Tina García is a 63 y.o. female.    Chief Complaint: Follow-up hypertension    HPI    Hypertension:  The patient is currently taking hydrochlorothiazide 12.5 mg daily, losartan 100 mg daily, she denies any side effects of the medication, the patient did not bring a log of the blood pressure readings from home.  Denies any symptoms of chest pain and shortness a breath at this office visit.    Iron deficiency anemia:  The patient last hemoglobin was 11.2, went  up slightly from 10.7.  The absorption of the iron was low, the ferritin levels were 39.  The patient denies any worsening symptoms of fatigue at this office visit.  Currently seen the rheumatologist.  The patient stated that she take vitamin-C plus iron together.    Hypothyroidism:  The patient is currently taking levothyroxine 50 mcg daily, denies any side effects of the medication, the patient last TSH levels were therapeutic.  The patient complains numbness sensation in her extremities.    Past medical history, past social history was reviewed and discussed with the patient.    Review of Systems   Constitutional:  Negative for activity change, appetite change and chills.   HENT:  Negative for congestion and ear discharge.    Eyes:  Negative for discharge and itching.   Respiratory:  Negative for choking and chest tightness.    Cardiovascular:  Negative for chest pain, palpitations and leg swelling.   Gastrointestinal:  Negative for abdominal distention, abdominal pain and constipation.   Endocrine: Negative for cold intolerance and heat intolerance.   Genitourinary:  Negative for dysuria and flank pain.   Musculoskeletal:  Positive for arthralgias and back pain.   Skin:  Negative for pallor and rash.   Allergic/Immunologic: Negative for environmental allergies and food allergies.   Neurological:  Positive for numbness. Negative for dizziness, facial asymmetry and headaches.   Hematological:  Negative for adenopathy. Does not bruise/bleed easily.   Psychiatric/Behavioral:  Negative for agitation, confusion, decreased concentration and sleep disturbance.      Objective:      Physical Exam  Vitals and nursing note reviewed.   Constitutional:       General: She is not in acute distress.     Appearance: Normal appearance. She is well-developed. She is obese. She is not diaphoretic.   HENT:      Head: Normocephalic and atraumatic.      Right Ear: External ear normal.      Left Ear: External ear normal.      Nose: Nose  normal.   Eyes:      General: No scleral icterus.        Right eye: No discharge.         Left eye: No discharge.      Conjunctiva/sclera: Conjunctivae normal.   Cardiovascular:      Rate and Rhythm: Normal rate and regular rhythm.      Heart sounds: Normal heart sounds.   Pulmonary:      Effort: Pulmonary effort is normal. No respiratory distress.      Breath sounds: Normal breath sounds. No wheezing.   Musculoskeletal:         General: No tenderness or deformity.      Cervical back: Neck supple.   Skin:     Coloration: Skin is not pale.      Findings: No erythema.   Neurological:      Mental Status: She is alert.   Psychiatric:         Behavior: Behavior normal.         Thought Content: Thought content normal.         Judgment: Judgment normal.

## 2023-07-12 ENCOUNTER — HOSPITAL ENCOUNTER (OUTPATIENT)
Dept: RADIOLOGY | Facility: HOSPITAL | Age: 63
Discharge: HOME | End: 2023-07-12
Attending: FAMILY MEDICINE
Payer: MEDICARE

## 2023-07-12 DIAGNOSIS — Z78.0 POST-MENOPAUSAL: ICD-10-CM

## 2023-07-12 PROCEDURE — 77080 DXA BONE DENSITY AXIAL: CPT | Mod: TC,PO

## 2023-07-12 PROCEDURE — 77080 DXA BONE DENSITY AXIAL SKELETON 1 OR MORE SITES: ICD-10-PCS | Mod: 26,,, | Performed by: RADIOLOGY

## 2023-07-12 PROCEDURE — 77080 DXA BONE DENSITY AXIAL: CPT | Mod: 26,,, | Performed by: RADIOLOGY

## 2023-07-16 DIAGNOSIS — L29.9 ITCHING: ICD-10-CM

## 2023-07-17 RX ORDER — HYDROXYZINE PAMOATE 25 MG/1
25 CAPSULE ORAL EVERY 8 HOURS PRN
Qty: 90 CAPSULE | Refills: 3 | Status: SHIPPED | OUTPATIENT
Start: 2023-07-17 | End: 2023-12-29

## 2023-08-12 ENCOUNTER — LAB VISIT (OUTPATIENT)
Dept: LAB | Facility: HOSPITAL | Age: 63
End: 2023-08-12
Attending: FAMILY MEDICINE
Payer: MEDICARE

## 2023-08-12 DIAGNOSIS — E09.9 DRUG OR CHEMICAL INDUCED DIABETES MELLITUS WITHOUT COMPLICATIONS: ICD-10-CM

## 2023-08-12 DIAGNOSIS — E09.638: ICD-10-CM

## 2023-08-12 DIAGNOSIS — Z79.899 IMMUNOCOMPROMISED STATE DUE TO DRUG THERAPY: ICD-10-CM

## 2023-08-12 DIAGNOSIS — K21.00 GASTROESOPHAGEAL REFLUX DISEASE WITH ESOPHAGITIS, UNSPECIFIED WHETHER HEMORRHAGE: ICD-10-CM

## 2023-08-12 DIAGNOSIS — D64.9 ANEMIA, UNSPECIFIED TYPE: ICD-10-CM

## 2023-08-12 DIAGNOSIS — D84.821 IMMUNOCOMPROMISED STATE DUE TO DRUG THERAPY: ICD-10-CM

## 2023-08-12 LAB
ALBUMIN SERPL BCP-MCNC: 3.6 G/DL (ref 3.5–5.2)
ALP SERPL-CCNC: 56 U/L (ref 55–135)
ALT SERPL W/O P-5'-P-CCNC: 20 U/L (ref 10–44)
ANION GAP SERPL CALC-SCNC: 11 MMOL/L (ref 8–16)
AST SERPL-CCNC: 20 U/L (ref 10–40)
BASOPHILS # BLD AUTO: 0.04 K/UL (ref 0–0.2)
BASOPHILS NFR BLD: 0.9 % (ref 0–1.9)
BILIRUB SERPL-MCNC: 0.2 MG/DL (ref 0.1–1)
BUN SERPL-MCNC: 14 MG/DL (ref 8–23)
C3 SERPL-MCNC: 140 MG/DL (ref 50–180)
C4 SERPL-MCNC: 24 MG/DL (ref 11–44)
CALCIUM SERPL-MCNC: 9.1 MG/DL (ref 8.7–10.5)
CHLORIDE SERPL-SCNC: 102 MMOL/L (ref 95–110)
CO2 SERPL-SCNC: 25 MMOL/L (ref 23–29)
CREAT SERPL-MCNC: 0.8 MG/DL (ref 0.5–1.4)
DIFFERENTIAL METHOD: ABNORMAL
EOSINOPHIL # BLD AUTO: 0.1 K/UL (ref 0–0.5)
EOSINOPHIL NFR BLD: 2.5 % (ref 0–8)
ERYTHROCYTE [DISTWIDTH] IN BLOOD BY AUTOMATED COUNT: 12.8 % (ref 11.5–14.5)
ERYTHROCYTE [SEDIMENTATION RATE] IN BLOOD BY PHOTOMETRIC METHOD: 21 MM/HR (ref 0–36)
EST. GFR  (NO RACE VARIABLE): >60 ML/MIN/1.73 M^2
GLUCOSE SERPL-MCNC: 97 MG/DL (ref 70–110)
HCT VFR BLD AUTO: 33.2 % (ref 37–48.5)
HGB BLD-MCNC: 10.9 G/DL (ref 12–16)
IMM GRANULOCYTES # BLD AUTO: 0.01 K/UL (ref 0–0.04)
IMM GRANULOCYTES NFR BLD AUTO: 0.2 % (ref 0–0.5)
IRON SERPL-MCNC: 81 UG/DL (ref 30–160)
LYMPHOCYTES # BLD AUTO: 1.7 K/UL (ref 1–4.8)
LYMPHOCYTES NFR BLD: 39.6 % (ref 18–48)
MCH RBC QN AUTO: 29.8 PG (ref 27–31)
MCHC RBC AUTO-ENTMCNC: 32.8 G/DL (ref 32–36)
MCV RBC AUTO: 91 FL (ref 82–98)
MONOCYTES # BLD AUTO: 0.5 K/UL (ref 0.3–1)
MONOCYTES NFR BLD: 11 % (ref 4–15)
NEUTROPHILS # BLD AUTO: 2 K/UL (ref 1.8–7.7)
NEUTROPHILS NFR BLD: 45.8 % (ref 38–73)
NRBC BLD-RTO: 0 /100 WBC
PLATELET # BLD AUTO: 264 K/UL (ref 150–450)
PMV BLD AUTO: 10.1 FL (ref 9.2–12.9)
POTASSIUM SERPL-SCNC: 4 MMOL/L (ref 3.5–5.1)
PROT SERPL-MCNC: 6.8 G/DL (ref 6–8.4)
RBC # BLD AUTO: 3.66 M/UL (ref 4–5.4)
SATURATED IRON: 21 % (ref 20–50)
SODIUM SERPL-SCNC: 138 MMOL/L (ref 136–145)
TOTAL IRON BINDING CAPACITY: 382 UG/DL (ref 250–450)
TRANSFERRIN SERPL-MCNC: 258 MG/DL (ref 200–375)
TRANSFERRIN SERPL-MCNC: 258 MG/DL (ref 200–375)
WBC # BLD AUTO: 4.37 K/UL (ref 3.9–12.7)

## 2023-08-12 PROCEDURE — 86162 COMPLEMENT TOTAL (CH50): CPT | Mod: HCNC | Performed by: INTERNAL MEDICINE

## 2023-08-12 PROCEDURE — 84466 ASSAY OF TRANSFERRIN: CPT | Mod: HCNC | Performed by: INTERNAL MEDICINE

## 2023-08-12 PROCEDURE — 86235 NUCLEAR ANTIGEN ANTIBODY: CPT | Mod: 59,HCNC | Performed by: INTERNAL MEDICINE

## 2023-08-12 PROCEDURE — 85652 RBC SED RATE AUTOMATED: CPT | Mod: HCNC | Performed by: INTERNAL MEDICINE

## 2023-08-12 PROCEDURE — 83540 ASSAY OF IRON: CPT | Mod: HCNC | Performed by: INTERNAL MEDICINE

## 2023-08-12 PROCEDURE — 86038 ANTINUCLEAR ANTIBODIES: CPT | Mod: HCNC | Performed by: INTERNAL MEDICINE

## 2023-08-12 PROCEDURE — 86235 NUCLEAR ANTIGEN ANTIBODY: CPT | Mod: HCNC | Performed by: INTERNAL MEDICINE

## 2023-08-12 PROCEDURE — 86039 ANTINUCLEAR ANTIBODIES (ANA): CPT | Mod: HCNC | Performed by: INTERNAL MEDICINE

## 2023-08-12 PROCEDURE — 86225 DNA ANTIBODY NATIVE: CPT | Mod: HCNC | Performed by: INTERNAL MEDICINE

## 2023-08-12 PROCEDURE — 85025 COMPLETE CBC W/AUTO DIFF WBC: CPT | Mod: HCNC | Performed by: INTERNAL MEDICINE

## 2023-08-12 PROCEDURE — 86160 COMPLEMENT ANTIGEN: CPT | Mod: 59,HCNC | Performed by: INTERNAL MEDICINE

## 2023-08-12 PROCEDURE — 80053 COMPREHEN METABOLIC PANEL: CPT | Mod: HCNC | Performed by: INTERNAL MEDICINE

## 2023-08-12 PROCEDURE — 83516 IMMUNOASSAY NONANTIBODY: CPT | Mod: HCNC | Performed by: INTERNAL MEDICINE

## 2023-08-12 PROCEDURE — 36415 COLL VENOUS BLD VENIPUNCTURE: CPT | Mod: HCNC,PO | Performed by: INTERNAL MEDICINE

## 2023-08-12 PROCEDURE — 86160 COMPLEMENT ANTIGEN: CPT | Mod: HCNC | Performed by: INTERNAL MEDICINE

## 2023-08-14 LAB
ANA PATTERN 1: NORMAL
ANA PATTERN 2: NORMAL
ANA SER QL IF: POSITIVE
ANA TITER 2: NORMAL
ANA TITR SER IF: NORMAL {TITER}
DSDNA AB SER-ACNC: NORMAL [IU]/ML

## 2023-08-15 LAB
ANTI SM ANTIBODY: 0.07 RATIO (ref 0–0.99)
ANTI SM/RNP ANTIBODY: 0.09 RATIO (ref 0–0.99)
ANTI-SM INTERPRETATION: NEGATIVE
ANTI-SM/RNP INTERPRETATION: NEGATIVE
ANTI-SSA ANTIBODY: 0.04 RATIO (ref 0–0.99)
ANTI-SSA INTERPRETATION: NEGATIVE
ANTI-SSB ANTIBODY: 0.08 RATIO (ref 0–0.99)
ANTI-SSB INTERPRETATION: NEGATIVE

## 2023-08-16 LAB — CH50 SERPL-ACNC: 66 U/ML (ref 42–95)

## 2023-08-17 LAB
ENA SCL70 AB SER-ACNC: <0.6 U/ML
HISTONE IGG SER IA-ACNC: 0.3 UNITS (ref 0–0.9)

## 2023-09-04 ENCOUNTER — PATIENT MESSAGE (OUTPATIENT)
Dept: RHEUMATOLOGY | Facility: CLINIC | Age: 63
End: 2023-09-04
Payer: MEDICARE

## 2023-09-21 ENCOUNTER — OFFICE VISIT (OUTPATIENT)
Dept: RHEUMATOLOGY | Facility: CLINIC | Age: 63
End: 2023-09-21
Payer: MEDICARE

## 2023-09-21 VITALS
BODY MASS INDEX: 32.61 KG/M2 | HEIGHT: 64 IN | HEART RATE: 87 BPM | WEIGHT: 191 LBS | SYSTOLIC BLOOD PRESSURE: 153 MMHG | DIASTOLIC BLOOD PRESSURE: 81 MMHG

## 2023-09-21 DIAGNOSIS — J20.9 ACUTE BRONCHITIS, UNSPECIFIED ORGANISM: ICD-10-CM

## 2023-09-21 DIAGNOSIS — G89.4 CHRONIC PAIN SYNDROME: ICD-10-CM

## 2023-09-21 DIAGNOSIS — E06.3 HASHIMOTO'S THYROIDITIS: ICD-10-CM

## 2023-09-21 DIAGNOSIS — M35.00 SJOGREN'S SYNDROME, WITH UNSPECIFIED ORGAN INVOLVEMENT: ICD-10-CM

## 2023-09-21 DIAGNOSIS — M34.1 CREST (CALCINOSIS, RAYNAUD'S PHENOMENON, ESOPHAGEAL DYSFUNCTION, SCLERODACTYLY, TELANGIECTASIA): Primary | ICD-10-CM

## 2023-09-21 DIAGNOSIS — I27.20 PULMONARY HYPERTENSION: ICD-10-CM

## 2023-09-21 DIAGNOSIS — K21.9 GASTROESOPHAGEAL REFLUX DISEASE, UNSPECIFIED WHETHER ESOPHAGITIS PRESENT: ICD-10-CM

## 2023-09-21 DIAGNOSIS — J40 BRONCHITIS: ICD-10-CM

## 2023-09-21 DIAGNOSIS — B37.9 YEAST INFECTION: ICD-10-CM

## 2023-09-21 DIAGNOSIS — M34.1 CREST (CALCINOSIS, RAYNAUD'S PHENOMENON, ESOPHAGEAL DYSFUNCTION, SCLERODACTYLY, TELANGIECTASIA): ICD-10-CM

## 2023-09-21 DIAGNOSIS — E09.9 DRUG OR CHEMICAL INDUCED DIABETES MELLITUS WITHOUT COMPLICATIONS: ICD-10-CM

## 2023-09-21 DIAGNOSIS — R11.0 NAUSEA: ICD-10-CM

## 2023-09-21 PROCEDURE — 1160F PR REVIEW ALL MEDS BY PRESCRIBER/CLIN PHARMACIST DOCUMENTED: ICD-10-PCS | Mod: HCNC,CPTII,S$GLB, | Performed by: PHYSICIAN ASSISTANT

## 2023-09-21 PROCEDURE — 99214 OFFICE O/P EST MOD 30 MIN: CPT | Mod: 25,HCNC,S$GLB, | Performed by: PHYSICIAN ASSISTANT

## 2023-09-21 PROCEDURE — 1159F PR MEDICATION LIST DOCUMENTED IN MEDICAL RECORD: ICD-10-PCS | Mod: HCNC,CPTII,S$GLB, | Performed by: PHYSICIAN ASSISTANT

## 2023-09-21 PROCEDURE — 99999 PR PBB SHADOW E&M-EST. PATIENT-LVL V: ICD-10-PCS | Mod: PBBFAC,HCNC,, | Performed by: PHYSICIAN ASSISTANT

## 2023-09-21 PROCEDURE — 3044F PR MOST RECENT HEMOGLOBIN A1C LEVEL <7.0%: ICD-10-PCS | Mod: HCNC,CPTII,S$GLB, | Performed by: PHYSICIAN ASSISTANT

## 2023-09-21 PROCEDURE — 96372 THER/PROPH/DIAG INJ SC/IM: CPT | Mod: HCNC,S$GLB,, | Performed by: PHYSICIAN ASSISTANT

## 2023-09-21 PROCEDURE — 3008F PR BODY MASS INDEX (BMI) DOCUMENTED: ICD-10-PCS | Mod: HCNC,CPTII,S$GLB, | Performed by: PHYSICIAN ASSISTANT

## 2023-09-21 PROCEDURE — 99214 PR OFFICE/OUTPT VISIT, EST, LEVL IV, 30-39 MIN: ICD-10-PCS | Mod: 25,HCNC,S$GLB, | Performed by: PHYSICIAN ASSISTANT

## 2023-09-21 PROCEDURE — 3008F BODY MASS INDEX DOCD: CPT | Mod: HCNC,CPTII,S$GLB, | Performed by: PHYSICIAN ASSISTANT

## 2023-09-21 PROCEDURE — 4010F PR ACE/ARB THEARPY RXD/TAKEN: ICD-10-PCS | Mod: HCNC,CPTII,S$GLB, | Performed by: PHYSICIAN ASSISTANT

## 2023-09-21 PROCEDURE — 96372 PR INJECTION,THERAP/PROPH/DIAG2ST, IM OR SUBCUT: ICD-10-PCS | Mod: HCNC,S$GLB,, | Performed by: PHYSICIAN ASSISTANT

## 2023-09-21 PROCEDURE — 4010F ACE/ARB THERAPY RXD/TAKEN: CPT | Mod: HCNC,CPTII,S$GLB, | Performed by: PHYSICIAN ASSISTANT

## 2023-09-21 PROCEDURE — 3077F SYST BP >= 140 MM HG: CPT | Mod: HCNC,CPTII,S$GLB, | Performed by: PHYSICIAN ASSISTANT

## 2023-09-21 PROCEDURE — 1159F MED LIST DOCD IN RCRD: CPT | Mod: HCNC,CPTII,S$GLB, | Performed by: PHYSICIAN ASSISTANT

## 2023-09-21 PROCEDURE — 99999 PR PBB SHADOW E&M-EST. PATIENT-LVL V: CPT | Mod: PBBFAC,HCNC,, | Performed by: PHYSICIAN ASSISTANT

## 2023-09-21 PROCEDURE — 3044F HG A1C LEVEL LT 7.0%: CPT | Mod: HCNC,CPTII,S$GLB, | Performed by: PHYSICIAN ASSISTANT

## 2023-09-21 PROCEDURE — 3077F PR MOST RECENT SYSTOLIC BLOOD PRESSURE >= 140 MM HG: ICD-10-PCS | Mod: HCNC,CPTII,S$GLB, | Performed by: PHYSICIAN ASSISTANT

## 2023-09-21 PROCEDURE — 3079F DIAST BP 80-89 MM HG: CPT | Mod: HCNC,CPTII,S$GLB, | Performed by: PHYSICIAN ASSISTANT

## 2023-09-21 PROCEDURE — 3079F PR MOST RECENT DIASTOLIC BLOOD PRESSURE 80-89 MM HG: ICD-10-PCS | Mod: HCNC,CPTII,S$GLB, | Performed by: PHYSICIAN ASSISTANT

## 2023-09-21 PROCEDURE — 1160F RVW MEDS BY RX/DR IN RCRD: CPT | Mod: HCNC,CPTII,S$GLB, | Performed by: PHYSICIAN ASSISTANT

## 2023-09-21 RX ORDER — ESOMEPRAZOLE MAGNESIUM 40 MG/1
40 CAPSULE, DELAYED RELEASE ORAL 2 TIMES DAILY
Qty: 60 CAPSULE | Refills: 11 | Status: SHIPPED | OUTPATIENT
Start: 2023-09-21 | End: 2024-09-20

## 2023-09-21 RX ORDER — TIRZEPATIDE 2.5 MG/.5ML
2.5 INJECTION, SOLUTION SUBCUTANEOUS
Qty: 4 PEN | Refills: 3 | Status: SHIPPED | OUTPATIENT
Start: 2023-09-21 | End: 2024-01-11

## 2023-09-21 RX ORDER — METHYLPREDNISOLONE 4 MG/1
TABLET ORAL
Qty: 1 EACH | Refills: 0 | Status: SHIPPED | OUTPATIENT
Start: 2023-09-21

## 2023-09-21 RX ORDER — PROMETHAZINE HYDROCHLORIDE 25 MG/1
25 TABLET ORAL EVERY 6 HOURS PRN
Qty: 30 TABLET | Refills: 3 | Status: SHIPPED | OUTPATIENT
Start: 2023-09-21

## 2023-09-21 RX ORDER — FAMOTIDINE 40 MG/1
40 TABLET, FILM COATED ORAL NIGHTLY PRN
Qty: 30 TABLET | Refills: 11 | Status: SHIPPED | OUTPATIENT
Start: 2023-09-21 | End: 2024-09-20

## 2023-09-21 RX ORDER — NYSTATIN 100000 [USP'U]/ML
4 SUSPENSION ORAL
Qty: 160 ML | Refills: 0 | Status: SHIPPED | OUTPATIENT
Start: 2023-09-21 | End: 2023-10-01

## 2023-09-21 RX ORDER — CYANOCOBALAMIN 1000 UG/ML
1000 INJECTION, SOLUTION INTRAMUSCULAR; SUBCUTANEOUS
Status: COMPLETED | OUTPATIENT
Start: 2023-09-21 | End: 2023-09-21

## 2023-09-21 RX ORDER — ONDANSETRON 4 MG/1
8 TABLET, ORALLY DISINTEGRATING ORAL 3 TIMES DAILY PRN
Qty: 30 TABLET | Refills: 3 | Status: SHIPPED | OUTPATIENT
Start: 2023-09-21

## 2023-09-21 RX ORDER — FLUCONAZOLE 150 MG/1
150 TABLET ORAL DAILY
Qty: 7 TABLET | Refills: 5 | Status: SHIPPED | OUTPATIENT
Start: 2023-09-21

## 2023-09-21 RX ORDER — CYCLOBENZAPRINE HCL 10 MG
10 TABLET ORAL 3 TIMES DAILY PRN
Qty: 90 TABLET | Refills: 3 | Status: SHIPPED | OUTPATIENT
Start: 2023-09-21 | End: 2024-01-11 | Stop reason: SDUPTHER

## 2023-09-21 RX ORDER — AZITHROMYCIN 250 MG/1
TABLET, FILM COATED ORAL
Qty: 6 TABLET | Refills: 0 | Status: SHIPPED | OUTPATIENT
Start: 2023-09-21

## 2023-09-21 RX ORDER — KETOROLAC TROMETHAMINE 30 MG/ML
60 INJECTION, SOLUTION INTRAMUSCULAR; INTRAVENOUS
Status: COMPLETED | OUTPATIENT
Start: 2023-09-21 | End: 2023-09-21

## 2023-09-21 RX ADMIN — KETOROLAC TROMETHAMINE 60 MG: 30 INJECTION, SOLUTION INTRAMUSCULAR; INTRAVENOUS at 03:09

## 2023-09-21 RX ADMIN — CYANOCOBALAMIN 1000 MCG: 1000 INJECTION, SOLUTION INTRAMUSCULAR; SUBCUTANEOUS at 03:09

## 2023-09-21 NOTE — PROGRESS NOTES
Subjective:       Patient ID: Tina García is a 63 y.o. female.    Chief Complaint: Disease Management    Mrs. García is a 63 year old female who presents to clinic visit for follow up on CREST syndrome.      GI: Anti reflux surgery was being considered but the patient was found to have aperistalsis of the esophagus on esophageal manometry.  PPI was changed to nexium. If they are unable to control her symptoms then will consider foregut surgeons to discuss anti reflux surgery.  Recommend EGD with [facility] to prove that she has GERD prior to sending to surgery as she has had only LA grade A or B esophagitis on endoscopy in the past. She has been unable to completed EGD w/[facility] since she can not hold PPI for 96 hours due to severity of her symptoms.   9/23--No further progress with this since she is unable to hold PPIs due to severity of symptoms.    Thyroid function tests are stable. Hair growth is improving with natural hair products.    She is off imuran since she had covid infection in December 2021.     Raynauds is stable at this time with conservative measures. She has not taken revatio recently.     She has not been able to restart GLP1 due to insurnace denial.      She has puffiness and stiffness in her hands, which seems to respond to voltaren gel. She is taking tramadol PRN for severe pain.     We reviewed her recent labs, which are stable.     She is due for ECHO, PFTs.    Current tx:  1. plaquenil  2. revatio PRN  3. Flexeril PRN      Review of Systems   Constitutional:  Positive for unexpected weight change. Negative for activity change, appetite change, chills, fatigue and fever.   HENT:  Negative for mouth sores and trouble swallowing.    Eyes:  Negative for redness and visual disturbance.   Respiratory:  Negative for cough, shortness of breath and wheezing.    Cardiovascular:  Negative for chest pain, palpitations and leg swelling.   Gastrointestinal:  Positive for constipation. Negative  for abdominal pain, diarrhea, nausea and vomiting.        Reflux sx   Genitourinary:  Negative for dysuria and genital sores.   Musculoskeletal:  Positive for arthralgias, back pain, gait problem and myalgias. Negative for joint swelling.   Skin:  Negative for rash.   Allergic/Immunologic: Negative for immunocompromised state.   Neurological:  Negative for dizziness, weakness, light-headedness and headaches.   Hematological:  Does not bruise/bleed easily.         Objective:     Vitals:    09/21/23 1407   BP: (!) 153/81   Pulse: 87       Past Medical History:   Diagnosis Date    Anxiety     AR (allergic rhinitis)     Arthritis     rheumatoid?    Chronic constipation     Colon polyp     CREST syndrome     Depression     Dry eyes     Dry mouth     Esophageal dysmotility     Fibrocystic breast     Fibromyalgia     Food intolerance     GERD (gastroesophageal reflux disease)     HTN (hypertension)     Irritable bowel syndrome     Raynaud disease     Right wrist fracture     nov 2013    SOB (shortness of breath) on exertion     Thyroid disease     hypothyroid, thyroid cyst on U/S    Ulcer     Vitamin D deficiency disease      Past Surgical History:   Procedure Laterality Date    COLONOSCOPY  7/2012    one polyp removed, biospy=TUBULOVILLOUS ADENOMA, otherwise normal findings, repeat in 4-5 years for surveillance    COLONOSCOPY N/A 4/17/2019    Procedure: COLONOSCOPY;  Surgeon: Rich Kingsley Jr., MD;  Location: [facility];  Service: Endoscopy;  Laterality: N/A;    COLONOSCOPY W/ POLYPECTOMY  7/24/2012  [facility]    4-5 mm sigmoid polyp.    CYST REMOVAL      pilonidal    DILATION AND CURETTAGE OF UTERUS      ESOPHAGEAL MANOMETRY WITH MEASUREMENT OF IMPEDANCE N/A 2/4/2022    Procedure: MANOMETRY, ESOPHAGUS, WITH IMPEDANCE MEASUREMENT;  Surgeon: Kami Cobos MD;  Location: [address];  Service: Endoscopy;  Laterality: N/A;  COVID test on 2/1 at [facility]    ESOPHAGOGASTRODUODENOSCOPY N/A 4/17/2019     Procedure: EGD (ESOPHAGOGASTRODUODENOSCOPY);  Surgeon: Rich Kingsley Jr., MD;  Location: [facility];  Service: Endoscopy;  Laterality: N/A;    ESOPHAGOGASTRODUODENOSCOPY N/A 12/3/2021    Procedure: EGD (ESOPHAGOGASTRODUODENOSCOPY);  Surgeon: Rich Kingsley Jr., MD;  Location: [facility];  Service: Endoscopy;  Laterality: N/A;    GANGLION CYST EXCISION      right thumb    HYSTERECTOMY  2008    complete    TOTAL ABDOMINAL HYSTERECTOMY W/ BILATERAL SALPINGOOPHORECTOMY  2008    w/Hysterectomy    UPPER GASTROINTESTINAL ENDOSCOPY  12/23/2011  [facility]    Fluid in the lower third of the esophagus  (stasis).  The esophageal examination was compatible with scleroderma.  (Dilataion done, 51 Fr.)    LA Grade A reflux esophagitis.   Enlarged gastric folds.   Multiple gastric polyps.   Antritis (erythematous streaks).  JOELLEN Test  Negative.          Physical Exam   Constitutional: She is oriented to person, place, and time.   Eyes: Right conjunctiva is not injected. Left conjunctiva is not injected.   Neck: No JVD present. No thyromegaly present.   Cardiovascular: Normal rate.   Pulmonary/Chest: Effort normal.   Musculoskeletal:         General: Swelling (puffiness of fingers) and tenderness present.      Right shoulder: Normal.      Left shoulder: Normal.      Right elbow: Normal.      Left elbow: Normal.      Right wrist: Normal.      Left wrist: Normal.      Right knee: Normal.      Left knee: Normal.   Lymphadenopathy:     She has no cervical adenopathy.   Neurological: She is alert and oriented to person, place, and time. Gait normal.   Skin: No rash noted.   Psychiatric: Mood and affect normal.       Right Side Rheumatological Exam     Examination finds the shoulder, elbow, wrist, knee, 1st MCP, 2nd MCP, 3rd MCP, 4th MCP and 5th MCP normal.    The patient has an enlarged 1st PIP, 2nd PIP, 3rd PIP, 4th PIP and 5th PIP    Left Side Rheumatological Exam     Examination finds the shoulder, elbow, wrist, knee, 1st MCP, 2nd MCP,  3rd MCP, 4th MCP and 5th MCP normal.    The patient has an enlarged 1st PIP, 2nd PIP, 3rd PIP, 4th PIP and 5th PIP.          Recent labs:  Component      Latest Ref Rng 8/12/2023   WBC      3.90 - 12.70 K/uL 4.37    RBC      4.00 - 5.40 M/uL 3.66 (L)    Hemoglobin      12.0 - 16.0 g/dL 10.9 (L)    Hematocrit      37.0 - 48.5 % 33.2 (L)    MCV      82 - 98 fL 91    MCH      27.0 - 31.0 pg 29.8    MCHC      32.0 - 36.0 g/dL 32.8    RDW      11.5 - 14.5 % 12.8    Platelets      150 - 450 K/uL 264    MPV      9.2 - 12.9 fL 10.1    Immature Granulocytes      0.0 - 0.5 % 0.2    Gran # (ANC)      1.8 - 7.7 K/uL 2.0    Immature Grans (Abs)      0.00 - 0.04 K/uL 0.01    Lymph #      1.0 - 4.8 K/uL 1.7    Mono #      0.3 - 1.0 K/uL 0.5    Eos #      0.0 - 0.5 K/uL 0.1    Baso #      0.00 - 0.20 K/uL 0.04    nRBC      0 /100 WBC 0    Gran %      38.0 - 73.0 % 45.8    Lymph %      18.0 - 48.0 % 39.6    Mono %      4.0 - 15.0 % 11.0    Eosinophil %      0.0 - 8.0 % 2.5    Basophil %      0.0 - 1.9 % 0.9    Differential Method Automated    Sodium      136 - 145 mmol/L 138    Potassium      3.5 - 5.1 mmol/L 4.0    Chloride      95 - 110 mmol/L 102    CO2      23 - 29 mmol/L 25    Glucose      70 - 110 mg/dL 97    BUN      8 - 23 mg/dL 14    Creatinine      0.5 - 1.4 mg/dL 0.8    Calcium      8.7 - 10.5 mg/dL 9.1    PROTEIN TOTAL      6.0 - 8.4 g/dL 6.8    Albumin      3.5 - 5.2 g/dL 3.6    BILIRUBIN TOTAL      0.1 - 1.0 mg/dL 0.2    Alkaline Phosphatase      55 - 135 U/L 56    AST      10 - 40 U/L 20    ALT      10 - 44 U/L 20    eGFR      >60 mL/min/1.73 m^2 >60.0    Anion Gap      8 - 16 mmol/L 11    Iron      30 - 160 ug/dL 81    Transferrin      200 - 375 mg/dL 258    Transferrin      200 - 375 mg/dL 258    TIBC      250 - 450 ug/dL 382    Saturated Iron      20 - 50 % 21    Anti-SSB Antibody      0.00 - 0.99 Ratio 0.08    Anti-SSB Interpretation      Negative  Negative    Anti-SSA Antibody      0.00 - 0.99 Ratio 0.04     Anti-SSA Interpretation      Negative  Negative    Anti Sm Antibody      0.00 - 0.99 Ratio 0.07    Anti-Sm Interpretation      Negative  Negative    Anti Sm/RNP Antibody      0.00 - 0.99 Ratio 0.09    Anti-Sm/RNP Interpretation      Negative  Negative    Complement,Total, Serum      42 - 95 U/mL 66    Complement (C-4)      11 - 44 mg/dL 24    Complement (C-3)      50 - 180 mg/dL 140    Sed Rate      0 - 36 mm/Hr 21    Scleroderma SCL-      <7.0 U/mL <0.6    Anti-Histone Antibody      0.0 - 0.9 Units 0.3    ds DNA Ab      Negative 1:10  Negative 1:10    JAMILA Screen      Negative <1:80  Positive !    JAMILA Pattern 2 Centromere    JAMILA Titer 1 1:160    JAMILA PATTERN 1 Speckled    JAMILA Titer 2 >=1:2560       Legend:  (L) Low  ! Abnormal  Assessment:       1. CREST (calcinosis, Raynaud's phenomenon, esophageal dysfunction, sclerodactyly, telangiectasia)    2. Sjogren's syndrome, with unspecified organ involvement    3. Gastroesophageal reflux disease, unspecified whether esophagitis present    4. Nausea    5. Acute bronchitis, unspecified organism    6. Yeast infection    7. 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 Pulmonary hypertension              Plan:       CREST (calcinosis, Raynaud's phenomenon, esophageal dysfunction, sclerodactyly, telangiectasia)  -     cyclobenzaprine (FLEXERIL) 10 MG tablet; Take 1 tablet (10 mg total) by mouth 3 (three) times daily as needed for Muscle spasms.  Dispense: 90 tablet; Refill: 3  -     Complete PFT w/ bronchodilator; Future  -     Echo; Future  -     ketorolac injection 60 mg  -     cyanocobalamin injection 1,000 mcg  -     CBC Auto Differential; Future; Expected date: 09/21/2023  -     Comprehensive Metabolic Panel; Future; Expected date: 09/21/2023  -     C-Reactive Protein; Future; Expected date: 09/21/2023  -     Sedimentation rate; Future; Expected date: 09/21/2023    Sjogren's syndrome, with unspecified organ involvement  -     cyclobenzaprine  (FLEXERIL) 10 MG tablet; Take 1 tablet (10 mg total) by mouth 3 (three) times daily as needed for Muscle spasms.  Dispense: 90 tablet; Refill: 3  -     CBC Auto Differential; Future; Expected date: 09/21/2023  -     Comprehensive Metabolic Panel; Future; Expected date: 09/21/2023  -     C-Reactive Protein; Future; Expected date: 09/21/2023  -     Sedimentation rate; Future; Expected date: 09/21/2023    Gastroesophageal reflux disease, unspecified whether esophagitis present  -     esomeprazole (NEXIUM) 40 MG capsule; Take 1 capsule (40 mg total) by mouth 2 (two) times daily.  Dispense: 60 capsule; Refill: 11  -     promethazine (PHENERGAN) 25 MG tablet; Take 1 tablet (25 mg total) by mouth every 6 (six) hours as needed for Nausea.  Dispense: 30 tablet; Refill: 3  -     ondansetron (ZOFRAN-ODT) 4 MG TbDL; Take 2 tablets (8 mg total) by mouth 3 (three) times daily as needed.  Dispense: 30 tablet; Refill: 3  -     famotidine (PEPCID) 40 MG tablet; Take 1 tablet (40 mg total) by mouth nightly as needed (For heartburn and acid reflux symptoms not controlled with Omeprazole twice daily).  Dispense: 30 tablet; Refill: 11    Nausea    Acute bronchitis, unspecified organism  -     azithromycin (Z-NATALIE) 250 MG tablet; Take 2 tablets by mouth on day 1; Take 1 tablet by mouth on days 2-5  Dispense: 6 tablet; Refill: 0  -     methylPREDNISolone (MEDROL DOSEPACK) 4 mg tablet; use as directed  Dispense: 1 each; Refill: 0    Yeast infection  -     fluconazole (DIFLUCAN) 150 MG Tab; Take 1 tablet (150 mg total) by mouth once daily.  Dispense: 7 tablet; Refill: 5  -     nystatin (MYCOSTATIN) 100,000 unit/mL suspension; Take 4 mLs (400,000 Units total) by mouth 4 (four) times daily with meals and nightly. for 10 days  Dispense: 160 mL; Refill: 0    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 complications  -     tirzepatide (MOUNJARO) 2.5 mg/0.5 mL PnIj; Inject 2.5 mg into the skin every 7 days.  Dispense: 4 pen ; Refill:  3    Pulmonary hypertension  -     Complete PFT w/ bronchodilator; Future  -     Echo; Future          Assessment:  63 year old female with  CREST syndrome (raynauds on revatio PRN, esophageal dysfunction/GERD on ppi/zantac, sclerodactyly hands), Sjogren's syndrome   --chronic pain syndrome on tramadol  --hx of Hashimoto's thyroiditis on levothyroxine  --hx of pulmonary HTN  --chronic normocytic anemia    Plan:  1. Cont. Plaquenil bid, revatio prn  2. Cont. Tramadol PRN  severe pain per Dr. Fry. I have  check [place] prescription monitoring program site and no unusual or abnormal behavior has occurred pt understand the risk and benefits of taking opioid medications and has decided to continue the  medication   3.Echo, PFTs  4. Zpack if needed for infection, nystatin/diflucan for yeast infection  5. Mounjaro 2.5 mg once weekly  6. Toradol 60, b12  7. Follow up with GI to eval for bleeding due to persistent anemia    Follow up:  4 months Dr. Fry w/labs prior

## 2023-09-22 RX ORDER — TRAMADOL HYDROCHLORIDE 50 MG/1
50 TABLET ORAL EVERY 6 HOURS PRN
Qty: 120 TABLET | Refills: 5 | Status: SHIPPED | OUTPATIENT
Start: 2023-09-22 | End: 2024-01-11 | Stop reason: SDUPTHER

## 2023-10-28 DIAGNOSIS — E03.8 OTHER SPECIFIED HYPOTHYROIDISM: Primary | ICD-10-CM

## 2023-10-28 NOTE — TELEPHONE ENCOUNTER
No care due was identified.  [facility] Embedded Care Due Messages. Reference number: [id number].   10/28/2023 4:23:20 PM CDT

## 2023-10-29 RX ORDER — LEVOTHYROXINE SODIUM 50 UG/1
50 TABLET ORAL
Qty: 30 TABLET | Refills: 2 | Status: SHIPPED | OUTPATIENT
Start: 2023-10-29 | End: 2024-01-19

## 2023-10-29 NOTE — TELEPHONE ENCOUNTER
Refill Decision Note   Tina García  is requesting a refill authorization.  Brief Assessment and Rationale for Refill:  Approve     Medication Therapy Plan:         Comments:     Note composed:7:37 AM 10/29/2023

## 2023-11-08 ENCOUNTER — PATIENT MESSAGE (OUTPATIENT)
Dept: GASTROENTEROLOGY | Facility: CLINIC | Age: 63
End: 2023-11-08
Payer: MEDICARE

## 2023-11-16 ENCOUNTER — OFFICE VISIT (OUTPATIENT)
Dept: FAMILY MEDICINE | Facility: CLINIC | Age: 63
End: 2023-11-16
Payer: MEDICARE

## 2023-11-16 ENCOUNTER — CLINICAL SUPPORT (OUTPATIENT)
Dept: CARDIOLOGY | Facility: HOSPITAL | Age: 63
End: 2023-11-16
Attending: PHYSICIAN ASSISTANT
Payer: MEDICARE

## 2023-11-16 VITALS
BODY MASS INDEX: 31.2 KG/M2 | DIASTOLIC BLOOD PRESSURE: 79 MMHG | HEIGHT: 64 IN | OXYGEN SATURATION: 98 % | SYSTOLIC BLOOD PRESSURE: 126 MMHG | WEIGHT: 182.75 LBS | HEART RATE: 87 BPM | HEIGHT: 64 IN | BODY MASS INDEX: 32.61 KG/M2 | WEIGHT: 191 LBS

## 2023-11-16 DIAGNOSIS — I27.20 PULMONARY HYPERTENSION: ICD-10-CM

## 2023-11-16 DIAGNOSIS — M34.1 CREST (CALCINOSIS, RAYNAUD'S PHENOMENON, ESOPHAGEAL DYSFUNCTION, SCLERODACTYLY, TELANGIECTASIA): ICD-10-CM

## 2023-11-16 DIAGNOSIS — R73.03 PREDIABETES: Primary | ICD-10-CM

## 2023-11-16 DIAGNOSIS — I10 PRIMARY HYPERTENSION: ICD-10-CM

## 2023-11-16 LAB
ASCENDING AORTA: 2.41 CM
AV INDEX (PROSTH): 0.9
AV MEAN GRADIENT: 3 MMHG
AV PEAK GRADIENT: 6 MMHG
AV VALVE AREA BY VELOCITY RATIO: 2.44 CM²
AV VALVE AREA: 2.59 CM²
AV VELOCITY RATIO: 0.85
BSA FOR ECHO PROCEDURE: 1.98 M2
CV ECHO LV RWT: 0.42 CM
DOP CALC AO PEAK VEL: 1.21 M/S
DOP CALC AO VTI: 22.1 CM
DOP CALC LVOT AREA: 2.9 CM2
DOP CALC LVOT DIAMETER: 1.91 CM
DOP CALC LVOT PEAK VEL: 1.03 M/S
DOP CALC LVOT STROKE VOLUME: 57.28 CM3
DOP CALCLVOT PEAK VEL VTI: 20 CM
E WAVE DECELERATION TIME: 184.93 MSEC
E/A RATIO: 0.89
E/E' RATIO: 8 M/S
ECHO LV POSTERIOR WALL: 0.89 CM (ref 0.6–1.1)
EJECTION FRACTION: 60 %
FRACTIONAL SHORTENING: 31 % (ref 28–44)
INTERVENTRICULAR SEPTUM: 1.04 CM (ref 0.6–1.1)
IVRT: 76.12 MSEC
LEFT ATRIUM SIZE: 3.22 CM
LEFT ATRIUM VOLUME INDEX MOD: 13.5 ML/M2
LEFT ATRIUM VOLUME MOD: 25.99 CM3
LEFT INTERNAL DIMENSION IN SYSTOLE: 2.9 CM (ref 2.1–4)
LEFT VENTRICLE DIASTOLIC VOLUME INDEX: 41.73 ML/M2
LEFT VENTRICLE DIASTOLIC VOLUME: 80.12 ML
LEFT VENTRICLE MASS INDEX: 69 G/M2
LEFT VENTRICLE SYSTOLIC VOLUME INDEX: 16.7 ML/M2
LEFT VENTRICLE SYSTOLIC VOLUME: 32.08 ML
LEFT VENTRICULAR INTERNAL DIMENSION IN DIASTOLE: 4.23 CM (ref 3.5–6)
LEFT VENTRICULAR MASS: 132.1 G
LV LATERAL E/E' RATIO: 6.4 M/S
LV SEPTAL E/E' RATIO: 10.67 M/S
LVOT MG: 2.06 MMHG
LVOT MV: 0.66 CM/S
MV PEAK A VEL: 0.72 M/S
MV PEAK E VEL: 0.64 M/S
MV STENOSIS PRESSURE HALF TIME: 53.63 MS
MV VALVE AREA P 1/2 METHOD: 4.1 CM2
PISA TR MAX VEL: 1.58 M/S
PULM VEIN S/D RATIO: 1.31
PV PEAK D VEL: 0.36 M/S
PV PEAK S VEL: 0.47 M/S
RA MAJOR: 4.18 CM
RA PRESSURE ESTIMATED: 3 MMHG
RA WIDTH: 2.9 CM
RIGHT VENTRICULAR END-DIASTOLIC DIMENSION: 3.06 CM
RIGHT VENTRICULAR LENGTH IN DIASTOLE (APICAL 4-CHAMBER VIEW): 5.16 CM
RV MID DIAMA: 1.72 CM
RV TB RVSP: 5 MMHG
RV TISSUE DOPPLER FREE WALL SYSTOLIC VELOCITY 1 (APICAL 4 CHAMBER VIEW): 11.75 CM/S
SINUS: 2.95 CM
STJ: 2.18 CM
TDI LATERAL: 0.1 M/S
TDI SEPTAL: 0.06 M/S
TDI: 0.08 M/S
TR MAX PG: 10 MMHG
TRICUSPID ANNULAR PLANE SYSTOLIC EXCURSION: 2.22 CM
TV REST PULMONARY ARTERY PRESSURE: 13 MMHG
Z-SCORE OF LEFT VENTRICULAR DIMENSION IN END DIASTOLE: -2.48
Z-SCORE OF LEFT VENTRICULAR DIMENSION IN END SYSTOLE: -1.11

## 2023-11-16 PROCEDURE — 3074F PR MOST RECENT SYSTOLIC BLOOD PRESSURE < 130 MM HG: ICD-10-PCS | Mod: HCNC,CPTII,S$GLB, | Performed by: NURSE PRACTITIONER

## 2023-11-16 PROCEDURE — 99999 PR PBB SHADOW E&M-EST. PATIENT-LVL IV: ICD-10-PCS | Mod: PBBFAC,HCNC,, | Performed by: NURSE PRACTITIONER

## 2023-11-16 PROCEDURE — 99214 OFFICE O/P EST MOD 30 MIN: CPT | Mod: HCNC,S$GLB,, | Performed by: NURSE PRACTITIONER

## 2023-11-16 PROCEDURE — 93306 TTE W/DOPPLER COMPLETE: CPT | Mod: 26,HCNC,, | Performed by: INTERNAL MEDICINE

## 2023-11-16 PROCEDURE — 1160F PR REVIEW ALL MEDS BY PRESCRIBER/CLIN PHARMACIST DOCUMENTED: ICD-10-PCS | Mod: HCNC,CPTII,S$GLB, | Performed by: NURSE PRACTITIONER

## 2023-11-16 PROCEDURE — 1159F MED LIST DOCD IN RCRD: CPT | Mod: HCNC,CPTII,S$GLB, | Performed by: NURSE PRACTITIONER

## 2023-11-16 PROCEDURE — 1160F RVW MEDS BY RX/DR IN RCRD: CPT | Mod: HCNC,CPTII,S$GLB, | Performed by: NURSE PRACTITIONER

## 2023-11-16 PROCEDURE — 93306 ECHO (CUPID ONLY): ICD-10-PCS | Mod: 26,HCNC,, | Performed by: INTERNAL MEDICINE

## 2023-11-16 PROCEDURE — 3078F DIAST BP <80 MM HG: CPT | Mod: HCNC,CPTII,S$GLB, | Performed by: NURSE PRACTITIONER

## 2023-11-16 PROCEDURE — 3008F BODY MASS INDEX DOCD: CPT | Mod: HCNC,CPTII,S$GLB, | Performed by: NURSE PRACTITIONER

## 2023-11-16 PROCEDURE — 3044F PR MOST RECENT HEMOGLOBIN A1C LEVEL <7.0%: ICD-10-PCS | Mod: HCNC,CPTII,S$GLB, | Performed by: NURSE PRACTITIONER

## 2023-11-16 PROCEDURE — 3074F SYST BP LT 130 MM HG: CPT | Mod: HCNC,CPTII,S$GLB, | Performed by: NURSE PRACTITIONER

## 2023-11-16 PROCEDURE — 4010F PR ACE/ARB THEARPY RXD/TAKEN: ICD-10-PCS | Mod: HCNC,CPTII,S$GLB, | Performed by: NURSE PRACTITIONER

## 2023-11-16 PROCEDURE — 1159F PR MEDICATION LIST DOCUMENTED IN MEDICAL RECORD: ICD-10-PCS | Mod: HCNC,CPTII,S$GLB, | Performed by: NURSE PRACTITIONER

## 2023-11-16 PROCEDURE — 99999 PR PBB SHADOW E&M-EST. PATIENT-LVL IV: CPT | Mod: PBBFAC,HCNC,, | Performed by: NURSE PRACTITIONER

## 2023-11-16 PROCEDURE — 3008F PR BODY MASS INDEX (BMI) DOCUMENTED: ICD-10-PCS | Mod: HCNC,CPTII,S$GLB, | Performed by: NURSE PRACTITIONER

## 2023-11-16 PROCEDURE — 4010F ACE/ARB THERAPY RXD/TAKEN: CPT | Mod: HCNC,CPTII,S$GLB, | Performed by: NURSE PRACTITIONER

## 2023-11-16 PROCEDURE — 93306 TTE W/DOPPLER COMPLETE: CPT | Mod: HCNC,PO

## 2023-11-16 PROCEDURE — 3044F HG A1C LEVEL LT 7.0%: CPT | Mod: HCNC,CPTII,S$GLB, | Performed by: NURSE PRACTITIONER

## 2023-11-16 PROCEDURE — 99214 PR OFFICE/OUTPT VISIT, EST, LEVL IV, 30-39 MIN: ICD-10-PCS | Mod: HCNC,S$GLB,, | Performed by: NURSE PRACTITIONER

## 2023-11-16 PROCEDURE — 3078F PR MOST RECENT DIASTOLIC BLOOD PRESSURE < 80 MM HG: ICD-10-PCS | Mod: HCNC,CPTII,S$GLB, | Performed by: NURSE PRACTITIONER

## 2023-11-16 NOTE — PROGRESS NOTES
Subjective     Patient ID: Tina García is a 63 y.o. female.    Chief Complaint: Annual Exam    Patient is here for a check up. Due for labs to evaluate her pre diabetes and htn. HTN stable, compliant with current medications.       Review of Systems   Constitutional: Negative.    HENT: Negative.     Respiratory: Negative.     Cardiovascular: Negative.    Gastrointestinal: Negative.  Positive for reflux.   Genitourinary: Negative.    Neurological: Negative.           Objective     Physical Exam  Constitutional:       Appearance: Normal appearance.   HENT:      Head: Normocephalic and atraumatic.   Neck:      Vascular: No carotid bruit.   Cardiovascular:      Rate and Rhythm: Normal rate and regular rhythm.      Heart sounds: No murmur heard.  Pulmonary:      Effort: Pulmonary effort is normal. No respiratory distress.      Breath sounds: Normal breath sounds.   Neurological:      Mental Status: She is alert and oriented to person, place, and time.   Psychiatric:         Mood and Affect: Mood normal.         Behavior: Behavior normal.            Assessment and Plan     1. Prediabetes  -     Hemoglobin A1C; Future; Expected date: 11/16/2023    2. Primary hypertension  -     Lipid Panel; Future; Expected date: 11/16/2023        Labs as scheduled, continue current medications. Declined vaccines.          No follow-ups on file.

## 2023-11-19 ENCOUNTER — PATIENT MESSAGE (OUTPATIENT)
Dept: ADMINISTRATIVE | Facility: HOSPITAL | Age: 63
End: 2023-11-19
Payer: MEDICARE

## 2023-12-23 DIAGNOSIS — L29.9 ITCHING: ICD-10-CM

## 2023-12-29 RX ORDER — HYDROXYZINE PAMOATE 25 MG/1
CAPSULE ORAL
Qty: 90 CAPSULE | Refills: 3 | Status: SHIPPED | OUTPATIENT
Start: 2023-12-29

## 2024-01-04 ENCOUNTER — LAB VISIT (OUTPATIENT)
Dept: LAB | Facility: HOSPITAL | Age: 64
End: 2024-01-04
Attending: PHYSICIAN ASSISTANT
Payer: MEDICARE

## 2024-01-04 DIAGNOSIS — R73.03 PREDIABETES: ICD-10-CM

## 2024-01-04 DIAGNOSIS — M34.1 CREST (CALCINOSIS, RAYNAUD'S PHENOMENON, ESOPHAGEAL DYSFUNCTION, SCLERODACTYLY, TELANGIECTASIA): ICD-10-CM

## 2024-01-04 DIAGNOSIS — M35.00 SJOGREN'S SYNDROME, WITH UNSPECIFIED ORGAN INVOLVEMENT: ICD-10-CM

## 2024-01-04 DIAGNOSIS — I10 PRIMARY HYPERTENSION: ICD-10-CM

## 2024-01-04 LAB
ALBUMIN SERPL BCP-MCNC: 3.8 G/DL (ref 3.5–5.2)
ALP SERPL-CCNC: 52 U/L (ref 55–135)
ALT SERPL W/O P-5'-P-CCNC: 29 U/L (ref 10–44)
ANION GAP SERPL CALC-SCNC: 11 MMOL/L (ref 8–16)
AST SERPL-CCNC: 18 U/L (ref 10–40)
BASOPHILS # BLD AUTO: 0.06 K/UL (ref 0–0.2)
BASOPHILS NFR BLD: 1.1 % (ref 0–1.9)
BILIRUB SERPL-MCNC: 0.2 MG/DL (ref 0.1–1)
BUN SERPL-MCNC: 14 MG/DL (ref 8–23)
CALCIUM SERPL-MCNC: 9 MG/DL (ref 8.7–10.5)
CHLORIDE SERPL-SCNC: 104 MMOL/L (ref 95–110)
CHOLEST SERPL-MCNC: 217 MG/DL (ref 120–199)
CHOLEST/HDLC SERPL: 4.7 {RATIO} (ref 2–5)
CO2 SERPL-SCNC: 26 MMOL/L (ref 23–29)
CREAT SERPL-MCNC: 0.8 MG/DL (ref 0.5–1.4)
CRP SERPL-MCNC: 1.5 MG/L (ref 0–8.2)
DIFFERENTIAL METHOD BLD: ABNORMAL
EOSINOPHIL # BLD AUTO: 0.1 K/UL (ref 0–0.5)
EOSINOPHIL NFR BLD: 2.3 % (ref 0–8)
ERYTHROCYTE [DISTWIDTH] IN BLOOD BY AUTOMATED COUNT: 13.4 % (ref 11.5–14.5)
ERYTHROCYTE [SEDIMENTATION RATE] IN BLOOD BY PHOTOMETRIC METHOD: 24 MM/HR (ref 0–36)
EST. GFR  (NO RACE VARIABLE): >60 ML/MIN/1.73 M^2
ESTIMATED AVG GLUCOSE: 117 MG/DL (ref 68–131)
GLUCOSE SERPL-MCNC: 100 MG/DL (ref 70–110)
HBA1C MFR BLD: 5.7 % (ref 4–5.6)
HCT VFR BLD AUTO: 34.1 % (ref 37–48.5)
HDLC SERPL-MCNC: 46 MG/DL (ref 40–75)
HDLC SERPL: 21.2 % (ref 20–50)
HGB BLD-MCNC: 11.5 G/DL (ref 12–16)
IMM GRANULOCYTES # BLD AUTO: 0.01 K/UL (ref 0–0.04)
IMM GRANULOCYTES NFR BLD AUTO: 0.2 % (ref 0–0.5)
LDLC SERPL CALC-MCNC: 130.8 MG/DL (ref 63–159)
LYMPHOCYTES # BLD AUTO: 2.3 K/UL (ref 1–4.8)
LYMPHOCYTES NFR BLD: 41.8 % (ref 18–48)
MCH RBC QN AUTO: 29.8 PG (ref 27–31)
MCHC RBC AUTO-ENTMCNC: 33.7 G/DL (ref 32–36)
MCV RBC AUTO: 88 FL (ref 82–98)
MONOCYTES # BLD AUTO: 0.5 K/UL (ref 0.3–1)
MONOCYTES NFR BLD: 9 % (ref 4–15)
NEUTROPHILS # BLD AUTO: 2.6 K/UL (ref 1.8–7.7)
NEUTROPHILS NFR BLD: 45.6 % (ref 38–73)
NONHDLC SERPL-MCNC: 171 MG/DL
NRBC BLD-RTO: 0 /100 WBC
PLATELET # BLD AUTO: 302 K/UL (ref 150–450)
PMV BLD AUTO: 10 FL (ref 9.2–12.9)
POTASSIUM SERPL-SCNC: 3.9 MMOL/L (ref 3.5–5.1)
PROT SERPL-MCNC: 6.9 G/DL (ref 6–8.4)
RBC # BLD AUTO: 3.86 M/UL (ref 4–5.4)
SODIUM SERPL-SCNC: 141 MMOL/L (ref 136–145)
TRIGL SERPL-MCNC: 201 MG/DL (ref 30–150)
WBC # BLD AUTO: 5.58 K/UL (ref 3.9–12.7)

## 2024-01-04 PROCEDURE — 83036 HEMOGLOBIN GLYCOSYLATED A1C: CPT | Mod: HCNC | Performed by: NURSE PRACTITIONER

## 2024-01-04 PROCEDURE — 80053 COMPREHEN METABOLIC PANEL: CPT | Mod: HCNC | Performed by: PHYSICIAN ASSISTANT

## 2024-01-04 PROCEDURE — 85652 RBC SED RATE AUTOMATED: CPT | Mod: HCNC | Performed by: PHYSICIAN ASSISTANT

## 2024-01-04 PROCEDURE — 36415 COLL VENOUS BLD VENIPUNCTURE: CPT | Mod: HCNC,PO | Performed by: PHYSICIAN ASSISTANT

## 2024-01-04 PROCEDURE — 86140 C-REACTIVE PROTEIN: CPT | Mod: HCNC | Performed by: PHYSICIAN ASSISTANT

## 2024-01-04 PROCEDURE — 80061 LIPID PANEL: CPT | Mod: HCNC | Performed by: NURSE PRACTITIONER

## 2024-01-04 PROCEDURE — 85025 COMPLETE CBC W/AUTO DIFF WBC: CPT | Mod: HCNC | Performed by: PHYSICIAN ASSISTANT

## 2024-01-11 ENCOUNTER — OFFICE VISIT (OUTPATIENT)
Dept: RHEUMATOLOGY | Facility: CLINIC | Age: 64
End: 2024-01-11
Payer: MEDICARE

## 2024-01-11 VITALS
HEART RATE: 90 BPM | HEIGHT: 64 IN | WEIGHT: 179.88 LBS | SYSTOLIC BLOOD PRESSURE: 127 MMHG | DIASTOLIC BLOOD PRESSURE: 83 MMHG | BODY MASS INDEX: 30.71 KG/M2

## 2024-01-11 DIAGNOSIS — E13.69 OTHER SPECIFIED DIABETES MELLITUS WITH OTHER SPECIFIED COMPLICATION, UNSPECIFIED WHETHER LONG TERM INSULIN USE: ICD-10-CM

## 2024-01-11 DIAGNOSIS — E09.638: ICD-10-CM

## 2024-01-11 DIAGNOSIS — G89.4 CHRONIC PAIN SYNDROME: ICD-10-CM

## 2024-01-11 DIAGNOSIS — I27.20 PULMONARY HYPERTENSION: ICD-10-CM

## 2024-01-11 DIAGNOSIS — M34.1 CREST (CALCINOSIS, RAYNAUD'S PHENOMENON, ESOPHAGEAL DYSFUNCTION, SCLERODACTYLY, TELANGIECTASIA): ICD-10-CM

## 2024-01-11 DIAGNOSIS — J40 BRONCHITIS: ICD-10-CM

## 2024-01-11 DIAGNOSIS — E06.3 HASHIMOTO'S THYROIDITIS: ICD-10-CM

## 2024-01-11 DIAGNOSIS — K29.60 REFLUX GASTRITIS: ICD-10-CM

## 2024-01-11 DIAGNOSIS — M35.00 SJOGREN'S SYNDROME, WITH UNSPECIFIED ORGAN INVOLVEMENT: ICD-10-CM

## 2024-01-11 DIAGNOSIS — E09.9 DRUG OR CHEMICAL INDUCED DIABETES MELLITUS WITHOUT COMPLICATIONS: Primary | ICD-10-CM

## 2024-01-11 PROCEDURE — 3074F SYST BP LT 130 MM HG: CPT | Mod: HCNC,CPTII,S$GLB, | Performed by: INTERNAL MEDICINE

## 2024-01-11 PROCEDURE — 99999 PR PBB SHADOW E&M-EST. PATIENT-LVL IV: CPT | Mod: PBBFAC,HCNC,, | Performed by: INTERNAL MEDICINE

## 2024-01-11 PROCEDURE — 1160F RVW MEDS BY RX/DR IN RCRD: CPT | Mod: HCNC,CPTII,S$GLB, | Performed by: INTERNAL MEDICINE

## 2024-01-11 PROCEDURE — 3008F BODY MASS INDEX DOCD: CPT | Mod: HCNC,CPTII,S$GLB, | Performed by: INTERNAL MEDICINE

## 2024-01-11 PROCEDURE — 3044F HG A1C LEVEL LT 7.0%: CPT | Mod: HCNC,CPTII,S$GLB, | Performed by: INTERNAL MEDICINE

## 2024-01-11 PROCEDURE — 1159F MED LIST DOCD IN RCRD: CPT | Mod: HCNC,CPTII,S$GLB, | Performed by: INTERNAL MEDICINE

## 2024-01-11 PROCEDURE — 3079F DIAST BP 80-89 MM HG: CPT | Mod: HCNC,CPTII,S$GLB, | Performed by: INTERNAL MEDICINE

## 2024-01-11 PROCEDURE — 99215 OFFICE O/P EST HI 40 MIN: CPT | Mod: HCNC,S$GLB,, | Performed by: INTERNAL MEDICINE

## 2024-01-11 RX ORDER — METOCLOPRAMIDE 10 MG/1
10 TABLET ORAL
Qty: 90 TABLET | Refills: 5 | Status: SHIPPED | OUTPATIENT
Start: 2024-01-11 | End: 2024-05-01

## 2024-01-11 RX ORDER — TRAMADOL HYDROCHLORIDE 50 MG/1
50 TABLET ORAL EVERY 6 HOURS PRN
Qty: 120 TABLET | Refills: 5 | Status: SHIPPED | OUTPATIENT
Start: 2024-01-11

## 2024-01-11 RX ORDER — CYCLOBENZAPRINE HCL 10 MG
10 TABLET ORAL 3 TIMES DAILY PRN
Qty: 90 TABLET | Refills: 3 | Status: SHIPPED | OUTPATIENT
Start: 2024-01-11

## 2024-01-11 RX ORDER — TIRZEPATIDE 5 MG/.5ML
5 INJECTION, SOLUTION SUBCUTANEOUS
Qty: 4 PEN | Refills: 5 | Status: SHIPPED | OUTPATIENT
Start: 2024-01-11 | End: 2024-07-09

## 2024-01-11 NOTE — PROGRESS NOTES
Subjective:       Patient ID: Tina García is a 63 y.o. female.    Chief Complaint: Disease Management    Follow up: 63 year old female who presents to clinic visit for follow up on CREST syndrome.  Lost weight and echo improved. Overall stable.    GI: Anti reflux surgery was being considered but the patient was found to have aperistalsis of the esophagus on esophageal manometry.  PPI was changed to nexium. If they are unable to control her symptoms then will consider foregut surgeons to discuss anti reflux surgery.  Recommend EGD with [facility] to prove that she has GERD prior to sending to surgery as she has had only LA grade A or B esophagitis on endoscopy in the past. She has been unable to completed EGD w/[facility] since she can not hold PPI for 96 hours due to severity of her symptoms.   9/23--No further progress with this since she is unable to hold PPIs due to severity of symptoms.    Thyroid function tests are stable. Hair growth is improving with natural hair products.    She is off imuran since she had covid infection in December 2021.     Raynauds is stable at this time with conservative measures. She has not taken revatio recently.            r ECHO, PFTs.    Current tx:  1. plaquenil  2. revatio PRN  3. Flexeril PRN    Review of Systems   Constitutional:  Positive for unexpected weight change. Negative for activity change, appetite change and chills.   HENT:  Negative for mouth sores.    Eyes:  Negative for redness and visual disturbance.   Respiratory:  Negative for cough, shortness of breath and wheezing.    Cardiovascular:  Negative for chest pain, palpitations and leg swelling.   Gastrointestinal:  Positive for constipation. Negative for abdominal pain, diarrhea, nausea and vomiting.        Reflux sx   Genitourinary:  Negative for genital sores.   Musculoskeletal:  Positive for arthralgias, back pain and gait problem.   Skin:  Negative for rash.   Allergic/Immunologic: Negative for  immunocompromised state.   Neurological:  Negative for dizziness, weakness and light-headedness.   Hematological:  Does not bruise/bleed easily.         Objective:     Vitals:    01/11/24 1328   BP: 127/83   Pulse: 90       Past Medical History:   Diagnosis Date    Anxiety     AR (allergic rhinitis)     Arthritis     rheumatoid?    Chronic constipation     Colon polyp     CREST syndrome     Depression     Dry eyes     Dry mouth     Esophageal dysmotility     Fibrocystic breast     Fibromyalgia     Food intolerance     GERD (gastroesophageal reflux disease)     HTN (hypertension)     Irritable bowel syndrome     Raynaud disease     Right wrist fracture     nov 2013    SOB (shortness of breath) on exertion     Thyroid disease     hypothyroid, thyroid cyst on U/S    Ulcer     Vitamin D deficiency disease      Past Surgical History:   Procedure Laterality Date    COLONOSCOPY  7/2012    one polyp removed, biospy=TUBULOVILLOUS ADENOMA, otherwise normal findings, repeat in 4-5 years for surveillance    COLONOSCOPY N/A 4/17/2019    Procedure: COLONOSCOPY;  Surgeon: Rich Kingsley Jr., MD;  Location: [facility];  Service: Endoscopy;  Laterality: N/A;    COLONOSCOPY W/ POLYPECTOMY  7/24/2012  [facility]    4-5 mm sigmoid polyp.    CYST REMOVAL      pilonidal    DILATION AND CURETTAGE OF UTERUS      ESOPHAGEAL MANOMETRY WITH MEASUREMENT OF IMPEDANCE N/A 2/4/2022    Procedure: MANOMETRY, ESOPHAGUS, WITH IMPEDANCE MEASUREMENT;  Surgeon: Kami Cobos MD;  Location: [address]);  Service: Endoscopy;  Laterality: N/A;  COVID test on 2/1 at [facility]    ESOPHAGOGASTRODUODENOSCOPY N/A 4/17/2019    Procedure: EGD (ESOPHAGOGASTRODUODENOSCOPY);  Surgeon: Rich Kingsley Jr., MD;  Location: [facility];  Service: Endoscopy;  Laterality: N/A;    ESOPHAGOGASTRODUODENOSCOPY N/A 12/3/2021    Procedure: EGD (ESOPHAGOGASTRODUODENOSCOPY);  Surgeon: Rich Kingsley Jr., MD;  Location: [facility];  Service: Endoscopy;  Laterality:  N/A;    GANGLION CYST EXCISION      right thumb    HYSTERECTOMY  2008    complete    TOTAL ABDOMINAL HYSTERECTOMY W/ BILATERAL SALPINGOOPHORECTOMY  2008    w/Hysterectomy    UPPER GASTROINTESTINAL ENDOSCOPY  12/23/2011  [facility]    Fluid in the lower third of the esophagus  (stasis).  The esophageal examination was compatible with scleroderma.  (Dilataion done, 51 Fr.)    LA Grade A reflux esophagitis.   Enlarged gastric folds.   Multiple gastric polyps.   Antritis (erythematous streaks).  JOELLEN Test  Negative.          Physical Exam   Constitutional: She is oriented to person, place, and time.   Eyes: Right conjunctiva is not injected. Left conjunctiva is not injected.   Neck: No JVD present. No thyromegaly present.   Cardiovascular: Normal rate.   Pulmonary/Chest: Effort normal.   Musculoskeletal:         General: Swelling (puffiness of fingers) and tenderness present.      Right shoulder: Normal.      Left shoulder: Normal.      Right elbow: Normal.      Left elbow: Normal.      Right wrist: Normal.      Left wrist: Normal.      Right knee: Normal.      Left knee: Normal.   Lymphadenopathy:     She has no cervical adenopathy.   Neurological: She is alert and oriented to person, place, and time. Gait normal.   Skin: No rash noted.   Psychiatric: Mood and affect normal.       Right Side Rheumatological Exam     Examination finds the shoulder, elbow, wrist, knee, 1st MCP, 2nd MCP, 3rd MCP, 4th MCP and 5th MCP normal.    The patient has an enlarged 1st PIP, 2nd PIP, 3rd PIP, 4th PIP and 5th PIP    Left Side Rheumatological Exam     Examination finds the shoulder, elbow, wrist, knee, 1st MCP, 2nd MCP, 3rd MCP, 4th MCP and 5th MCP normal.    The patient has an enlarged 1st PIP, 2nd PIP, 3rd PIP, 4th PIP and 5th PIP.     Reason for Exam  Priority: Routine  Dx: CREST (calcinosis, Raynaud's phenomenon, esophageal dysfunction, sclerodactyly, telangiectasia) [M34.1 (ICD-10-CM)]; Pulmonary hypertension [I27.20 (ICD-10-CM)]      View Images Vital Vitrea     Show images for Echo  Summary         Left Ventricle: The left ventricle is normal in size. Normal wall thickness. Normal wall motion. There is normal systolic function. Ejection fraction by visual approximation is 60%. There is normal diastolic function.    Right Ventricle: Normal right ventricular cavity size. Wall thickness is normal. Right ventricle wall motion  is normal. Systolic function is normal.    Aortic Valve: The aortic valve is a trileaflet valve.    Pulmonary Artery: The estimated pulmonary artery systolic pressure is 13 mmHg.    IVC/SVC: Normal venous pressure at 3 mmHg.     Vitals      Assessment:       1. 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 Drug or chemical induced diabetes mellitus with other oral complication, unspecified whether long term insulin use    3. Other specified diabetes mellitus with other specified complication, unspecified whether long term insulin use    4. Reflux gastritis    5. CREST (calcinosis, Raynaud's phenomenon, esophageal dysfunction, sclerodactyly, telangiectasia)    6. Sjogren's syndrome, with unspecified organ involvement    7. Hashimoto's thyroiditis    8. Pulmonary hypertension    9. Chronic pain syndrome    10. 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  Dispense: 90 tablet; Refill: 5  -     Transferrin; Future; Expected date: 01/11/2024  -     Iron and TIBC; Future; Expected date: 01/11/2024  -     Sedimentation rate; Future; Expected date: 01/11/2024  -     C-Reactive Protein; Future; Expected date: 01/11/2024  -     Comprehensive Metabolic Panel; Future; Expected date: 01/11/2024  -     CBC Auto Differential; Future; Expected date: 01/11/2024  -     JAMILA Screen w/Reflex; Future; Expected date: 01/11/2024  -      Anti-Scleroderma Antibody; Future; Expected date: 01/11/2024  -     Ambulatory referral/consult to Pulmonology; Future; Expected date: 01/18/2024    Drug or chemical induced diabetes mellitus with other oral complication, unspecified whether long term insulin use  -     tirzepatide (MOUNJARO) 5 mg/0.5 mL PnIj; Inject 5 mg into the skin every 7 days.  Dispense: 4 pen ; Refill: 5  -     metoclopramide HCl (REGLAN) 10 MG tablet; Take 1 tablet (10 mg total) by mouth 3 (three) times daily before meals.  Dispense: 90 tablet; Refill: 5  -     Transferrin; Future; Expected date: 01/11/2024  -     Iron and TIBC; Future; Expected date: 01/11/2024  -     Sedimentation rate; Future; Expected date: 01/11/2024  -     C-Reactive Protein; Future; Expected date: 01/11/2024  -     Comprehensive Metabolic Panel; Future; Expected date: 01/11/2024  -     CBC Auto Differential; Future; Expected date: 01/11/2024  -     JAMILA Screen w/Reflex; Future; Expected date: 01/11/2024  -     Anti-Scleroderma Antibody; Future; Expected date: 01/11/2024  -     Ambulatory referral/consult to Pulmonology; Future; Expected date: 01/18/2024    Other specified diabetes mellitus with other specified complication, unspecified whether long term insulin use  -     tirzepatide (MOUNJARO) 5 mg/0.5 mL PnIj; Inject 5 mg into the skin every 7 days.  Dispense: 4 pen ; Refill: 5  -     metoclopramide HCl (REGLAN) 10 MG tablet; Take 1 tablet (10 mg total) by mouth 3 (three) times daily before meals.  Dispense: 90 tablet; Refill: 5  -     Transferrin; Future; Expected date: 01/11/2024  -     Iron and TIBC; Future; Expected date: 01/11/2024  -     Sedimentation rate; Future; Expected date: 01/11/2024  -     C-Reactive Protein; Future; Expected date: 01/11/2024  -     Comprehensive Metabolic Panel; Future; Expected date: 01/11/2024  -     CBC Auto Differential; Future; Expected date: 01/11/2024  -     JAMILA Screen w/Reflex; Future; Expected date: 01/11/2024  -      Anti-Scleroderma Antibody; Future; Expected date: 01/11/2024  -     Ambulatory referral/consult to Pulmonology; Future; Expected date: 01/18/2024    Reflux gastritis  -     metoclopramide HCl (REGLAN) 10 MG tablet; Take 1 tablet (10 mg total) by mouth 3 (three) times daily before meals.  Dispense: 90 tablet; Refill: 5  -     Transferrin; Future; Expected date: 01/11/2024  -     Iron and TIBC; Future; Expected date: 01/11/2024  -     Sedimentation rate; Future; Expected date: 01/11/2024  -     C-Reactive Protein; Future; Expected date: 01/11/2024  -     Comprehensive Metabolic Panel; Future; Expected date: 01/11/2024  -     CBC Auto Differential; Future; Expected date: 01/11/2024  -     JAMILA Screen w/Reflex; Future; Expected date: 01/11/2024  -     Anti-Scleroderma Antibody; Future; Expected date: 01/11/2024  -     Ambulatory referral/consult to Pulmonology; Future; Expected date: 01/18/2024  -     Ambulatory referral/consult to Gastroenterology; Future; Expected date: 01/18/2024    CREST (calcinosis, Raynaud's phenomenon, esophageal dysfunction, sclerodactyly, telangiectasia)  -     metoclopramide HCl (REGLAN) 10 MG tablet; Take 1 tablet (10 mg total) by mouth 3 (three) times daily before meals.  Dispense: 90 tablet; Refill: 5  -     Transferrin; Future; Expected date: 01/11/2024  -     Iron and TIBC; Future; Expected date: 01/11/2024  -     Sedimentation rate; Future; Expected date: 01/11/2024  -     C-Reactive Protein; Future; Expected date: 01/11/2024  -     Comprehensive Metabolic Panel; Future; Expected date: 01/11/2024  -     CBC Auto Differential; Future; Expected date: 01/11/2024  -     JAMILA Screen w/Reflex; Future; Expected date: 01/11/2024  -     Anti-Scleroderma Antibody; Future; Expected date: 01/11/2024  -     Complete PFT with bronchodilator; Future  -     Ambulatory referral/consult to Pulmonology; Future; Expected date: 01/18/2024  -     traMADoL (ULTRAM) 50 mg tablet; Take 1 tablet (50 mg total) by  mouth every 6 (six) hours as needed for Pain.  Dispense: 120 tablet; Refill: 5  -     cyclobenzaprine (FLEXERIL) 10 MG tablet; Take 1 tablet (10 mg total) by mouth 3 (three) times daily as needed for Muscle spasms.  Dispense: 90 tablet; Refill: 3    Sjogren's syndrome, with unspecified organ involvement  -     metoclopramide HCl (REGLAN) 10 MG tablet; Take 1 tablet (10 mg total) by mouth 3 (three) times daily before meals.  Dispense: 90 tablet; Refill: 5  -     Transferrin; Future; Expected date: 01/11/2024  -     Iron and TIBC; Future; Expected date: 01/11/2024  -     Sedimentation rate; Future; Expected date: 01/11/2024  -     C-Reactive Protein; Future; Expected date: 01/11/2024  -     Comprehensive Metabolic Panel; Future; Expected date: 01/11/2024  -     CBC Auto Differential; Future; Expected date: 01/11/2024  -     JAMILA Screen w/Reflex; Future; Expected date: 01/11/2024  -     Anti-Scleroderma Antibody; Future; Expected date: 01/11/2024  -     Ambulatory referral/consult to Pulmonology; Future; Expected date: 01/18/2024  -     traMADoL (ULTRAM) 50 mg tablet; Take 1 tablet (50 mg total) by mouth every 6 (six) hours as needed for Pain.  Dispense: 120 tablet; Refill: 5  -     cyclobenzaprine (FLEXERIL) 10 MG tablet; Take 1 tablet (10 mg total) by mouth 3 (three) times daily as needed for Muscle spasms.  Dispense: 90 tablet; Refill: 3    Hashimoto's thyroiditis  -     metoclopramide HCl (REGLAN) 10 MG tablet; Take 1 tablet (10 mg total) by mouth 3 (three) times daily before meals.  Dispense: 90 tablet; Refill: 5  -     Transferrin; Future; Expected date: 01/11/2024  -     Iron and TIBC; Future; Expected date: 01/11/2024  -     Sedimentation rate; Future; Expected date: 01/11/2024  -     C-Reactive Protein; Future; Expected date: 01/11/2024  -     Comprehensive Metabolic Panel; Future; Expected date: 01/11/2024  -     CBC Auto Differential; Future; Expected date: 01/11/2024  -     JAMILA Screen w/Reflex; Future;  Expected date: 01/11/2024  -     Anti-Scleroderma Antibody; Future; Expected date: 01/11/2024  -     Ambulatory referral/consult to Pulmonology; Future; Expected date: 01/18/2024  -     traMADoL (ULTRAM) 50 mg tablet; Take 1 tablet (50 mg total) by mouth every 6 (six) hours as needed for Pain.  Dispense: 120 tablet; Refill: 5    Pulmonary hypertension  -     Complete PFT with bronchodilator; Future  -     Ambulatory referral/consult to Pulmonology; Future; Expected date: 01/18/2024    Chronic pain syndrome  -     traMADoL (ULTRAM) 50 mg tablet; Take 1 tablet (50 mg total) by mouth every 6 (six) hours as needed for Pain.  Dispense: 120 tablet; Refill: 5    Bronchitis  -     traMADoL (ULTRAM) 50 mg tablet; Take 1 tablet (50 mg total) by mouth every 6 (six) hours as needed for Pain.  Dispense: 120 tablet; Refill: 5            Assessment:  63 year old female with  CREST syndrome (raynauds on revatio PRN, esophageal dysfunction/GERD on ppi/zantac, sclerodactyly hands), Sjogren's syndrome   --chronic pain syndrome on tramadol  --hx of Hashimoto's thyroiditis on levothyroxine  --hx of pulmonary HTN  --chronic normocytic anemia    Plan:  1. Cont. Plaquenil bid, revatio prn  2. Cont. Tramadol PRN  severe pain  I have  check [place] prescription monitoring program site and no unusual or abnormal behavior has occurred pt understand the risk and benefits of taking opioid medications and has decided to continue the  medication   3.Echo, PFTs  4. Mounjaro 5 mcg      More than 50% of the  56 minute encounter was spent face to face counseling the patient regarding current status and future plan of care as well as side effects  of the medications. All questions were answered to patient's satisfaction also includes  non-face to face time preparing to see the patient (eg, review of tests), Obtaining and/or reviewing separately obtained history, Documenting clinical information in the electronic or other health record, Independently  interpreting results

## 2024-01-19 DIAGNOSIS — E03.8 OTHER SPECIFIED HYPOTHYROIDISM: ICD-10-CM

## 2024-01-19 RX ORDER — LEVOTHYROXINE SODIUM 50 UG/1
50 TABLET ORAL
Qty: 90 TABLET | Refills: 1 | Status: SHIPPED | OUTPATIENT
Start: 2024-01-19

## 2024-01-19 NOTE — TELEPHONE ENCOUNTER
No care due was identified.  [facility] Embedded Care Due Messages. Reference number: [id number].   1/19/2024 11:26:45 AM CST

## 2024-01-20 NOTE — TELEPHONE ENCOUNTER
Refill Decision Note   Tina García  is requesting a refill authorization.  Brief Assessment and Rationale for Refill:  Approve     Medication Therapy Plan:         Comments:     Note composed:7:58 PM 01/19/2024

## 2024-01-31 DIAGNOSIS — Z91.09 ENVIRONMENTAL ALLERGIES: ICD-10-CM

## 2024-01-31 RX ORDER — MONTELUKAST SODIUM 10 MG/1
10 TABLET ORAL NIGHTLY
Qty: 90 TABLET | Refills: 3 | Status: SHIPPED | OUTPATIENT
Start: 2024-01-31

## 2024-02-07 ENCOUNTER — OFFICE VISIT (OUTPATIENT)
Dept: GASTROENTEROLOGY | Facility: CLINIC | Age: 64
End: 2024-02-07
Payer: MEDICARE

## 2024-02-07 VITALS — HEIGHT: 64 IN | BODY MASS INDEX: 29.81 KG/M2 | WEIGHT: 174.63 LBS

## 2024-02-07 DIAGNOSIS — K22.4 SYMPTOMATIC ESOPHAGEAL PERISTALSIS: ICD-10-CM

## 2024-02-07 DIAGNOSIS — K21.00 GASTROESOPHAGEAL REFLUX DISEASE WITH ESOPHAGITIS WITHOUT HEMORRHAGE: Primary | ICD-10-CM

## 2024-02-07 DIAGNOSIS — Z86.010 HISTORY OF COLON POLYPS: ICD-10-CM

## 2024-02-07 DIAGNOSIS — R13.19 ESOPHAGEAL DYSPHAGIA: ICD-10-CM

## 2024-02-07 PROCEDURE — 1159F MED LIST DOCD IN RCRD: CPT | Mod: HCNC,CPTII,S$GLB, | Performed by: NURSE PRACTITIONER

## 2024-02-07 PROCEDURE — 3008F BODY MASS INDEX DOCD: CPT | Mod: HCNC,CPTII,S$GLB, | Performed by: NURSE PRACTITIONER

## 2024-02-07 PROCEDURE — 99214 OFFICE O/P EST MOD 30 MIN: CPT | Mod: HCNC,S$GLB,, | Performed by: NURSE PRACTITIONER

## 2024-02-07 PROCEDURE — 1160F RVW MEDS BY RX/DR IN RCRD: CPT | Mod: HCNC,CPTII,S$GLB, | Performed by: NURSE PRACTITIONER

## 2024-02-07 PROCEDURE — 99999 PR PBB SHADOW E&M-EST. PATIENT-LVL V: CPT | Mod: PBBFAC,HCNC,, | Performed by: NURSE PRACTITIONER

## 2024-02-07 PROCEDURE — 3044F HG A1C LEVEL LT 7.0%: CPT | Mod: HCNC,CPTII,S$GLB, | Performed by: NURSE PRACTITIONER

## 2024-02-07 RX ORDER — KETOROLAC TROMETHAMINE 30 MG/ML
INJECTION, SOLUTION INTRAMUSCULAR; INTRAVENOUS
COMMUNITY
Start: 2023-09-21

## 2024-02-07 RX ORDER — CYANOCOBALAMIN 1000 UG/ML
INJECTION, SOLUTION INTRAMUSCULAR; SUBCUTANEOUS
COMMUNITY
Start: 2023-09-21

## 2024-02-07 NOTE — PROGRESS NOTES
Subjective:       Patient ID: Tina García is a 63 y.o. female, Body mass index is 29.97 kg/m².    Chief Complaint: Gastroesophageal Reflux      Patient is new to me. Established patient of Dr. Cobos, Dr. Kingsley & Laurel Brady, NP.  Referred by Dr. Fry for reflux gastritis.    Gastroesophageal Reflux  She complains of dysphagia (dysphagia to solids, especially bread, meat and pills; denies trouble swallowing liquids), heartburn (Chief complaint) and nausea. She reports no abdominal pain, no belching, no chest pain, no choking, no coughing, no early satiety, no globus sensation, no hoarse voice, no sore throat, no stridor, no tooth decay, no water brash or no wheezing. This is a chronic problem. The current episode started more than 1 year ago. The problem occurs frequently. The problem has been waxing and waning. The heartburn is located in the substernum. The heartburn is of severe intensity. The heartburn wakes her from sleep. The heartburn limits her activity. The heartburn changes with position. The symptoms are aggravated by certain foods, caffeine and lying down. Associated symptoms include anemia and weight loss (on Mounjaro). Pertinent negatives include no melena. There are no known risk factors. She has tried a PPI, a histamine-2 antagonist, a diet change, head elevation and an antacid (caffeine reduction; Past meds: Prilosec- ineffective; Currently: Nexium 40 mg BID, Famotidine 40 mg nightly PRN and OTC Tums PRN- somewhat helps; she is interested in the LINX procedure) for the symptoms. Past procedures include an EGD and a UGI (showed patulous esophagus with decreased peristalsis- followed by Dr. Cobos).     Review of Systems   Constitutional:  Positive for weight loss (on Mounjaro). Negative for appetite change, fever and unexpected weight change.   HENT:  Positive for trouble swallowing. Negative for hoarse voice and sore throat.    Respiratory:  Negative for cough, choking, shortness  of breath and wheezing.    Cardiovascular:  Negative for chest pain.   Gastrointestinal:  Positive for dysphagia (dysphagia to solids, especially bread, meat and pills; denies trouble swallowing liquids), heartburn (Chief complaint) and nausea. Negative for abdominal distention, abdominal pain, anal bleeding, blood in stool, constipation, diarrhea, melena, rectal pain and vomiting.   Genitourinary:  Negative for difficulty urinating and dysuria.   Musculoskeletal:  Negative for gait problem.   Skin:  Negative for rash.   Neurological:  Negative for speech difficulty.   Psychiatric/Behavioral:  Negative for confusion.        Past Medical History:   Diagnosis Date    Anxiety     AR (allergic rhinitis)     Arthritis     rheumatoid?    Chronic constipation     Colon polyp     CREST syndrome     Depression     Dry eyes     Dry mouth     Esophageal dysmotility     Fibrocystic breast     Fibromyalgia     Food intolerance     GERD (gastroesophageal reflux disease)     HTN (hypertension)     Irritable bowel syndrome     Raynaud disease     Right wrist fracture     nov 2013    SOB (shortness of breath) on exertion     Thyroid disease     hypothyroid, thyroid cyst on U/S    Ulcer     Vitamin D deficiency disease       Past Surgical History:   Procedure Laterality Date    COLONOSCOPY  7/2012    one polyp removed, biospy=TUBULOVILLOUS ADENOMA, otherwise normal findings, repeat in 4-5 years for surveillance    COLONOSCOPY N/A 4/17/2019    Procedure: COLONOSCOPY;  Surgeon: Rich Kingsley Jr., MD;  Location: [facility];  Service: Endoscopy;  Laterality: N/A;    COLONOSCOPY W/ POLYPECTOMY  7/24/2012  [facility]    4-5 mm sigmoid polyp.    CYST REMOVAL      pilonidal    DILATION AND CURETTAGE OF UTERUS      ESOPHAGEAL MANOMETRY WITH MEASUREMENT OF IMPEDANCE N/A 2/4/2022    Procedure: MANOMETRY, ESOPHAGUS, WITH IMPEDANCE MEASUREMENT;  Surgeon: Kami Cobos MD;  Location: [address];  Service: Endoscopy;  Laterality: N/A;   COVID test on 2/1 at [facility]    ESOPHAGOGASTRODUODENOSCOPY N/A 4/17/2019    Procedure: EGD (ESOPHAGOGASTRODUODENOSCOPY);  Surgeon: Rich Kingsley Jr., MD;  Location: [facility];  Service: Endoscopy;  Laterality: N/A;    ESOPHAGOGASTRODUODENOSCOPY N/A 12/3/2021    Procedure: EGD (ESOPHAGOGASTRODUODENOSCOPY);  Surgeon: Rich Kingsley Jr., MD;  Location: [facility];  Service: Endoscopy;  Laterality: N/A;    GANGLION CYST EXCISION      right thumb    HYSTERECTOMY  2008    complete    TOTAL ABDOMINAL HYSTERECTOMY W/ BILATERAL SALPINGOOPHORECTOMY  2008    w/Hysterectomy    UPPER GASTROINTESTINAL ENDOSCOPY  12/23/2011  Sancho    Fluid in the lower third of the esophagus  (stasis).  The esophageal examination was compatible with scleroderma.  (Dilataion done, 51 Fr.)    LA Grade A reflux esophagitis.   Enlarged gastric folds.   Multiple gastric polyps.   Antritis (erythematous streaks).  JOELLEN Test  Negative.      Family History   Problem Relation Age of Onset    Cancer Mother         Leiomyosarcoma    Liver cancer Mother     Esophageal cancer Father     Cloud's esophagus Father     Cancer Father         esophageal    Colon polyps Sister     Cancer Paternal Uncle         colon    Cancer Maternal Grandfather         stomach    Stomach cancer Paternal Grandfather     Breast cancer Paternal Cousin 40    Breast cancer Paternal Cousin 53    Diabetes Other     Hypertension Other       Wt Readings from Last 10 Encounters:   02/07/24 79.2 kg (174 lb 9.7 oz)   01/11/24 81.6 kg (179 lb 14.3 oz)   12/21/23 82.6 kg (182 lb)   11/16/23 82.9 kg (182 lb 12.2 oz)   11/16/23 86.6 kg (191 lb)   09/21/23 86.6 kg (191 lb)   06/28/23 84.7 kg (186 lb 11.7 oz)   05/10/23 82.7 kg (182 lb 5.1 oz)   12/28/22 82.3 kg (181 lb 7 oz)   09/30/22 88 kg (194 lb 0.1 oz)     Lab Results   Component Value Date    WBC 5.58 01/04/2024    HGB 11.5 (L) 01/04/2024    HCT 34.1 (L) 01/04/2024    MCV 88 01/04/2024     01/04/2024     CMP  Sodium   Date  Value Ref Range Status   01/04/2024 141 136 - 145 mmol/L Final     Potassium   Date Value Ref Range Status   01/04/2024 3.9 3.5 - 5.1 mmol/L Final     Chloride   Date Value Ref Range Status   01/04/2024 104 95 - 110 mmol/L Final     CO2   Date Value Ref Range Status   01/04/2024 26 23 - 29 mmol/L Final     Glucose   Date Value Ref Range Status   01/04/2024 100 70 - 110 mg/dL Final     BUN   Date Value Ref Range Status   01/04/2024 14 8 - 23 mg/dL Final     Creatinine   Date Value Ref Range Status   01/04/2024 0.8 0.5 - 1.4 mg/dL Final     Calcium   Date Value Ref Range Status   01/04/2024 9.0 8.7 - 10.5 mg/dL Final     Total Protein   Date Value Ref Range Status   01/04/2024 6.9 6.0 - 8.4 g/dL Final     Albumin   Date Value Ref Range Status   01/04/2024 3.8 3.5 - 5.2 g/dL Final     Total Bilirubin   Date Value Ref Range Status   01/04/2024 0.2 0.1 - 1.0 mg/dL Final     Comment:     For infants and newborns, interpretation of results should be based  on gestational age, weight and in agreement with clinical  observations.    Premature Infant recommended reference ranges:  Up to 24 hours.............<8.0 mg/dL  Up to 48 hours............<12.0 mg/dL  3-5 days..................<15.0 mg/dL  6-29 days.................<15.0 mg/dL       Alkaline Phosphatase   Date Value Ref Range Status   01/04/2024 52 (L) 55 - 135 U/L Final     AST   Date Value Ref Range Status   01/04/2024 18 10 - 40 U/L Final     ALT   Date Value Ref Range Status   01/04/2024 29 10 - 44 U/L Final     Anion Gap   Date Value Ref Range Status   01/04/2024 11 8 - 16 mmol/L Final     eGFR if    Date Value Ref Range Status   06/08/2022 >60.0 >60 mL/min/1.73 m^2 Final     eGFR if non    Date Value Ref Range Status   06/08/2022 >60.0 >60 mL/min/1.73 m^2 Final     Comment:     Calculation used to obtain the estimated glomerular filtration  rate (eGFR) is the CKD-EPI equation.                 Reviewed prior medical records including  radiology report of Upper GI with small bowel 12/22/21 & endoscopy history (see surgical history).     Objective:      Physical Exam  Constitutional:       General: She is not in acute distress.     Appearance: She is well-developed.   HENT:      Head: Normocephalic.      Right Ear: Hearing normal.      Left Ear: Hearing normal.      Nose: Nose normal.      Mouth/Throat:      Mouth: No oral lesions.      Pharynx: Uvula midline.   Eyes:      General: Lids are normal.      Conjunctiva/sclera: Conjunctivae normal.      Pupils: Pupils are equal, round, and reactive to light.   Neck:      Trachea: Trachea normal.   Cardiovascular:      Rate and Rhythm: Normal rate and regular rhythm.      Heart sounds: Normal heart sounds. No murmur heard.  Pulmonary:      Effort: Pulmonary effort is normal. No respiratory distress.      Breath sounds: Normal breath sounds. No stridor. No wheezing.   Abdominal:      General: Bowel sounds are normal. There is no distension.      Palpations: Abdomen is soft. There is no mass.      Tenderness: There is no abdominal tenderness. There is no guarding or rebound.   Musculoskeletal:         General: Normal range of motion.      Cervical back: Normal range of motion.   Skin:     General: Skin is warm and dry.      Findings: No rash.      Comments: Non jaundiced   Neurological:      Mental Status: She is alert and oriented to person, place, and time.   Psychiatric:         Speech: Speech normal.         Behavior: Behavior normal. Behavior is cooperative.           Assessment:       1. Gastroesophageal reflux disease with esophagitis without hemorrhage    2. Esophageal dysphagia    3. Symptomatic esophageal peristalsis    4. History of colon polyps           Plan:   All diagnoses and orders for this visit:    Gastroesophageal reflux disease with esophagitis without hemorrhage  - Continue Nexium 40 mg BID  - Continue Famotidine 40 mg nightly PRN  - Schedule EGD   - Take PPI in the morning 30 minutes  before breakfast and dinner  - Recommend to avoid large meals, avoid eating within 3 hours of bedtime, elevate head of bed if nocturnal symptoms are present, smoking cessation (if current smoker), & weight loss (if overweight).   - Recommend minimize/avoid high-fat foods, chocolate, caffeine, citrus, alcohol, & tomato products.  - Advised to avoid/limit use of NSAID's, since they can cause GI upset, bleeding, and/or ulcers. If needed, take with food.    - Ambulatory referral/consult to General Surgery; Future; Expected date: 02/14/2024    Esophageal dysphagia & Symptomatic esophageal peristalsis   - Recommend follow-up with GI motility specialist (Dr. Cobos) for continued management  - Educated patient to eat smaller more frequent meals and to eat slowly and advised to eat a soft diet.    History of colon polyps   - Schedule Colonoscopy     If no improvement in symptoms or symptoms worsen, call/follow-up at clinic or go to ER

## 2024-02-20 ENCOUNTER — TELEPHONE (OUTPATIENT)
Dept: SURGERY | Facility: CLINIC | Age: 64
End: 2024-02-20
Payer: MEDICARE

## 2024-02-20 NOTE — TELEPHONE ENCOUNTER
----- Message from Regla Robert sent at 2/20/2024  4:50 PM CST -----  Regarding: Appt  Contact: Pt [phone]  Pt is calling to see appt can virtual on 2/22 please call

## 2024-02-22 ENCOUNTER — TELEPHONE (OUTPATIENT)
Dept: SURGERY | Facility: CLINIC | Age: 64
End: 2024-02-22
Payer: MEDICARE

## 2024-02-22 ENCOUNTER — TELEPHONE (OUTPATIENT)
Dept: RHEUMATOLOGY | Facility: CLINIC | Age: 64
End: 2024-02-22
Payer: MEDICARE

## 2024-02-22 ENCOUNTER — OFFICE VISIT (OUTPATIENT)
Dept: SURGERY | Facility: CLINIC | Age: 64
End: 2024-02-22
Payer: MEDICARE

## 2024-02-22 DIAGNOSIS — K21.00 GASTROESOPHAGEAL REFLUX DISEASE WITH ESOPHAGITIS WITHOUT HEMORRHAGE: ICD-10-CM

## 2024-02-22 PROCEDURE — 99204 OFFICE O/P NEW MOD 45 MIN: CPT | Mod: HCNC,95,, | Performed by: SURGERY

## 2024-02-22 PROCEDURE — 3044F HG A1C LEVEL LT 7.0%: CPT | Mod: HCNC,CPTII,95, | Performed by: SURGERY

## 2024-02-22 PROCEDURE — 1160F RVW MEDS BY RX/DR IN RCRD: CPT | Mod: HCNC,CPTII,95, | Performed by: SURGERY

## 2024-02-22 PROCEDURE — 1159F MED LIST DOCD IN RCRD: CPT | Mod: HCNC,CPTII,95, | Performed by: SURGERY

## 2024-02-22 NOTE — TELEPHONE ENCOUNTER
----- Message from Mera Barrera sent at 2/22/2024  8:08 AM CST -----  Type:  Sme Day Appointment Request        Name of Caller:Portal Message   When is the first available appointment?06/13  Symptoms:injection   Best Call Back Number:[phone]  Additional Information: Sent: Tue February 20, 2024  6:35 PM     Message  Appointment Request From: Tina García  With Provider: Joanna Paulson PA-C [[place] - Rheumatology]  Preferred Date Range: 2/21/2024 - 2/23/2024  Preferred Times: Wednesday Afternoon, Thursday Morning, Friday Morning, Friday Afternoon  Reason for visit: Toradol Inj    Comments:  Left clavicle shoulder pain

## 2024-02-22 NOTE — TELEPHONE ENCOUNTER
----- Message from Anant Huber sent at 2/22/2024  2:19 PM CST -----  Contact: [phone]  Pt is calling because she has been in her virtual appt since 1:30 and no one has ever come on. Please call back to further assist.

## 2024-02-23 DIAGNOSIS — I10 ESSENTIAL HYPERTENSION: ICD-10-CM

## 2024-02-23 RX ORDER — LOSARTAN POTASSIUM AND HYDROCHLOROTHIAZIDE 12.5; 1 MG/1; MG/1
1 TABLET ORAL
Qty: 90 TABLET | Refills: 3 | OUTPATIENT
Start: 2024-02-23

## 2024-02-23 NOTE — TELEPHONE ENCOUNTER
No care due was identified.  [facility] Embedded Care Due Messages. Reference number: [id number].   2/23/2024 3:37:47 AM CST

## 2024-02-23 NOTE — PROGRESS NOTES
History & Physical    SUBJECTIVE:     History of Present Illness:  he complains of dysphagia (dysphagia to solids, especially bread, meat and pills; denies trouble swallowing liquids), heartburn (Chief complaint) and nausea. She reports no abdominal pain, no belching, no chest pain, no choking, no coughing, no early satiety, no globus sensation, no hoarse voice, no sore throat, no stridor, no tooth decay, no water brash or no wheezing. This is a chronic problem. The current episode started more than 1 year ago. The problem occurs frequently. The problem has been waxing and waning. The heartburn is located in the substernum. The heartburn is of severe intensity. The heartburn wakes her from sleep. The heartburn limits her activity. The heartburn changes with position. The symptoms are aggravated by certain foods, caffeine and lying down. Associated symptoms include anemia and weight loss (on Mounjaro). Pertinent negatives include no melena. There are no known risk factors. She has tried a PPI, a histamine-2 antagonist, a diet change, head elevation and an antacid (caffeine reduction; Past meds: Prilosec- ineffective; Currently: Nexium 40 mg BID, Famotidine 40 mg nightly PRN and OTC Tums PRN- somewhat helps; she is interested in the LINX procedure) for the symptoms. Past procedures include an EGD and a UGI (showed patulous esophagus with decreased peristalsis- followed by Dr. Cobos).      Comes to General surgery Clinic for discussion possible hiatal hernia repair with either fundoplication or LINX.    Due to the patient's history of scleroderma and almost no esophageal motility I do not think a LINX would be a great option was I do not think she would be able to open up the device.    We will discuss case with Gastroenterology to see with the best option is going for.    I think a hiatal hernia repair with a partial fundoplication such as a Toupet would be a viable option for this patient.         No chief complaint  on file.      Review of patient's allergies indicates:   Allergen Reactions    Celebrex [celecoxib] Other (See Comments)     Reflux & stomach symptoms    Nsaids (non-steroidal anti-inflammatory drug)      Irritates reflux       Current Outpatient Medications   Medication Sig Dispense Refill    albuterol (PROVENTIL/VENTOLIN HFA) 90 mcg/actuation inhaler INHALE TWO PUFFS INTO THE LUNGS EVERY 6 HOURS AS NEEDED FOR WHEEZING 8.5 g 3    azithromycin (Z-NATALIE) 250 MG tablet Take 2 tablets by mouth on day 1; Take 1 tablet by mouth on days 2-5 (Patient not taking: Reported on 2/7/2024) 6 tablet 0    cetirizine (ZYRTEC) 10 MG tablet Take 1 tablet (10 mg total) by mouth once daily. 30 tablet 11    cyanocobalamin 1,000 mcg/mL injection       cyclobenzaprine (FLEXERIL) 10 MG tablet Take 1 tablet (10 mg total) by mouth 3 (three) times daily as needed for Muscle spasms. 90 tablet 3    esomeprazole (NEXIUM) 40 MG capsule Take 1 capsule (40 mg total) by mouth 2 (two) times daily. 60 capsule 11    famotidine (PEPCID) 40 MG tablet Take 1 tablet (40 mg total) by mouth nightly as needed (For heartburn and acid reflux symptoms not controlled with Omeprazole twice daily). 30 tablet 11    fluconazole (DIFLUCAN) 150 MG Tab Take 1 tablet (150 mg total) by mouth once daily. 7 tablet 5    fluticasone propionate (FLONASE) 50 mcg/actuation nasal spray 1 spray (50 mcg total) by Each Nostril route once daily. 16 g 3    hydroCHLOROthiazide (HYDRODIURIL) 12.5 MG Tab Take 1 tablet (12.5 mg total) by mouth once daily. 90 tablet 3    hydrOXYchloroQUINE (PLAQUENIL) 200 mg tablet Take 1 tablet (200 mg total) by mouth 2 (two) times daily. 180 tablet 3    hydrOXYzine pamoate (VISTARIL) 25 MG Cap TAKE 1 CAPSULE(25 MG) BY MOUTH EVERY 8 HOURS AS NEEDED FOR ITCHING 90 capsule 3    ketorolac (TORADOL) 60 mg/2 mL Soln       levothyroxine (SYNTHROID) 50 MCG tablet TAKE 1 TABLET(50 MCG) BY MOUTH BEFORE BREAKFAST 90 tablet 1    LIDOcaine HCl 2% (LIDOCAINE VISCOUS) 2  % Soln by Mucous Membrane route every 6 (six) hours. 100 mL 3    losartan (COZAAR) 100 MG tablet Take 1 tablet (100 mg total) by mouth once daily. 90 tablet 3    methylPREDNISolone (MEDROL DOSEPACK) 4 mg tablet use as directed (Patient not taking: Reported on 2/7/2024) 1 each 0    metoclopramide HCl (REGLAN) 10 MG tablet Take 1 tablet (10 mg total) by mouth 3 (three) times daily before meals. (Patient not taking: Reported on 2/7/2024) 90 tablet 5    montelukast (SINGULAIR) 10 mg tablet TAKE 1 TABLET(10 MG) BY MOUTH EVERY EVENING 90 tablet 3    ondansetron (ZOFRAN-ODT) 4 MG TbDL Take 2 tablets (8 mg total) by mouth 3 (three) times daily as needed. 30 tablet 3    promethazine (PHENERGAN) 25 MG tablet Take 1 tablet (25 mg total) by mouth every 6 (six) hours as needed for Nausea. 30 tablet 3    tirzepatide (MOUNJARO) 5 mg/0.5 mL PnIj Inject 5 mg into the skin every 7 days. 4 pen 5    traMADoL (ULTRAM) 50 mg tablet Take 1 tablet (50 mg total) by mouth every 6 (six) hours as needed for Pain. (Patient not taking: Reported on 2/7/2024) 120 tablet 5    valACYclovir (VALTREX) 500 MG tablet TAKE 1 TABLET BY MOUTH TWICE DAILY 180 tablet 3     No current facility-administered medications for this visit.       Past Medical History:   Diagnosis Date    Anxiety     AR (allergic rhinitis)     Arthritis     rheumatoid?    Chronic constipation     Colon polyp     CREST syndrome     Depression     Dry eyes     Dry mouth     Esophageal dysmotility     Fibrocystic breast     Fibromyalgia     Food intolerance     GERD (gastroesophageal reflux disease)     HTN (hypertension)     Irritable bowel syndrome     Raynaud disease     Right wrist fracture     nov 2013    SOB (shortness of breath) on exertion     Thyroid disease     hypothyroid, thyroid cyst on U/S    Ulcer     Vitamin D deficiency disease      Past Surgical History:   Procedure Laterality Date    COLONOSCOPY  7/2012    one polyp removed, biospy=TUBULOVILLOUS ADENOMA, otherwise  normal findings, repeat in 4-5 years for surveillance    COLONOSCOPY N/A 4/17/2019    Procedure: COLONOSCOPY;  Surgeon: Rich Kingsley Jr., MD;  Location: [facility];  Service: Endoscopy;  Laterality: N/A;    COLONOSCOPY W/ POLYPECTOMY  7/24/2012  [facility]    4-5 mm sigmoid polyp.    CYST REMOVAL      pilonidal    DILATION AND CURETTAGE OF UTERUS      ESOPHAGEAL MANOMETRY WITH MEASUREMENT OF IMPEDANCE N/A 2/4/2022    Procedure: MANOMETRY, ESOPHAGUS, WITH IMPEDANCE MEASUREMENT;  Surgeon: Kami Cobos MD;  [address]  Service: Endoscopy;  Laterality: N/A;  COVID test on 2/1 at [facility]    ESOPHAGOGASTRODUODENOSCOPY N/A 4/17/2019    Procedure: EGD (ESOPHAGOGASTRODUODENOSCOPY);  Surgeon: Rich Kingsley Jr., MD;  Location: [facility];  Service: Endoscopy;  Laterality: N/A;    ESOPHAGOGASTRODUODENOSCOPY N/A 12/3/2021    Procedure: EGD (ESOPHAGOGASTRODUODENOSCOPY);  Surgeon: Rich Kingsley Jr., MD;  Location: [facility];  Service: Endoscopy;  Laterality: N/A;    GANGLION CYST EXCISION      right thumb    HYSTERECTOMY  2008    complete    TOTAL ABDOMINAL HYSTERECTOMY W/ BILATERAL SALPINGOOPHORECTOMY  2008    w/Hysterectomy    UPPER GASTROINTESTINAL ENDOSCOPY  12/23/2011  S[facility]o    Fluid in the lower third of the esophagus  (stasis).  The esophageal examination was compatible with scleroderma.  (Dilataion done, 51 Fr.)    LA Grade A reflux esophagitis.   Enlarged gastric folds.   Multiple gastric polyps.   Antritis (erythematous streaks).  JOELLEN Test  Negative.     Family History   Problem Relation Age of Onset    Cancer Mother         Leiomyosarcoma    Liver cancer Mother     Esophageal cancer Father     Cloud's esophagus Father     Cancer Father         esophageal    Colon polyps Sister     Cancer Paternal Uncle         colon    Cancer Maternal Grandfather         stomach    Stomach cancer Paternal Grandfather     Breast cancer Paternal Cousin 40    Breast cancer Paternal Cousin 53    Diabetes  Other     Hypertension Other      Social History     Tobacco Use    Smoking status: Never    Smokeless tobacco: Never   Substance Use Topics    Alcohol use: Not Currently    Drug use: No        Review of Systems:  Review of Systems   Constitutional:  Negative for appetite change, fatigue, fever and unexpected weight change.   HENT:  Negative for sore throat and trouble swallowing.    Eyes: Negative.    Respiratory:  Negative for cough, shortness of breath and wheezing.    Cardiovascular:  Negative for chest pain and leg swelling.   Gastrointestinal:  Positive for abdominal pain. Negative for abdominal distention, blood in stool, constipation, diarrhea, nausea and vomiting.        Dysphagia   Endocrine: Negative.    Genitourinary: Negative.    Musculoskeletal:  Negative for back pain.   Skin: Negative.  Negative for rash.   Allergic/Immunologic: Negative.    Neurological: Negative.    Hematological: Negative.    Psychiatric/Behavioral:  Negative for confusion.      OBJECTIVE:     Vital Signs (Most Recent)              Physical Exam:  Physical Exam  Vitals and nursing note reviewed.   Constitutional:       Appearance: She is well-developed.   HENT:      Head: Normocephalic and atraumatic.   Cardiovascular:      Rate and Rhythm: Normal rate.      Heart sounds: Normal heart sounds.   Pulmonary:      Effort: Pulmonary effort is normal.   Abdominal:      General: Bowel sounds are normal. There is no distension.      Palpations: Abdomen is soft.      Tenderness: There is no abdominal tenderness.   Musculoskeletal:         General: Normal range of motion.      Cervical back: Normal range of motion.   Skin:     General: Skin is warm and dry.      Capillary Refill: Capillary refill takes less than 2 seconds.   Neurological:      Mental Status: She is alert and oriented to person, place, and time.   Psychiatric:         Behavior: Behavior normal.     Laboratory  CBC: Reviewed  CMP: Reviewed    Diagnostic Results:  Upper GI:  Reviewed  Patulous esophagus with decreased peristalsis  Manometry showed findings with very poor esophageal motility  ASSESSMENT/PLAN:     63-year-old female with history of scleroderma and severe GERD despite medication    PLAN:Plan     Discussed with the patient possibilities of hiatal hernia repair with partial fundoplication.    We will discuss with GI to see if they agree with this plan.

## 2024-02-23 NOTE — TELEPHONE ENCOUNTER
Refill Decision Note   Tina García  is requesting a refill authorization.  Brief Assessment and Rationale for Refill:  Quick Discontinue     Medication Therapy Plan: Pt taking Losartan 100mg and HCTZ 12.5mg  separately as of 6/28/23      Comments:     Note composed:10:05 AM 02/23/2024

## 2024-03-07 NOTE — H&P
History & Physical - Short Stay  Gastroenterology      SUBJECTIVE:     Procedure: Gastroscopy and Colonoscopy    Chief Complaint/Indication for Procedure:  Scleroderma, Reflux.  Rectal bleeding, Hx of colon polyps..    History of Present Illness:  Tina García   to Me       3/11/19 10:15 AM   Hello, Im expecting some rectal bleeding. I noticed some  red blood on toilet paper after I tried to walk for exercise x 3 days. It subsided with rest. No burning noticed initially, but did experience burning yesterday. It may be hemorrhoids, but never had them in the past. Im due for  scope of stomach and colon and would like to schedule this, please.  Thanks in advance , Tina     Office Visit     12/11/2018  [facility]      Rand Jones NP   Family Medicine   Encounter for preventive health examination +8 more   Dx   Health Risk Assessment   Reason for Visit        Progress Notes     Expand All Collapse All    Tina García presented for a  Medicare AWV and comprehensive Health Risk Assessment today. The following components were reviewed and updated:     · Medical history  · Family History  · Social history  · Allergies and Current Medications  · Health Risk Assessment  · Health Maintenance  · Care Team     Diagnoses and health risks identified today and associated recommendations/orders:     1. Encounter for preventive health examination  Reviewed and discussed health maintenance.       2. Major depressive disorder, single episode, severe without psychotic features  Stable- continue current treatment and follow up routinely with PCP      3. Chronic pulmonary heart disease  Stable- continue current treatment and follow up routinely with PCP      4. PHT (pulmonary hypertension)  Stable- continue current treatment and follow up routinely with PCP and rheumatolgy ()     5. Essential hypertension  Stable- continue current treatment and follow up routinely with PCP      6.  CREST (calcinosis, Raynaud's phenomenon, esophageal dysfunction, sclerodactyly, telangiectasia)  Stable- continue current treatment and follow up routinely with PCP and rheumatolgy (     7. Gastroesophageal reflux disease without esophagitis  Stable- continue current treatment and follow up routinely with PCP      8. Fibromyalgia  Stable- continue current treatment and follow up routinely with PCP and rheumatolgy (     9. Cough  - benzonatate (TESSALON) 100 MG capsule; Take 1 capsule (100 mg total) by mouth 3 (three) times daily as needed for Cough.  Dispense: 30 capsule; Refill: 0     Provided Tina with a 5-10 year written screening schedule and personal prevention plan. Recommendations were developed using the USPSTF age appropriate recommendations. Education, counseling, and referrals were provided as needed. After Visit Summary printed and given to patient which includes a list of additional screenings\tests needed.     Rand Jones NP            Last EGD 4/14/2016  Impression:          - Normal oropharynx.                       - Normal upper third of esophagus and middle third                        of esophagus.                       - LA Grade B reflux esophagitis. Biopsied.                       - Esophageal ulcers. Biopsied.                       - Z-line regular, 35 cm from the incisors.                       - Gastritis. Biopsied.                       - Erythematous mucosa in the antrum. Biopsied.                       - Normal stomach otherwise.                       - Normal examined duodenum.  Recommendation:      - Discharge patient to home.                       - Follow an antireflux regimen.                       - Continue present medications.                       - Use Protonix (pantoprazole) 40 mg PO daily.                       - Use Zantac (ranitidine) 300 mg PO nightly.                       - Use sucralfate tablets 1 gram PO QID.                       - Use  [FreeTextEntry1] : Interval History: Pt is a 59 yr old male with PMH HIV (since late 1980's, well-controlled, on HAART), anxiety, and chronic peripheral neuropathy (HIV-related), formerly in care with Dr. Yeung (pain mgmt) followed by Dr. Hawley (palliative). Referred to my practice for continuation of chronic pain treatment/opioid management.  Interval Hx: Chart reviewed beforehand. Pt seen virtually. Pleasant, cooperative, AAO X3 with no overt confusion. Requested this visit for putick in neuropathic pain. Describes constant burning/pins/needles pain in b/l LE even at rest but worsened with activity. Asking if opioids could be modified, citing that in years past he was on Methadone 160mg/day and Oxycodone "300's" mg/day and yet now on much less of each.   We discussed how methadone has a stronger potency, and that we should strive for a basal level of pain control with that agent and achieve less reliance on short-acting oxycodone. He agreed with plan to increase methadone and reduce oxycodone.   misoprostol 200 micrograms PO QID.                       - Await pathology results.                       - Repeat the upper endoscopy in 1 year for                        surveillance.  Rich Kingsley MD  4/14/2016         PTA Medications   Medication Sig    azaTHIOprine (IMURAN) 50 mg Tab Take 1 tablet (50 mg total) by mouth once daily.    calcium carbonate-vit D3-min (CALTRATE 600+D PLUS MINERALS) 600 mg (1,500 mg)-400 unit Chew Take 1 tablet by mouth once daily.      cetirizine (ZYRTEC) 10 MG tablet Take 1 tablet (10 mg total) by mouth once daily.    estradiol (ESTRACE) 1 MG tablet Take by mouth once a week. Insert tablet vaginally once a week    fluticasone (FLONASE) 50 mcg/actuation nasal spray 1 spray (50 mcg total) by Each Nare route once daily.    levothyroxine (SYNTHROID) 75 MCG tablet Take 1 tablet (75 mcg total) by mouth once daily.    losartan-hydrochlorothiazide 100-25 mg (HYZAAR) 100-25 mg per tablet Take 1 tablet by mouth once daily.    niacin 500 MG tablet Take 1 tablet by mouth.    omeprazole (PRILOSEC) 40 MG capsule Take 1 capsule (40 mg total) by mouth before breakfast.    ondansetron (ZOFRAN-ODT) 4 MG TbDL Take 2 tablets (8 mg total) by mouth 3 (three) times daily as needed.    ranitidine (ZANTAC) 300 MG tablet Take 1 tablet (300 mg total) by mouth every evening. , about 30-60 minutes before bedtime.    sildenafil, antihypertensive, (REVATIO) 20 mg Tab Take 1 tablet (20 mg total) by mouth 3 (three) times daily.    traMADol (ULTRAM) 50 mg tablet Take 1 tablet (50 mg total) by mouth every 6 (six) hours as needed.    albuterol (PROAIR HFA) 90 mcg/actuation inhaler Inhale 2 puffs into the lungs every 6 (six) hours as needed for Wheezing.    atropine-phenobarbital-scopolamine-hyoscyamine (DONNATAL) 16.2-0.1037 -0.0194 mg Tab Take 1 tablet by mouth 3 (three) times daily as needed.    clonazePAM (KLONOPIN) 1 MG tablet Take 1 tablet (1 mg total) by mouth 2 (two) times daily as  needed for Anxiety.    hydrOXYzine pamoate (VISTARIL) 25 MG Cap TAKE TWO CAPSULES BY MOUTH NIGHTLY IF NEEDED    misoprostol (CYTOTEC) 200 MCG Tab Take 1 tablet (200 mcg total) by mouth 4 (four) times daily with meals and nightly. (Patient taking differently: Take 200 mcg by mouth 4 (four) times daily with meals and nightly. As needed)    naftifine (NAFTIN) 1 % Gel Apply 1 puff topically once daily.    triamcinolone acetonide 0.1% (KENALOG) 0.1 % cream 2 (two) times daily. Apply to affected area       Review of patient's allergies indicates:   Allergen Reactions    Celebrex [celecoxib] Other (See Comments)     Reflux & stomach symptoms    Nsaids (non-steroidal anti-inflammatory drug)      Irritates reflux        Past Medical History:   Diagnosis Date    Anxiety     AR (allergic rhinitis)     Arthritis     rheumatoid?    Chronic constipation     Colon polyp     CREST syndrome     Depression     Dry eyes     Dry mouth     Esophageal dysmotility     Fibrocystic breast     Fibromyalgia     Food intolerance     GERD (gastroesophageal reflux disease)     HTN (hypertension)     Irritable bowel syndrome     Raynaud disease     Right wrist fracture     nov 2013    SOB (shortness of breath) on exertion     Thyroid disease     hypothyroid, thyroid cyst on U/S    Ulcer     Vitamin D deficiency disease      Past Surgical History:   Procedure Laterality Date    COLONOSCOPY  7/2012    one polyp removed, biospy=TUBULOVILLOUS ADENOMA, otherwise normal findings, repeat in 4-5 years for surveillance    COLONOSCOPY N/A 7/24/2012    Performed by Rich Kingsley Jr., [facility]    COLONOSCOPY W/ POLYPECTOMY  7/24/2012  [facility]    4-5 mm sigmoid polyp.    CYST REMOVAL      pilonidal    DILATION AND CURETTAGE OF UTERUS      ESOPHAGOGASTRODUODENOSCOPY (EGD) N/A 4/14/2016    Per[facility] Rich [facility] Jr[facility]    ESOPHAGOGASTRODUODENOSCOPY (EGD) N/A 12/22/2015    Performed by Rich BARR  "Sancho Gale MD at [facility] END[facility]    GANGLION CYST EXCISION      right thumb    HYSTERECTOMY      TOTAL ABDOMINAL HYSTERECTOMY W/ BILATERAL SALPINGOOPHORECTOMY      UPPER GASTROINTESTINAL ENDOSCOPY  12/23/2011  [facility]    Fluid in the lower third of the esophagus  (stasis).  The esophageal examination was compatible with scleroderma.  (Dilataion done, 51 Fr.)    LA Grade A reflux esophagitis.   Enlarged gastric folds.   Multiple gastric polyps.   Antritis (erythematous streaks).  JOELLEN Test  Negative.     Family History   Problem Relation Age of Onset    Cancer Mother         Leiomyosarcoma    Liver cancer Mother     Stomach cancer Paternal Grandfather     Cancer Maternal Grandfather         stomach    Stomach cancer Father 73        getting chemo    Cloud's esophagus Father     Cancer Father         esophageal    Colon polyps Sister     Diabetes Unknown     Hypertension Unknown     Cancer Paternal Uncle         colon    Breast cancer Paternal Cousin 40    Breast cancer Paternal Cousin 53     Social History     Tobacco Use    Smoking status: Never Smoker    Smokeless tobacco: Never Used   Substance Use Topics    Alcohol use: Yes     Comment: holidays    Drug use: No         OBJECTIVE:     Vital Signs (Most Recent)  Temp: 98.1 °F (36.7 °C) (04/17/19 1149)  Pulse: 71 (04/17/19 1149)  Resp: 16 (04/17/19 1149)  BP: 122/71 (04/17/19 1149)  SpO2: 100 % (04/17/19 1149)    Physical Exam:  :Ht 5' 3" (1.6 m)   Wt 78.4 kg (172 lb 13.5 oz)   BMI 30.62 kg/m²                        GENERAL:  Comfortable, in no acute distress.                                 HEENT EXAM:  Nonicteric.  No adenopathy.  Oropharynx is clear.               NECK:  Supple.                                                               LUNGS:  Clear.                                                               CARDIAC:  Regular rate and rhythm.  S1, S2.  No murmur.                      ABDOMEN:  Soft, positive bowel sounds, nontender.  No " hepatosplenomegaly or masses.  No rebound or guarding.                               EXTREMITIES:  No edema.     MENTAL STATUS:  Alert and oriented.    ASSESSMENT/PLAN:     Assessment:  Scleroderma, Reflux.  Rectal bleeding, Hx of colon polyps...    Plan: Gastroscopy and Colonoscopy    Anesthesia Plan:   MAC / General Anaesthesia    ASA Grade: ASA 2 - Patient with mild systemic disease with no functional limitations    MALLAMPATI SCORE: II (hard and soft palate, upper portion of tonsils anduvula visible)

## 2024-03-22 DIAGNOSIS — B02.9 HERPES ZOSTER WITHOUT COMPLICATION: ICD-10-CM

## 2024-03-22 DIAGNOSIS — M35.00 SJOGREN'S SYNDROME, WITH UNSPECIFIED ORGAN INVOLVEMENT: ICD-10-CM

## 2024-03-22 RX ORDER — VALACYCLOVIR HYDROCHLORIDE 500 MG/1
TABLET, FILM COATED ORAL
Qty: 180 TABLET | Refills: 1 | Status: SHIPPED | OUTPATIENT
Start: 2024-03-22

## 2024-03-22 NOTE — TELEPHONE ENCOUNTER
No care due was identified.  [facility] Embedded Care Due Messages. Reference number: [id number].   3/22/2024 5:43:16 AM CDT

## 2024-03-22 NOTE — TELEPHONE ENCOUNTER
Refill Decision Note   Tina García  is requesting a refill authorization.  Brief Assessment and Rationale for Refill:  Approve     Medication Therapy Plan:         Comments:     Note composed:3:14 PM 03/22/2024

## 2024-04-01 ENCOUNTER — TELEPHONE (OUTPATIENT)
Dept: FAMILY MEDICINE | Facility: CLINIC | Age: 64
End: 2024-04-01
Payer: MEDICARE

## 2024-04-01 NOTE — TELEPHONE ENCOUNTER
Returned patient call, let her know Dr. Harris is out of clinic and we cannot just place urine order without visit. She stated she will call her GYN provider.

## 2024-04-01 NOTE — TELEPHONE ENCOUNTER
----- Message from Delmis Sánchez sent at 4/1/2024  7:08 AM CDT -----  Contact: self  Type:  Needs Medical Advice    Who Called: self  Symptoms (please be specific): pt is requesting a lab order be put into chart. Pt thinks she has a UTI.   Would the patient rather a call back or a response via [facility]? call  Best Call Back Number: [phone] (home)     Additional Information: please let pt know when she can schedule. please advise and thank you.

## 2024-04-11 ENCOUNTER — TELEPHONE (OUTPATIENT)
Dept: GASTROENTEROLOGY | Facility: CLINIC | Age: 64
End: 2024-04-11
Payer: MEDICARE

## 2024-04-11 NOTE — TELEPHONE ENCOUNTER
Prep Instructions sent to patient portal.      MiraLAX Prep      ALERT: Please notify the clinic if you start any blood thinners as does affect your procedure  NOTE: NO ASPIRIN or ibuprofen products for the morning of your procedure. This includes Motrin, ALEVE, Advil, or other arthritis type medications. TYLENOL IS ALLOWED.  AVOID the following foods for 7 - 10 days prior to the procedure: Beans, peas, corn, nuts, popcorn, or tomatoes. If you forget we will not cancel your procedure.      **You will need to purchase over-the-counter  -    4 Dulcolax laxative tablets - any brand is fine   -    2 Liter Bottle or 64 oz. of any clear liquid - see list below       (Noncarbonated, Nothing RED or PURPLE)   -    MiraLAX (255 gram / 8 oz ) bottle of powder     The evening before your prep day, at bedtime, take 2 Dulcolax tablets    ONE DAY BEFORE THE PROCEDURE (PREP DAY):   1. You will be on a clear liquid diet the entire day before the procedure.  2. At 10 am you will take 2 more Dulcolax laxative tablets  3. At 5 pm mix one 8oz. glass of clear liquid with one capful of Miralax and drink it entirely.  4. Repeat every 15 minutes until you have finished the 2 liter/ 64 oz. of clear fluid.      It's about 8 - 10 glasses of fluid. You may have some MiraLAX left over.      CLEAR LIQUIDS INCLUDE: Coffee (no cream), tea, apple juice, white grape juice, limit carbonated drinks to 2 in 24 hours, boullion, chicken broth, beef broth, Gatorade, Krishan-Aid, jello, popsicles, snowballs, Italian ice, and hard candy. NO ALCOHOL. NOTHING RED OR PURPLE.    It is important to drink plenty of clear fluids throughout the day prior to the procedure while doing this prep. After midnight  WATER ONLY is allowed, then nothing by mouth 4 hours prior to the procedure time.    A preop nurse will call the day prior to your procedure to discuss medications you can and cannot take the morning of your procedure. Since sedation is used, you must have someone  drive you home. It is [facility] policy that you may not take a taxi home after sedation.         NOTE:  ·         Dulaglutide (Trulicity) (weekly) - hold 8 days  ·         Exenatide extended release (Bydureon bcise) (weekly) - hold 8 days  ·         Semaglutide (Ozempic) (weekly) - hold 8 days  Tirepatide (Mounjaro) (weekly) - hold 8 days  Wegovy (weekly) - hold 8 days  ·         Exenatide (Byetta) (twice daily)- hold DAY OF PROCEDURE  ·         Liraglutide (Victoza, Saxenda) (daily)- hold DAY OF PROCEDURE  ·         Lixisenatide (Adlyxin) (daily)- hold DAY OF PROCEDURE  ·         Semaglutide (Rybelsus) (daily) -hold DAY OF PROCEDURE

## 2024-04-19 ENCOUNTER — HOSPITAL ENCOUNTER (OUTPATIENT)
Facility: HOSPITAL | Age: 64
Discharge: HOME | End: 2024-04-19
Attending: INTERNAL MEDICINE | Admitting: FAMILY MEDICINE
Payer: MEDICARE

## 2024-04-19 ENCOUNTER — ANESTHESIA EVENT (OUTPATIENT)
Dept: ENDOSCOPY | Facility: HOSPITAL | Age: 64
End: 2024-04-19
Payer: MEDICARE

## 2024-04-19 ENCOUNTER — ANESTHESIA (OUTPATIENT)
Dept: ENDOSCOPY | Facility: HOSPITAL | Age: 64
End: 2024-04-19
Payer: MEDICARE

## 2024-04-19 VITALS
DIASTOLIC BLOOD PRESSURE: 83 MMHG | BODY MASS INDEX: 28.51 KG/M2 | WEIGHT: 167 LBS | OXYGEN SATURATION: 98 % | SYSTOLIC BLOOD PRESSURE: 178 MMHG | HEART RATE: 71 BPM | TEMPERATURE: 99 F | RESPIRATION RATE: 14 BRPM | HEIGHT: 64 IN

## 2024-04-19 DIAGNOSIS — M34.1 CREST (CALCINOSIS, RAYNAUD'S PHENOMENON, ESOPHAGEAL DYSFUNCTION, SCLERODACTYLY, TELANGIECTASIA): Primary | ICD-10-CM

## 2024-04-19 DIAGNOSIS — R13.10 DYSPHAGIA: ICD-10-CM

## 2024-04-19 PROCEDURE — 63600175 PHARM REV CODE 636 W HCPCS: Mod: HCNC,PO | Performed by: INTERNAL MEDICINE

## 2024-04-19 PROCEDURE — D9220A PRA ANESTHESIA: Mod: CRNA,,, | Performed by: NURSE ANESTHETIST, CERTIFIED REGISTERED

## 2024-04-19 PROCEDURE — 88305 TISSUE EXAM BY PATHOLOGIST: CPT | Mod: HCNC,PO | Performed by: STUDENT IN AN ORGANIZED HEALTH CARE EDUCATION/TRAINING PROGRAM

## 2024-04-19 PROCEDURE — 43248 EGD GUIDE WIRE INSERTION: CPT | Mod: 59,HCNC,PO | Performed by: INTERNAL MEDICINE

## 2024-04-19 PROCEDURE — 45380 COLONOSCOPY AND BIOPSY: CPT | Mod: PT,HCNC,PO | Performed by: INTERNAL MEDICINE

## 2024-04-19 PROCEDURE — 88312 SPECIAL STAINS GROUP 1: CPT | Mod: 26,HCNC,, | Performed by: STUDENT IN AN ORGANIZED HEALTH CARE EDUCATION/TRAINING PROGRAM

## 2024-04-19 PROCEDURE — 25000003 PHARM REV CODE 250: Mod: HCNC,PO | Performed by: NURSE ANESTHETIST, CERTIFIED REGISTERED

## 2024-04-19 PROCEDURE — 43239 EGD BIOPSY SINGLE/MULTIPLE: CPT | Mod: HCNC,PO | Performed by: INTERNAL MEDICINE

## 2024-04-19 PROCEDURE — 63600175 PHARM REV CODE 636 W HCPCS: Mod: HCNC,PO | Performed by: NURSE ANESTHETIST, CERTIFIED REGISTERED

## 2024-04-19 PROCEDURE — 88312 SPECIAL STAINS GROUP 1: CPT | Mod: HCNC,PO | Performed by: STUDENT IN AN ORGANIZED HEALTH CARE EDUCATION/TRAINING PROGRAM

## 2024-04-19 PROCEDURE — 45380 COLONOSCOPY AND BIOPSY: CPT | Mod: PT,HCNC,, | Performed by: INTERNAL MEDICINE

## 2024-04-19 PROCEDURE — 88305 TISSUE EXAM BY PATHOLOGIST: CPT | Mod: 26,HCNC,, | Performed by: STUDENT IN AN ORGANIZED HEALTH CARE EDUCATION/TRAINING PROGRAM

## 2024-04-19 PROCEDURE — 37000009 HC ANESTHESIA EA ADD 15 MINS: Mod: HCNC,PO | Performed by: INTERNAL MEDICINE

## 2024-04-19 PROCEDURE — 43248 EGD GUIDE WIRE INSERTION: CPT | Mod: 51,HCNC,, | Performed by: INTERNAL MEDICINE

## 2024-04-19 PROCEDURE — 88341 IMHCHEM/IMCYTCHM EA ADD ANTB: CPT | Mod: 26,HCNC,, | Performed by: STUDENT IN AN ORGANIZED HEALTH CARE EDUCATION/TRAINING PROGRAM

## 2024-04-19 PROCEDURE — 37000008 HC ANESTHESIA 1ST 15 MINUTES: Mod: HCNC,PO | Performed by: INTERNAL MEDICINE

## 2024-04-19 PROCEDURE — 27201012 HC FORCEPS, HOT/COLD, DISP: Mod: HCNC,PO | Performed by: INTERNAL MEDICINE

## 2024-04-19 PROCEDURE — 88341 IMHCHEM/IMCYTCHM EA ADD ANTB: CPT | Mod: HCNC,PO | Performed by: STUDENT IN AN ORGANIZED HEALTH CARE EDUCATION/TRAINING PROGRAM

## 2024-04-19 PROCEDURE — 43239 EGD BIOPSY SINGLE/MULTIPLE: CPT | Mod: 59,HCNC,, | Performed by: INTERNAL MEDICINE

## 2024-04-19 PROCEDURE — D9220A PRA ANESTHESIA: Mod: ANES,,, | Performed by: ANESTHESIOLOGY

## 2024-04-19 PROCEDURE — 88342 IMHCHEM/IMCYTCHM 1ST ANTB: CPT | Mod: 59,HCNC,PO | Performed by: STUDENT IN AN ORGANIZED HEALTH CARE EDUCATION/TRAINING PROGRAM

## 2024-04-19 PROCEDURE — 88342 IMHCHEM/IMCYTCHM 1ST ANTB: CPT | Mod: 26,HCNC,, | Performed by: STUDENT IN AN ORGANIZED HEALTH CARE EDUCATION/TRAINING PROGRAM

## 2024-04-19 PROCEDURE — C1769 GUIDE WIRE: HCPCS | Mod: HCNC,PO | Performed by: INTERNAL MEDICINE

## 2024-04-19 RX ORDER — LIDOCAINE HYDROCHLORIDE 20 MG/ML
INJECTION INTRAVENOUS
Status: DISCONTINUED | OUTPATIENT
Start: 2024-04-19 | End: 2024-04-19

## 2024-04-19 RX ORDER — PROPOFOL 10 MG/ML
VIAL (ML) INTRAVENOUS
Status: DISCONTINUED | OUTPATIENT
Start: 2024-04-19 | End: 2024-04-19

## 2024-04-19 RX ORDER — SODIUM CHLORIDE 0.9 % (FLUSH) 0.9 %
10 SYRINGE (ML) INJECTION
Status: DISCONTINUED | OUTPATIENT
Start: 2024-04-19 | End: 2024-04-19 | Stop reason: HOSPADM

## 2024-04-19 RX ORDER — PROPOFOL 10 MG/ML
VIAL (ML) INTRAVENOUS CONTINUOUS PRN
Status: DISCONTINUED | OUTPATIENT
Start: 2024-04-19 | End: 2024-04-19

## 2024-04-19 RX ORDER — SUCRALFATE 1 G/1
1 TABLET ORAL
Qty: 120 TABLET | Refills: 11 | Status: SHIPPED | OUTPATIENT
Start: 2024-04-19 | End: 2025-04-19

## 2024-04-19 RX ORDER — SODIUM CHLORIDE, SODIUM LACTATE, POTASSIUM CHLORIDE, CALCIUM CHLORIDE 600; 310; 30; 20 MG/100ML; MG/100ML; MG/100ML; MG/100ML
INJECTION, SOLUTION INTRAVENOUS CONTINUOUS
Status: DISCONTINUED | OUTPATIENT
Start: 2024-04-19 | End: 2024-04-19 | Stop reason: HOSPADM

## 2024-04-19 RX ADMIN — SODIUM CHLORIDE, POTASSIUM CHLORIDE, SODIUM LACTATE AND CALCIUM CHLORIDE: 600; 310; 30; 20 INJECTION, SOLUTION INTRAVENOUS at 12:04

## 2024-04-19 RX ADMIN — PROPOFOL 50 MG: 10 INJECTION, EMULSION INTRAVENOUS at 12:04

## 2024-04-19 RX ADMIN — SODIUM CHLORIDE, POTASSIUM CHLORIDE, SODIUM LACTATE AND CALCIUM CHLORIDE: 600; 310; 30; 20 INJECTION, SOLUTION INTRAVENOUS at 11:04

## 2024-04-19 RX ADMIN — PROPOFOL 50 MG: 10 INJECTION, EMULSION INTRAVENOUS at 01:04

## 2024-04-19 RX ADMIN — PROPOFOL 100 MG: 10 INJECTION, EMULSION INTRAVENOUS at 12:04

## 2024-04-19 RX ADMIN — PROPOFOL 150 MCG/KG/MIN: 10 INJECTION, EMULSION INTRAVENOUS at 12:04

## 2024-04-19 RX ADMIN — LIDOCAINE HYDROCHLORIDE 50 MG: 20 INJECTION INTRAVENOUS at 12:04

## 2024-04-19 NOTE — BRIEF OP NOTE
Discharge Note  Short Stay      SUMMARY     Admit Date: 4/19/2024    Attending Physician: Rich Kingsley Jr., MD     Discharge Physician: Rich Kingsley Jr., MD    Discharge Date: 4/19/2024 2:23 PM    Final Diagnosis: Gastroesophageal reflux disease, unspecified whether esophagitis present [K21.9]  History of colon polyps [Z86.010]      Impression:            - Normal oropharynx.                          - Normal larynx.                          - Dilation in the entire esophagus.                          - Normal upper third of esophagus and middle third                          of esophagus.                          - Fluid in the lower third of the esophagus.                          - Reflux / erosive esophagitis (or stasis                          esophagitis).                          - Esophageal ulcer. Biopsied.                          - Z-line regular, 30-31 cm from the incisors.                          - Gaping lower esophageal sphincter.                          - Small hiatal hernia.                          - The esophageal examination was consistent with                          aperistalsis.                          - Fundal Gastritis. Biopsied.                          - Excessive gastric fluid. Fluid aspiration                          performed.                          - Dilated stomach(?).                          - Due to absence of peristalsis, patient symptoms                          and retained gastric contents, suspect                          gastroparesis (secondary to scleroderma?).                          - Normal antrum and prepyloric region of the                          stomach. Biopsied.                          - Normal pylorus.                          - Normal examined duodenum.                          - No endoscopic esophageal abnormality to explain                          patient's dysphagia. Esophagus dilated, 51 Fr.   Recommendation:        - Perform a  colonoscopy as previously scheduled.                          - Discharge patient to home after that.                          - Await pathology results.                          - Follow an antireflux regimen.                          - Exercise and weight loss.                          - Continue present medications.                          - Use Nexium (esomeprazole) 40 mg PO BID.                          - Use Pepcid (famotidine) 40 mg PO nightly.                          - Use sucralfate tablets 1 gram PO QID for 1-2                          months.                          - Call the G.I. clinic in 2 weeks for reports (if                          you haven't heard from us sooner) [phone].                          - Return to GI clinic in 4-6 weeks.                          - Do a gastric emptying study at appointment to be                          scheduled.     Impression:            - One 2 mm polyp in the distal rectum, removed                          piecemeal using a cold biopsy forceps. Resected                          and retrieved.                          - Tortuous colon.                          - Redundant colon.                          - Non-bleeding internal hemorrhoids.                          - The examination was otherwise normal.                          - The examined portion of the ileum was normal.   Recommendation:        - Discharge patient to home.                          - Await pathology results.                          - Repeat colonoscopy in 5 years for surveillance.                          - High fiber diet.                          - Use fiber, for example Citrucel, Fibercon,                          Konsyl or Metamucil.                          - Augmented water consumption diet.                          - Miralax 1 capful (17 grams) in 8 ounces of water                          PO daily PRN.                          - Continue present medications.     Rich BARR  MD Sancho   4/19/2024       Disposition: HOME OR SELF CARE    Patient Instructions:   Current Discharge Medication List        START taking these medications    Details   sucralfate (CARAFATE) 1 gram tablet Take 1 tablet (1 g total) by mouth 4 (four) times daily before meals and nightly. Or, take 2 in the AM ac BF and take 2 at bedtime.  Qty: 120 tablet, Refills: 11           CONTINUE these medications which have NOT CHANGED    Details   cyanocobalamin 1,000 mcg/mL injection       cyclobenzaprine (FLEXERIL) 10 MG tablet Take 1 tablet (10 mg total) by mouth 3 (three) times daily as needed for Muscle spasms.  Qty: 90 tablet, Refills: 3    Associated Diagnoses: CREST (calcinosis, Raynaud's phenomenon, esophageal dysfunction, sclerodactyly, telangiectasia); Sjogren's syndrome, with unspecified organ involvement      esomeprazole (NEXIUM) 40 MG capsule Take 1 capsule (40 mg total) by mouth 2 (two) times daily.  Qty: 60 capsule, Refills: 11    Associated Diagnoses: Gastroesophageal reflux disease, unspecified whether esophagitis present      famotidine (PEPCID) 40 MG tablet Take 1 tablet (40 mg total) by mouth nightly as needed (For heartburn and acid reflux symptoms not controlled with Omeprazole twice daily).  Qty: 30 tablet, Refills: 11    Associated Diagnoses: Gastroesophageal reflux disease, unspecified whether esophagitis present      hydroCHLOROthiazide (HYDRODIURIL) 12.5 MG Tab Take 1 tablet (12.5 mg total) by mouth once daily.  Qty: 90 tablet, Refills: 3    Comments: .  Associated Diagnoses: Primary hypertension      hydrOXYchloroQUINE (PLAQUENIL) 200 mg tablet Take 1 tablet (200 mg total) by mouth 2 (two) times daily.  Qty: 180 tablet, Refills: 3    Associated Diagnoses: Gastroesophageal reflux disease with esophagitis, unspecified whether hemorrhage; Sjogren's syndrome, with unspecified organ involvement; Drug-induced constipation      hydrOXYzine pamoate (VISTARIL) 25 MG Cap TAKE 1 CAPSULE(25 MG) BY MOUTH  EVERY 8 HOURS AS NEEDED FOR ITCHING  Qty: 90 capsule, Refills: 3    Associated Diagnoses: Itching      levothyroxine (SYNTHROID) 50 MCG tablet TAKE 1 TABLET(50 MCG) BY MOUTH BEFORE BREAKFAST  Qty: 90 tablet, Refills: 1    Associated Diagnoses: Other specified hypothyroidism      losartan (COZAAR) 100 MG tablet Take 1 tablet (100 mg total) by mouth once daily.  Qty: 90 tablet, Refills: 3    Comments: .  Associated Diagnoses: Primary hypertension      montelukast (SINGULAIR) 10 mg tablet TAKE 1 TABLET(10 MG) BY MOUTH EVERY EVENING  Qty: 90 tablet, Refills: 3    Associated Diagnoses: Environmental allergies      ondansetron (ZOFRAN-ODT) 4 MG TbDL Take 2 tablets (8 mg total) by mouth 3 (three) times daily as needed.  Qty: 30 tablet, Refills: 3    Associated Diagnoses: Gastroesophageal reflux disease, unspecified whether esophagitis present      traMADoL (ULTRAM) 50 mg tablet Take 1 tablet (50 mg total) by mouth every 6 (six) hours as needed for Pain.  Qty: 120 tablet, Refills: 5    Comments: Quantity prescribed more than 7 day supply? Yes, quantity medically necessary  Associated Diagnoses: CREST (calcinosis, Raynaud's phenomenon, esophageal dysfunction, sclerodactyly, telangiectasia); Sjogren's syndrome, with unspecified organ involvement; Hashimoto's thyroiditis; Chronic pain syndrome; Bronchitis      albuterol (PROVENTIL/VENTOLIN HFA) 90 mcg/actuation inhaler INHALE TWO PUFFS INTO THE LUNGS EVERY 6 HOURS AS NEEDED FOR WHEEZING  Qty: 8.5 g, Refills: 3    Associated Diagnoses: Reactive airway disease, mild intermittent, uncomplicated      azithromycin (Z-NATALIE) 250 MG tablet Take 2 tablets by mouth on day 1; Take 1 tablet by mouth on days 2-5  Qty: 6 tablet, Refills: 0    Associated Diagnoses: Acute bronchitis, unspecified organism      cetirizine (ZYRTEC) 10 MG tablet Take 1 tablet (10 mg total) by mouth once daily.  Qty: 30 tablet, Refills: 11      fluconazole (DIFLUCAN) 150 MG Tab Take 1 tablet (150 mg total) by mouth  once daily.  Qty: 7 tablet, Refills: 5    Associated Diagnoses: Yeast infection      fluticasone propionate (FLONASE) 50 mcg/actuation nasal spray 1 spray (50 mcg total) by Each Nostril route once daily.  Qty: 16 g, Refills: 3    Associated Diagnoses: Seasonal allergies      ketorolac (TORADOL) 60 mg/2 mL Soln       LIDOcaine HCl 2% (LIDOCAINE VISCOUS) 2 % Soln by Mucous Membrane route every 6 (six) hours.  Qty: 100 mL, Refills: 3    Associated Diagnoses: Hashimoto's thyroiditis; Gastroesophageal reflux disease with esophagitis, unspecified whether hemorrhage      methylPREDNISolone (MEDROL DOSEPACK) 4 mg tablet use as directed  Qty: 1 each, Refills: 0    Associated Diagnoses: Acute bronchitis, unspecified organism      metoclopramide HCl (REGLAN) 10 MG tablet Take 1 tablet (10 mg total) by mouth 3 (three) times daily before meals.  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  Qty: 30 tablet, Refills: 3    Associated Diagnoses: Gastroesophageal reflux disease, unspecified whether esophagitis present      tirzepatide (MOUNJARO) 5 mg/0.5 mL PnIj Inject 5 mg into the skin every 7 days.  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  Please follow an anti reflux diet and a high fiber diet.  Activity as tolerated.    No driving day of procedure.

## 2024-04-19 NOTE — PROVATION PATIENT INSTRUCTIONS
Discharge Summary/Instructions for after Colonoscopy with   Biopsy/Polypectomy  Tina García    Friday, April 19, 2024  Rich Kingsley MD  RESTRICTIONS ON ACTIVITY:  - Do not drive a car or operate machinery until the day after the procedure.      - The following day: return to full activity including work.  - For  3 days: No heavy lifting, straining or running.  - Diet: You can have solid foods, but no gassy foods (i.e. beans, broccoli,   cabbage, etc).  TREATMENT FOR COMMON SIDE EFFECTS:  - Mild abdominal pain and bloating or excessive gas: rest, eat lightly and   use a heating pad.  SYMPTOMS TO WATCH FOR AND REPORT TO YOUR PHYSICIAN:  1. Severe abdominal pain.  2. Fever within 24 hours after a procedure.  3. A large amount of rectal bleeding. (A small amount of blood from the   rectum is not serious, especially if hemorrhoids are present.  3.  Because air was put into your colon during the procedure, expelling   large amounts of air from your rectum is normal.  4.  You may not have a bowel movement for 1-3 days because of the   colonoscopy prep.  This is normal.  5.  Call immediately if you notice any of the following:   Chills and/or fever over 101   Persistent vomiting   Severe abdominal pain, other than gas cramps   Severe chest pain   Black, tarry stools   Any bleeding - exceeding one tablespoon  Your doctor recommends these additional instructions:  We are waiting for your pathology results.   Your physician has recommended a repeat colonoscopy in 5 years for   surveillance.   Eat a high fiber diet.   Take a fiber supplement, for example Citrucel, Fibercon, Konsyl or   Metamucil.   Augmented water consumption diet.   If needed:    Take Miralax 1 capful (17 grams) in 8 ounces of water by mouth   once a day as needed.  None  If you have any questions or problems, please call your physician.  EMERGENCY PHONE NUMBER: [phone]  LAB RESULTS: Call in two (2) weeks for lab results,  [phone]  ___________________________________________  Nurse Signature  ___________________________________________  Patient/Designated Responsible Party Signature  Rich Kingsley MD  4/19/2024 2:19:44 PM  This report has been verified and signed electronically.  Dear patient,  As a result of recent federal legislation (The Federal Cures Act), you may   receive lab or pathology results from your procedure in your [facility]   account before your physician is able to contact you. Your physician or   their representative will relay the results to you with their   recommendations at their soonest availability.  Thank you.  PROVATION

## 2024-04-19 NOTE — DISCHARGE INSTRUCTIONS
Recovery After Procedural Sedation (Adult)   You have been given medicine by vein to make you sleep during your surgery. This may have included both a pain medicine and sleeping medicine. Most of the effects have worn off. But you may still have some drowsiness for the next 6 to 8 hours.  Home care  Follow these guidelines when you get home:  For the next 8 hours, you should be watched by a responsible adult. This person should make sure your condition is not getting worse.  Don't drink any alcohol for the next 24 hours.  Don't drive, operate dangerous machinery, or make important business or personal decisions during the next 24 hours.  To prevent injury or falls, use caution when standing and walking for at least 24 hours after your procedure.  Note: Your healthcare provider may tell you not to take any medicine by mouth for pain or sleep in the next 4 hours. These medicines may react with the medicines you were given in the hospital. This could cause a much stronger response than usual.  Follow-up care  Follow up with your healthcare provider if you are not alert and back to your usual level of activity within 12 hours.  When to seek medical advice  Call your healthcare provider right away if any of these occur:  Drowsiness gets worse  Weakness or dizziness gets worse  Repeated vomiting  You can't be awakened  Fever  New rash  [employer] last reviewed this educational content on 9/1/2019  © 1772-0955 [employer]. [address] [place] [address], [place] [address]. All rights reserved. This information is not intended as a substitute for professional medical care. Always follow your healthcare professional's instructions         Tips to Control Acid Reflux    To control acid reflux, youll need to make some basic diet and lifestyle changes. The simple steps outlined below may be all youll need to ease discomfort.  Watch what you eat  Avoid fatty foods and spicy foods.  Eat fewer acidic foods, such as citrus  and tomato-based foods. These can increase symptoms.  Limit drinking alcohol, caffeine, and fizzy beverages. All increase acid reflux.  Try limiting chocolate, peppermint, and spearmint. These can worsen acid reflux in some people.  Watch when you eat  Avoid lying down for 3 hours after eating.  Do not snack before going to bed.  Raise your head  Raising your head and upper body by 4 to 6 inches helps limit reflux when youre lying down. Put blocks under the head of your bed frame to raise it.  Other changes  Lose weight, if you need to  Dont exercise near bedtime  Avoid tight-fitting clothes  Limit aspirin and ibuprofen  Stop smoking   Date Last Reviewed: 7/1/2016  © 7167-7857 [employer]. [address] [place] [address], [place] [address]. All rights reserved. This information is not intended as a substitute for professional medical care. Always follow your healthcare professional's instructions.         High-Fiber Diet  Fiber is in fruits, vegetables, cereals, and grains. Fiber passes through your body undigested. A high-fiber diet helps food move through your intestinal tract. The added bulk is helpful in preventing constipation. In people with diverticulosis, fiber helps clean out the pouches along the colon wall. It also prevents new pouches from forming. A high-fiber diet reduces the risk of colon cancer. It also lowers blood cholesterol and prevents high blood sugar in people with diabetes.    The fiber-rich foods listed below should be part of your diet. If you are not used to high-fiber foods, start with 1 or 2 foods from this list. Every 3 to 4 days add a new one to your diet. Do this until you are eating 4 high-fiber foods per day. This should give you 20 to 35 grams of fiber a day. It is also important to drink a lot of water when you are on this diet. You should have 6 to 8 glasses of water a day. Water makes the fiber swell and increases the benefit.  Foods high in dietary fiber  The following  foods are high in dietary fiber:  Breads. Breads made with 100% whole-wheat flour; judith, wheat, or rye crackers; whole-grain tortillas, bran muffins.  Cereals. Whole-grain and bran cereals with bran (shredded wheat, wheat flakes, raisin bran, corn bran); oatmeal, rolled oats, granola, and brown rice.  Fruits. Fresh fruits and their edible skins (pears, prunes, raisins, berries, apples, and apricots); bananas, citrus fruit, mangoes, pineapple; and prune juice.  Nuts. Any nuts and seeds.  Vegetables. Best served raw or lightly cooked. All types, especially: green peas, celery, eggplant, potatoes, spinach, broccoli, [place] sprouts, winter squash, carrots, cauliflower, soybeans, lentils, and fresh and dried beans of all kinds.  Other. Popcorn, any spices.  Date Last Reviewed: 8/1/2016  © 2625-5161 [employer]. [address] [place] [id number]. All rights reserved. This information is not intended as a substitute for professional medical care. Always follow your healthcare professional's instructions.

## 2024-04-19 NOTE — TRANSFER OF CARE
"Anesthesia Transfer of Care Note    Patient: Tina García    Procedure(s) Performed: Procedure(s) (LRB):  EGD (ESOPHAGOGASTRODUODENOSCOPY) (N/A)  COLONOSCOPY (N/A)    Patient location: PACU    Anesthesia Type: general    Transport from OR: Transported from OR on room air with adequate spontaneous ventilation    Post pain: adequate analgesia    Post assessment: no apparent anesthetic complications and tolerated procedure well    Post vital signs: stable    Level of consciousness: lethargic and responds to stimulation    Nausea/Vomiting: no nausea/vomiting    Complications: none    Transfer of care protocol was followed      Last vitals: Visit Vitals  BP (!) 147/67 (BP Location: Right arm, Patient Position: Sitting)   Pulse 83   Temp 37.2 °C (99 °F) (Skin)   Resp 18   Ht 5' 4" (1.626 m)   Wt 75.8 kg (167 lb)   SpO2 98%   Breastfeeding No   BMI 28.67 kg/m²     "

## 2024-04-19 NOTE — PROVATION PATIENT INSTRUCTIONS
Discharge Summary/Instructions after an Endoscopic Procedure  Patient Name: Tina García  Patient MRN: [id number]  Patient YOB: 1960 Friday, April 19, 2024  Rich Kingsley MD  Dear patient,  As a result of recent federal legislation (The Federal Cures Act), you may   receive lab or pathology results from your procedure in your [facility]   account before your physician is able to contact you. Your physician or   their representative will relay the results to you with their   recommendations at their soonest availability.  Thank you,  RESTRICTIONS:  During your procedure today, you received medications for sedation.  These   medications may affect your judgment, balance and coordination.  Therefore,   for 24 hours, you have the following restrictions:   - DO NOT drive a car, operate machinery, make legal/financial decisions,   sign important papers or drink alcohol.    ACTIVITY:  Today: no heavy lifting, straining or running due to procedural   sedation/anesthesia.  The following day: return to full activity including work.  DIET:  Eat and drink normally unless instructed otherwise.     TREATMENT FOR COMMON SIDE EFFECTS:  - Mild abdominal pain, nausea, belching, bloating or excessive gas:  rest,   eat lightly and use a heating pad.  - Sore Throat: treat with throat lozenges and/or gargle with warm salt   water.  - Because air was used during the procedure, expelling large amounts of air   from your rectum or belching is normal.  - If a bowel prep was taken, you may not have a bowel movement for 1-3 days.    This is normal.  SYMPTOMS TO WATCH FOR AND REPORT TO YOUR PHYSICIAN:  1. Abdominal pain or bloating, other than gas cramps.  2. Chest pain.  3. Back pain.  4. Signs of infection such as: chills or fever occurring within 24 hours   after the procedure.  5. Rectal bleeding, which would show as bright red, maroon, or black stools.   (A tablespoon of blood from the rectum is not serious,  especially if   hemorrhoids are present.)  6. Vomiting.  7. Weakness or dizziness.  GO DIRECTLY TO THE NEAREST EMERGENCY ROOM IF YOU HAVE ANY OF THE FOLLOWING:      Difficulty breathing              Chills and/or fever over 101 F   Persistent vomiting and/or vomiting blood   Severe abdominal pain   Severe chest pain   Black, tarry stools   Bleeding- more than one tablespoon   Any other symptom or condition that you feel may need urgent attention  Your doctor recommends these additional instructions:  If any biopsies were taken, your doctors clinic will contact you in 1 to 2   weeks with any results.  Follow an antireflux regimen.  This includes:       - Do not lie down for at least 3 to 4 hours after meals  !!!.        - Raise the head of the bed 4 to 6 inches.  !!!      - Decrease excess weight.        - Avoid citrus juices and other acidic foods, alcohol, chocolate, mints,   coffee and other caffeinated beverages, carbonated beverages, fatty and   fried foods.        - Avoid tight-fitting clothing.        - Avoid cigarettes and other tobacco products.   Continue your present medications.   Take Nexium (esomeprazole) 40 mg by mouth twice a day.   Take Pepcid (famotidine) 40 mg by mouth every night.   Take Carafate (sucralfate) tablets 1 gram by mouth four times a day for 1-2   months.   Your physician has recommended a gastric emptying study at appointment to be   scheduled.   Return to GI clinic in 4-6 weeks.  For questions, problems or results please call your physician - Rich Kingsley MD at Work:  [phone].  EMERGENCY PHONE NUMBER: [phone], LAB RESULTS: [phone]  IF A COMPLICATION OR EMERGENCY SITUATION ARISES AND YOU ARE UNABLE TO REACH   YOUR PHYSICIAN - GO DIRECTLY TO THE EMERGENCY ROOM.  ___________________________________________  Nurse Signature  ___________________________________________  Patient/Designated Responsible Party Signature  Rich Kingsley MD  4/19/2024 2:02:01  PM  This report has been verified and signed electronically.  Dear patient,  As a result of recent federal legislation (The Federal Cures Act), you may   receive lab or pathology results from your procedure in your [facility]   account before your physician is able to contact you. Your physician or   their representative will relay the results to you with their   recommendations at their soonest availability.  Thank you.  PROVATION

## 2024-04-19 NOTE — H&P
History & Physical - Short Stay  Gastroenterology      SUBJECTIVE:     Procedure: Gastroscopy and Colonoscopy    Chief Complaint/Indication for Procedure:  Dysphagia.  Heartburn/Reflux.  Hx of colon polyps.    History of Present Illness:  See recent GI OV note:  Office Visit   2/7/2024  [facility] - Gastroenterology       Jeaneth Mcleod NP  Gastroenterology Gastroesophageal reflux disease with esophagitis without hemorrhage +3 more  Dx Gastroesophageal Reflux; Referred by Joao Fry MD  Reason for Visit     Progress Notes    Jeaneth Mcleod NP at 2/7/2024 10:30 AM    Status: Signed   Expand All Collapse All  Subjective:         Subjective  Patient ID: Tina García is a 63 y.o. female, Body mass index is 29.97 kg/m².     Chief Complaint: Gastroesophageal Reflux        Patient is new to me. Established patient of Dr. Cobos, Dr. Kingsley & Laurel Brady NP.  Referred by Dr. Fry for reflux gastritis.     Gastroesophageal Reflux  She complains of dysphagia (dysphagia to solids, especially bread, meat and pills; denies trouble swallowing liquids), heartburn (Chief complaint) and nausea. She reports no abdominal pain, no belching, no chest pain, no choking, no coughing, no early satiety, no globus sensation, no hoarse voice, no sore throat, no stridor, no tooth decay, no water brash or no wheezing. This is a chronic problem. The current episode started more than 1 year ago. The problem occurs frequently. The problem has been waxing and waning. The heartburn is located in the substernum. The heartburn is of severe intensity. The heartburn wakes her from sleep. The heartburn limits her activity. The heartburn changes with position. The symptoms are aggravated by certain foods, caffeine and lying down. Associated symptoms include anemia and weight loss (on Mounjaro). Pertinent negatives include no melena. There are no known risk factors. She has tried a PPI, a histamine-2 antagonist, a diet change,  head elevation and an antacid (caffeine reduction; Past meds: Prilosec- ineffective; Currently: Nexium 40 mg BID, Famotidine 40 mg nightly PRN and OTC Tums PRN- somewhat helps; she is interested in the LINX procedure) for the symptoms. Past procedures include an EGD and a UGI (showed patulous esophagus with decreased peristalsis- followed by Dr. Cobos).      Review of Systems   Constitutional:  Positive for weight loss (on Mounjaro). Negative for appetite change, fever and unexpected weight change.   HENT:  Positive for trouble swallowing. Negative for hoarse voice and sore throat.    Respiratory:  Negative for cough, choking, shortness of breath and wheezing.    Cardiovascular:  Negative for chest pain.   Gastrointestinal:  Positive for dysphagia (dysphagia to solids, especially bread, meat and pills; denies trouble swallowing liquids), heartburn (Chief complaint) and nausea. Negative for abdominal distention, abdominal pain, anal bleeding, blood in stool, constipation, diarrhea, melena, rectal pain and vomiting.     Assessment:      Assessment  1. Gastroesophageal reflux disease with esophagitis without hemorrhage    2. Esophageal dysphagia    3. Symptomatic esophageal peristalsis    4. History of colon polyps             Plan:   All diagnoses and orders for this visit:     Gastroesophageal reflux disease with esophagitis without hemorrhage  - Continue Nexium 40 mg BID  - Continue Famotidine 40 mg nightly PRN  - Schedule EGD   - Take PPI in the morning 30 minutes before breakfast and dinner  - Recommend to avoid large meals, avoid eating within 3 hours of bedtime, elevate head of bed if nocturnal symptoms are present, smoking cessation (if current smoker), & weight loss (if overweight).   - Recommend minimize/avoid high-fat foods, chocolate, caffeine, citrus, alcohol, & tomato products.  - Advised to avoid/limit use of NSAID's, since they can cause GI upset, bleeding, and/or ulcers. If needed, take with food.    -  Ambulatory referral/consult to General Surgery; Future; Expected date: 02/14/2024     Esophageal dysphagia & Symptomatic esophageal peristalsis              - Recommend follow-up with GI motility specialist (Dr. Cobos) for continued management  - Educated patient to eat smaller more frequent meals and to eat slowly and advised to eat a soft diet.     History of colon polyps              - Schedule Colonoscopy              See the manometry study 2/4/2022:  High Resolution esophageal manometry   Indications:     Esophageal dysphagia, Gastro-esophageal reflux disease   Providers:             Kami Cobos MD   Impression:    -The patient's study is consistent with absent                          contractility. Absent contractility is a Disorder                          of Peristalsis according the the [facility]                          Classifcation version 4.0.                          -Absent contractility is defined by a normal                          median IRP (less than 15mmHg) in the supine and                          upright positions and 100% failed esophageal                          peristalsis.   Recommendation:        - Follow up with your referring provider.   Kami Cobos MD   2/4/2022         See last Upper GI endoscopy 12/3/2021   Indications:           Pharyngeal phase dysphagia, Heartburn, Esophageal                          reflux, Failure to respond to medical treatment   Comorbidities       Asthma, Systemic rheumatic disease   Providers:             Rich Kingsley MD   Impression:            - Normal oropharynx.                          - Normal cricopharyngeus.                          - GERD, as evident by free flow of gastric                          contents into the esophagus.                          - Dilation in the entire esophagus. Biopsied.                          - LA Grade A reflux esophagitis. Biopsied.                          - Z-line regular, 35 cm from the  incisors.                          - Patulous lower esophageal sphincter.                          - No endoscopic esophageal abnormality to explain                          patient's dysphagia. Esophagus dilated, 51 Fr.                          - Gastritis. Biopsied.                          - Normal stomach otherwise.                          - Normal pylorus.                          - Normal examined duodenum.   Recommendation:        - Discharge patient to home.                          - Await pathology results.                          - Follow an antireflux regimen.                          - Continue present medications.                          - Use Prilosec (omeprazole) 40 mg PO daily.                          - Use Pepcid (famotidine) 40 mg PO daily.                          - Use sucralfate tablets 1 gram PO QID.                          - Perform a modified barium swallow.                          - Perform routine esophageal manometry.                          - Consider Referal to a surgeon, to consider a                          modified fundoplication.   Rich Kingsley MD   12/3/2021     A. ESOPHAGUS, PROXIMAL, BIOPSY:   -Esophageal squamous mucosa with reactive epithelial changes and chronic   inflammation, suggestive of reflux.   -No increased intraepithelial eosinophils identified.   -Negative for dysplasia or malignancy.   B. ESOPHAGUS, DISTAL, BIOPSY:   -Esophageal squamous mucosa with reactive epithelial changes and chronic   inflammation, suggestive of reflux.   -Rare intraepithelial eosinophil identified (up to 1 eosinophil/HPF).   -Negative for dysplasia or malignancy.   C. STOMACH, ANTRUM, BIOPSY:   -Antral and oxyntic type mucosa with mild chronic inactive gastritis.   -No Helicobacter pylori organisms identified by *[facility].   -Negative for intestinal metaplasia, dysplasia, or malignancy.       See UGI 12/22/2021:  FINDINGS:  Swallowing: Normal.   Esophagus: Patulous esophagus with  decreased peristalsis.  Large volume of contrast remained within the esophagus.  Opening of the gastroesophageal junction was observed.  No evidence of stricture.  Small hiatal versus prominent phrenic ampulla.  Gastroesophageal reflux: Assessment of reflux was limited by significant residual contrast in the esophagus.   Stomach: Unremarkable.   Duodenum: Unremarkable.   Transit time: 120 minutes (normal, 30 - 240 minutes)   Small bowel: No small bowel stricture, dilatation, adhesion, or mass.  The terminal ileum was difficult to separate from overlying bowel loops with compression.     Impression:   Patulous esophagus with decreased peristalsis.  No evidence of stricture.   Normal small bowel transit time.  Electronically signed by:Yeyo Levin MD  Date:                                            12/22/2021      And the last Colonoscopy 4/17/2019   Indications:        High risk colon cancer surveillance: Personal                        history of colonic polyps, Incidental - Rectal                        bleeding   Comorbidities       Hypertension, Hypothyroidism, Systemic rheumatic disease CREST   Providers:           Rich Kingsley MD   Impression:          - One 3 mm polyp in the proximal sigmoid colon,                        removed with a cold snare. Resected and retrieved.                        - Non-bleeding internal hemorrhoids.                        - The examination was otherwise normal.                        - The examined portion of the ileum was normal.   Recommendation:      - Discharge patient to home.                        - Await pathology results.                        - Repeat colonoscopy in 5 years for surveillance.                        - High fiber diet.                        - Use fiber, for example Citrucel, Fibercon, Konsyl                        or Metamucil.                        - Take a PROBIOTIC, such as a carton of GREEK YOGURT                        (Chobani or Oikos,  or Activia or Dannon); or tablets                        of ALIGN or CULTURELLE or RADHA-Q (all                        non-prescription), every day for a month.                        - Call the G.I[facility] clinic in 2 weeks for reports (if                        you haven't heard from us sooner) [phone].                        - Continue present medications.                        - Return to normal activities tomorrow.   Rich Kingsley MD   4/17/2019     Sigmoid colon, polyp, biopsy:   Tubular adenoma.       4/19/2024      75.8 kg (167 lb)    Wt Readings from Last 20 Encounters:   02/07/24 79.2 kg (174 lb 9.7 oz)   01/11/24 81.6 kg (179 lb 14.3 oz)   12/21/23 82.6 kg (182 lb)   11/16/23 82.9 kg (182 lb 12.2 oz)   11/16/23 86.6 kg (191 lb)   09/21/23 86.6 kg (191 lb)   06/28/23 84.7 kg (186 lb 11.7 oz)   05/10/23 82.7 kg (182 lb 5.1 oz)   12/28/22 82.3 kg (181 lb 7 oz)   09/30/22 88 kg (194 lb 0.1 oz)   08/04/22 80.3 kg (177 lb)   02/04/22 80.3 kg (177 lb)   12/08/21 83 kg (183 lb)   12/03/21 75.8 kg (167 lb)   11/16/21 80.3 kg (177 lb)   10/28/21 83.2 kg (183 lb 6.8 oz)   06/16/21 75.8 kg (167 lb)   12/05/20 75.8 kg (167 lb)   11/25/20 75.8 kg (167 lb)   09/25/20 81.2 kg (179 lb)               No current facility-administered medications for this encounter.     Current Outpatient Medications   Medication Sig Dispense Refill    cyclobenzaprine (FLEXERIL) 10 MG tablet Take 1 tablet (10 mg total) by mouth 3 (three) times daily as needed for Muscle spasms. 90 tablet 3    esomeprazole (NEXIUM) 40 MG capsule Take 1 capsule (40 mg total) by mouth 2 (two) times daily. 60 capsule 11    famotidine (PEPCID) 40 MG tablet Take 1 tablet (40 mg total) by mouth nightly as needed (For heartburn and acid reflux symptoms not controlled with Omeprazole twice daily). 30 tablet 11    hydroCHLOROthiazide (HYDRODIURIL) 12.5 MG Tab Take 1 tablet (12.5 mg total) by mouth once daily. 90 tablet 3    hydrOXYchloroQUINE (PLAQUENIL) 200 mg  tablet Take 1 tablet (200 mg total) by mouth 2 (two) times daily. 180 tablet 3    hydrOXYzine pamoate (VISTARIL) 25 MG Cap TAKE 1 CAPSULE(25 MG) BY MOUTH EVERY 8 HOURS AS NEEDED FOR ITCHING 90 capsule 3    levothyroxine (SYNTHROID) 50 MCG tablet TAKE 1 TABLET(50 MCG) BY MOUTH BEFORE BREAKFAST 90 tablet 1    losartan (COZAAR) 100 MG tablet Take 1 tablet (100 mg total) by mouth once daily. 90 tablet 3    montelukast (SINGULAIR) 10 mg tablet TAKE 1 TABLET(10 MG) BY MOUTH EVERY EVENING 90 tablet 3    ondansetron (ZOFRAN-ODT) 4 MG TbDL Take 2 tablets (8 mg total) by mouth 3 (three) times daily as needed. 30 tablet 3    traMADoL (ULTRAM) 50 mg tablet Take 1 tablet (50 mg total) by mouth every 6 (six) hours as needed for Pain. 120 tablet 5    albuterol (PROVENTIL/VENTOLIN HFA) 90 mcg/actuation inhaler INHALE TWO PUFFS INTO THE LUNGS EVERY 6 HOURS AS NEEDED FOR WHEEZING 8.5 g 3    azithromycin (Z-NATALIE) 250 MG tablet Take 2 tablets by mouth on day 1; Take 1 tablet by mouth on days 2-5 (Patient not taking: Reported on 2/7/2024) 6 tablet 0    cetirizine (ZYRTEC) 10 MG tablet Take 1 tablet (10 mg total) by mouth once daily. 30 tablet 11    cyanocobalamin 1,000 mcg/mL injection       fluconazole (DIFLUCAN) 150 MG Tab Take 1 tablet (150 mg total) by mouth once daily. 7 tablet 5    fluticasone propionate (FLONASE) 50 mcg/actuation nasal spray 1 spray (50 mcg total) by Each Nostril route once daily. 16 g 3    ketorolac (TORADOL) 60 mg/2 mL Soln       LIDOcaine HCl 2% (LIDOCAINE VISCOUS) 2 % Soln by Mucous Membrane route every 6 (six) hours. 100 mL 3    methylPREDNISolone (MEDROL DOSEPACK) 4 mg tablet use as directed (Patient not taking: Reported on 2/7/2024) 1 each 0    metoclopramide HCl (REGLAN) 10 MG tablet Take 1 tablet (10 mg total) by mouth 3 (three) times daily before meals. (Patient not taking: Reported on 2/7/2024) 90 tablet 5    promethazine (PHENERGAN) 25 MG tablet Take 1 tablet (25 mg total) by mouth every 6 (six) hours  as needed for Nausea. 30 tablet 3    tirzepatide (MOUNJARO) 5 mg/0.5 mL PnIj Inject 5 mg into the skin every 7 days. 4 pen 5    valACYclovir (VALTREX) 500 MG tablet TAKE 1 TABLET BY MOUTH TWICE DAILY 180 tablet 1       Review of patient's allergies indicates:   Allergen Reactions    Celebrex [celecoxib] Other (See Comments)     Reflux & stomach symptoms    Nsaids (non-steroidal anti-inflammatory drug)      Irritates reflux        Past Medical History:   Diagnosis Date    Anxiety     AR (allergic rhinitis)     Arthritis     rheumatoid?    Chronic constipation     Colon polyp     CREST syndrome     Dry eyes     Dry mouth     Esophageal dysmotility     Fibrocystic breast     Fibromyalgia     Food intolerance     GERD (gastroesophageal reflux disease)     HTN (hypertension)     Irritable bowel syndrome     Raynaud disease     Right wrist fracture     nov 2013    SOB (shortness of breath) on exertion     Thyroid disease     hypothyroid, thyroid cyst on U/S    Ulcer     Vitamin D deficiency disease      Past Surgical History:   Procedure Laterality Date    COLONOSCOPY  7/2012    one polyp removed, biospy=TUBULOVILLOUS ADENOMA, otherwise normal findings, repeat in 4-5 years for surveillance    COLONOSCOPY N/A 4/17/2019    Procedure: COLONOSCOPY;  Surgeon: Rich Kingsley Jr., MD;  Location: [facility];  Service: Endoscopy;  Laterality: N/A;    COLONOSCOPY W/ POLYPECTOMY  7/24/2012  [facility]    4-5 mm sigmoid polyp.    CYST REMOVAL      pilonidal    DILATION AND CURETTAGE OF UTERUS      ESOPHAGEAL MANOMETRY WITH MEASUREMENT OF IMPEDANCE N/A 2/4/2022    Procedure: MANOMETRY, ESOPHAGUS, WITH IMPEDANCE MEASUREMENT;  Surgeon: Kami Cobos MD;  Location: [address]);  Service: Endoscopy;  Laterality: N/A;  COVID test on 2/1 at [facility]    ESOPHAGOGASTRODUODENOSCOPY N/A 4/17/2019    Procedure: EGD (ESOPHAGOGASTRODUODENOSCOPY);  Surgeon: Rich Kingsley Jr., MD;  Location: [facility];  Service: Endoscopy;   "Laterality: N/A;    ESOPHAGOGASTRODUODENOSCOPY N/A 12/3/2021    Procedure: EGD (ESOPHAGOGASTRODUODENOSCOPY);  Surgeon: Rich Kingsley Jr., MD;  Location[facility];  Service: Endoscopy;  Laterality: N/A;    GANGLION CYST EXCISION      right thumb    HYSTERECTOMY  2008    complete    TOTAL ABDOMINAL HYSTERECTOMY W/ BILATERAL SALPINGOOPHORECTOMY  2008    w/Hysterectomy    UPPER GASTROINTESTINAL ENDOSCOPY  12/23/2011  [facility]    Fluid in the lower third of the esophagus  (stasis).  The esophageal examination was compatible with scleroderma.  (Dilataion done, 51 Fr.)    LA Grade A reflux esophagitis.   Enlarged gastric folds.   Multiple gastric polyps.   Antritis (erythematous streaks).  JOELLEN Test  Negative.     Family History   Problem Relation Name Age of Onset    Cancer Mother          Leiomyosarcoma    Liver cancer Mother      Esophageal cancer Father      Cloud's esophagus Father      Cancer Father          esophageal    Colon polyps Sister x3     Cancer Paternal Uncle          colon    Cancer Maternal Grandfather          stomach    Stomach cancer Paternal Grandfather      Breast cancer Paternal Cousin  40    Breast cancer Paternal Cousin  53    Diabetes Other      Hypertension Other       Social History     Tobacco Use    Smoking status: Never    Smokeless tobacco: Never   Substance Use Topics    Alcohol use: Not Currently    Drug use: No         OBJECTIVE:     Vital Signs (Most Recent)       Physical Exam:  : Ht: 5' 4" (162.6 cm)   Wt: 75.8 kg   BMI: 28.67 kg/m² .                                                       GENERAL:  Comfortable, in no acute distress.                                 HEENT EXAM:  Nonicteric.  No adenopathy.  Oropharynx is clear.               NECK:  Supple.                                                               LUNGS:  Clear.                                                               CARDIAC:  Regular rate and rhythm.  S1, S2.  No murmur.                      ABDOMEN:  Obese. "   Soft, positive bowel sounds, nontender.  No hepatosplenomegaly or masses.  No rebound or guarding.                                             EXTREMITIES:  No edema.     MENTAL STATUS:  Alert and oriented.    ASSESSMENT/PLAN:     Assessment: Dysphagia.  Heartburn/Reflux.  Hx of colon polyps.    Plan: Gastroscopy and Colonoscopy    Anesthesia Plan:   MAC / General Anaesthesia    ASA Grade: ASA 2 - Patient with mild systemic disease with no functional limitations    MALLAMPATI SCORE: II (hard and soft palate, upper portion of tonsils anduvula visible)

## 2024-04-19 NOTE — ANESTHESIA PREPROCEDURE EVALUATION
04/19/2024  Tina García is a 63 y.o., female.      Pre-op Assessment    I have reviewed the Patient Summary Reports.     I have reviewed the Nursing Notes. I have reviewed the NPO Status.   I have reviewed the Medications.     Review of Systems  Anesthesia Hx:  No problems with previous Anesthesia                Social:  Non-Smoker       EENT/Dental:  chronic allergic rhinitis           Cardiovascular:     Hypertension                     Shortness of Breath                    Hypertension         Pulmonary:      Shortness of breath                  Hepatic/GI:  Bowel Prep.   GERD      Gerd          Musculoskeletal:  Arthritis               Neurological:    Neuromuscular Disease,                                 Neuromuscular Disease   Endocrine:  Diabetes    Diabetes                          Physical Exam  General: Well nourished, Cooperative, Alert and Oriented    Airway:  Mallampati: II   Mouth Opening: Normal  TM Distance: Normal  Tongue: Normal  Neck ROM: Normal ROM    Dental:  Intact    Chest/Lungs:  Normal Respiratory Rate    Heart:  Rate: Normal        Anesthesia Plan  Type of Anesthesia, risks & benefits discussed:    Anesthesia Type: Gen Natural Airway  Intra-op Monitoring Plan: Standard ASA Monitors  Induction:  IV  Informed Consent: Informed consent signed with the Patient and all parties understand the risks and agree with anesthesia plan.  All questions answered.   ASA Score: 3    Ready For Surgery From Anesthesia Perspective.     .

## 2024-04-22 NOTE — ANESTHESIA POSTPROCEDURE EVALUATION
Anesthesia Post Evaluation    Patient: Tina García    Procedure(s) Performed: Procedure(s) (LRB):  EGD (ESOPHAGOGASTRODUODENOSCOPY) (N/A)  COLONOSCOPY (N/A)    Final Anesthesia Type: general      Patient location during evaluation: PACU  Patient participation: Yes- Able to Participate  Level of consciousness: awake and alert  Post-procedure vital signs: reviewed and stable  Pain management: adequate  Airway patency: patent    PONV status at discharge: No PONV  Anesthetic complications: no      Cardiovascular status: hemodynamically stable  Respiratory status: unassisted and room air  Hydration status: euvolemic  Follow-up not needed.              Vitals Value Taken Time   /83 04/19/24 1345     04/21/24 2114   Pulse 71 04/19/24 1345   Resp 14 04/19/24 1345   SpO2 98 % 04/19/24 1345         Event Time   Out of Recovery 14:44:11         Pain/Laquita Score: No data recorded

## 2024-05-01 ENCOUNTER — OFFICE VISIT (OUTPATIENT)
Dept: FAMILY MEDICINE | Facility: CLINIC | Age: 64
End: 2024-05-01
Payer: MEDICARE

## 2024-05-01 VITALS
OXYGEN SATURATION: 99 % | SYSTOLIC BLOOD PRESSURE: 114 MMHG | BODY MASS INDEX: 29.37 KG/M2 | HEART RATE: 83 BPM | DIASTOLIC BLOOD PRESSURE: 72 MMHG | TEMPERATURE: 98 F | WEIGHT: 171.06 LBS

## 2024-05-01 DIAGNOSIS — R73.03 PREDIABETES: Primary | ICD-10-CM

## 2024-05-01 DIAGNOSIS — Z79.899 IMMUNOCOMPROMISED STATE DUE TO DRUG THERAPY: ICD-10-CM

## 2024-05-01 DIAGNOSIS — Z12.31 ENCOUNTER FOR SCREENING MAMMOGRAM FOR MALIGNANT NEOPLASM OF BREAST: ICD-10-CM

## 2024-05-01 DIAGNOSIS — E03.8 OTHER SPECIFIED HYPOTHYROIDISM: ICD-10-CM

## 2024-05-01 DIAGNOSIS — E78.49 OTHER HYPERLIPIDEMIA: ICD-10-CM

## 2024-05-01 DIAGNOSIS — D84.821 IMMUNOCOMPROMISED STATE DUE TO DRUG THERAPY: ICD-10-CM

## 2024-05-01 PROCEDURE — 4010F ACE/ARB THERAPY RXD/TAKEN: CPT | Mod: HCNC,CPTII,S$GLB, | Performed by: FAMILY MEDICINE

## 2024-05-01 PROCEDURE — 3078F DIAST BP <80 MM HG: CPT | Mod: HCNC,CPTII,S$GLB, | Performed by: FAMILY MEDICINE

## 2024-05-01 PROCEDURE — 99214 OFFICE O/P EST MOD 30 MIN: CPT | Mod: HCNC,S$GLB,, | Performed by: FAMILY MEDICINE

## 2024-05-01 PROCEDURE — 3008F BODY MASS INDEX DOCD: CPT | Mod: HCNC,CPTII,S$GLB, | Performed by: FAMILY MEDICINE

## 2024-05-01 PROCEDURE — 1159F MED LIST DOCD IN RCRD: CPT | Mod: HCNC,CPTII,S$GLB, | Performed by: FAMILY MEDICINE

## 2024-05-01 PROCEDURE — 3044F HG A1C LEVEL LT 7.0%: CPT | Mod: HCNC,CPTII,S$GLB, | Performed by: FAMILY MEDICINE

## 2024-05-01 PROCEDURE — 99999 PR PBB SHADOW E&M-EST. PATIENT-LVL IV: CPT | Mod: PBBFAC,HCNC,, | Performed by: FAMILY MEDICINE

## 2024-05-01 PROCEDURE — 3074F SYST BP LT 130 MM HG: CPT | Mod: HCNC,CPTII,S$GLB, | Performed by: FAMILY MEDICINE

## 2024-05-01 NOTE — PROGRESS NOTES
Assessment:       1. Prediabetes    2. Other specified hypothyroidism    3. Other hyperlipidemia    4. Immunocompromised state due to drug therapy    5. Encounter for screening mammogram for malignant neoplasm of breast        Assessment & Plan  Prediabetes:  Worsening  -     Hemoglobin A1C; Future; Expected date: 05/01/2024    Other specified hypothyroidism:  Stable  -     TSH; Future; Expected date: 05/01/2024    Other hyperlipidemia:  Uncontrolled  -     Lipid Panel; Future; Expected date: 05/01/2024    Immunocompromised state due to drug therapy:  Stable    Encounter for screening mammogram for malignant neoplasm of breast  -     Mammo Digital Screening Bilat w/ [facility]; Future; Expected date: 12/21/2024       The patient's blood pressure is well-regulated. The patient will receive her Tdap vaccine at her local pharmacy. Fasting blood work, including A1c, cholesterol, blood count, inflammation, kidney function, liver function, glucose, iron levels, transferrin levels, and thyroid levels, will be ordered. A mammogram has been scheduled for 12/2024. The patient has been advised to incorporate weight lifting exercises to preserve muscle mass and to incorporate cardiovascular exercise into her routine.    Follow-up  The patient is scheduled for a follow-up visit in 6 months.     The patient's BMI has been recorded in the chart. The patient has been provided educational materials regarding the benefits of attaining and maintaining a normal weight. We will continue to address and follow this issue during follow up visits.   Patient agreed with assessment and plan. Patient verbalized understanding.     Subjective:       Patient ID: Tina García is a 63 y.o. female.    Chief Complaint: Follow-up (6 month. Patient had a colonoscopy and EGD done and hasn't rec'd the results)     HPI  History of Present Illness  The patient presents for evaluation of multiple medical concerns.    The patient reports overall  good health and does not require any medication refills at this time. She has discontinued the use of Flonase, opting to resume its use in the event of severe symptoms. She suffers from respiratory infections annually, necessitating a Z-Gio and Medrol Dosepak refilled by her rheumatologist. Metoclopramide was discontinued due to its ineffectiveness, and she was prescribed Carafate following an EGD procedure on 01/19/2024. She continues to take hydrochlorothiazide as needed and fluticasone as needed for yeast infections, particularly during antibiotic use. She acknowledges consumption of [place] kisses during the Christmas holiday. She has a scheduled appointment with her rheumatologist on 01/16/2024, during which all necessary tests have been ordered. She is scheduled for an eye appointment next Wednesday morning. Her next appointment with her rheumatologist is scheduled for 09/2024. Her last wellness visit was conducted in 11/2023. She is currently on Plaquenil for Raynaud's, CREST, and scleroderma. She denies any skin thickening. She experiences arthritic joints and intermittent rheumatoid arthritis. She suffers from dysmotility. She considered undergoing a LINX procedure, but was deemed unsuitable due to her dysmotility issues. She contracts severe respiratory infections annually, which often leave her from work. These infections typically resolve with Medrol Dosepak and Z-Gio. She no longer visits to urgent care due to her dysmotility. She was tested for COVID-19 at home, which yielded negative results. She denies any chest pain or shortness of breath. She used to walk for exercise, but has not been since her move. She engages in gardening and weightlifting, but experiences severe pain.     Past medical history, past social history was reviewed and discussed with the patient.    Review of Systems   HENT:  Negative for congestion.    Cardiovascular:  Negative for chest pain and palpitations.   Gastrointestinal:   Negative for abdominal distention and blood in stool.   Musculoskeletal:  Positive for arthralgias and back pain.       Objective:      Physical Exam    Physical Exam  The patient is in no acute distress, lungs are clear to auscultation, heart is regular rate and rhythm, extremities has no presence of edema.     Results  Laboratory Studies  LDL cholesterol decreased from 166 to 130. HDL cholesterol is normal, but triglycerides are high. A1c is 5.7. Inflammation markers are within normal range. Blood sugar is 100. Liver enzymes are normal. Hemoglobin increased from 10.9 to 11.5.     This note was generated with the assistance of ambient listening technology. Verbal consent was obtained by the patient and accompanying visitor(s) for the recording of patient appointment to facilitate this note. I attest to having reviewed and edited the generated note for accuracy, though some syntax or spelling errors may persist. Please contact the author of this note for any clarification.

## 2024-05-07 LAB
FINAL PATHOLOGIC DIAGNOSIS: NORMAL
GROSS: NORMAL
Lab: NORMAL
MICROSCOPIC EXAM: NORMAL

## 2024-05-08 ENCOUNTER — LAB VISIT (OUTPATIENT)
Dept: LAB | Facility: HOSPITAL | Age: 64
End: 2024-05-08
Attending: FAMILY MEDICINE
Payer: MEDICARE

## 2024-05-08 DIAGNOSIS — E78.49 OTHER HYPERLIPIDEMIA: ICD-10-CM

## 2024-05-08 DIAGNOSIS — M35.00 SJOGREN'S SYNDROME, WITH UNSPECIFIED ORGAN INVOLVEMENT: ICD-10-CM

## 2024-05-08 DIAGNOSIS — E13.69 OTHER SPECIFIED DIABETES MELLITUS WITH OTHER SPECIFIED COMPLICATION, UNSPECIFIED WHETHER LONG TERM INSULIN USE: ICD-10-CM

## 2024-05-08 DIAGNOSIS — K29.60 REFLUX GASTRITIS: ICD-10-CM

## 2024-05-08 DIAGNOSIS — E06.3 HASHIMOTO'S THYROIDITIS: ICD-10-CM

## 2024-05-08 DIAGNOSIS — E09.638: ICD-10-CM

## 2024-05-08 DIAGNOSIS — R73.03 PREDIABETES: ICD-10-CM

## 2024-05-08 DIAGNOSIS — E09.9 DRUG OR CHEMICAL INDUCED DIABETES MELLITUS WITHOUT COMPLICATIONS: ICD-10-CM

## 2024-05-08 DIAGNOSIS — M34.1 CREST (CALCINOSIS, RAYNAUD'S PHENOMENON, ESOPHAGEAL DYSFUNCTION, SCLERODACTYLY, TELANGIECTASIA): ICD-10-CM

## 2024-05-08 DIAGNOSIS — E03.8 OTHER SPECIFIED HYPOTHYROIDISM: ICD-10-CM

## 2024-05-08 LAB
ALBUMIN SERPL BCP-MCNC: 3.7 G/DL (ref 3.5–5.2)
ALP SERPL-CCNC: 61 U/L (ref 55–135)
ALT SERPL W/O P-5'-P-CCNC: 22 U/L (ref 10–44)
ANION GAP SERPL CALC-SCNC: 10 MMOL/L (ref 8–16)
AST SERPL-CCNC: 16 U/L (ref 10–40)
BASOPHILS # BLD AUTO: 0.04 K/UL (ref 0–0.2)
BASOPHILS NFR BLD: 0.7 % (ref 0–1.9)
BILIRUB SERPL-MCNC: 0.2 MG/DL (ref 0.1–1)
BUN SERPL-MCNC: 18 MG/DL (ref 8–23)
CALCIUM SERPL-MCNC: 9.7 MG/DL (ref 8.7–10.5)
CHLORIDE SERPL-SCNC: 105 MMOL/L (ref 95–110)
CHOLEST SERPL-MCNC: 239 MG/DL (ref 120–199)
CHOLEST/HDLC SERPL: 5 {RATIO} (ref 2–5)
CO2 SERPL-SCNC: 24 MMOL/L (ref 23–29)
CREAT SERPL-MCNC: 0.9 MG/DL (ref 0.5–1.4)
CRP SERPL-MCNC: 1.1 MG/L (ref 0–8.2)
DIFFERENTIAL METHOD BLD: ABNORMAL
EOSINOPHIL # BLD AUTO: 0.1 K/UL (ref 0–0.5)
EOSINOPHIL NFR BLD: 2 % (ref 0–8)
ERYTHROCYTE [DISTWIDTH] IN BLOOD BY AUTOMATED COUNT: 12.7 % (ref 11.5–14.5)
ERYTHROCYTE [SEDIMENTATION RATE] IN BLOOD BY PHOTOMETRIC METHOD: 5 MM/HR (ref 0–36)
EST. GFR  (NO RACE VARIABLE): >60 ML/MIN/1.73 M^2
ESTIMATED AVG GLUCOSE: 108 MG/DL (ref 68–131)
GLUCOSE SERPL-MCNC: 90 MG/DL (ref 70–110)
HBA1C MFR BLD: 5.4 % (ref 4–5.6)
HCT VFR BLD AUTO: 34.9 % (ref 37–48.5)
HDLC SERPL-MCNC: 48 MG/DL (ref 40–75)
HDLC SERPL: 20.1 % (ref 20–50)
HGB BLD-MCNC: 11.7 G/DL (ref 12–16)
IMM GRANULOCYTES # BLD AUTO: 0.01 K/UL (ref 0–0.04)
IMM GRANULOCYTES NFR BLD AUTO: 0.2 % (ref 0–0.5)
IRON SERPL-MCNC: 77 UG/DL (ref 30–160)
LDLC SERPL CALC-MCNC: 164.4 MG/DL (ref 63–159)
LYMPHOCYTES # BLD AUTO: 2.6 K/UL (ref 1–4.8)
LYMPHOCYTES NFR BLD: 42.9 % (ref 18–48)
MCH RBC QN AUTO: 30.4 PG (ref 27–31)
MCHC RBC AUTO-ENTMCNC: 33.5 G/DL (ref 32–36)
MCV RBC AUTO: 91 FL (ref 82–98)
MONOCYTES # BLD AUTO: 0.5 K/UL (ref 0.3–1)
MONOCYTES NFR BLD: 7.4 % (ref 4–15)
NEUTROPHILS # BLD AUTO: 2.9 K/UL (ref 1.8–7.7)
NEUTROPHILS NFR BLD: 46.8 % (ref 38–73)
NONHDLC SERPL-MCNC: 191 MG/DL
NRBC BLD-RTO: 0 /100 WBC
PLATELET # BLD AUTO: 310 K/UL (ref 150–450)
PMV BLD AUTO: 10.1 FL (ref 9.2–12.9)
POTASSIUM SERPL-SCNC: 4.3 MMOL/L (ref 3.5–5.1)
PROT SERPL-MCNC: 7 G/DL (ref 6–8.4)
RBC # BLD AUTO: 3.85 M/UL (ref 4–5.4)
SATURATED IRON: 21 % (ref 20–50)
SODIUM SERPL-SCNC: 139 MMOL/L (ref 136–145)
TOTAL IRON BINDING CAPACITY: 367 UG/DL (ref 250–450)
TRANSFERRIN SERPL-MCNC: 248 MG/DL (ref 200–375)
TRANSFERRIN SERPL-MCNC: 248 MG/DL (ref 200–375)
TRIGL SERPL-MCNC: 133 MG/DL (ref 30–150)
TSH SERPL DL<=0.005 MIU/L-ACNC: 3.4 UIU/ML (ref 0.4–4)
WBC # BLD AUTO: 6.09 K/UL (ref 3.9–12.7)

## 2024-05-08 PROCEDURE — 84443 ASSAY THYROID STIM HORMONE: CPT | Mod: HCNC | Performed by: FAMILY MEDICINE

## 2024-05-08 PROCEDURE — 85652 RBC SED RATE AUTOMATED: CPT | Mod: HCNC | Performed by: INTERNAL MEDICINE

## 2024-05-08 PROCEDURE — 86235 NUCLEAR ANTIGEN ANTIBODY: CPT | Mod: 59,HCNC | Performed by: INTERNAL MEDICINE

## 2024-05-08 PROCEDURE — 80061 LIPID PANEL: CPT | Mod: HCNC | Performed by: FAMILY MEDICINE

## 2024-05-08 PROCEDURE — 83540 ASSAY OF IRON: CPT | Mod: HCNC | Performed by: INTERNAL MEDICINE

## 2024-05-08 PROCEDURE — 86039 ANTINUCLEAR ANTIBODIES (ANA): CPT | Mod: HCNC | Performed by: INTERNAL MEDICINE

## 2024-05-08 PROCEDURE — 85025 COMPLETE CBC W/AUTO DIFF WBC: CPT | Mod: HCNC | Performed by: INTERNAL MEDICINE

## 2024-05-08 PROCEDURE — 86038 ANTINUCLEAR ANTIBODIES: CPT | Mod: HCNC | Performed by: INTERNAL MEDICINE

## 2024-05-08 PROCEDURE — 36415 COLL VENOUS BLD VENIPUNCTURE: CPT | Mod: HCNC,PO | Performed by: INTERNAL MEDICINE

## 2024-05-08 PROCEDURE — 83036 HEMOGLOBIN GLYCOSYLATED A1C: CPT | Mod: HCNC | Performed by: FAMILY MEDICINE

## 2024-05-08 PROCEDURE — 86235 NUCLEAR ANTIGEN ANTIBODY: CPT | Mod: HCNC | Performed by: INTERNAL MEDICINE

## 2024-05-08 PROCEDURE — 80053 COMPREHEN METABOLIC PANEL: CPT | Mod: HCNC | Performed by: INTERNAL MEDICINE

## 2024-05-08 PROCEDURE — 86140 C-REACTIVE PROTEIN: CPT | Mod: HCNC | Performed by: INTERNAL MEDICINE

## 2024-05-09 LAB
ANA PATTERN 1: NORMAL
ANA SER QL IF: POSITIVE
ANA TITR SER IF: 5120 {TITER}

## 2024-05-10 LAB
ANTI SM ANTIBODY: 0.07 RATIO (ref 0–0.99)
ANTI SM/RNP ANTIBODY: 0.06 RATIO (ref 0–0.99)
ANTI-SM INTERPRETATION: NEGATIVE
ANTI-SM/RNP INTERPRETATION: NEGATIVE
ANTI-SSA ANTIBODY: 0.06 RATIO (ref 0–0.99)
ANTI-SSA INTERPRETATION: NEGATIVE
ANTI-SSB ANTIBODY: 0.16 RATIO (ref 0–0.99)
ANTI-SSB INTERPRETATION: NEGATIVE
DSDNA AB SER-ACNC: NORMAL [IU]/ML

## 2024-05-13 DIAGNOSIS — Z13.6 ENCOUNTER FOR SCREENING FOR CARDIOVASCULAR DISORDERS: Primary | ICD-10-CM

## 2024-05-13 LAB — ENA SCL70 AB SER-ACNC: <0.6 U/ML

## 2024-05-16 ENCOUNTER — OFFICE VISIT (OUTPATIENT)
Dept: RHEUMATOLOGY | Facility: CLINIC | Age: 64
End: 2024-05-16
Payer: MEDICARE

## 2024-05-16 VITALS
DIASTOLIC BLOOD PRESSURE: 82 MMHG | SYSTOLIC BLOOD PRESSURE: 139 MMHG | HEART RATE: 81 BPM | WEIGHT: 169 LBS | BODY MASS INDEX: 29.01 KG/M2

## 2024-05-16 DIAGNOSIS — G89.4 CHRONIC PAIN SYNDROME: ICD-10-CM

## 2024-05-16 DIAGNOSIS — M34.1 CREST SYNDROME: Primary | ICD-10-CM

## 2024-05-16 DIAGNOSIS — Z79.899 HIGH RISK MEDICATION USE: ICD-10-CM

## 2024-05-16 PROCEDURE — 1159F MED LIST DOCD IN RCRD: CPT | Mod: HCNC,CPTII,S$GLB, | Performed by: PHYSICIAN ASSISTANT

## 2024-05-16 PROCEDURE — 99999 PR PBB SHADOW E&M-EST. PATIENT-LVL III: CPT | Mod: PBBFAC,HCNC,, | Performed by: PHYSICIAN ASSISTANT

## 2024-05-16 PROCEDURE — 4010F ACE/ARB THERAPY RXD/TAKEN: CPT | Mod: HCNC,CPTII,S$GLB, | Performed by: PHYSICIAN ASSISTANT

## 2024-05-16 PROCEDURE — 3008F BODY MASS INDEX DOCD: CPT | Mod: HCNC,CPTII,S$GLB, | Performed by: PHYSICIAN ASSISTANT

## 2024-05-16 PROCEDURE — 99213 OFFICE O/P EST LOW 20 MIN: CPT | Mod: HCNC,S$GLB,, | Performed by: PHYSICIAN ASSISTANT

## 2024-05-16 PROCEDURE — 3044F HG A1C LEVEL LT 7.0%: CPT | Mod: HCNC,CPTII,S$GLB, | Performed by: PHYSICIAN ASSISTANT

## 2024-05-16 PROCEDURE — 1160F RVW MEDS BY RX/DR IN RCRD: CPT | Mod: HCNC,CPTII,S$GLB, | Performed by: PHYSICIAN ASSISTANT

## 2024-05-16 PROCEDURE — 3075F SYST BP GE 130 - 139MM HG: CPT | Mod: HCNC,CPTII,S$GLB, | Performed by: PHYSICIAN ASSISTANT

## 2024-05-16 PROCEDURE — 3079F DIAST BP 80-89 MM HG: CPT | Mod: HCNC,CPTII,S$GLB, | Performed by: PHYSICIAN ASSISTANT

## 2024-05-16 NOTE — PROGRESS NOTES
Subjective:       Patient ID: Tina García is a 63 y.o. female.    Chief Complaint: Disease Management    Mrs. García is a 63 year old female who presents to clinic visit for follow up on CREST syndrome.  She is doing fair overall. She has been compliant with plaquenil. Raynauds is active today in clinic, but has been stable overall. She is taking sildenafil prn for flares. She states GI sx have improved slightly with carafate--she has modified her diet to include protein shakes and avoids large meals. Not ready to consider GI surgery at this time. She is tolerating mounjaro 5 mg and A1c improved to normal rage. She is taking tramadol PRN for severe pain.     4/24: EGD: 1. Esophagus, distal esophagus ulcer, biopsy:  - Active esophagitis with ulceration and granulation tissue formation, considerations include reflux esophagitis    2. Stomach, gastric fundus gastritis, biopsy:  - Polypoid foveolar hyperplasia with mild chronic and focally active gastritis and reactive changesa    3. Stomach, random antrum, biopsy:  - Mild chronic inactive gastritis with features suggestive of reactive gastropathy and rare foci of intestinal metaplasia    4. Rectum, polypectomy, biopsy:  - Polypoid pieces of colorectal mucosa with hyperplastic changes and smooth muscle ingrowth to lamina propria, consistent with polypoid prolapsing mucosal fold  - Negative for dysplasia or carcinoma     We reviewed her recent labs, which are stable. Echo is normal. CT Cardiac score ordered given high cholesterol.     Current tx:  1. plaquenil  2. revatio PRN  3. Flexeril PRN      Review of Systems   Constitutional:  Negative for fever and unexpected weight change.   HENT:  Positive for trouble swallowing. Negative for mouth sores.    Eyes:  Negative for redness.   Respiratory:  Negative for cough and shortness of breath.    Cardiovascular:  Negative for chest pain.   Gastrointestinal:  Positive for constipation. Negative for diarrhea.    Genitourinary:  Negative for dysuria and genital sores.   Musculoskeletal:  Positive for arthralgias.   Skin:  Negative for rash.   Neurological:  Negative for headaches.   Hematological:  Does not bruise/bleed easily.         Objective:     Vitals:    05/16/24 0847   BP: 139/82   Pulse: 81       Past Medical History:   Diagnosis Date    Anxiety     AR (allergic rhinitis)     Arthritis     rheumatoid?    Chronic constipation     Colon polyp     CREST syndrome     Dry eyes     Dry mouth     Esophageal dysmotility     Fibrocystic breast     Fibromyalgia     Food intolerance     GERD (gastroesophageal reflux disease)     HTN (hypertension)     Irritable bowel syndrome     Raynaud disease     Right wrist fracture     nov 2013    SOB (shortness of breath) on exertion     Thyroid disease     hypothyroid, thyroid cyst on U/S    Ulcer     Vitamin D deficiency disease      Past Surgical History:   Procedure Laterality Date    COLONOSCOPY  7/2012    one polyp removed, biospy=TUBULOVILLOUS ADENOMA, otherwise normal findings, repeat in 4-5 years for surveillance    COLONOSCOPY N/A 4/17/2019    Procedure: COLONOSCOPY;  Surgeon: Rich Kingsley Jr., MD;  Location: [facility];  Service: Endoscopy;  Laterality: N/A;    COLONOSCOPY N/A 4/19/2024    Procedure: COLONOSCOPY;  Surgeon: Rich Kingsley Jr., MD;  Location: [facility];  Service: Endoscopy;  Laterality: N/A;    COLONOSCOPY W/ POLYPECTOMY  7/24/2012  [facility]    4-5 mm sigmoid polyp.    CYST REMOVAL      pilonidal    DILATION AND CURETTAGE OF UTERUS      ESOPHAGEAL MANOMETRY WITH MEASUREMENT OF IMPEDANCE N/A 2/4/2022    Procedure: MANOMETRY, ESOPHAGUS, WITH IMPEDANCE MEASUREMENT;  Surgeon: Kami Cobos MD;  Location: [address];  Service: Endoscopy;  Laterality: N/A;  COVID test on 2/1 at [facility]    ESOPHAGOGASTRODUODENOSCOPY N/A 4/17/2019    Procedure: EGD (ESOPHAGOGASTRODUODENOSCOPY);  Surgeon: Rich Kingsley Jr., MD;  Location: [facility];  Service:  Endoscopy;  Laterality: N/A;    ESOPHAGOGASTRODUODENOSCOPY N/A 12/3/2021    Procedure: EGD (ESOPHAGOGASTRODUODENOSCOPY);  Surgeon: Rich Kingsley Jr., MD;  Location: [facility];  Service: Endoscopy;  Laterality: N/A;    ESOPHAGOGASTRODUODENOSCOPY N/A 4/19/2024    Procedure: EGD (ESOPHAGOGASTRODUODENOSCOPY);  Surgeon: Rich Kingsley Jr., MD;  Location: [facility];  Service: Endoscopy;  Laterality: N/A;    GANGLION CYST EXCISION      right thumb    HYSTERECTOMY  2008    complete    TOTAL ABDOMINAL HYSTERECTOMY W/ BILATERAL SALPINGOOPHORECTOMY  2008    w/Hysterectomy    UPPER GASTROINTESTINAL ENDOSCOPY  12/23/2011  [facility]    Fluid in the lower third of the esophagus  (stasis).  The esophageal examination was compatible with scleroderma.  (Dilataion done, 51 Fr.)    LA Grade A reflux esophagitis.   Enlarged gastric folds.   Multiple gastric polyps.   Antritis (erythematous streaks).  JOELLEN Test  Negative.          Physical Exam   Constitutional: She is oriented to person, place, and time.   Eyes: Right conjunctiva is not injected. Left conjunctiva is not injected.   Neck: No JVD present. No thyromegaly present.   Cardiovascular: Normal rate.   Pulmonary/Chest: Effort normal.   Musculoskeletal:         General: Swelling (puffiness of fingers) and tenderness present.      Right shoulder: Normal.      Left shoulder: Normal.      Right elbow: Normal.      Left elbow: Normal.      Right wrist: Normal.      Left wrist: Normal.      Right knee: Normal.      Left knee: Normal.   Lymphadenopathy:     She has no cervical adenopathy.   Neurological: She is alert and oriented to person, place, and time. Gait normal.   Skin: No rash noted.   Psychiatric: Mood and affect normal.       Right Side Rheumatological Exam     Examination finds the shoulder, elbow, wrist, knee, 1st MCP, 2nd MCP, 3rd MCP, 4th MCP and 5th MCP normal.    The patient has an enlarged 1st PIP, 2nd PIP, 3rd PIP, 4th PIP and 5th PIP    Left Side Rheumatological  Exam     Examination finds the shoulder, elbow, wrist, knee, 1st MCP, 2nd MCP, 3rd MCP, 4th MCP and 5th MCP normal.    The patient has an enlarged 1st PIP, 2nd PIP, 3rd PIP, 4th PIP and 5th PIP.          Recent labs:  Component      Latest Ref Rng 5/8/2024   WBC      3.90 - 12.70 K/uL 6.09    RBC      4.00 - 5.40 M/uL 3.85 (L)    Hemoglobin      12.0 - 16.0 g/dL 11.7 (L)    Hematocrit      37.0 - 48.5 % 34.9 (L)    MCV      82 - 98 fL 91    MCH      27.0 - 31.0 pg 30.4    MCHC      32.0 - 36.0 g/dL 33.5    RDW      11.5 - 14.5 % 12.7    Platelet Count      150 - 450 K/uL 310    MPV      9.2 - 12.9 fL 10.1    Immature Granulocytes      0.0 - 0.5 % 0.2    Gran # (ANC)      1.8 - 7.7 K/uL 2.9    Immature Grans (Abs)      0.00 - 0.04 K/uL 0.01    Lymph #      1.0 - 4.8 K/uL 2.6    Mono #      0.3 - 1.0 K/uL 0.5    Eos #      0.0 - 0.5 K/uL 0.1    Baso #      0.00 - 0.20 K/uL 0.04    nRBC      0 /100 WBC 0    Gran %      38.0 - 73.0 % 46.8    Lymph %      18.0 - 48.0 % 42.9    Mono %      4.0 - 15.0 % 7.4    Eos %      0.0 - 8.0 % 2.0    Basophil %      0.0 - 1.9 % 0.7    Differential Method Automated    Sodium      136 - 145 mmol/L 139    Potassium      3.5 - 5.1 mmol/L 4.3    Chloride      95 - 110 mmol/L 105    CO2      23 - 29 mmol/L 24    Glucose      70 - 110 mg/dL 90    BUN      8 - 23 mg/dL 18    Creatinine      0.5 - 1.4 mg/dL 0.9    Calcium      8.7 - 10.5 mg/dL 9.7    PROTEIN TOTAL      6.0 - 8.4 g/dL 7.0    Albumin      3.5 - 5.2 g/dL 3.7    BILIRUBIN TOTAL      0.1 - 1.0 mg/dL 0.2    ALP      55 - 135 U/L 61    AST      10 - 40 U/L 16    ALT      10 - 44 U/L 22    eGFR      >60 mL/min/1.73 m^2 >60.0    Anion Gap      8 - 16 mmol/L 10    Anti Sm Antibody      0.00 - 0.99 Ratio 0.07    Anti-Sm Interpretation      Negative  Negative    Anti-SSA Antibody      0.00 - 0.99 Ratio 0.06    Anti-SSA Interpretation      Negative  Negative    Anti-SSB Antibody      0.00 - 0.99 Ratio 0.16    Anti-SSB Interpretation       Negative  Negative    ds DNA Ab      Negative 1:10  Negative 1:10    Anti Sm/RNP Antibody      0.00 - 0.99 Ratio 0.06    Anti-Sm/RNP Interpretation      Negative  Negative    Cholesterol Total      120 - 199 mg/dL 239 (H)    Triglycerides      30 - 150 mg/dL 133    HDL      40 - 75 mg/dL 48    LDL Cholesterol      63.0 - 159.0 mg/dL 164.4 (H)    HDL/Cholesterol Ratio      20.0 - 50.0 % 20.1    Total Cholesterol/HDL Ratio      2.0 - 5.0  5.0    Non-HDL Cholesterol      mg/dL 191    Iron      30 - 160 ug/dL 77    Transferrin      200 - 375 mg/dL 248    Transferrin      200 - 375 mg/dL 248    TIBC      250 - 450 ug/dL 367    Saturated Iron      20 - 50 % 21    Hemoglobin A1C External      4.0 - 5.6 % 5.4    Estimated Avg Glucose      68 - 131 mg/dL 108    Sed Rate      0 - 36 mm/Hr 5    CRP      0.0 - 8.2 mg/L 1.1    JAMILA Screen      Negative <1:80  Positive !    Scleroderma SCL-      <7.0 U/mL <0.6    TSH      0.400 - 4.000 uIU/mL 3.399    JAMILA PATTERN 1 [employer]    JAMILA Titer 1 5120      Assessment:       1. CREST syndrome    2. High risk medication use    3. Chronic pain syndrome              Plan:       CREST syndrome  -     CBC Auto Differential; Future; Expected date: 05/16/2024  -     Comprehensive Metabolic Panel; Future; Expected date: 05/16/2024  -     C-Reactive Protein; Future; Expected date: 05/16/2024  -     Sedimentation rate; Future; Expected date: 05/16/2024  -     JAMILA Screen w/Reflex; Future; Expected date: 05/16/2024    High risk medication use    Chronic pain syndrome            63 year old female with  CREST syndrome (raynauds on revatio PRN, esophageal dysfunction, sclerodactyly hands), Sjogren's syndrome   --chronic pain syndrome on tramadol  --hx of Hashimoto's thyroiditis on levothyroxine  --hx of pulmonary HTN  --chronic normocytic anemia    Plan:  1. Cont. Plaquenil bid, revatio prn  2. Cont. Tramadol PRN  severe pain per Dr. Fry. I have  check [place] prescription monitoring program site  and no unusual or abnormal behavior has occurred pt understand the risk and benefits of taking opioid medications and has decided to continue the  medication   3. PFTs and Pulmonary est care  4. Mounjaro 5 mg weekly    Follow up:  4 months Dr. Lisandra noyola/labs prior

## 2024-05-21 DIAGNOSIS — K21.00 GASTROESOPHAGEAL REFLUX DISEASE WITH ESOPHAGITIS, UNSPECIFIED WHETHER HEMORRHAGE: ICD-10-CM

## 2024-05-21 DIAGNOSIS — K59.03 DRUG-INDUCED CONSTIPATION: ICD-10-CM

## 2024-05-21 DIAGNOSIS — M35.00 SJOGREN'S SYNDROME, WITH UNSPECIFIED ORGAN INVOLVEMENT: ICD-10-CM

## 2024-05-21 RX ORDER — HYDROXYCHLOROQUINE SULFATE 200 MG/1
TABLET, FILM COATED ORAL
Qty: 180 TABLET | Refills: 3 | Status: SHIPPED | OUTPATIENT
Start: 2024-05-21

## 2024-06-20 DIAGNOSIS — I10 PRIMARY HYPERTENSION: ICD-10-CM

## 2024-06-20 RX ORDER — HYDROCHLOROTHIAZIDE 12.5 MG/1
12.5 TABLET ORAL
Qty: 90 TABLET | Refills: 3 | Status: SHIPPED | OUTPATIENT
Start: 2024-06-20

## 2024-06-20 RX ORDER — LOSARTAN POTASSIUM 100 MG/1
100 TABLET ORAL
Qty: 90 TABLET | Refills: 3 | Status: SHIPPED | OUTPATIENT
Start: 2024-06-20

## 2024-06-20 NOTE — TELEPHONE ENCOUNTER
Refill Decision Note   Tina Raquel  is requesting a refill authorization.  Brief Assessment and Rationale for Refill:  Approve     Medication Therapy Plan:         Pharmacist review requested: Yes   Extended chart review required: Yes   Comments:     Note composed:2:47 PM 06/20/2024

## 2024-06-20 NOTE — TELEPHONE ENCOUNTER
Refill Routing Note   Medication(s) are not appropriate for processing by [facility] for the following reason(s):        Drug-disease interaction    [facility] action(s):  Defer  Approve        Medication Therapy Plan: hydroCHLOROthiazide and Hyponatremia    Pharmacist review requested: Yes     Appointments  past 12m or future 3m with PCP    Date Provider   Last Visit   5/1/2024 Loan Harris MD   Next Visit   10/31/2024 Loan Harris MD   ED visits in past 90 days: 0        Note composed:11:19 AM 06/20/2024

## 2024-06-20 NOTE — TELEPHONE ENCOUNTER
No care due was identified.  [facility] Embedded Care Due Messages. Reference number: [id number].   6/20/2024 5:45:09 AM CDT

## 2024-06-30 DIAGNOSIS — E09.638: ICD-10-CM

## 2024-06-30 DIAGNOSIS — E13.69 OTHER SPECIFIED DIABETES MELLITUS WITH OTHER SPECIFIED COMPLICATION, UNSPECIFIED WHETHER LONG TERM INSULIN USE: ICD-10-CM

## 2024-07-02 RX ORDER — TIRZEPATIDE 5 MG/.5ML
INJECTION, SOLUTION SUBCUTANEOUS
Qty: 2 ML | Refills: 6 | Status: SHIPPED | OUTPATIENT
Start: 2024-07-02

## 2024-07-10 ENCOUNTER — PATIENT MESSAGE (OUTPATIENT)
Dept: FAMILY MEDICINE | Facility: CLINIC | Age: 64
End: 2024-07-10
Payer: MEDICARE

## 2024-07-10 ENCOUNTER — HOSPITAL ENCOUNTER (OUTPATIENT)
Dept: RADIOLOGY | Facility: HOSPITAL | Age: 64
Discharge: HOME | End: 2024-07-10
Attending: FAMILY MEDICINE
Payer: MEDICARE

## 2024-07-10 DIAGNOSIS — Z13.6 ENCOUNTER FOR SCREENING FOR CARDIOVASCULAR DISORDERS: ICD-10-CM

## 2024-07-10 PROCEDURE — 75571 CT HRT W/O DYE W/CA TEST: CPT | Mod: TC,PO

## 2024-07-10 PROCEDURE — 75571 CT HRT W/O DYE W/CA TEST: CPT | Mod: 26,,, | Performed by: RADIOLOGY

## 2024-09-04 ENCOUNTER — LAB VISIT (OUTPATIENT)
Dept: LAB | Facility: HOSPITAL | Age: 64
End: 2024-09-04
Attending: INTERNAL MEDICINE
Payer: MEDICARE

## 2024-09-04 DIAGNOSIS — M34.1 CREST SYNDROME: ICD-10-CM

## 2024-09-04 LAB
ALBUMIN SERPL BCP-MCNC: 3.8 G/DL (ref 3.5–5.2)
ALP SERPL-CCNC: 51 U/L (ref 55–135)
ALT SERPL W/O P-5'-P-CCNC: 14 U/L (ref 10–44)
ANION GAP SERPL CALC-SCNC: 8 MMOL/L (ref 8–16)
AST SERPL-CCNC: 14 U/L (ref 10–40)
BASOPHILS # BLD AUTO: 0.05 K/UL (ref 0–0.2)
BASOPHILS NFR BLD: 0.8 % (ref 0–1.9)
BILIRUB SERPL-MCNC: 0.2 MG/DL (ref 0.1–1)
BUN SERPL-MCNC: 14 MG/DL (ref 8–23)
CALCIUM SERPL-MCNC: 9.7 MG/DL (ref 8.7–10.5)
CHLORIDE SERPL-SCNC: 104 MMOL/L (ref 95–110)
CO2 SERPL-SCNC: 25 MMOL/L (ref 23–29)
CREAT SERPL-MCNC: 0.9 MG/DL (ref 0.5–1.4)
CRP SERPL-MCNC: 0.5 MG/L (ref 0–8.2)
DIFFERENTIAL METHOD BLD: ABNORMAL
EOSINOPHIL # BLD AUTO: 0.1 K/UL (ref 0–0.5)
EOSINOPHIL NFR BLD: 1.9 % (ref 0–8)
ERYTHROCYTE [DISTWIDTH] IN BLOOD BY AUTOMATED COUNT: 13.9 % (ref 11.5–14.5)
ERYTHROCYTE [SEDIMENTATION RATE] IN BLOOD BY PHOTOMETRIC METHOD: 9 MM/HR (ref 0–36)
EST. GFR  (NO RACE VARIABLE): >60 ML/MIN/1.73 M^2
GLUCOSE SERPL-MCNC: 91 MG/DL (ref 70–110)
HCT VFR BLD AUTO: 34.8 % (ref 37–48.5)
HGB BLD-MCNC: 11.6 G/DL (ref 12–16)
IMM GRANULOCYTES # BLD AUTO: 0.02 K/UL (ref 0–0.04)
IMM GRANULOCYTES NFR BLD AUTO: 0.3 % (ref 0–0.5)
LYMPHOCYTES # BLD AUTO: 2.7 K/UL (ref 1–4.8)
LYMPHOCYTES NFR BLD: 43.5 % (ref 18–48)
MCH RBC QN AUTO: 30.1 PG (ref 27–31)
MCHC RBC AUTO-ENTMCNC: 33.3 G/DL (ref 32–36)
MCV RBC AUTO: 90 FL (ref 82–98)
MONOCYTES # BLD AUTO: 0.5 K/UL (ref 0.3–1)
MONOCYTES NFR BLD: 8.1 % (ref 4–15)
NEUTROPHILS # BLD AUTO: 2.8 K/UL (ref 1.8–7.7)
NEUTROPHILS NFR BLD: 45.4 % (ref 38–73)
NRBC BLD-RTO: 0 /100 WBC
PLATELET # BLD AUTO: 308 K/UL (ref 150–450)
PMV BLD AUTO: 9.8 FL (ref 9.2–12.9)
POTASSIUM SERPL-SCNC: 4.2 MMOL/L (ref 3.5–5.1)
PROT SERPL-MCNC: 6.9 G/DL (ref 6–8.4)
RBC # BLD AUTO: 3.86 M/UL (ref 4–5.4)
SODIUM SERPL-SCNC: 137 MMOL/L (ref 136–145)
WBC # BLD AUTO: 6.18 K/UL (ref 3.9–12.7)

## 2024-09-04 PROCEDURE — 86235 NUCLEAR ANTIGEN ANTIBODY: CPT | Mod: 59,HCNC | Performed by: PHYSICIAN ASSISTANT

## 2024-09-04 PROCEDURE — 85652 RBC SED RATE AUTOMATED: CPT | Mod: HCNC | Performed by: PHYSICIAN ASSISTANT

## 2024-09-04 PROCEDURE — 86039 ANTINUCLEAR ANTIBODIES (ANA): CPT | Mod: HCNC | Performed by: PHYSICIAN ASSISTANT

## 2024-09-04 PROCEDURE — 86225 DNA ANTIBODY NATIVE: CPT | Mod: HCNC | Performed by: PHYSICIAN ASSISTANT

## 2024-09-04 PROCEDURE — 85025 COMPLETE CBC W/AUTO DIFF WBC: CPT | Mod: HCNC | Performed by: PHYSICIAN ASSISTANT

## 2024-09-04 PROCEDURE — 86038 ANTINUCLEAR ANTIBODIES: CPT | Mod: HCNC | Performed by: PHYSICIAN ASSISTANT

## 2024-09-04 PROCEDURE — 80053 COMPREHEN METABOLIC PANEL: CPT | Mod: HCNC | Performed by: PHYSICIAN ASSISTANT

## 2024-09-04 PROCEDURE — 36415 COLL VENOUS BLD VENIPUNCTURE: CPT | Mod: HCNC,PO | Performed by: PHYSICIAN ASSISTANT

## 2024-09-04 PROCEDURE — 86140 C-REACTIVE PROTEIN: CPT | Mod: HCNC | Performed by: PHYSICIAN ASSISTANT

## 2024-09-06 LAB
ANTI SM ANTIBODY: 0.08 RATIO (ref 0–0.99)
ANTI SM/RNP ANTIBODY: 0.06 RATIO (ref 0–0.99)
ANTI-SM INTERPRETATION: NEGATIVE
ANTI-SM/RNP INTERPRETATION: NEGATIVE
ANTI-SSA ANTIBODY: 0.05 RATIO (ref 0–0.99)
ANTI-SSA INTERPRETATION: NEGATIVE
ANTI-SSB ANTIBODY: 0.09 RATIO (ref 0–0.99)
ANTI-SSB INTERPRETATION: NEGATIVE
DSDNA AB SER-ACNC: NORMAL [IU]/ML

## 2024-09-09 ENCOUNTER — PATIENT MESSAGE (OUTPATIENT)
Dept: RHEUMATOLOGY | Facility: CLINIC | Age: 64
End: 2024-09-09
Payer: MEDICARE

## 2024-09-12 ENCOUNTER — TELEPHONE (OUTPATIENT)
Dept: RHEUMATOLOGY | Facility: CLINIC | Age: 64
End: 2024-09-12
Payer: MEDICARE

## 2024-09-12 NOTE — TELEPHONE ENCOUNTER
----- Message from Quique Ghotra sent at 9/12/2024 12:32 PM CDT -----  Type:  Sooner Apoointment Request    Caller is requesting a sooner appointment.  Caller will accept being placed on the waitlist and is requesting a message be sent to doctor.  Name of Caller:pt  When is the first available appointment?01/30/2024  Symptoms:4 month f/u  Would the patient rather a call back or a response via [facility]? call  Best Call Back Number: [phone]  Additional Information: please call

## 2024-09-18 DIAGNOSIS — B02.9 HERPES ZOSTER WITHOUT COMPLICATION: ICD-10-CM

## 2024-09-18 DIAGNOSIS — M35.00 SJOGREN'S SYNDROME, WITH UNSPECIFIED ORGAN INVOLVEMENT: ICD-10-CM

## 2024-09-18 RX ORDER — VALACYCLOVIR HYDROCHLORIDE 500 MG/1
TABLET, FILM COATED ORAL
Qty: 180 TABLET | Refills: 2 | Status: SHIPPED | OUTPATIENT
Start: 2024-09-18

## 2024-09-18 NOTE — TELEPHONE ENCOUNTER
No care due was identified.  [facility] Embedded Care Due Messages. Reference number: [id number].   9/18/2024 5:44:55 AM CDT

## 2024-09-19 NOTE — TELEPHONE ENCOUNTER
Refill Decision Note   Tina García  is requesting a refill authorization.  Brief Assessment and Rationale for Refill:  Approve     Medication Therapy Plan:         Comments:     Note composed:11:53 PM 09/18/2024

## 2024-09-20 DIAGNOSIS — K21.9 GASTROESOPHAGEAL REFLUX DISEASE, UNSPECIFIED WHETHER ESOPHAGITIS PRESENT: ICD-10-CM

## 2024-09-20 RX ORDER — FAMOTIDINE 40 MG/1
40 TABLET, FILM COATED ORAL NIGHTLY PRN
Qty: 30 TABLET | Refills: 11 | Status: SHIPPED | OUTPATIENT
Start: 2024-09-20 | End: 2025-09-20

## 2024-09-20 NOTE — TELEPHONE ENCOUNTER
Pharmacy requesting refill on Famotidine 40mg  Pt's LOV 05/16/2024  Pt's NOV 10/25/2024  Medication pending

## 2024-10-24 DIAGNOSIS — B37.9 YEAST INFECTION: ICD-10-CM

## 2024-10-24 RX ORDER — FLUCONAZOLE 150 MG/1
150 TABLET ORAL DAILY
Qty: 7 TABLET | Refills: 5 | Status: SHIPPED | OUTPATIENT
Start: 2024-10-24

## 2024-10-24 NOTE — TELEPHONE ENCOUNTER
Pharmacy requesting refill on Fluconazole 150mg  Pt's LOV 05/16/2024  Pt's NOV 10/25/2024  Medication pending

## 2024-10-25 ENCOUNTER — OFFICE VISIT (OUTPATIENT)
Dept: RHEUMATOLOGY | Facility: CLINIC | Age: 64
End: 2024-10-25
Payer: MEDICARE

## 2024-10-25 ENCOUNTER — PATIENT MESSAGE (OUTPATIENT)
Dept: RHEUMATOLOGY | Facility: CLINIC | Age: 64
End: 2024-10-25

## 2024-10-25 DIAGNOSIS — K21.9 GASTROESOPHAGEAL REFLUX DISEASE, UNSPECIFIED WHETHER ESOPHAGITIS PRESENT: ICD-10-CM

## 2024-10-25 DIAGNOSIS — J40 BRONCHITIS: ICD-10-CM

## 2024-10-25 DIAGNOSIS — E06.3 HASHIMOTO'S THYROIDITIS: ICD-10-CM

## 2024-10-25 DIAGNOSIS — M34.1 CREST SYNDROME: Primary | ICD-10-CM

## 2024-10-25 DIAGNOSIS — G89.4 CHRONIC PAIN SYNDROME: ICD-10-CM

## 2024-10-25 DIAGNOSIS — M35.00 SJOGREN'S SYNDROME, WITH UNSPECIFIED ORGAN INVOLVEMENT: ICD-10-CM

## 2024-10-25 DIAGNOSIS — M34.1 CREST (CALCINOSIS, RAYNAUD'S PHENOMENON, ESOPHAGEAL DYSFUNCTION, SCLERODACTYLY, TELANGIECTASIA): ICD-10-CM

## 2024-10-25 DIAGNOSIS — E09.9 DRUG OR CHEMICAL INDUCED DIABETES MELLITUS WITHOUT COMPLICATIONS: ICD-10-CM

## 2024-10-25 PROCEDURE — 4010F ACE/ARB THERAPY RXD/TAKEN: CPT | Mod: HCNC,CPTII,95, | Performed by: INTERNAL MEDICINE

## 2024-10-25 PROCEDURE — 99215 OFFICE O/P EST HI 40 MIN: CPT | Mod: HCNC,95,, | Performed by: INTERNAL MEDICINE

## 2024-10-25 PROCEDURE — 3044F HG A1C LEVEL LT 7.0%: CPT | Mod: HCNC,CPTII,95, | Performed by: INTERNAL MEDICINE

## 2024-10-25 RX ORDER — METHYLPREDNISOLONE 4 MG/1
8 TABLET ORAL DAILY
Qty: 60 TABLET | Refills: 1 | Status: SHIPPED | OUTPATIENT
Start: 2024-10-25 | End: 2025-04-23

## 2024-10-25 RX ORDER — TRAMADOL HYDROCHLORIDE 50 MG/1
50 TABLET ORAL EVERY 6 HOURS PRN
Qty: 120 TABLET | Refills: 5 | Status: SHIPPED | OUTPATIENT
Start: 2024-10-25

## 2024-10-25 RX ORDER — FAMOTIDINE 40 MG/1
40 TABLET, FILM COATED ORAL NIGHTLY PRN
Qty: 30 TABLET | Refills: 11 | Status: SHIPPED | OUTPATIENT
Start: 2024-10-25 | End: 2025-10-25

## 2024-10-25 RX ORDER — HYDROXYCHLOROQUINE SULFATE 200 MG/1
200 TABLET, FILM COATED ORAL 2 TIMES DAILY
Qty: 180 TABLET | Refills: 3 | Status: SHIPPED | OUTPATIENT
Start: 2024-10-25 | End: 2025-10-25

## 2024-10-25 RX ORDER — ESOMEPRAZOLE MAGNESIUM 40 MG/1
40 CAPSULE, DELAYED RELEASE ORAL 2 TIMES DAILY
Qty: 60 CAPSULE | Refills: 11 | Status: SHIPPED | OUTPATIENT
Start: 2024-10-25 | End: 2025-10-25

## 2024-10-25 NOTE — PROGRESS NOTES
Subjective:     Patient ID:  Tina García    Chief Complaint:  Disease Management     History of Present Illness:  Pt is a 64 y.o. female Mrs. García is a 63 year old female who presents to clinic visit for follow up on CREST syndrome.  She is doing fair overall. She has been compliant with plaquenil.she saw GI Raynauds is active today in clinic, but has been stable overall. She is taking sildenafil prn for flares. She states GI sx have improved slightly with carafate--she has modified her diet to include protein shakes and avoids large meals. Not ready to consider GI surgery at this time. She is tolerating mounjaro 5 mg and A1c improved to normal rage. She is taking tramadol PRN for severe pain.         Current tx:  1. plaquenil  2. revatio PRN  3. Flexeril PRN               Rheumatologic History:   - Diagnosis/es:  - Positive serologies:  - Infectious screening labs:  - Previous Treatments:  - Current Treatments:     Interval History:   Hospitalization since last office visit: No    Patient Active Problem List    Diagnosis Date Noted    Dysphagia 04/19/2024    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 complications 05/10/2023    Immunocompromised state due to drug therapy 12/28/2022    Hyponatremia 05/30/2017    Esophageal dysmotility 09/25/2012    Other specified oesophagitis 09/25/2012    Fibromyalgia     Gastroesophageal reflux disease     HTN (hypertension)     CREST (calcinosis, Raynaud's phenomenon, esophageal dysfunction, sclerodactyly, telangiectasia)      Past Surgical History:   Procedure Laterality Date    COLONOSCOPY  7/2012    one polyp removed, biospy=TUBULOVILLOUS ADENOMA, otherwise normal findings, repeat in 4-5 years for surveillance    COLONOSCOPY N/A 4/17/2019    Procedure: COLONOSCOPY;  Surgeon: Rich Kingsley Jr., MD;  Location: [facility];  Service: Endoscopy;  Laterality: N/A;    COLONOSCOPY N/A 4/19/2024    Procedure: COLONOSCOPY;  Surgeon: Rich Kingsley Jr.,  MD;  Location: [facility];  Service: Endoscopy;  Laterality: N/A;    COLONOSCOPY W/ POLYPECTOMY  7/24/2012  [facility]    4-5 mm sigmoid polyp.    CYST REMOVAL      pilonidal    DILATION AND CURETTAGE OF UTERUS      ESOPHAGEAL MANOMETRY WITH MEASUREMENT OF IMPEDANCE N/A 2/4/2022    Procedure: MANOMETRY, ESOPHAGUS, WITH IMPEDANCE MEASUREMENT;  Surgeon: Kami Cobos MD;  Location: [facility] ([address] [place] [address]);  Service: Endoscopy;  Laterality: N/A;  COVID test on 2/1 at [facility]    ESOPHAGOGASTRODUODENOSCOPY N/A 4/17/2019    Procedure: EGD (ESOPHAGOGASTRODUODENOSCOPY);  Surgeon: Rich Kingsley Jr., MD;  Location: [facility];  Service: Endoscopy;  Laterality: N/A;    ESOPHAGOGASTRODUODENOSCOPY N/A 12/3/2021    Procedure: EGD (ESOPHAGOGASTRODUODENOSCOPY);  Surgeon: Rich Kingsley Jr., MD;  Location: [facility];  Service: Endoscopy;  Laterality: N/A;    ESOPHAGOGASTRODUODENOSCOPY N/A 4/19/2024    Procedure: EGD (ESOPHAGOGASTRODUODENOSCOPY);  Surgeon: Rich Kingsley Jr., MD;  Location: [facility];  Service: Endoscopy;  Laterality: N/A;    GANGLION CYST EXCISION      right thumb    HYSTERECTOMY  2008    complete    TOTAL ABDOMINAL HYSTERECTOMY W/ BILATERAL SALPINGOOPHORECTOMY  2008    w/Hysterectomy    UPPER GASTROINTESTINAL ENDOSCOPY  12/23/2011  [facility]    Fluid in the lower third of the esophagus  (stasis).  The esophageal examination was compatible with scleroderma.  (Dilataion done, 51 Fr.)    LA Grade A reflux esophagitis.   Enlarged gastric folds.   Multiple gastric polyps.   Antritis (erythematous streaks).  JOELLEN Test  Negative.     Social History     Tobacco Use    Smoking status: Never    Smokeless tobacco: Never   Substance Use Topics    Alcohol use: Not Currently    Drug use: No     Family History   Problem Relation Name Age of Onset    Cancer Mother          Leiomyosarcoma    Liver cancer Mother      Esophageal cancer Father      Cloud's esophagus Father      Cancer Father          esophageal    Colon polyps  Sister x3     Cancer Paternal Uncle          colon    Cancer Maternal Grandfather          stomach    Stomach cancer Paternal Grandfather      Breast cancer Paternal Cousin  40    Breast cancer Paternal Cousin  53    Diabetes Other      Hypertension Other       Review of patient's allergies indicates:   Allergen Reactions    Celebrex [celecoxib] Other (See Comments)     Reflux & stomach symptoms    Nsaids (non-steroidal anti-inflammatory drug)      Irritates reflux       Review of Systems   Review of Systems   Constitutional:  Negative for activity change, appetite change, chills, diaphoresis, fatigue, fever and unexpected weight change.   HENT:  Negative for congestion, dental problem, ear discharge, ear pain, facial swelling, mouth sores, nosebleeds, postnasal drip, rhinorrhea, sinus pressure, sneezing, sore throat, tinnitus, trouble swallowing and voice change.    Eyes:  Negative for photophobia, pain, discharge, redness and itching.   Respiratory:  Negative for apnea, cough, chest tightness, shortness of breath and wheezing.    Cardiovascular:  Positive for leg swelling. Negative for chest pain and palpitations.   Gastrointestinal:  Negative for abdominal distention, abdominal pain, constipation, diarrhea, nausea and vomiting.   Endocrine: Negative for cold intolerance, heat intolerance, polydipsia and polyuria.   Genitourinary:  Negative for decreased urine volume, difficulty urinating, dysuria, flank pain, frequency, hematuria and urgency.   Musculoskeletal:  Positive for arthralgias, back pain, gait problem, joint swelling, myalgias, neck pain and neck stiffness.   Skin:  Negative for pallor, rash and wound.   Allergic/Immunologic: Negative for immunocompromised state.   Neurological:  Negative for dizziness, tremors, weakness, numbness and headaches.   Hematological:  Negative for adenopathy. Does not bruise/bleed easily.   Psychiatric/Behavioral:  Negative for sleep disturbance. The patient is not  nervous/anxious.       Current Medications:  Current Outpatient Medications   Medication Instructions    albuterol (PROVENTIL/VENTOLIN HFA) 90 mcg/actuation inhaler INHALE TWO PUFFS INTO THE LUNGS EVERY 6 HOURS AS NEEDED FOR WHEEZING    cetirizine (ZYRTEC) 10 mg, Oral, Daily    cyanocobalamin 1,000 mcg/mL injection     cyclobenzaprine (FLEXERIL) 10 mg, Oral, 3 times daily PRN    esomeprazole (NEXIUM) 40 mg, Oral, 2 times daily    famotidine (PEPCID) 40 mg, Oral, Nightly PRN    fluconazole (DIFLUCAN) 150 mg, Oral, Daily    hydroCHLOROthiazide (HYDRODIURIL) 12.5 mg, Oral    hydroxychloroquine (PLAQUENIL) 200 mg, Oral, 2 times daily    hydrOXYzine pamoate (VISTARIL) 25 MG Cap TAKE 1 CAPSULE(25 MG) BY MOUTH EVERY 8 HOURS AS NEEDED FOR ITCHING    ketorolac (TORADOL) 60 mg/2 mL Soln     levothyroxine (SYNTHROID) 50 mcg, Oral, Before breakfast    LIDOcaine HCl 2% (LIDOCAINE VISCOUS) 2 % Soln Mucous Membrane, Every 6 hours    losartan (COZAAR) 100 mg, Oral    methylPREDNISolone (MEDROL) 8 mg, Oral, Daily    montelukast (SINGULAIR) 10 mg, Oral, Nightly    ondansetron (ZOFRAN-ODT) 8 mg, Oral, 3 times daily PRN    promethazine (PHENERGAN) 25 mg, Oral, Every 6 hours PRN    sucralfate (CARAFATE) 1 g, Oral, Before meals & nightly, Or, take 2 in the AM ac BF and take 2 at bedtime.    tirzepatide (MOUNJARO) 5 mg/0.5 mL PnIj ADMINISTER 5 MG UNDER THE SKIN EVERY 7 DAYS    tirzepatide 7.5 mg, Subcutaneous, Every 7 days    traMADoL (ULTRAM) 50 mg, Oral, Every 6 hours PRN    valACYclovir (VALTREX) 500 MG tablet TAKE 1 TABLET BY MOUTH TWICE DAILY         Objective:     There were no vitals filed for this visit.   There is no height or weight on file to calculate BMI.     Physical Examinations:  Physical Exam   Constitutional: She is oriented to person, place, and time.   Neurological: She is alert and oriented to person, place, and time.   Psychiatric: Mood, affect and judgment normal.        Disease Assessment Scores:  Patient's Global  "Assessment of arthritis (0-10): 1  Physician's Global Assessment of arthritis (0-10): 1  Number of Tender Joints (0-28): 1  Number of Swollen Joints (0-28): 1        5/13/2024     2:51 PM   Rapid3 Question Responses and Scores   MDHAQ Score 0.3   Psychologic Score 2.2   Pain Score 3   When you awakened in the morning OVER THE LAST WEEK, did you feel stiff? Yes   If Yes, please indicate the number of hours until you are as limber as you will be for the day 1   Fatigue Score 0   Global Health Score 0   RAPID3 Score 1.33       Monitoring Lab Results:  Lab Results   Component Value Date    WBC 6.18 09/04/2024    RBC 3.86 (L) 09/04/2024    HGB 11.6 (L) 09/04/2024    HCT 34.8 (L) 09/04/2024    MCV 90 09/04/2024    MCH 30.1 09/04/2024    MCHC 33.3 09/04/2024    RDW 13.9 09/04/2024     09/04/2024        Lab Results   Component Value Date     09/04/2024    K 4.2 09/04/2024     09/04/2024    CO2 25 09/04/2024    GLU 91 09/04/2024    BUN 14 09/04/2024    CREATININE 0.9 09/04/2024    CALCIUM 9.7 09/04/2024    PROT 6.9 09/04/2024    ALBUMIN 3.8 09/04/2024    BILITOT 0.2 09/04/2024    ALKPHOS 51 (L) 09/04/2024    AST 14 09/04/2024    ALT 14 09/04/2024    ANIONGAP 8 09/04/2024    EGFRNORACEVR >60.0 09/04/2024       Lab Results   Component Value Date    SEDRATE 9 09/04/2024    CRP 0.5 09/04/2024        Lab Results   Component Value Date    BIVBXKAM97MH 59 06/08/2022    HDSBBUZC79 875 06/28/2023        Lab Results   Component Value Date    CHOL 239 (H) 05/08/2024    HDL 48 05/08/2024    LDLCALC 164.4 (H) 05/08/2024    TRIG 133 05/08/2024       Lab Results   Component Value Date    RF <13.0 12/01/2021    CCPANTIBODIE 1.1 07/06/2016     Lab Results   Component Value Date    ANASCREEN Positive (A) 09/04/2024    ANATITER 1:160 09/04/2024    DSDNA Negative 1:10 09/04/2024     Lab Results   Component Value Date    HLABB27 Negative 07/06/2016       Infectious Disease Screening:  No results found for: "HEPBSAG", " ""HEPBCAB", "HEPBSAB", "HEPBSURFABQU", "HEPBIGM"  Lab Results   Component Value Date    HEPCAB Positive (A) 04/23/2014     No results found for: "TBGOLDPLUS", "QUANTTBGDPL"  No results found for: "QUANTIFERON", "SVCMT", "QUANTAGVALUE", "QUANTNILVALU", "QUANTMITOGEN", "QFTTBAG", "QINT"     Imaging: DEXA, Xrays, MRIs, CTs, etc    Old & Outside Medical Records:  Reviewed old and all outside medical records available in Care Everywhere     Assessment:     Encounter Diagnoses   Name Primary?    CREST syndrome Yes    Gastroesophageal reflux disease, unspecified whether esophagitis present     Sjogren's syndrome, with unspecified organ involvement     Chronic pain syndrome     Hashimoto's thyroiditis     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 complications           Plan:      Encounter Diagnoses   Name Primary?    Gastroesophageal reflux disease, unspecified whether esophagitis present     Sjogren's syndrome, with unspecified organ involvement     CREST syndrome Yes    Chronic pain syndrome     Hashimoto's thyroiditis     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 complications    Diagnoses and all orders for this visit:    CREST syndrome  -     esomeprazole (NEXIUM) 40 MG capsule; Take 1 capsule (40 mg total) by mouth 2 (two) times daily.  -     famotidine (PEPCID) 40 MG tablet; Take 1 tablet (40 mg total) by mouth nightly as needed (For heartburn and acid reflux symptoms not controlled with Omeprazole twice daily).  -     hydroxychloroquine (PLAQUENIL) 200 mg tablet; Take 1 tablet (200 mg total) by mouth 2 (two) times daily.  -     methylPREDNISolone (MEDROL) 4 MG Tab; Take 2 tablets (8 mg total) by mouth once daily.  -     Sedimentation rate; Future  -     Sjogrens syndrome-B extractable nuclear antibody; Future  -     Sjogrens syndrome-A extractable nuclear antibody; Future  -     Comprehensive Metabolic Panel; Future  -     CBC Auto Differential; Future  -     Anti-Smith Antibody; Future  -     " Anti-Scleroderma Antibody; Future  -     Anti-Histone Antibody; Future  -     Anti-DNA Ab, Double-Stranded; Future  -     Anti Sm/RNP Antibody; Future  -     JAMILA Screen w/Reflex; Future    Gastroesophageal reflux disease, unspecified whether esophagitis present  -     esomeprazole (NEXIUM) 40 MG capsule; Take 1 capsule (40 mg total) by mouth 2 (two) times daily.  -     famotidine (PEPCID) 40 MG tablet; Take 1 tablet (40 mg total) by mouth nightly as needed (For heartburn and acid reflux symptoms not controlled with Omeprazole twice daily).  -     hydroxychloroquine (PLAQUENIL) 200 mg tablet; Take 1 tablet (200 mg total) by mouth 2 (two) times daily.  -     methylPREDNISolone (MEDROL) 4 MG Tab; Take 2 tablets (8 mg total) by mouth once daily.  -     Sedimentation rate; Future  -     Sjogrens syndrome-B extractable nuclear antibody; Future  -     Sjogrens syndrome-A extractable nuclear antibody; Future  -     Comprehensive Metabolic Panel; Future  -     CBC Auto Differential; Future  -     Anti-Smith Antibody; Future  -     Anti-Scleroderma Antibody; Future  -     Anti-Histone Antibody; Future  -     Anti-DNA Ab, Double-Stranded; Future  -     Anti Sm/RNP Antibody; Future  -     JAMILA Screen w/Reflex; Future    Sjogren's syndrome, with unspecified organ involvement  -     esomeprazole (NEXIUM) 40 MG capsule; Take 1 capsule (40 mg total) by mouth 2 (two) times daily.  -     famotidine (PEPCID) 40 MG tablet; Take 1 tablet (40 mg total) by mouth nightly as needed (For heartburn and acid reflux symptoms not controlled with Omeprazole twice daily).  -     hydroxychloroquine (PLAQUENIL) 200 mg tablet; Take 1 tablet (200 mg total) by mouth 2 (two) times daily.  -     methylPREDNISolone (MEDROL) 4 MG Tab; Take 2 tablets (8 mg total) by mouth once daily.  -     Sedimentation rate; Future  -     Sjogrens syndrome-B extractable nuclear antibody; Future  -     Sjogrens syndrome-A extractable nuclear antibody; Future  -      Comprehensive Metabolic Panel; Future  -     CBC Auto Differential; Future  -     Anti-Smith Antibody; Future  -     Anti-Scleroderma Antibody; Future  -     Anti-Histone Antibody; Future  -     Anti-DNA Ab, Double-Stranded; Future  -     Anti Sm/RNP Antibody; Future  -     JAMILA Screen w/Reflex; Future    Chronic pain syndrome  -     esomeprazole (NEXIUM) 40 MG capsule; Take 1 capsule (40 mg total) by mouth 2 (two) times daily.  -     famotidine (PEPCID) 40 MG tablet; Take 1 tablet (40 mg total) by mouth nightly as needed (For heartburn and acid reflux symptoms not controlled with Omeprazole twice daily).  -     hydroxychloroquine (PLAQUENIL) 200 mg tablet; Take 1 tablet (200 mg total) by mouth 2 (two) times daily.  -     methylPREDNISolone (MEDROL) 4 MG Tab; Take 2 tablets (8 mg total) by mouth once daily.  -     Sedimentation rate; Future  -     Sjogrens syndrome-B extractable nuclear antibody; Future  -     Sjogrens syndrome-A extractable nuclear antibody; Future  -     Comprehensive Metabolic Panel; Future  -     CBC Auto Differential; Future  -     Anti-Smith Antibody; Future  -     Anti-Scleroderma Antibody; Future  -     Anti-Histone Antibody; Future  -     Anti-DNA Ab, Double-Stranded; Future  -     Anti Sm/RNP Antibody; Future  -     JAMILA Screen w/Reflex; Future    Hashimoto's thyroiditis  -     esomeprazole (NEXIUM) 40 MG capsule; Take 1 capsule (40 mg total) by mouth 2 (two) times daily.  -     famotidine (PEPCID) 40 MG tablet; Take 1 tablet (40 mg total) by mouth nightly as needed (For heartburn and acid reflux symptoms not controlled with Omeprazole twice daily).  -     hydroxychloroquine (PLAQUENIL) 200 mg tablet; Take 1 tablet (200 mg total) by mouth 2 (two) times daily.  -     methylPREDNISolone (MEDROL) 4 MG Tab; Take 2 tablets (8 mg total) by mouth once daily.  -     Sedimentation rate; Future  -     Sjogrens syndrome-B extractable nuclear antibody; Future  -     Sjogrens syndrome-A extractable  nuclear antibody; Future  -     Comprehensive Metabolic Panel; Future  -     CBC Auto Differential; Future  -     Anti-Smith Antibody; Future  -     Anti-Scleroderma Antibody; Future  -     Anti-Histone Antibody; Future  -     Anti-DNA Ab, Double-Stranded; Future  -     Anti Sm/RNP Antibody; Future  -     JAMILA Screen w/Reflex; Future    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 complications  -     tirzepatide 7.5 mg/0.5 mL PnIj; Inject 7.5 mg into the skin every 7 days.  -     Sedimentation rate; Future  -     Sjogrens syndrome-B extractable nuclear antibody; Future  -     Sjogrens syndrome-A extractable nuclear antibody; Future  -     Comprehensive Metabolic Panel; Future  -     CBC Auto Differential; Future  -     Anti-Smith Antibody; Future  -     Anti-Scleroderma Antibody; Future  -     Anti-Histone Antibody; Future  -     Anti-DNA Ab, Double-Stranded; Future  -     Anti Sm/RNP Antibody; Future  -     JAMILA Screen w/Reflex; Future          1. Increase mounjaro 7.5 mg  2. labs  3. F/u 6 months     Follow-up 6 months    The patient location is: home  The chief complaint leading to consultation is: crest    Visit type: audiovisual    Face to Face time with patient: 51   minutes of total time spent on the encounter, which includes face to face time and non-face to face time preparing to see the patient (eg, review of tests), Obtaining and/or reviewing separately obtained history, Documenting clinical information in the electronic or other health record, Independently interpreting results (not separately reported) and communicating results to the patient/family/caregiver, or Care coordination (not separately reported).         Each patient to whom he or she provides medical services by telemedicine is:  (1) informed of the relationship between the physician and patient and the respective role of any other health care provider with respect to management of the patient; and (2) notified that he or she may  decline to receive medical services by telemedicine and may withdraw from such care at any time.    Notes:

## 2025-01-19 DIAGNOSIS — M34.1 CREST (CALCINOSIS, RAYNAUD'S PHENOMENON, ESOPHAGEAL DYSFUNCTION, SCLERODACTYLY, TELANGIECTASIA): ICD-10-CM

## 2025-01-19 DIAGNOSIS — M35.00 SJOGREN'S SYNDROME, WITH UNSPECIFIED ORGAN INVOLVEMENT: ICD-10-CM

## 2025-01-24 ENCOUNTER — PATIENT MESSAGE (OUTPATIENT)
Dept: RHEUMATOLOGY | Facility: CLINIC | Age: 65
End: 2025-01-24
Payer: MEDICARE

## 2025-01-24 RX ORDER — CYCLOBENZAPRINE HCL 10 MG
10 TABLET ORAL
Qty: 90 TABLET | Refills: 0 | Status: SHIPPED | OUTPATIENT
Start: 2025-01-24

## 2025-01-27 DIAGNOSIS — K21.9 GASTROESOPHAGEAL REFLUX DISEASE, UNSPECIFIED WHETHER ESOPHAGITIS PRESENT: ICD-10-CM

## 2025-01-27 DIAGNOSIS — L29.9 ITCHING: ICD-10-CM

## 2025-01-27 RX ORDER — ONDANSETRON 4 MG/1
4 TABLET, ORALLY DISINTEGRATING ORAL 3 TIMES DAILY PRN
Qty: 30 TABLET | Refills: 3 | Status: SHIPPED | OUTPATIENT
Start: 2025-01-27

## 2025-01-27 RX ORDER — HYDROXYZINE PAMOATE 25 MG/1
25 CAPSULE ORAL DAILY
Qty: 90 CAPSULE | Refills: 3 | Status: SHIPPED | OUTPATIENT
Start: 2025-01-27

## 2025-01-29 ENCOUNTER — PATIENT MESSAGE (OUTPATIENT)
Dept: RHEUMATOLOGY | Facility: CLINIC | Age: 65
End: 2025-01-29
Payer: MEDICARE

## 2025-01-30 DIAGNOSIS — Z91.09 ENVIRONMENTAL ALLERGIES: ICD-10-CM

## 2025-01-30 RX ORDER — MONTELUKAST SODIUM 10 MG/1
10 TABLET ORAL NIGHTLY
Qty: 90 TABLET | Refills: 0 | Status: SHIPPED | OUTPATIENT
Start: 2025-01-30

## 2025-01-30 NOTE — TELEPHONE ENCOUNTER
Pharmacy requesting refill on montelukast  Pt's LOV 10/25/24  Pt's NOV 4/10/25  [facility]

## 2025-02-17 DIAGNOSIS — M35.00 SJOGREN'S SYNDROME, WITH UNSPECIFIED ORGAN INVOLVEMENT: ICD-10-CM

## 2025-02-17 DIAGNOSIS — M34.1 CREST (CALCINOSIS, RAYNAUD'S PHENOMENON, ESOPHAGEAL DYSFUNCTION, SCLERODACTYLY, TELANGIECTASIA): ICD-10-CM

## 2025-02-17 RX ORDER — CYCLOBENZAPRINE HCL 10 MG
10 TABLET ORAL 3 TIMES DAILY
Qty: 90 TABLET | Refills: 3 | Status: SHIPPED | OUTPATIENT
Start: 2025-02-17

## 2025-02-17 NOTE — TELEPHONE ENCOUNTER
Pharmacy requesting refill on Cyclobenzaprine 10mg  Pt's LOV 10/25/2024  Pt's NOV 09/23/2025  Medication pending

## 2025-04-05 DIAGNOSIS — E09.9 DRUG OR CHEMICAL INDUCED DIABETES MELLITUS WITHOUT COMPLICATIONS: ICD-10-CM

## 2025-04-08 RX ORDER — TIRZEPATIDE 7.5 MG/.5ML
INJECTION, SOLUTION SUBCUTANEOUS
Qty: 2 ML | Refills: 5 | Status: SHIPPED | OUTPATIENT
Start: 2025-04-08

## 2025-04-17 ENCOUNTER — OFFICE VISIT (OUTPATIENT)
Dept: FAMILY MEDICINE | Facility: CLINIC | Age: 65
End: 2025-04-17
Payer: MEDICARE

## 2025-04-17 VITALS
WEIGHT: 164 LBS | HEIGHT: 64 IN | SYSTOLIC BLOOD PRESSURE: 120 MMHG | DIASTOLIC BLOOD PRESSURE: 70 MMHG | BODY MASS INDEX: 28 KG/M2

## 2025-04-17 DIAGNOSIS — I10 ESSENTIAL HYPERTENSION: Primary | ICD-10-CM

## 2025-04-17 DIAGNOSIS — E83.42 HYPOMAGNESEMIA: ICD-10-CM

## 2025-04-17 DIAGNOSIS — M34.1 CREST SYNDROME: ICD-10-CM

## 2025-04-17 DIAGNOSIS — E03.8 OTHER SPECIFIED HYPOTHYROIDISM: ICD-10-CM

## 2025-04-17 DIAGNOSIS — R73.03 PREDIABETES: ICD-10-CM

## 2025-04-17 DIAGNOSIS — E78.49 OTHER HYPERLIPIDEMIA: ICD-10-CM

## 2025-04-17 RX ORDER — LOSARTAN POTASSIUM 50 MG/1
50 TABLET ORAL DAILY
Qty: 90 TABLET | Refills: 3 | Status: SHIPPED | OUTPATIENT
Start: 2025-04-17 | End: 2026-04-17

## 2025-04-17 NOTE — PROGRESS NOTES
Assessment:       1. Essential hypertension    2. CREST syndrome    3. Other hyperlipidemia    4. Other specified hypothyroidism    5. Hypomagnesemia    6. Prediabetes        Plan:       Essential hypertension:  Controlled  -     losartan (COZAAR) 50 MG tablet; Take 1 tablet (50 mg total) by mouth once daily.  Dispense: 90 tablet; Refill: 3  -     Comprehensive Metabolic Panel; Future; Expected date: 04/17/2025  -     Lipid Panel; Future; Expected date: 04/17/2025    CREST syndrome:  Stable.  The patient is currently seen the rheumatologist.  -     CBC Without Differential; Future; Expected date: 04/17/2025    Other hyperlipidemia:  Uncontrolled    Other specified hypothyroidism:  Stable  -     TSH; Future; Expected date: 04/17/2025    Hypomagnesemia:  Stable  -     Magnesium; Future; Expected date: 04/17/2025    Prediabetes:  Worsening  -     Hemoglobin A1C; Future; Expected date: 04/17/2025         Recommend blood work, will decrease the dosage of the losartan to 50 mg daily instead of 100 mg.  The patient's BMI has been recorded in the chart. The patient has been provided educational materials regarding the benefits of attaining and maintaining a normal weight. We will continue to address and follow this issue during follow up visits.   Patient agreed with assessment and plan. Patient verbalized understanding.     Subjective:       Patient ID: Tina García is a 64 y.o. female.    Chief Complaint: Hypertension    The patient location is:  [place]  The chief complaint leading to consultation is:  Hypertension    Visit type: audiovisual    Face to Face time with patient:  8 minutes  Twelve minutes of total time spent on the encounter, which includes face to face time and non-face to face time preparing to see the patient (eg, review of tests), Obtaining and/or reviewing separately obtained history, Documenting clinical information in the electronic or other health record, Independently  interpreting results (not separately reported) and communicating results to the patient/family/caregiver, or Care coordination (not separately reported).         Each patient to whom he or she provides medical services by telemedicine is:  (1) informed of the relationship between the physician and patient and the respective role of any other health care provider with respect to management of the patient; and (2) notified that he or she may decline to receive medical services by telemedicine and may withdraw from such care at any time.    Notes:      Hypertension  This is a recurrent problem. The current episode started more than 1 year ago. The problem has been gradually improving since onset. The problem is controlled. Associated symptoms include blurred vision. Pertinent negatives include no anxiety, chest pain, headaches, malaise/fatigue, neck pain, orthopnea, palpitations, peripheral edema, PND, shortness of breath or sweats. Agents associated with hypertension include thyroid hormones. Risk factors for coronary artery disease include family history, obesity, post-menopausal state and stress. Past treatments include angiotensin blockers and diuretics. The current treatment provides significant improvement. Compliance problems include exercise.      Past medical history, past social history was reviewed and discussed with the patient.    Review of Systems   Constitutional:  Negative for malaise/fatigue.   Eyes:  Positive for blurred vision.   Respiratory:  Negative for shortness of breath.    Cardiovascular:  Negative for chest pain, palpitations, orthopnea and PND.   Musculoskeletal:  Negative for neck pain.   Neurological:  Negative for headaches.       Objective:      Physical Exam  Constitutional:       General: She is not in acute distress.     Appearance: Normal appearance. She is not toxic-appearing.   HENT:      Head: Normocephalic and atraumatic.   Neurological:      Mental Status: She is alert.    Psychiatric:         Mood and Affect: Mood normal.         Thought Content: Thought content normal.         Judgment: Judgment normal.

## 2025-05-05 DIAGNOSIS — Z91.09 ENVIRONMENTAL ALLERGIES: ICD-10-CM

## 2025-05-05 NOTE — TELEPHONE ENCOUNTER
Pharmacy requesting refill on Montelukast 10mg  Pt's LOV 10/25/2024  Pt's NOV 09/23/2025  Medication pending

## 2025-05-07 RX ORDER — MONTELUKAST SODIUM 10 MG/1
10 TABLET ORAL NIGHTLY
Qty: 90 TABLET | Refills: 0 | Status: SHIPPED | OUTPATIENT
Start: 2025-05-07

## 2025-05-29 ENCOUNTER — TELEPHONE (OUTPATIENT)
Dept: FAMILY MEDICINE | Facility: CLINIC | Age: 65
End: 2025-05-29
Payer: MEDICARE

## 2025-06-09 ENCOUNTER — PATIENT MESSAGE (OUTPATIENT)
Dept: RHEUMATOLOGY | Facility: CLINIC | Age: 65
End: 2025-06-09
Payer: MEDICARE

## 2025-06-10 RX ORDER — AZITHROMYCIN 250 MG/1
TABLET, FILM COATED ORAL
Qty: 6 TABLET | Refills: 0 | Status: SHIPPED | OUTPATIENT
Start: 2025-06-10 | End: 2025-06-14

## 2025-06-10 RX ORDER — METHYLPREDNISOLONE 4 MG/1
TABLET ORAL
Qty: 21 EACH | Refills: 0 | Status: SHIPPED | OUTPATIENT
Start: 2025-06-10 | End: 2025-06-30

## 2025-06-18 DIAGNOSIS — M35.00 SJOGREN'S SYNDROME, WITH UNSPECIFIED ORGAN INVOLVEMENT: ICD-10-CM

## 2025-06-18 DIAGNOSIS — M34.1 CREST (CALCINOSIS, RAYNAUD'S PHENOMENON, ESOPHAGEAL DYSFUNCTION, SCLERODACTYLY, TELANGIECTASIA): ICD-10-CM

## 2025-06-20 RX ORDER — CYCLOBENZAPRINE HCL 10 MG
10 TABLET ORAL 3 TIMES DAILY
Qty: 90 TABLET | Refills: 3 | Status: SHIPPED | OUTPATIENT
Start: 2025-06-20

## 2025-06-21 DIAGNOSIS — I10 PRIMARY HYPERTENSION: ICD-10-CM

## 2025-06-21 NOTE — TELEPHONE ENCOUNTER
Care Due:                  Date            Visit Type   Department     Provider  --------------------------------------------------------------------------------                                ESTABLISHED                              PATIENT -    [facility]  Last Visit: 04-      VIRTUAL      GILBERTO Harris                              EP -                              PRIMARY      [facility]  Next Visit: 01-      CARE ([facility])   MEDICINE       Loan Harris                                                            Last  Test          Frequency    Reason                     Performed    Due Date  --------------------------------------------------------------------------------    CBC.........  12 months..  valACYclovir.............  09- 08-    CMP.........  12 months..  hydroCHLOROthiazide,       09- 08-                             losartan, valACYclovir...    TSH.........  12 months..  levothyroxine............  05- 05-    [facility] Embedded Care Due Messages. Reference number: [id number].   6/21/2025 5:42:44 AM CDT

## 2025-06-21 NOTE — TELEPHONE ENCOUNTER
Refill Routing Note   Medication(s) are not appropriate for processing by [facility] for the following reason(s):        Drug-disease interaction  Drug-Disease: hydroCHLOROthiazide and Hyponatremia    [facility] action(s):  Defer        Medication Therapy Plan: FLOS 06/25/25      Appointments  past 12m or future 3m with PCP    Date Provider   Last Visit   4/17/2025 Loan Harris MD   Next Visit   Visit date not found Loan Harris MD   ED visits in past 90 days: 0        Note composed:1:19 PM 06/21/2025

## 2025-06-22 RX ORDER — HYDROCHLOROTHIAZIDE 12.5 MG/1
12.5 TABLET ORAL
Qty: 90 TABLET | Refills: 3 | Status: SHIPPED | OUTPATIENT
Start: 2025-06-22

## 2025-06-23 DIAGNOSIS — I10 PRIMARY HYPERTENSION: ICD-10-CM

## 2025-06-23 DIAGNOSIS — B02.9 HERPES ZOSTER WITHOUT COMPLICATION: ICD-10-CM

## 2025-06-23 DIAGNOSIS — M35.00 SJOGREN'S SYNDROME, WITH UNSPECIFIED ORGAN INVOLVEMENT: ICD-10-CM

## 2025-06-23 RX ORDER — VALACYCLOVIR HYDROCHLORIDE 500 MG/1
500 TABLET, FILM COATED ORAL 2 TIMES DAILY
Qty: 180 TABLET | Refills: 0 | Status: SHIPPED | OUTPATIENT
Start: 2025-06-23

## 2025-06-23 RX ORDER — LOSARTAN POTASSIUM 100 MG/1
100 TABLET ORAL
Qty: 90 TABLET | Refills: 3 | OUTPATIENT
Start: 2025-06-23

## 2025-06-23 NOTE — TELEPHONE ENCOUNTER
No care due was identified.  [facility] Embedded Care Due Messages. Reference number: [id number].   6/23/2025 3:37:41 AM CDT

## 2025-06-23 NOTE — TELEPHONE ENCOUNTER
Refill Decision Note   Tina Raquel  is requesting a refill authorization.  Brief Assessment and Rationale for Refill:  Quick Discontinue  Approve     Medication Therapy Plan:  Dosage d/c by PCP on 04/17/25; C      Comments:     Note composed:7:32 AM 06/23/2025

## 2025-06-30 ENCOUNTER — LAB VISIT (OUTPATIENT)
Dept: LAB | Facility: HOSPITAL | Age: 65
End: 2025-06-30
Attending: FAMILY MEDICINE
Payer: MEDICARE

## 2025-06-30 DIAGNOSIS — M34.1 CREST SYNDROME: ICD-10-CM

## 2025-06-30 DIAGNOSIS — I10 ESSENTIAL HYPERTENSION: ICD-10-CM

## 2025-06-30 DIAGNOSIS — R73.03 PREDIABETES: ICD-10-CM

## 2025-06-30 DIAGNOSIS — E03.8 OTHER SPECIFIED HYPOTHYROIDISM: ICD-10-CM

## 2025-06-30 DIAGNOSIS — E83.42 HYPOMAGNESEMIA: ICD-10-CM

## 2025-06-30 LAB
ALBUMIN SERPL BCP-MCNC: 4 G/DL (ref 3.5–5.2)
ALP SERPL-CCNC: 49 UNIT/L (ref 40–150)
ALT SERPL W/O P-5'-P-CCNC: 22 UNIT/L (ref 10–44)
ANION GAP (OHS): 8 MMOL/L (ref 8–16)
AST SERPL-CCNC: 19 UNIT/L (ref 11–45)
BILIRUB SERPL-MCNC: 0.2 MG/DL (ref 0.1–1)
BUN SERPL-MCNC: 15 MG/DL (ref 8–23)
CALCIUM SERPL-MCNC: 9.2 MG/DL (ref 8.7–10.5)
CHLORIDE SERPL-SCNC: 103 MMOL/L (ref 95–110)
CHOLEST SERPL-MCNC: 227 MG/DL (ref 120–199)
CHOLEST/HDLC SERPL: 5.2 {RATIO} (ref 2–5)
CO2 SERPL-SCNC: 27 MMOL/L (ref 23–29)
CREAT SERPL-MCNC: 0.9 MG/DL (ref 0.5–1.4)
EAG (OHS): 111 MG/DL (ref 68–131)
ERYTHROCYTE [DISTWIDTH] IN BLOOD BY AUTOMATED COUNT: 12.7 % (ref 11.5–14.5)
GFR SERPLBLD CREATININE-BSD FMLA CKD-EPI: >60 ML/MIN/1.73/M2
GLUCOSE SERPL-MCNC: 87 MG/DL (ref 70–110)
HBA1C MFR BLD: 5.5 % (ref 4–5.6)
HCT VFR BLD AUTO: 35.9 % (ref 37–48.5)
HDLC SERPL-MCNC: 44 MG/DL (ref 40–75)
HDLC SERPL: 19.4 % (ref 20–50)
HGB BLD-MCNC: 11.7 GM/DL (ref 12–16)
LDLC SERPL CALC-MCNC: 145.8 MG/DL (ref 63–159)
MAGNESIUM SERPL-MCNC: 1.7 MG/DL (ref 1.6–2.6)
MCH RBC QN AUTO: 29.7 PG (ref 27–31)
MCHC RBC AUTO-ENTMCNC: 32.6 G/DL (ref 32–36)
MCV RBC AUTO: 91 FL (ref 82–98)
NONHDLC SERPL-MCNC: 183 MG/DL
PLATELET # BLD AUTO: 302 K/UL (ref 150–450)
PMV BLD AUTO: 10.2 FL (ref 9.2–12.9)
POTASSIUM SERPL-SCNC: 4 MMOL/L (ref 3.5–5.1)
PROT SERPL-MCNC: 6.8 GM/DL (ref 6–8.4)
RBC # BLD AUTO: 3.94 M/UL (ref 4–5.4)
SODIUM SERPL-SCNC: 138 MMOL/L (ref 136–145)
T4 FREE SERPL-MCNC: 1.02 NG/DL (ref 0.71–1.51)
TRIGL SERPL-MCNC: 186 MG/DL (ref 30–150)
TSH SERPL-ACNC: 4.34 UIU/ML (ref 0.4–4)
WBC # BLD AUTO: 6.43 K/UL (ref 3.9–12.7)

## 2025-06-30 PROCEDURE — 84443 ASSAY THYROID STIM HORMONE: CPT | Mod: HCNC

## 2025-06-30 PROCEDURE — 84439 ASSAY OF FREE THYROXINE: CPT | Mod: HCNC

## 2025-06-30 PROCEDURE — 82465 ASSAY BLD/SERUM CHOLESTEROL: CPT | Mod: HCNC

## 2025-06-30 PROCEDURE — 36415 COLL VENOUS BLD VENIPUNCTURE: CPT | Mod: HCNC,PO

## 2025-06-30 PROCEDURE — 83735 ASSAY OF MAGNESIUM: CPT | Mod: HCNC

## 2025-06-30 PROCEDURE — 85027 COMPLETE CBC AUTOMATED: CPT | Mod: HCNC

## 2025-06-30 PROCEDURE — 84075 ASSAY ALKALINE PHOSPHATASE: CPT | Mod: HCNC

## 2025-06-30 PROCEDURE — 83036 HEMOGLOBIN GLYCOSYLATED A1C: CPT | Mod: HCNC

## 2025-07-01 ENCOUNTER — RESULTS FOLLOW-UP (OUTPATIENT)
Dept: FAMILY MEDICINE | Facility: CLINIC | Age: 65
End: 2025-07-01

## 2025-07-01 DIAGNOSIS — E03.8 OTHER SPECIFIED HYPOTHYROIDISM: Primary | ICD-10-CM

## 2025-07-01 RX ORDER — LEVOTHYROXINE SODIUM 75 UG/1
75 TABLET ORAL
Qty: 30 TABLET | Refills: 11 | Status: SHIPPED | OUTPATIENT
Start: 2025-07-01 | End: 2026-07-01

## 2025-07-09 ENCOUNTER — OFFICE VISIT (OUTPATIENT)
Dept: FAMILY MEDICINE | Facility: CLINIC | Age: 65
End: 2025-07-09
Payer: MEDICARE

## 2025-07-09 VITALS
HEART RATE: 83 BPM | HEIGHT: 64 IN | OXYGEN SATURATION: 98 % | WEIGHT: 174.25 LBS | SYSTOLIC BLOOD PRESSURE: 110 MMHG | RESPIRATION RATE: 17 BRPM | DIASTOLIC BLOOD PRESSURE: 70 MMHG | BODY MASS INDEX: 29.75 KG/M2 | TEMPERATURE: 98 F

## 2025-07-09 DIAGNOSIS — Z79.899 IMMUNOCOMPROMISED STATE DUE TO DRUG THERAPY: ICD-10-CM

## 2025-07-09 DIAGNOSIS — I10 PRIMARY HYPERTENSION: ICD-10-CM

## 2025-07-09 DIAGNOSIS — M34.1 CREST (CALCINOSIS, RAYNAUD'S PHENOMENON, ESOPHAGEAL DYSFUNCTION, SCLERODACTYLY, TELANGIECTASIA): ICD-10-CM

## 2025-07-09 DIAGNOSIS — Z00.00 ENCOUNTER FOR MEDICARE ANNUAL WELLNESS EXAM: Primary | ICD-10-CM

## 2025-07-09 DIAGNOSIS — D84.821 IMMUNOCOMPROMISED STATE DUE TO DRUG THERAPY: ICD-10-CM

## 2025-07-09 DIAGNOSIS — E09.9 DRUG OR CHEMICAL INDUCED DIABETES MELLITUS WITHOUT COMPLICATIONS: ICD-10-CM

## 2025-07-09 DIAGNOSIS — K21.9 GASTROESOPHAGEAL REFLUX DISEASE, UNSPECIFIED WHETHER ESOPHAGITIS PRESENT: ICD-10-CM

## 2025-07-09 DIAGNOSIS — E03.8 OTHER SPECIFIED HYPOTHYROIDISM: ICD-10-CM

## 2025-07-09 NOTE — PROGRESS NOTES
"Tina García presented for an initial Medicare AWV today. The following components were reviewed and updated:    Medical history  Family History  Social history  Allergies and Current Medications  Health Risk Assessment  Health Maintenance  Care Team    **See Completed Assessments for Annual Wellness visit with in the encounter summary    The following assessments were completed:  Depression Screening  Cognitive function Screening    Timed Get Up Test  Whisper Test    Opioid documentation:  Patient does have a current opioid prescription.    Patient accepted further discussion regarding opioid medication use.    Patient is currently taking tramadol narcotic for generalized pain.      Pain level today is 0/10.     In addition to narcotic pain medications, patient is also using acetaminophen for pain control.     Patient is followed by a specialist currently for their pain and will not be referred today.     Patient's opioid risk potential based on ORT-OUD tool:       Gavin each box that applies   No   Yes     Family history of substance abuse   Alcohol [x] []   Illegal drugs [x] []   Rx drugs [x] []     Personal history of substance abuse   Alcohol [x] []   Illegal drugs [x] []   Rx drugs [x] []     Age between 16-45 years   [x]   []     Patient with ADD, OCD, Bipolar disorder, schizoprenia   [x]   []     Patient with depression   [x]   []                         Scoring total                            0                                  Non-opioid treatment options have been discussed today and added to the patient's after visit summary.        Vitals:    07/09/25 0805   BP: 110/70   BP Location: Left arm   Patient Position: Sitting   Pulse: 83   Resp: 17   Temp: 97.5 °F (36.4 °C)   TempSrc: Temporal   SpO2: 98%   Weight: 79.1 kg (174 lb 4.4 oz)   Height: 5' 4" (1.626 m)     Body mass index is 29.91 kg/m².     Physical Exam  Vitals reviewed.   Constitutional:       General: She is not in acute " distress.  Pulmonary:      Effort: Pulmonary effort is normal. No respiratory distress.   Neurological:      Mental Status: She is alert and oriented to person, place, and time.   Psychiatric:         Mood and Affect: Mood normal.         Behavior: Behavior normal.         Thought Content: Thought content normal.         Judgment: Judgment normal.     Diagnoses and health risks identified today and associated recommendations/orders:  1. Encounter for Medicare annual wellness exam  Reviewed and discussed health maintenance.    - Ambulatory Referral/Consult to Enhanced Annual Wellness Visit (eAWV)    2. CREST (calcinosis, Raynaud's phenomenon, esophageal dysfunction, sclerodactyly, telangiectasia)  3. Immunocompromised state due to drug therapy  Stable- continue current treatment and follow up routinely with PCP and rheumatology ()  Plaquenil 200mg bid  Flexeril and ultram prn    4. Primary hypertension  Stable- continue current treatment and follow up routinely with PCP   Cozaar 50mg daily and Hydrochlorothiazide 12.5mg daily prn    5. Gastroesophageal reflux disease, unspecified whether esophagitis present  Stable- continue current treatment and follow up routinely with PCP   Nexium 40mg bid and pepcid 40mg at night prn    6. Other specified hypothyroidism   Stable- continue current treatment and follow up routinely with PCP   Synthroid 75mcg daily  Lab Results   Component Value Date    TSH 4.336 (H) 06/30/2025     7. 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 Will cont to monitor  Lab Results   Component Value Date    HGBA1C 5.5 06/30/2025      Provided Tina with a 5-10 year written screening schedule and personal prevention plan. Recommendations were developed using the USPSTF age appropriate  recommendations. Education, counseling, and referrals were provided as needed.  After Visit Summary printed and given to patient which includes a list of additional screenings\tests needed.    I offered to discuss advanced care planning, including how to pick a person who would make decisions for you if you were unable to make them for yourself, called a health care power of , and what kind of decisions you might make such as use of life sustaining treatments such as ventilators and tube feeding when faced with a life limiting illness recorded on a living will that they will need to know. (How you want to be cared for as you near the end of your natural life)  Patient declined a discussion regarding advance directives at this time. He will complete independently with his family   Rand Jones NP

## 2025-07-09 NOTE — PATIENT INSTRUCTIONS
Counseling and Referral of Other Preventative  (Italic type indicates deductible and co-insurance are waived)    Patient Name: Tina García  Today's Date: 7/9/2025    Health Maintenance       Date Due Completion Date    COVID-19 Vaccine (1) Never done ---    HIV Screening Never done ---    Shingles Vaccine (1 of 2) Never done ---    Pneumococcal Vaccines (Age 50+) (1 of 2 - PCV) Never done ---    RSV Vaccine (Age 60+ and Pregnant patients) (1 - Risk 60-74 years 1-dose series) Never done ---    Influenza Vaccine (1) 09/01/2025 9/24/2020    Mammogram 02/27/2026 2/27/2025    Override on 12/9/2013: Done (Needs recheck in 6 months due to abnormal MMG)    Hemoglobin A1c (Prediabetes) 06/30/2026 6/30/2025    Colorectal Cancer Screening 04/19/2029 4/19/2024    Lipid Panel 06/30/2030 6/30/2025    TETANUS VACCINE 05/01/2034 5/1/2024        No orders of the defined types were placed in this encounter.    The following information is provided to all patients.  This information is to help you find resources for any of the problems found today that may be affecting your health:                  Living healthy guide: www.[facility].[place].gov      Understanding Diabetes: www.diabetes.org      Eating healthy: www.cdc.gov/healthyweight      CDC home safety checklist: www.cdc.gov/steadi/patient.html      Agency on Aging: www.goea.[place].[facility]      Alcoholics anonymous (AA): www.aa.org      Physical Activity: www.[place].nih.gov/cx9zttl      Tobacco use: www.quitwithusla.org

## 2025-08-01 DIAGNOSIS — Z91.09 ENVIRONMENTAL ALLERGIES: ICD-10-CM

## 2025-08-04 RX ORDER — MONTELUKAST SODIUM 10 MG/1
10 TABLET ORAL NIGHTLY
Qty: 90 TABLET | Refills: 0 | Status: SHIPPED | OUTPATIENT
Start: 2025-08-04

## 2025-08-13 ENCOUNTER — PATIENT MESSAGE (OUTPATIENT)
Dept: FAMILY MEDICINE | Facility: CLINIC | Age: 65
End: 2025-08-13
Payer: MEDICARE